# Patient Record
Sex: MALE | Race: WHITE | NOT HISPANIC OR LATINO | Employment: FULL TIME | URBAN - METROPOLITAN AREA
[De-identification: names, ages, dates, MRNs, and addresses within clinical notes are randomized per-mention and may not be internally consistent; named-entity substitution may affect disease eponyms.]

---

## 2017-05-19 ENCOUNTER — OFFICE VISIT (OUTPATIENT)
Dept: MEDICAL GROUP | Facility: MEDICAL CENTER | Age: 62
End: 2017-05-19
Payer: COMMERCIAL

## 2017-05-19 VITALS
WEIGHT: 191 LBS | DIASTOLIC BLOOD PRESSURE: 98 MMHG | OXYGEN SATURATION: 98 % | RESPIRATION RATE: 16 BRPM | SYSTOLIC BLOOD PRESSURE: 138 MMHG | HEIGHT: 71 IN | HEART RATE: 86 BPM | BODY MASS INDEX: 26.74 KG/M2 | TEMPERATURE: 97.9 F

## 2017-05-19 DIAGNOSIS — B18.2 CHRONIC HEPATITIS C WITHOUT HEPATIC COMA (HCC): ICD-10-CM

## 2017-05-19 DIAGNOSIS — Z00.00 ANNUAL PHYSICAL EXAM: ICD-10-CM

## 2017-05-19 DIAGNOSIS — R03.0 ELEVATED BLOOD PRESSURE READING: ICD-10-CM

## 2017-05-19 DIAGNOSIS — N52.9 ERECTILE DYSFUNCTION, UNSPECIFIED ERECTILE DYSFUNCTION TYPE: ICD-10-CM

## 2017-05-19 PROBLEM — B19.20 HEPATITIS C: Status: ACTIVE | Noted: 2017-05-19

## 2017-05-19 PROCEDURE — 99386 PREV VISIT NEW AGE 40-64: CPT | Performed by: FAMILY MEDICINE

## 2017-05-19 RX ORDER — SILDENAFIL 100 MG/1
100 TABLET, FILM COATED ORAL
Qty: 10 TAB | Refills: 5 | Status: SHIPPED | OUTPATIENT
Start: 2017-05-19 | End: 2018-05-07 | Stop reason: SDUPTHER

## 2017-05-19 ASSESSMENT — PATIENT HEALTH QUESTIONNAIRE - PHQ9: CLINICAL INTERPRETATION OF PHQ2 SCORE: 0

## 2017-05-19 NOTE — ASSESSMENT & PLAN NOTE
Started about 8 years ago. Difficulty getting and maintaining an erection.  Has not tried any medication.

## 2017-05-19 NOTE — ASSESSMENT & PLAN NOTE
Patient does not regularly, last any check. His blood pressure was 120/70, that was many months ago.

## 2017-05-19 NOTE — PROGRESS NOTES
"German Pagan is a 62 y.o. male here for annual  HPI: Patient has not seen a physician in 10 years.  He has history of methamphetamine abuse, but quit in .    ED (erectile dysfunction)  Started about 8 years ago. Difficulty getting and maintaining an erection.  Has not tried any medication.    Hepatitis C  Diagnosed  in [place] - [facility].  Liver biopsy in [place], mild scarring.  He is no longer drinking alcohol, quit in .    Elevated blood pressure reading  Patient does not regularly, last any check. His blood pressure was 120/70, that was many months ago.    Current medicines (including changes today)  Current Outpatient Prescriptions   Medication Sig Dispense Refill   • sildenafil citrate (VIAGRA) 100 MG tablet Take 1 Tab by mouth 1 time daily as needed for Erectile Dysfunction. 10 Tab 5     No current facility-administered medications for this visit.     He  has no past medical history on file.  He  has past surgical history that includes ankle orif and ankle fusion (Right, ).  Social History   Substance Use Topics   • Smoking status: Former Smoker -- 0.50 packs/day for 40 years     Types: Cigarettes     Quit date: 2006   • Smokeless tobacco: Current User     Types: Chew      Comment: Quit smoking in    • Alcohol Use: No      Comment: Quit in      Social History     Social History Narrative   • No narrative on file     Family History   Problem Relation Age of Onset   • Cancer Mother      Multiple Myeloma   • Cancer Father      Colon   • Cancer Sister      Breast   • Diabetes Brother      Family Status   Relation Status Death Age   • Mother Alive    • Father     • Sister     • Brother Alive          ROS  No chest pain  All other systems reviewed and are negative     Objective:     Blood pressure 138/98, pulse 86, temperature 36.6 °C (97.9 °F), resp. rate 16, height 1.803 m (5' 10.98\"), weight 86.637 kg (191 lb), SpO2 98 %. Body mass " index is 26.65 kg/(m^2).  Physical Exam:    Constitutional: Alert, no distress.  Skin: Warm, dry, good turgor, no rashes in visible areas.  Eye: Equal, round and reactive, conjunctiva clear, lids normal.  ENMT: Lips without lesions, false dentition, oropharynx clear.  Neck: Trachea midline, no masses, no thyromegaly. No cervical or supraclavicular lymphadenopathy.  Respiratory: Unlabored respiratory effort, lungs clear to auscultation, no wheezes, no ronchi.  Cardiovascular: Normal S1, S2, no murmur, no edema.  Abdomen: Soft, non-tender, no masses, no hepatosplenomegaly.  Psych: Alert and oriented x3, normal affect and mood.        Assessment and Plan:   The following treatment plan was discussed    1. Annual physical exam  Check labs and follow-up in one month with results.  - PROSTATE SPECIFIC AG SCREENING; Future  - COMP METABOLIC PANEL; Future  - CBC WITH DIFFERENTIAL; Future  - LIPID PROFILE; Future  - TSH WITH REFLEX TO FT4; Future  - VITAMIN D,25 HYDROXY; Future  - HCV GENOTYPING; Future  - HCV QUANTASURE (PLUS); Future    2. Chronic hepatitis C without hepatic coma (CMS-HCC)  Check labs in follow-up in one month with results.  - HCV GENOTYPING; Future  - HCV QUANTASURE (PLUS); Future    3. Erectile dysfunction, unspecified erectile dysfunction type  Trial of Viagra.  - sildenafil citrate (VIAGRA) 100 MG tablet; Take 1 Tab by mouth 1 time daily as needed for Erectile Dysfunction.  Dispense: 10 Tab; Refill: 5    4. Elevated blood pressure reading  Advised patient to monitor blood pressure daily and to follow-up in one month with blood pressure log.      [facility]  Followup: Return in about 4 weeks (around 6/16/2017) for Labs, HTN, Short.

## 2017-05-19 NOTE — ASSESSMENT & PLAN NOTE
Diagnosed 2002 in [place] - [facility].  Liver biopsy in [place], mild scarring.  He is no longer drinking alcohol, quit in 1994.

## 2017-05-19 NOTE — MR AVS SNAPSHOT
"        German Pagan   2017 3:40 PM   Office Visit   MRN: [id number]    Department:  [facility]   Dept Phone:  [phone]    Description:  Male : 1955   Provider:  Shiva Haney M.D.           Reason for Visit     Establish Care           Allergies as of 2017     Allergen Noted Reactions    Pcn [Penicillins] 2017       Rash as a baby      You were diagnosed with     Annual physical exam   [068395]       Chronic hepatitis C without hepatic coma (CMS-HCC)   [357430]       Erectile dysfunction, unspecified erectile dysfunction type   [4503249]       Elevated blood pressure reading   [829814]         Vital Signs     Blood Pressure Pulse Temperature Respirations Height Weight    138/98 mmHg 86 36.6 °C (97.9 °F) 16 1.803 m (5' 10.98\") 86.637 kg (191 lb)    Body Mass Index Oxygen Saturation Smoking Status             26.65 kg/m2 98% Former Smoker         Basic Information     Date Of Birth Sex Race Ethnicity Preferred Language    1955 Male White Non- English      Your appointments     2017  3:40 PM   Established Patient with Shiva Haney M.D.   [facility] ([facility])    [address]  [facility] [address] [phone]           You will be receiving a confirmation call a few days before your appointment from our automated call confirmation system.              Problem List              ICD-10-CM Priority Class Noted - Resolved    Hepatitis C B19.20   2017 - Present    ED (erectile dysfunction) N52.9   2017 - Present    Elevated blood pressure reading R03.0   2017 - Present      Health Maintenance        Date Due Completion Dates    IMM DTaP/Tdap/Td Vaccine (1 - Tdap) 3/15/1972 ---    COLONOSCOPY 3/15/2003 ---    IMM ZOSTER VACCINE 3/15/2013 ---            Current Immunizations     No immunizations on file.      Below and/or attached are the medications your provider expects you to take. Review all of your " home medications and newly ordered medications with your provider and/or pharmacist. Follow medication instructions as directed by your provider and/or pharmacist. Please keep your medication list with you and share with your provider. Update the information when medications are discontinued, doses are changed, or new medications (including over-the-counter products) are added; and carry medication information at all times in the event of emergency situations     Allergies:  PCN - (reactions not documented)               Medications  Valid as of: May 19, 2017 -  4:09 PM    Generic Name Brand Name Tablet Size Instructions for use    Sildenafil Citrate (Tab) VIAGRA 100 MG Take 1 Tab by mouth 1 time daily as needed for Erectile Dysfunction.        .                 Medicines prescribed today were sent to:     [facility] [address] [place] [address] (S), [place] [address]    [address] ([address]) [place] [address]    Phone: [phone] Fax: [phone]    Open 24 Hours?: No      Medication refill instructions:       If your prescription bottle indicates you have medication refills left, it is not necessary to call your provider’s office. Please contact your pharmacy and they will refill your medication.    If your prescription bottle indicates you do not have any refills left, you may request refills at any time through one of the following ways: The online shoutr system (except Urgent Care), by calling your provider’s office, or by asking your pharmacy to contact your provider’s office with a refill request. Medication refills are processed only during regular business hours and may not be available until the next business day. Your provider may request additional information or to have a follow-up visit with you prior to refilling your medication.   *Please Note: Medication refills are assigned a new Rx number when refilled electronically. Your pharmacy may indicate that no refills were authorized even though a new  prescription for the same medication is available at the pharmacy. Please request the medicine by name with the pharmacy before contacting your provider for a refill.        Your To Do List     Future Labs/Procedures Complete By Expires    CBC WITH DIFFERENTIAL  As directed 5/20/2018    COMP METABOLIC PANEL  As directed 5/20/2018    HCV GENOTYPING  As directed 5/20/2018    HCV QUANTASURE (PLUS)  As directed 5/20/2018    LIPID PROFILE  As directed 5/20/2018    PROSTATE SPECIFIC AG SCREENING  As directed 5/20/2018    TSH WITH REFLEX TO FT4  As directed 5/19/2018    VITAMIN D,25 HYDROXY  As directed 5/20/2018         [email or web address] Access Code: [email or web address]  Expires: 6/18/2017  3:18 PM    Netshow.me  A secure, online tool to manage your health information     [employer]’s Netshow.me® is a secure, online tool that connects you to your personalized health information from the privacy of your home -- day or night - making it very easy for you to manage your healthcare. Once the activation process is completed, you can even access your medical information using the Netshow.me maria esther, which is available for free in the Apple Maria Esther store or Google Play store.     Netshow.me provides the following levels of access (as shown below):   My Chart Features   [facility] Doctor [facility] [address]  Urgent  Care [facility]  Primary Care  Doctor   Email your healthcare team securely and privately 24/7 X X X    Manage appointments: schedule your next appointment; view details of past/upcoming appointments X      Request prescription refills. X      View recent personal medical records, including lab and immunizations X X X X   View health record, including health history, allergies, medications X X X X   Read reports about your outpatient visits, procedures, consult and ER notes X X X X   See your discharge summary, which is a recap of your hospital and/or ER visit that includes your diagnosis, lab results, and care plan. X X       How  to register for Ripl:  1. Go to  [email or web address].  2. Click on the Sign Up Now box, which takes you to the New Member Sign Up page. You will need to provide the following information:  a. Enter your Ripl Access Code exactly as it appears at the top of this page. (You will not need to use this code after you’ve completed the sign-up process. If you do not sign up before the expiration date, you must request a new code.)   b. Enter your date of birth.   c. Enter your home email address.   d. Click Submit, and follow the next screen’s instructions.  3. Create a Ripl ID. This will be your Ripl login ID and cannot be changed, so think of one that is secure and easy to remember.  4. Create a Ripl password. You can change your password at any time.  5. Enter your Password Reset Question and Answer. This can be used at a later time if you forget your password.   6. Enter your e-mail address. This allows you to receive e-mail notifications when new information is available in Ripl.  7. Click Sign Up. You can now view your health information.    For assistance activating your Ripl account, call [phone]        Quit Tobacco Information     Do you want to quit using tobacco?    Quitting tobacco decreases risks of cancer, heart and lung disease, increases life expectancy, improves sense of taste and smell, and increases spending money, among other benefits.    If you are thinking about quitting, we can help.  • [facility] Quit Tobacco Program: [phone]  o Program occurs weekly for four weeks and includes pharmacist consultation on products to support quitting smoking or chewing tobacco. A provider referral is needed for pharmacist consultation.  • Tobacco Users Help Hotline: 3-800-QUIT-NOW (113-1564) or https://[place].quitlogix.org/  o Free, confidential telephone and online coaching for [place] residents. Sessions are designed on a schedule that is convenient for you. Eligible clients  receive free nicotine replacement therapy.  • Nationally: www.smokefree.gov  o Information and professional assistance to support both immediate and long-term needs as you become, and remain, a non-smoker. Smokefree.gov allows you to choose the help that best fits your needs.

## 2017-05-23 ENCOUNTER — HOSPITAL ENCOUNTER (OUTPATIENT)
Dept: LAB | Facility: MEDICAL CENTER | Age: 62
End: 2017-05-23
Attending: FAMILY MEDICINE
Payer: COMMERCIAL

## 2017-05-23 DIAGNOSIS — B18.2 CHRONIC HEPATITIS C WITHOUT HEPATIC COMA (HCC): ICD-10-CM

## 2017-05-23 DIAGNOSIS — Z00.00 ANNUAL PHYSICAL EXAM: ICD-10-CM

## 2017-05-23 LAB
25(OH)D3 SERPL-MCNC: 24 NG/ML (ref 30–100)
ALBUMIN SERPL BCP-MCNC: 3.9 G/DL (ref 3.2–4.9)
ALBUMIN/GLOB SERPL: 1.3 G/DL
ALP SERPL-CCNC: 37 U/L (ref 30–99)
ALT SERPL-CCNC: 19 U/L (ref 2–50)
ANION GAP SERPL CALC-SCNC: 6 MMOL/L (ref 0–11.9)
AST SERPL-CCNC: 17 U/L (ref 12–45)
BASOPHILS # BLD AUTO: 0.7 % (ref 0–1.8)
BASOPHILS # BLD: 0.04 K/UL (ref 0–0.12)
BILIRUB SERPL-MCNC: 0.8 MG/DL (ref 0.1–1.5)
BUN SERPL-MCNC: 15 MG/DL (ref 8–22)
CALCIUM SERPL-MCNC: 9.7 MG/DL (ref 8.5–10.5)
CHLORIDE SERPL-SCNC: 107 MMOL/L (ref 96–112)
CHOLEST SERPL-MCNC: 167 MG/DL (ref 100–199)
CO2 SERPL-SCNC: 23 MMOL/L (ref 20–33)
CREAT SERPL-MCNC: 0.91 MG/DL (ref 0.5–1.4)
EOSINOPHIL # BLD AUTO: 0.09 K/UL (ref 0–0.51)
EOSINOPHIL NFR BLD: 1.5 % (ref 0–6.9)
ERYTHROCYTE [DISTWIDTH] IN BLOOD BY AUTOMATED COUNT: 41.4 FL (ref 35.9–50)
GFR SERPL CREATININE-BSD FRML MDRD: >60 ML/MIN/1.73 M 2
GLOBULIN SER CALC-MCNC: 3.1 G/DL (ref 1.9–3.5)
GLUCOSE SERPL-MCNC: 95 MG/DL (ref 65–99)
HCT VFR BLD AUTO: 45.6 % (ref 42–52)
HDLC SERPL-MCNC: 48 MG/DL
HGB BLD-MCNC: 15.7 G/DL (ref 14–18)
IMM GRANULOCYTES # BLD AUTO: 0.02 K/UL (ref 0–0.11)
IMM GRANULOCYTES NFR BLD AUTO: 0.3 % (ref 0–0.9)
LDLC SERPL CALC-MCNC: 104 MG/DL
LYMPHOCYTES # BLD AUTO: 2.35 K/UL (ref 1–4.8)
LYMPHOCYTES NFR BLD: 39 % (ref 22–41)
MCH RBC QN AUTO: 31.3 PG (ref 27–33)
MCHC RBC AUTO-ENTMCNC: 34.4 G/DL (ref 33.7–35.3)
MCV RBC AUTO: 91 FL (ref 81.4–97.8)
MONOCYTES # BLD AUTO: 0.55 K/UL (ref 0–0.85)
MONOCYTES NFR BLD AUTO: 9.1 % (ref 0–13.4)
NEUTROPHILS # BLD AUTO: 2.97 K/UL (ref 1.82–7.42)
NEUTROPHILS NFR BLD: 49.4 % (ref 44–72)
NRBC # BLD AUTO: 0 K/UL
NRBC BLD AUTO-RTO: 0 /100 WBC
PLATELET # BLD AUTO: 178 K/UL (ref 164–446)
PMV BLD AUTO: 10.7 FL (ref 9–12.9)
POTASSIUM SERPL-SCNC: 4 MMOL/L (ref 3.6–5.5)
PROT SERPL-MCNC: 7 G/DL (ref 6–8.2)
PSA SERPL-MCNC: 1.57 NG/ML (ref 0–4)
RBC # BLD AUTO: 5.01 M/UL (ref 4.7–6.1)
SODIUM SERPL-SCNC: 136 MMOL/L (ref 135–145)
TRIGL SERPL-MCNC: 74 MG/DL (ref 0–149)
TSH SERPL DL<=0.005 MIU/L-ACNC: 1.8 UIU/ML (ref 0.3–3.7)
WBC # BLD AUTO: 6 K/UL (ref 4.8–10.8)

## 2017-05-23 PROCEDURE — 82306 VITAMIN D 25 HYDROXY: CPT

## 2017-05-23 PROCEDURE — 87902 NFCT AGT GNTYP ALYS HEP C: CPT

## 2017-05-23 PROCEDURE — 85025 COMPLETE CBC W/AUTO DIFF WBC: CPT

## 2017-05-23 PROCEDURE — 84153 ASSAY OF PSA TOTAL: CPT

## 2017-05-23 PROCEDURE — 80061 LIPID PANEL: CPT

## 2017-05-23 PROCEDURE — 36415 COLL VENOUS BLD VENIPUNCTURE: CPT

## 2017-05-23 PROCEDURE — 80053 COMPREHEN METABOLIC PANEL: CPT

## 2017-05-23 PROCEDURE — 84443 ASSAY THYROID STIM HORMONE: CPT

## 2017-05-23 PROCEDURE — 87522 HEPATITIS C REVRS TRNSCRPJ: CPT

## 2017-05-26 LAB
HCV RNA SERPL NAA+PROBE-ACNC: NORMAL IU/ML
HCV RNA SERPL NAA+PROBE-LOG IU: 6.17 LOG10 IU/ML
TEST INFO  93644: NORMAL

## 2017-05-28 LAB — HCV GENTYP SERPL NAA+PROBE: NORMAL

## 2017-06-16 ENCOUNTER — OFFICE VISIT (OUTPATIENT)
Dept: MEDICAL GROUP | Facility: MEDICAL CENTER | Age: 62
End: 2017-06-16
Payer: COMMERCIAL

## 2017-06-16 VITALS
TEMPERATURE: 98.2 F | BODY MASS INDEX: 26.88 KG/M2 | SYSTOLIC BLOOD PRESSURE: 112 MMHG | OXYGEN SATURATION: 96 % | HEIGHT: 71 IN | HEART RATE: 86 BPM | WEIGHT: 192 LBS | DIASTOLIC BLOOD PRESSURE: 72 MMHG

## 2017-06-16 DIAGNOSIS — E55.9 VITAMIN D INSUFFICIENCY: ICD-10-CM

## 2017-06-16 DIAGNOSIS — B18.2 CHRONIC HEPATITIS C WITHOUT HEPATIC COMA (HCC): ICD-10-CM

## 2017-06-16 DIAGNOSIS — Z23 NEED FOR SHINGLES VACCINE: ICD-10-CM

## 2017-06-16 PROBLEM — R03.0 ELEVATED BLOOD PRESSURE READING: Status: RESOLVED | Noted: 2017-05-19 | Resolved: 2017-06-16

## 2017-06-16 PROCEDURE — 99213 OFFICE O/P EST LOW 20 MIN: CPT | Performed by: FAMILY MEDICINE

## 2017-06-16 NOTE — ASSESSMENT & PLAN NOTE
Diagnosed 2002 in [place] - was [facility] because of [facility].  Liver biopsy in [place], mild scarring.  He is no longer drinking alcohol, quit in 1994.  Patient states that hepatitis C has never bothered him, so he is not interested in treatment at this time.

## 2017-06-16 NOTE — Clinical Note
Critical access hospital  Shiva Haney M.D.  [address]  [place] [address]  Fax: [phone]   Authorization for Release/Disclosure of   Protected Health Information   Name: GERMAN PAGAN : 1955 SSN: [id number]   Address: [address] [place] [address] [address] [place] [address] Phone:    [phone] (home)    I authorize the entity listed below to release/disclose the PHI below to:   Critical access hospital/Shiva Haney M.D. and Shiva Haney M.D.   Provider or Entity Name:  [facility]   Address   City, State, Zip   Phone:      Fax:     Reason for request: continuity of care   Information to be released:    [  ] LAST COLONOSCOPY,  including any PATH REPORT and follow-up  [  ] LAST FIT/COLOGUARD RESULT [  ] LAST DEXA  [  ] LAST MAMMOGRAM  [  ] LAST PAP  [  ] LAST LABS [  ] RETINA EXAM REPORT  [  ] IMMUNIZATION RECORDS  [  ] Release all info      [  ] Check here and initial the line next to each item to release ALL health information INCLUDING  _____ Care and treatment for drug and / or alcohol abuse  _____ HIV testing, infection status, or AIDS  _____ Genetic Testing    DATES OF SERVICE OR TIME PERIOD TO BE DISCLOSED: _____________  I understand and acknowledge that:  * This Authorization may be revoked at any time by you in writing, except if your health information has already been used or disclosed.  * Your health information that will be used or disclosed as a result of you signing this authorization could be re-disclosed by the recipient. If this occurs, your re-disclosed health information may no longer be protected by State or Federal laws.  * You may refuse to sign this Authorization. Your refusal will not affect your ability to obtain treatment.  * This Authorization becomes effective upon signing and will  on (date) __________.      If no date is indicated, this Authorization will  one (1) year from the signature date.    Name: German Pagan    Signature:   Date:     2017            PLEASE FAX REQUESTED RECORDS BACK TO: [phone]

## 2017-06-16 NOTE — PROGRESS NOTES
"Subjective:   German Villalpando is a 62 y.o. male here today for vitamin D insufficiency, hepatitis C    Vitamin D insufficiency  Patient's vitamin D level was slightly low at 24. He is not taking vitamin D supplementation.    Hepatitis C  Diagnosed 2002 in [place] - [facility].  Liver biopsy in [place], mild scarring.  He is no longer drinking alcohol, quit in 1994.  Patient states that hepatitis C has never bothered him, so he is not interested in treatment at this time.         Current medicines (including changes today)  Current Outpatient Prescriptions   Medication Sig Dispense Refill   • zoster vaccine live, PF, (ZOSTAVAX) 88971 UNT/0.65ML injection Inject 0.65 mL as instructed Once for 1 dose. Please fax when administered so we can update our records 0.65 mL 0   • sildenafil citrate (VIAGRA) 100 MG tablet Take 1 Tab by mouth 1 time daily as needed for Erectile Dysfunction. 10 Tab 5     No current facility-administered medications for this visit.     He  has no past medical history on file.    ROS   No chest pain, no shortness of breath       Objective:     Blood pressure 112/72, pulse 86, temperature 36.8 °C (98.2 °F), height 1.803 m (5' 11\"), weight 87.091 kg (192 lb), SpO2 96 %. Body mass index is 26.79 kg/(m^2).   Physical Exam:  Constitutional: Alert, no distress.  Skin: Warm, dry, good turgor, no rashes in visible areas.  Eye: Equal, round and reactive, conjunctiva clear, lids normal.  Respiratory: Unlabored respiratory effort, lungs clear to auscultation, no wheezes, no ronchi.  Cardiovascular: Normal S1, S2, no murmur, no edema.  Psych: Alert and oriented x3, normal affect and mood.    Results for GERMAN VILLALPANDO (MRN [id number]) as of 6/16/2017 15:41   Ref. Range 5/23/2017 10:06   WBC Latest Ref Range: 4.8-10.8 K/uL 6.0   RBC Latest Ref Range: 4.70-6.10 M/uL 5.01   Hemoglobin Latest Ref Range: 14.0-18.0 g/dL 15.7   Hematocrit Latest Ref Range: 42.0-52.0 % 45.6   MCV Latest Ref " Range: 81.4-97.8 fL 91.0   MCH Latest Ref Range: 27.0-33.0 pg 31.3   MCHC Latest Ref Range: 33.7-35.3 g/dL 34.4   RDW Latest Ref Range: 35.9-50.0 fL 41.4   Platelet Count Latest Ref Range: 164-446 K/uL 178   MPV Latest Ref Range: 9.0-12.9 fL 10.7   Neutrophils-Polys Latest Ref Range: 44.00-72.00 % 49.40   Neutrophils (Absolute) Latest Ref Range: 1.82-7.42 K/uL 2.97   Lymphocytes Latest Ref Range: 22.00-41.00 % 39.00   Lymphs (Absolute) Latest Ref Range: 1.00-4.80 K/uL 2.35   Monocytes Latest Ref Range: 0.00-13.40 % 9.10   Monos (Absolute) Latest Ref Range: 0.00-0.85 K/uL 0.55   Eosinophils Latest Ref Range: 0.00-6.90 % 1.50   Eos (Absolute) Latest Ref Range: 0.00-0.51 K/uL 0.09   Basophils Latest Ref Range: 0.00-1.80 % 0.70   Baso (Absolute) Latest Ref Range: 0.00-0.12 K/uL 0.04   Immature Granulocytes Latest Ref Range: 0.00-0.90 % 0.30   Immature Granulocytes (abs) Latest Ref Range: 0.00-0.11 K/uL 0.02   Nucleated RBC Latest Units: /100 WBC 0.00   NRBC (Absolute) Latest Units: K/uL 0.00   Sodium Latest Ref Range: 135-145 mmol/L 136   Potassium Latest Ref Range: 3.6-5.5 mmol/L 4.0   Chloride Latest Ref Range:  mmol/L 107   Co2 Latest Ref Range: 20-33 mmol/L 23   Anion Gap Latest Ref Range: 0.0-11.9  6.0   Glucose Latest Ref Range: 65-99 mg/dL 95   Bun Latest Ref Range: 8-22 mg/dL 15   Creatinine Latest Ref Range: 0.50-1.40 mg/dL 0.91   GFR If  Latest Ref Range: >60 mL/min/1.73 m 2 >60   GFR If Non  Latest Ref Range: >60 mL/min/1.73 m 2 >60   Calcium Latest Ref Range: 8.5-10.5 mg/dL 9.7   AST(SGOT) Latest Ref Range: 12-45 U/L 17   ALT(SGPT) Latest Ref Range: 2-50 U/L 19   Alkaline Phosphatase Latest Ref Range: 30-99 U/L 37   Total Bilirubin Latest Ref Range: 0.1-1.5 mg/dL 0.8   Albumin Latest Ref Range: 3.2-4.9 g/dL 3.9   Total Protein Latest Ref Range: 6.0-8.2 g/dL 7.0   Globulin Latest Ref Range: 1.9-3.5 g/dL 3.1   A-G Ratio Latest Units: g/dL 1.3   Cholesterol,Tot Latest Ref  Range: 100-199 mg/dL 167   Triglycerides Latest Ref Range: 0-149 mg/dL 74   HDL Latest Ref Range: >=40 mg/dL 48   LDL Latest Ref Range: <100 mg/dL 104 (H)   Prostatic Specific Antigen Tot Latest Ref Range: 0.00-4.00 ng/mL 1.57   25-Hydroxy   Vitamin D 25 Latest Ref Range:  ng/mL 24 (L)   TSH Latest Ref Range: 0.300-3.700 uIU/mL 1.800   Hepatitis C Genotyping Unknown 1a or 1b   Hepatitis C Rna By Pcr, Quanti Latest Units: IU/mL [id number]   Test Info Unknown SEE BELOW   HCV Log 10 Latest Units: log10 IU/mL 6.170       Assessment and Plan:   The following treatment plan was discussed    1. Vitamin D insufficiency  Advised patient take vitamin D 2000 units daily.    2. Chronic hepatitis C without hepatic coma (CMS-HCC)  Offered referral to GI for hepatitis C treatment, patient will think about it and let us know if he is interested in referral.    3. Need for shingles vaccine  - zoster vaccine live, PF, (ZOSTAVAX) 90359 UNT/0.65ML injection; Inject 0.65 mL as instructed Once for 1 dose. Please fax when administered so we can update our records  Dispense: 0.65 mL; Refill: 0      Followup: Return in about 1 year (around 6/16/2018), or if symptoms worsen or fail to improve, for Annual, Short.

## 2017-06-16 NOTE — MR AVS SNAPSHOT
"        German Pagan   2017 3:40 PM   Office Visit   MRN: [id number]    Department:  [facility]   Dept Phone:  [phone]    Description:  Male : 1955   Provider:  Shiva Haney M.D.           Reason for Visit     Follow-Up labs       Allergies as of 2017     Allergen Noted Reactions    Pcn [Penicillins] 2017       Rash as a baby      You were diagnosed with     Vitamin D insufficiency   [999560]       Chronic hepatitis C without hepatic coma (CMS-HCC)   [214867]       Need for shingles vaccine   [048898]         Vital Signs     Blood Pressure Pulse Temperature Height Weight Body Mass Index    112/72 mmHg 86 36.8 °C (98.2 °F) 1.803 m (5' 11\") 87.091 kg (192 lb) 26.79 kg/m2    Oxygen Saturation Smoking Status                96% Former Smoker          Basic Information     Date Of Birth Sex Race Ethnicity Preferred Language    1955 Male White Non- English      Problem List              ICD-10-CM Priority Class Noted - Resolved    Hepatitis C B19.20   2017 - Present    ED (erectile dysfunction) N52.9   2017 - Present    Vitamin D insufficiency E55.9   2017 - Present      Health Maintenance        Date Due Completion Dates    IMM DTaP/Tdap/Td Vaccine (1 - Tdap) 3/15/1972 ---    COLONOSCOPY 3/15/2003 ---    IMM ZOSTER VACCINE 3/15/2013 ---            Current Immunizations     No immunizations on file.      Below and/or attached are the medications your provider expects you to take. Review all of your home medications and newly ordered medications with your provider and/or pharmacist. Follow medication instructions as directed by your provider and/or pharmacist. Please keep your medication list with you and share with your provider. Update the information when medications are discontinued, doses are changed, or new medications (including over-the-counter products) are added; and carry medication information at all times in the event of emergency situations "     Allergies:  PCN - (reactions not documented)               Medications  Valid as of: June 16, 2017 -  3:42 PM    Generic Name Brand Name Tablet Size Instructions for use    Sildenafil Citrate (Tab) VIAGRA 100 MG Take 1 Tab by mouth 1 time daily as needed for Erectile Dysfunction.        Zoster Vaccine Live (Recon Soln) ZOSTAVAX 39807 UNT/0.65ML Inject 0.65 mL as instructed Once for 1 dose. Please fax when administered so we can update our records        .                 Medicines prescribed today were sent to:     [address] ([place]), [place] [address]    [facility] [facility] [place] [address]    Phone: [phone] Fax: [phone]    Open 24 Hours?: No      Medication refill instructions:       If your prescription bottle indicates you have medication refills left, it is not necessary to call your provider’s office. Please contact your pharmacy and they will refill your medication.    If your prescription bottle indicates you do not have any refills left, you may request refills at any time through one of the following ways: The online [employer] system (except Urgent Care), by calling your provider’s office, or by asking your pharmacy to contact your provider’s office with a refill request. Medication refills are processed only during regular business hours and may not be available until the next business day. Your provider may request additional information or to have a follow-up visit with you prior to refilling your medication.   *Please Note: Medication refills are assigned a new Rx number when refilled electronically. Your pharmacy may indicate that no refills were authorized even though a new prescription for the same medication is available at the pharmacy. Please request the medicine by name with the pharmacy before contacting your provider for a refill.           MyChart Status: Patient Declined        Quit Tobacco Information     Do you want to quit using tobacco?    Quitting tobacco  decreases risks of cancer, heart and lung disease, increases life expectancy, improves sense of taste and smell, and increases spending money, among other benefits.    If you are thinking about quitting, we can help.  • [facility] Quit Tobacco Program: 782.863.1568  o Program occurs weekly for four weeks and includes pharmacist consultation on products to support quitting smoking or chewing tobacco. A provider referral is needed for pharmacist consultation.  • Tobacco Users Help Hotline: 5-376-QUIT-NOW (426-3655) or https://[place].quitlogix.org/  o Free, confidential telephone and online coaching for [place] residents. Sessions are designed on a schedule that is convenient for you. Eligible clients receive free nicotine replacement therapy.  • Nationally: www.smokefree.gov  o Information and professional assistance to support both immediate and long-term needs as you become, and remain, a non-smoker. Smokefree.gov allows you to choose the help that best fits your needs.

## 2018-05-07 ENCOUNTER — OFFICE VISIT (OUTPATIENT)
Dept: MEDICAL GROUP | Facility: MEDICAL CENTER | Age: 63
End: 2018-05-07
Payer: COMMERCIAL

## 2018-05-07 VITALS
OXYGEN SATURATION: 97 % | DIASTOLIC BLOOD PRESSURE: 84 MMHG | BODY MASS INDEX: 27.19 KG/M2 | TEMPERATURE: 97 F | WEIGHT: 194.2 LBS | HEART RATE: 75 BPM | HEIGHT: 71 IN | RESPIRATION RATE: 14 BRPM | SYSTOLIC BLOOD PRESSURE: 106 MMHG

## 2018-05-07 DIAGNOSIS — N52.9 ERECTILE DYSFUNCTION, UNSPECIFIED ERECTILE DYSFUNCTION TYPE: ICD-10-CM

## 2018-05-07 DIAGNOSIS — Z12.83 SKIN CANCER SCREENING: ICD-10-CM

## 2018-05-07 DIAGNOSIS — Z00.00 ANNUAL PHYSICAL EXAM: ICD-10-CM

## 2018-05-07 DIAGNOSIS — H61.22 IMPACTED CERUMEN OF LEFT EAR: ICD-10-CM

## 2018-05-07 DIAGNOSIS — B18.2 CHRONIC HEPATITIS C WITHOUT HEPATIC COMA (HCC): ICD-10-CM

## 2018-05-07 DIAGNOSIS — E55.9 VITAMIN D INSUFFICIENCY: ICD-10-CM

## 2018-05-07 PROCEDURE — 99397 PER PM REEVAL EST PAT 65+ YR: CPT | Mod: 25 | Performed by: FAMILY MEDICINE

## 2018-05-07 PROCEDURE — 90670 PCV13 VACCINE IM: CPT | Performed by: FAMILY MEDICINE

## 2018-05-07 PROCEDURE — 90471 IMMUNIZATION ADMIN: CPT | Performed by: FAMILY MEDICINE

## 2018-05-07 RX ORDER — SILDENAFIL 100 MG/1
100 TABLET, FILM COATED ORAL
Qty: 10 TAB | Refills: 5 | Status: SHIPPED | OUTPATIENT
Start: 2018-05-07 | End: 2018-08-14 | Stop reason: SDUPTHER

## 2018-05-07 ASSESSMENT — PATIENT HEALTH QUESTIONNAIRE - PHQ9: CLINICAL INTERPRETATION OF PHQ2 SCORE: 0

## 2018-05-07 NOTE — PROGRESS NOTES
"Subjective:   German Pagan is a 63 y.o. 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 Other than mild pain after work that requires tylenol or aleve, he has not complaints.  Is here for his annual exam. Patient has known hepatitis C. He states he had a liver biopsy done approximately 10 years ago and he had very mild liver disease. At that time with treatments, it was determined to not have treatment.    Current medicines (including changes today)  Current Outpatient Prescriptions   Medication Sig Dispense Refill   • sildenafil citrate (VIAGRA) 100 MG tablet Take 1 Tab by mouth 1 time daily as needed for Erectile Dysfunction. 10 Tab 5     No current facility-administered medications for this visit.      He  has no past medical history on file.    ROS   No chest pain, no shortness of breath, no abdominal pain       Objective:     Blood pressure 106/84, pulse 75, temperature 36.1 °C (97 °F), resp. rate 14, height 1.803 m (5' 11\"), weight 88.1 kg (194 lb 3.2 oz), SpO2 97 %. Body mass index is 27.09 kg/m².   Physical Exam:  Constitutional: Alert, no distress.  Skin: Warm, dry, good turgor, no rashes in visible areas.  Eye: Equal, round and reactive, conjunctiva clear, lids normal.  ENMT: Lips without lesions, good dentition, oropharynx clear. Left cerumen impaction.  Neck: Trachea midline, no masses, no thyromegaly. No cervical or supraclavicular lymphadenopathy  Respiratory: Unlabored respiratory effort, lungs clear to auscultation, no wheezes, no ronchi.  Cardiovascular: Normal S1, S2, no murmur, no edema.  Abdomen: Soft, non-tender, no masses, no hepatosplenomegaly.  Psych: Alert and oriented x3, normal affect and mood.        Assessment and Plan:   The following treatment plan was discussed    1. Annual physical exam  Check labs and call with results.  Prevnar-13 done today.  - COMP METABOLIC PANEL; Future  - CBC WITH DIFFERENTIAL; Future  - HEMOGLOBIN A1C; " Future  - LIPID PROFILE; Future  - TSH WITH REFLEX TO FT4; Future  - VITAMIN D,25 HYDROXY; Future  - PNEUMOCOCCAL CONJUGATE VACCINE 13-VALENT    2. Vitamin D insufficiency  Check labs and call with results.    3. Chronic hepatitis C without hepatic coma (HCC)  Genotype 1a or 1b. Referral to GI.  - REFERRAL TO GASTROENTEROLOGY    4. Erectile dysfunction, unspecified erectile dysfunction type  Stable. Continue viagra.  - sildenafil citrate (VIAGRA) 100 MG tablet; Take 1 Tab by mouth 1 time daily as needed for Erectile Dysfunction.  Dispense: 10 Tab; Refill: 5    5. Impacted cerumen of left ear  Lavaged in clinic.    6. Skin cancer screening  - REFERRAL TO DERMATOLOGY      Followup: Return in about 1 year (around 5/7/2019) for Annual.

## 2018-05-08 ENCOUNTER — HOSPITAL ENCOUNTER (OUTPATIENT)
Dept: LAB | Facility: MEDICAL CENTER | Age: 63
End: 2018-05-08
Attending: FAMILY MEDICINE
Payer: COMMERCIAL

## 2018-05-08 DIAGNOSIS — Z00.00 ANNUAL PHYSICAL EXAM: ICD-10-CM

## 2018-05-08 LAB
25(OH)D3 SERPL-MCNC: 39 NG/ML (ref 30–100)
BASOPHILS # BLD AUTO: 1.1 % (ref 0–1.8)
BASOPHILS # BLD: 0.07 K/UL (ref 0–0.12)
EOSINOPHIL # BLD AUTO: 0.11 K/UL (ref 0–0.51)
EOSINOPHIL NFR BLD: 1.7 % (ref 0–6.9)
ERYTHROCYTE [DISTWIDTH] IN BLOOD BY AUTOMATED COUNT: 44.7 FL (ref 35.9–50)
HCT VFR BLD AUTO: 47.8 % (ref 42–52)
HGB BLD-MCNC: 16.2 G/DL (ref 14–18)
IMM GRANULOCYTES # BLD AUTO: 0.02 K/UL (ref 0–0.11)
IMM GRANULOCYTES NFR BLD AUTO: 0.3 % (ref 0–0.9)
LYMPHOCYTES # BLD AUTO: 1.96 K/UL (ref 1–4.8)
LYMPHOCYTES NFR BLD: 30.8 % (ref 22–41)
MCH RBC QN AUTO: 32 PG (ref 27–33)
MCHC RBC AUTO-ENTMCNC: 33.9 G/DL (ref 33.7–35.3)
MCV RBC AUTO: 94.3 FL (ref 81.4–97.8)
MONOCYTES # BLD AUTO: 0.71 K/UL (ref 0–0.85)
MONOCYTES NFR BLD AUTO: 11.1 % (ref 0–13.4)
NEUTROPHILS # BLD AUTO: 3.5 K/UL (ref 1.82–7.42)
NEUTROPHILS NFR BLD: 55 % (ref 44–72)
NRBC # BLD AUTO: 0 K/UL
NRBC BLD-RTO: 0 /100 WBC
PLATELET # BLD AUTO: 164 K/UL (ref 164–446)
PMV BLD AUTO: 10.9 FL (ref 9–12.9)
RBC # BLD AUTO: 5.07 M/UL (ref 4.7–6.1)
TSH SERPL DL<=0.005 MIU/L-ACNC: 2.38 UIU/ML (ref 0.38–5.33)
WBC # BLD AUTO: 6.4 K/UL (ref 4.8–10.8)

## 2018-05-08 PROCEDURE — 80061 LIPID PANEL: CPT

## 2018-05-08 PROCEDURE — 84443 ASSAY THYROID STIM HORMONE: CPT

## 2018-05-08 PROCEDURE — 82306 VITAMIN D 25 HYDROXY: CPT

## 2018-05-08 PROCEDURE — 80053 COMPREHEN METABOLIC PANEL: CPT

## 2018-05-08 PROCEDURE — 36415 COLL VENOUS BLD VENIPUNCTURE: CPT

## 2018-05-08 PROCEDURE — 83036 HEMOGLOBIN GLYCOSYLATED A1C: CPT

## 2018-05-08 PROCEDURE — 85025 COMPLETE CBC W/AUTO DIFF WBC: CPT

## 2018-05-09 LAB
ALBUMIN SERPL BCP-MCNC: 3.9 G/DL (ref 3.2–4.9)
ALBUMIN/GLOB SERPL: 1.3 G/DL
ALP SERPL-CCNC: 33 U/L (ref 30–99)
ALT SERPL-CCNC: 93 U/L (ref 2–50)
ANION GAP SERPL CALC-SCNC: 8 MMOL/L (ref 0–11.9)
AST SERPL-CCNC: 58 U/L (ref 12–45)
BILIRUB SERPL-MCNC: 0.4 MG/DL (ref 0.1–1.5)
BUN SERPL-MCNC: 13 MG/DL (ref 8–22)
CALCIUM SERPL-MCNC: 9.7 MG/DL (ref 8.5–10.5)
CHLORIDE SERPL-SCNC: 106 MMOL/L (ref 96–112)
CHOLEST SERPL-MCNC: 160 MG/DL (ref 100–199)
CO2 SERPL-SCNC: 26 MMOL/L (ref 20–33)
CREAT SERPL-MCNC: 0.89 MG/DL (ref 0.5–1.4)
GLOBULIN SER CALC-MCNC: 3 G/DL (ref 1.9–3.5)
GLUCOSE SERPL-MCNC: 88 MG/DL (ref 65–99)
HDLC SERPL-MCNC: 42 MG/DL
LDLC SERPL CALC-MCNC: 107 MG/DL
POTASSIUM SERPL-SCNC: 4.7 MMOL/L (ref 3.6–5.5)
PROT SERPL-MCNC: 6.9 G/DL (ref 6–8.2)
SODIUM SERPL-SCNC: 140 MMOL/L (ref 135–145)
TRIGL SERPL-MCNC: 57 MG/DL (ref 0–149)

## 2018-05-10 ENCOUNTER — TELEPHONE (OUTPATIENT)
Dept: MEDICAL GROUP | Facility: MEDICAL CENTER | Age: 63
End: 2018-05-10

## 2018-05-10 LAB
EST. AVERAGE GLUCOSE BLD GHB EST-MCNC: 117 MG/DL
HBA1C MFR BLD: 5.7 % (ref 0–5.6)

## 2018-06-19 ENCOUNTER — APPOINTMENT (RX ONLY)
Dept: URBAN - METROPOLITAN AREA CLINIC 4 | Facility: CLINIC | Age: 63
Setting detail: DERMATOLOGY
End: 2018-06-19

## 2018-06-28 ENCOUNTER — APPOINTMENT (RX ONLY)
Dept: URBAN - METROPOLITAN AREA CLINIC 4 | Facility: CLINIC | Age: 63
Setting detail: DERMATOLOGY
End: 2018-06-28

## 2018-06-28 DIAGNOSIS — D22 MELANOCYTIC NEVI: ICD-10-CM

## 2018-06-28 DIAGNOSIS — L82.1 OTHER SEBORRHEIC KERATOSIS: ICD-10-CM

## 2018-06-28 DIAGNOSIS — Z71.89 OTHER SPECIFIED COUNSELING: ICD-10-CM

## 2018-06-28 DIAGNOSIS — L81.4 OTHER MELANIN HYPERPIGMENTATION: ICD-10-CM

## 2018-06-28 DIAGNOSIS — L57.0 ACTINIC KERATOSIS: ICD-10-CM

## 2018-06-28 DIAGNOSIS — L57.8 OTHER SKIN CHANGES DUE TO CHRONIC EXPOSURE TO NONIONIZING RADIATION: ICD-10-CM

## 2018-06-28 DIAGNOSIS — L85.3 XEROSIS CUTIS: ICD-10-CM

## 2018-06-28 PROBLEM — D22.9 MELANOCYTIC NEVI, UNSPECIFIED: Status: ACTIVE | Noted: 2018-06-28

## 2018-06-28 PROBLEM — D48.5 NEOPLASM OF UNCERTAIN BEHAVIOR OF SKIN: Status: ACTIVE | Noted: 2018-06-28

## 2018-06-28 PROCEDURE — ? TREATMENT REGIMEN

## 2018-06-28 PROCEDURE — 11100: CPT | Mod: 59

## 2018-06-28 PROCEDURE — 99203 OFFICE O/P NEW LOW 30 MIN: CPT | Mod: 25

## 2018-06-28 PROCEDURE — 17000 DESTRUCT PREMALG LESION: CPT

## 2018-06-28 PROCEDURE — 69100 BIOPSY OF EXTERNAL EAR: CPT | Mod: 59

## 2018-06-28 PROCEDURE — ? BIOPSY BY SHAVE METHOD

## 2018-06-28 PROCEDURE — ? COUNSELING

## 2018-06-28 PROCEDURE — 17003 DESTRUCT PREMALG LES 2-14: CPT

## 2018-06-28 PROCEDURE — ? LIQUID NITROGEN

## 2018-06-28 ASSESSMENT — LOCATION DETAILED DESCRIPTION DERM
LOCATION DETAILED: LEFT PROXIMAL DORSAL FOREARM
LOCATION DETAILED: EPIGASTRIC SKIN
LOCATION DETAILED: RIGHT DISTAL POSTERIOR UPPER ARM
LOCATION DETAILED: RIGHT CENTRAL TEMPLE
LOCATION DETAILED: RIGHT CENTRAL ZYGOMA
LOCATION DETAILED: RIGHT LATERAL MALAR CHEEK
LOCATION DETAILED: LEFT PROXIMAL POSTERIOR UPPER ARM
LOCATION DETAILED: RIGHT PROXIMAL DORSAL FOREARM
LOCATION DETAILED: RIGHT INFERIOR HELIX
LOCATION DETAILED: LEFT SUPERIOR MEDIAL FOREHEAD
LOCATION DETAILED: LEFT CENTRAL BUCCAL CHEEK
LOCATION DETAILED: LEFT SUPERIOR MEDIAL UPPER BACK
LOCATION DETAILED: LEFT SUPERIOR HELIX
LOCATION DETAILED: LEFT CENTRAL TEMPLE
LOCATION DETAILED: RIGHT MEDIAL UPPER BACK
LOCATION DETAILED: RIGHT SUPERIOR HELIX
LOCATION DETAILED: LEFT MEDIAL INFERIOR CHEST
LOCATION DETAILED: LEFT INFERIOR LATERAL MALAR CHEEK
LOCATION DETAILED: MID POSTERIOR NECK
LOCATION DETAILED: RIGHT LATERAL ZYGOMA

## 2018-06-28 ASSESSMENT — LOCATION SIMPLE DESCRIPTION DERM
LOCATION SIMPLE: LEFT FOREARM
LOCATION SIMPLE: LEFT UPPER BACK
LOCATION SIMPLE: RIGHT UPPER ARM
LOCATION SIMPLE: POSTERIOR NECK
LOCATION SIMPLE: LEFT CHEEK
LOCATION SIMPLE: ABDOMEN
LOCATION SIMPLE: RIGHT CHEEK
LOCATION SIMPLE: LEFT FOREHEAD
LOCATION SIMPLE: LEFT TEMPLE
LOCATION SIMPLE: RIGHT EAR
LOCATION SIMPLE: LEFT UPPER ARM
LOCATION SIMPLE: CHEST
LOCATION SIMPLE: RIGHT UPPER BACK
LOCATION SIMPLE: RIGHT ZYGOMA
LOCATION SIMPLE: LEFT EAR
LOCATION SIMPLE: RIGHT TEMPLE
LOCATION SIMPLE: RIGHT FOREARM

## 2018-06-28 ASSESSMENT — LOCATION ZONE DERM
LOCATION ZONE: NECK
LOCATION ZONE: TRUNK
LOCATION ZONE: ARM
LOCATION ZONE: FACE
LOCATION ZONE: EAR

## 2018-06-28 NOTE — PROCEDURE: LIQUID NITROGEN
Duration Of Freeze Thaw-Cycle (Seconds): 3
Render Post-Care Instructions In Note?: no
Number Of Freeze-Thaw Cycles: 2 freeze-thaw cycles
Detail Level: Detailed
Post-Care Instructions: I reviewed with the patient in detail post-care instructions. Patient is to wear sunprotection, and avoid picking at any of the treated lesions. Pt may apply Vaseline to crusted or scabbing areas.
Consent: The patient's consent was obtained including but not limited to risks of crusting, scabbing, blistering, scarring, darker or lighter pigmentary change, recurrence, incomplete removal and infection.
Repair Type: Complex Repair

## 2018-06-28 NOTE — PROCEDURE: BIOPSY BY SHAVE METHOD
Post-Care Instructions: I reviewed with the patient in detail post-care instructions. Patient is to keep the biopsy site dry overnight, and then apply bacitracin twice daily until healed. Patient may apply hydrogen peroxide soaks to remove any crusting.
Anesthesia Type: 1% lidocaine with 1:100,000 epinephrine and 408mcg clindamycin/ml and a 1:10 solution of 8.4% sodium bicarbonate
Lab Facility: 
Notification Instructions: Patient will be notified of biopsy results. However, patient instructed to call the office if not contacted within 2 weeks.
Electrodesiccation Text: The wound bed was treated with electrodesiccation after the biopsy was performed.
X Size Of Lesion In Cm: 0
Bill [id number] For Specimen Handling/Conveyance To Laboratory?: no
Silver Nitrate Text: The wound bed was treated with silver nitrate after the biopsy was performed.
Anesthesia Volume In Cc: 1
Consent: Written consent was obtained and risks were reviewed including but not limited to scarring, infection, bleeding, scabbing, incomplete removal, nerve damage and allergy to anesthesia.
Detail Level: Detailed
Hemostasis: Drysol
Type Of Destruction Used: Curettage
Biopsy Method: Personna blade
Was A Bandage Applied: Yes
Wound Care: Vaseline
Biopsy Type: H and E
Lab: [id number]
Dressing: bandage
Depth Of Biopsy: dermis
Cryotherapy Text: The wound bed was treated with cryotherapy after the biopsy was performed.
Electrodesiccation And Curettage Text: The wound bed was treated with electrodesiccation and curettage after the biopsy was performed.
Billing Type: Third-Party Bill
Curettage Text: The wound bed was treated with curettage after the biopsy was performed.

## 2018-07-06 ENCOUNTER — APPOINTMENT (RX ONLY)
Dept: URBAN - METROPOLITAN AREA CLINIC 4 | Facility: CLINIC | Age: 63
Setting detail: DERMATOLOGY
End: 2018-07-06

## 2018-07-06 PROBLEM — D04.9 CARCINOMA IN SITU OF SKIN, UNSPECIFIED: Status: ACTIVE | Noted: 2018-07-06

## 2018-07-06 PROCEDURE — ? MOHS SURGERY PHONE CONSULTATION

## 2018-07-06 NOTE — PROCEDURE: MOHS SURGERY PHONE CONSULTATION
Has The Patient Ever Been Seen In Our Practice For Mohs Surgery Before?: No
Additional Information?: Patient says he has Hepatitis B and is having treatment on July 18th for it.
Referring Provider: Aubrey Lott
Detail Level: Detailed
Patient Reported Location: Nasal Dorsum
Has The Pathology Report Been Received?: Yes
Pathology Accession #: [id number]
Date Of Mohs Surgery: 07/30/2018
Office Location Of Mohs Surgery: [facility]
Patient's Insurance: [facility]
Time Of Mohs Surgery: 12:00 PM
Which Antibiotic Do They Take For Surgical Prophylaxis?: Amoxicillin (2 grams)

## 2018-07-20 ENCOUNTER — HOSPITAL ENCOUNTER (OUTPATIENT)
Dept: LAB | Facility: MEDICAL CENTER | Age: 63
End: 2018-07-20
Attending: INTERNAL MEDICINE
Payer: COMMERCIAL

## 2018-07-20 LAB
HBV SURFACE AG SER QL: NEGATIVE
PLATELET # BLD AUTO: 205 K/UL (ref 164–446)

## 2018-07-20 PROCEDURE — 85049 AUTOMATED PLATELET COUNT: CPT

## 2018-07-20 PROCEDURE — 86708 HEPATITIS A ANTIBODY: CPT

## 2018-07-20 PROCEDURE — 87522 HEPATITIS C REVRS TRNSCRPJ: CPT

## 2018-07-20 PROCEDURE — 84520 ASSAY OF UREA NITROGEN: CPT

## 2018-07-20 PROCEDURE — 82105 ALPHA-FETOPROTEIN SERUM: CPT

## 2018-07-20 PROCEDURE — 87902 NFCT AGT GNTYP ALYS HEP C: CPT

## 2018-07-20 PROCEDURE — 84460 ALANINE AMINO (ALT) (SGPT): CPT

## 2018-07-20 PROCEDURE — 36415 COLL VENOUS BLD VENIPUNCTURE: CPT

## 2018-07-20 PROCEDURE — 83883 ASSAY NEPHELOMETRY NOT SPEC: CPT

## 2018-07-20 PROCEDURE — 82977 ASSAY OF GGT: CPT

## 2018-07-20 PROCEDURE — 87340 HEPATITIS B SURFACE AG IA: CPT

## 2018-07-20 PROCEDURE — 86704 HEP B CORE ANTIBODY TOTAL: CPT

## 2018-07-20 PROCEDURE — 84450 TRANSFERASE (AST) (SGOT): CPT

## 2018-07-21 LAB — HBV CORE AB SERPL QL IA: REACTIVE

## 2018-07-22 LAB
AFP-TM SERPL-MCNC: 6 NG/ML (ref 0–9)
HAV AB SER QL IA: POSITIVE

## 2018-07-24 LAB
A2 MACROGLOB SERPL-MCNC: 528 MG/DL (ref 131–293)
ALT SERPL-CCNC: 26 U/L (ref 5–50)
ANNOTATION COMMENT IMP: ABNORMAL
AST SERPL-CCNC: 20 U/L (ref 9–50)
BUN SERPL-SCNC: 11 MG/DL (ref 7–20)
CIRRHOMETER PT SCORE Q4850: 0.67
FIBROSIS STAGE SERPL QL: ABNORMAL
GGT SERPL-CCNC: 34 U/L (ref 7–51)
INFLAMETER META CLASS Q4853: ABNORMAL
INFLAMETER PT SCORE Q4852: 0.77
LIVER FIBR SCORE SERPL CALC.FIBROMETER: 0.96
PATHOLOGY STUDY: ABNORMAL
PROTHROM ACT/NOR PPP: 76 % (ref 90–120)

## 2018-07-25 LAB
HCV GENTYP SERPL NAA+PROBE: NORMAL
HCV RNA SERPL NAA+PROBE-ACNC: NORMAL IU/ML
HCV RNA SERPL NAA+PROBE-LOG IU: 6.03 LOG10 IU/ML
TEST INFO  93644: NORMAL

## 2018-07-30 ENCOUNTER — RX ONLY (OUTPATIENT)
Age: 63
Setting detail: RX ONLY
End: 2018-07-30

## 2018-07-30 ENCOUNTER — APPOINTMENT (RX ONLY)
Dept: URBAN - METROPOLITAN AREA CLINIC 36 | Facility: CLINIC | Age: 63
Setting detail: DERMATOLOGY
End: 2018-07-30

## 2018-07-30 PROBLEM — L57.0 ACTINIC KERATOSIS: Status: ACTIVE | Noted: 2018-07-30

## 2018-07-30 PROBLEM — K75.9 INFLAMMATORY LIVER DISEASE, UNSPECIFIED: Status: ACTIVE | Noted: 2018-07-30

## 2018-07-30 PROBLEM — Z85.828 PERSONAL HISTORY OF OTHER MALIGNANT NEOPLASM OF SKIN: Status: ACTIVE | Noted: 2018-07-30

## 2018-07-30 PROBLEM — D04.39 CARCINOMA IN SITU OF SKIN OF OTHER PARTS OF FACE: Status: ACTIVE | Noted: 2018-07-30

## 2018-07-30 PROCEDURE — ? PRESCRIPTION

## 2018-07-30 PROCEDURE — ? MOHS SURGERY

## 2018-07-30 PROCEDURE — 17312 MOHS ADDL STAGE: CPT

## 2018-07-30 PROCEDURE — 14061 TIS TRNFR E/N/E/L10.1-30SQCM: CPT

## 2018-07-30 PROCEDURE — 17311 MOHS 1 STAGE H/N/HF/G: CPT

## 2018-07-30 RX ORDER — HYDROCODONE BITARTRATE AND ACETAMINOPHEN 5; 325 MG/1; MG/1
TABLET ORAL
Qty: 20 | Refills: 0

## 2018-07-30 NOTE — PROCEDURE: MOHS SURGERY
Unique Flap 4 Text: The defect edges were debeveled with a #15 scalpel blade.  Given the location of the defect and the proximity to free margins a Banner transposition flap was deemed most appropriate.  Using a sterile surgical marker, an appropriate Banner transposition flap was drawn incorporating the defect.    The area thus outlined was incised deep to adipose tissue with a #15 scalpel blade.  The skin margins were undermined to an appropriate distance in all directions utilizing iris scissors.
Anesthesia Type: 1% lidocaine with 1:100,000 epinephrine and 408mcg clindamycin/ml and a 1:10 solution of 8.4% sodium bicarbonate
Stage 6: Additional Anesthesia Type: 1% lidocaine with epinephrine
Estimated Blood Loss (Cc): less than 5 cc
Inflammation Suggestive Of Cancer Camouflage Histology Text: There was a dense lymphocytic infiltrate which prevented adequate histologic evaluation of adjacent structures.
Cheek Interpolation Flap Text: A decision was made to reconstruct the defect utilizing an interpolation axial flap and a staged reconstruction.  A telfa template was made of the defect.  This telfa template was then used to outline the Cheek Interpolation flap.  The donor area for the pedicle flap was then injected with anesthesia.  The flap was excised through the skin and subcutaneous tissue down to the layer of the underlying musculature.  The interpolation flap was carefully excised within this deep plane to maintain its blood supply.  The edges of the donor site were undermined.   The donor site was closed in a primary fashion.  The pedicle was then rotated into position and sutured.  Once the tube was sutured into place, adequate blood supply was confirmed with blanching and refill.  The pedicle was then wrapped with xeroform gauze and dressed appropriately with a telfa and gauze bandage to ensure continued blood supply and protect the attached pedicle.
Dermal Autograft Text: The defect edges were debeveled with a #15 scalpel blade.  Given the location of the defect, shape of the defect and the proximity to free margins a dermal autograft was deemed most appropriate.  Using a sterile surgical marker, the primary defect shape was transferred to the donor site. The area thus outlined was incised deep to adipose tissue with a #15 scalpel blade.  The harvested graft was then trimmed of adipose and epidermal tissue until only dermis was left.  The skin graft was then placed in the primary defect and oriented appropriately.
Home Suture Removal Text: Patient was provided instructions on removing sutures and will remove their sutures at home.  If they have any questions or difficulties they will call the office.
Include Size Of Lesion In Location Indication Statement: Yes
Surgical Defect Length In Cm (Optional): 2
Advancement Flap (Single) Text: The defect edges were debeveled with a #15 scalpel blade.  Given the location of the defect and the proximity to free margins a single advancement flap was deemed most appropriate.  Using a sterile surgical marker, an appropriate advancement flap was drawn incorporating the defect and placing the expected incisions within the relaxed skin tension lines where possible.    The area thus outlined was incised deep to adipose tissue with a #15 scalpel blade.  The skin margins were undermined to an appropriate distance in all directions utilizing iris scissors.
Localized Dermabrasion With Wire Brush Text: The patient was draped in routine manner.  Localized dermabrasion using 3 x 17 mm wire brush was performed in routine manner to papillary dermis. This spot dermabrasion is being performed to complete skin cancer reconstruction. It also will eliminate the other sun damaged precancerous cells that are known to be part of the regional effect of a lifetime's worth of sun exposure. This localized dermabrasion is therapeutic and should not be considered cosmetic in any regard.
Stage 11: Additional Anesthesia Volume In Cc: 0
Cheek-To-Nose Interpolation Flap Text: A decision was made to reconstruct the defect utilizing an interpolation axial flap and a staged reconstruction.  A telfa template was made of the defect.  This telfa template was then used to outline the Cheek-To-Nose Interpolation flap.  The donor area for the pedicle flap was then injected with anesthesia.  The flap was excised through the skin and subcutaneous tissue down to the layer of the underlying musculature.  The interpolation flap was carefully excised within this deep plane to maintain its blood supply.  The edges of the donor site were undermined.   The donor site was closed in a primary fashion.  The pedicle was then rotated into position and sutured.  Once the tube was sutured into place, adequate blood supply was confirmed with blanching and refill.  The pedicle was then wrapped with xeroform gauze and dressed appropriately with a telfa and gauze bandage to ensure continued blood supply and protect the attached pedicle.
Advancement Flap (Double) Text: The defect edges were debeveled with a #15 scalpel blade.  Given the location of the defect and the proximity to free margins a double advancement flap was deemed most appropriate.  Using a sterile surgical marker, the appropriate advancement flaps were drawn incorporating the defect and placing the expected incisions within the relaxed skin tension lines where possible.    The area thus outlined was incised deep to adipose tissue with a #15 scalpel blade.  The skin margins were undermined to an appropriate distance in all directions utilizing iris scissors.
Keystone Flap Text: The defect edges were debeveled with a #15 scalpel blade.  Given the location of the defect, shape of the defect a keystone flap was deemed most appropriate.  Using a sterile surgical marker, an appropriate keystone flap was drawn incorporating the defect, outlining the appropriate donor tissue and placing the expected incisions within the relaxed skin tension lines where possible. The area thus outlined was incised deep to adipose tissue with a #15 scalpel blade.  The skin margins were undermined to an appropriate distance in all directions around the primary defect and laterally outward around the flap utilizing iris scissors.
Unique Flap 4 Name: Banner Flap
Hatchet Flap Text: The defect edges were debeveled with a #15 scalpel blade.  Given the location of the defect, shape of the defect and the proximity to free margins a hatchet flap based from the glabella was deemed most appropriate.  Using a sterile surgical marker, an appropriate glabellar hatchet flap was drawn incorporating the defect and placing the expected incisions within the relaxed skin tension lines where possible.    The area thus outlined was incised deep to adipose tissue with a #15 scalpel blade.  The skin margins were undermined to an appropriate distance in all directions utilizing iris scissors.
Quadrant Reporting?: no
Closure 2 Information: This tab is for additional flaps and grafts, including complex repair and grafts and complex repair and flaps. You can also specify a different location for the additional defect, if the location is the same you do not need to select a new one. We will insert the automated text for the repair you select below just as we do for solitary flaps and grafts. Please note that at this time if you select a location with a different insurance zone you will need to override the ICD10 and CPT if appropriate.
Banner Transposition Flap Text: The defect edges were debeveled with a #15 scalpel blade.  Given the location of the defect and the proximity to free margins a Banner transposition flap was deemed most appropriate.  Using a sterile surgical marker, an appropriate flap drawn around the defect. The area thus outlined was incised deep to adipose tissue with a #15 scalpel blade.  The skin margins were undermined to an appropriate distance in all directions utilizing iris scissors.
Deep Sutures: 5-0 Vicryl
Crescentic Complex Repair Preamble Text (Leave Blank If You Do Not Want): Extensive wide undermining was performed at least 2 cm in all directions.
Bilobed Transposition Flap Text: The defect edges were debeveled with a #15 scalpel blade.  Given the location of the defect and the proximity to free margins a bilobed transposition flap was deemed most appropriate.  Using a sterile surgical marker, an appropriate bilobe flap drawn around the defect.    The area thus outlined was incised deep to adipose tissue with a #15 scalpel blade.  The skin margins were undermined to an appropriate distance in all directions utilizing iris scissors.
No Repair - Repaired With Adjacent Surgical Defect Text (Leave Blank If You Do Not Want): After obtaining clear surgical margins the defect was repaired concurrently with another surgical defect which was in close approximation.
Mercedes Flap Text: The defect edges were debeveled with a #15 scalpel blade.  Given the location of the defect, shape of the defect and the proximity to free margins a Mercedes flap was deemed most appropriate.  Using a sterile surgical marker, an appropriate advancement flap was drawn incorporating the defect and placing the expected incisions within the relaxed skin tension lines where possible. The area thus outlined was incised deep to adipose tissue with a #15 scalpel blade.  The skin margins were undermined to an appropriate distance in all directions utilizing iris scissors.
Flap Type: A-T Advancement Flap
Eye Protection Verbiage: Before proceeding with the stage, a plastic scleral shield was inserted. The globe was anesthetized with a few drops of 1% lidocaine with 1:100,000 epinephrine. Then, an appropriate sized scleral shield was chosen and coated with lacrilube ointment. The shield was gently inserted and left in place for the duration of each stage. After the stage was completed, the shield was gently removed.
Purse String (Intermediate) Text: Given the location of the defect and the characteristics of the surrounding skin a purse string intermediate closure was deemed most appropriate.  Undermining was performed circumfirentially around the surgical defect.  A purse string suture was then placed and tightened.
Consent (Marginal Mandibular)/Introductory Paragraph: The rationale for Mohs was explained to the patient and consent was obtained. The risks, benefits and alternatives to therapy were discussed in detail. Specifically, the risks of damage to the marginal mandibular branch of the facial nerve, infection, scarring, bleeding, prolonged wound healing, incomplete removal, allergy to anesthesia, and recurrence were addressed. Prior to the procedure, the treatment site was clearly identified and confirmed by the patient. All components of Universal Protocol/PAUSE Rule completed.
Secondary Defect Length In Cm (Required For Flaps): 3.2
Double Island Pedicle Flap Text: The defect edges were debeveled with a #15 scalpel blade.  Given the location of the defect, shape of the defect and the proximity to free margins a double island pedicle advancement flap was deemed most appropriate.  Using a sterile surgical marker, an appropriate advancement flap was drawn incorporating the defect, outlining the appropriate donor tissue and placing the expected incisions within the relaxed skin tension lines where possible.    The area thus outlined was incised deep to adipose tissue with a #15 scalpel blade.  The skin margins were undermined to an appropriate distance in all directions around the primary defect and laterally outward around the island pedicle utilizing iris scissors.  There was minimal undermining beneath the pedicle flap.
Paramedian Forehead Flap Text: A decision was made to reconstruct the defect utilizing an interpolation axial flap and a staged reconstruction.  A telfa template was made of the defect.  This telfa template was then used to outline the paramedian forehead pedicle flap.  The donor area for the pedicle flap was then injected with anesthesia.  The flap was excised through the skin and subcutaneous tissue down to the layer of the underlying musculature.  The pedicle flap was carefully excised within this deep plane to maintain its blood supply.  The edges of the donor site were undermined.   The donor site was closed in a primary fashion.  The pedicle was then rotated into position and sutured.  Once the tube was sutured into place, adequate blood supply was confirmed with blanching and refill.  The pedicle was then wrapped with xeroform gauze and dressed appropriately with a telfa and gauze bandage to ensure continued blood supply and protect the attached pedicle.
H Plasty Text: Given the location of the defect, shape of the defect and the proximity to free margins a H-plasty was deemed most appropriate for repair.  Using a sterile surgical marker, the appropriate advancement arms of the H-plasty were drawn incorporating the defect and placing the expected incisions within the relaxed skin tension lines where possible. The area thus outlined was incised deep to adipose tissue with a #15 scalpel blade. The skin margins were undermined to an appropriate distance in all directions utilizing iris scissors.  The opposing advancement arms were then advanced into place in opposite direction and anchored with interrupted buried subcutaneous sutures.
Full Thickness Lip Wedge Repair (Flap) Text: Given the location of the defect and the proximity to free margins a full thickness wedge repair was deemed most appropriate.  Using a sterile surgical marker, the appropriate repair was drawn incorporating the defect and placing the expected incisions perpendicular to the vermilion border.  The vermilion border was also meticulously outlined to ensure appropriate reapproximation during the repair.  The area thus outlined was incised through and through with a #15 scalpel blade.  The muscularis and dermis were reaproximated with deep sutures following hemostasis. Care was taken to realign the vermilion border before proceeding with the superficial closure.  Once the vermilion was realigned the superfical and mucosal closure was finished.
Referred To Mid-Level For Closure Text (Leave Blank If You Do Not Want): After obtaining clear surgical margins the patient was sent to a mid-level provider for surgical repair.  The patient understands they will receive post-surgical care and follow-up from the mid-level provider.
Area H Indication Text: Tumors in this location are included in Area H (eyelids, eyebrows, nose, lips, chin, ear, pre-auricular, post-auricular, temple, genitalia, hands, feet, ankles and areola).  Tissue conservation is critical in these anatomic locations.
A-T Advancement Flap Text: The defect edges were debeveled with a #15 scalpel blade.  Given the location of the defect, shape of the defect and the proximity to free margins an A-T advancement flap was deemed most appropriate.  Using a sterile surgical marker, an appropriate advancement flap was drawn incorporating the defect and placing the expected incisions within the relaxed skin tension lines where possible.    The area thus outlined was incised deep to adipose tissue with a #15 scalpel blade.  The skin margins were undermined to an appropriate distance in all directions utilizing iris scissors.
Same Histology In Subsequent Stages Text: The pattern and morphology of the tumor is as described in the first stage.
Consent 2/Introductory Paragraph: Mohs surgery was explained to the patient and consent was obtained. The risks, benefits and alternatives to therapy were discussed in detail. Specifically, the risks of infection, scarring, bleeding, prolonged wound healing, incomplete removal, allergy to anesthesia, nerve injury and recurrence were addressed. Prior to the procedure, the treatment site was clearly identified and confirmed by the patient. All components of Universal Protocol/PAUSE Rule completed.
Complex Repair And Flap Additional Text (Will Appearing After The Standard Complex Repair Text): The complex repair was not sufficient to completely close the primary defect. The remaining additional defect was repaired with the flap mentioned below.
Referred To Otolaryngology For Closure Text (Leave Blank If You Do Not Want): After obtaining clear surgical margins the patient was sent to otolaryngology for surgical repair.  The patient understands they will receive post-surgical care and follow-up from the referring physician's office.
Muscle Hinge Flap Text: The defect edges were debeveled with a #15 scalpel blade.  Given the size, depth and location of the defect and the proximity to free margins a muscle hinge flap was deemed most appropriate.  Using a sterile surgical marker, an appropriate hinge flap was drawn incorporating the defect. The area thus outlined was incised with a #15 scalpel blade.  The skin margins were undermined to an appropriate distance in all directions utilizing iris scissors.
Unique Flap 1 Name: Myocutaneous Island pedicle Flap
Graft Cartilage Fenestration Text: The cartilage was fenestrated with a 2mm punch biopsy to help facilitate graft survival and healing.
Bcc Histology Text: There were numerous aggregates of basaloid cells.
Referred To Plastics For Closure Text (Leave Blank If You Do Not Want): After obtaining clear surgical margins the patient was sent to plastics for surgical repair.  The patient understands they will receive post-surgical care and follow-up from the referring physician's office.
Alternatives Discussed Intro (Do Not Add Period): I discussed alternative treatments to Mohs surgery and specifically discussed the risks and benefits of
Repair Anesthesia Method: local infiltration
Epidermal Closure: running cuticular
Helical Rim Advancement Flap Text: The defect edges were debeveled with a #15 blade scalpel.  Given the location of the defect and the proximity to free margins (helical rim) a double helical rim advancement flap was deemed most appropriate.  Using a sterile surgical marker, the appropriate advancement flaps were drawn incorporating the defect and placing the expected incisions between the helical rim and antihelix where possible.  The area thus outlined was incised through and through with a #15 scalpel blade.  With a skin hook and iris scissors, the flaps were gently and sharply undermined and freed up.
Donor Site Anesthesia Type: same as repair anesthesia
Referring Physician (Optional): HUY Lott
Unique Flap 2 Text: A decision was made to reconstruct the defect utilizing a Peng Flap (Bilateral Advancement Rotation Flap). Given the location of the defect and the proximity to free margins, this flap was deemed most appropriate.  Using a sterile surgical marker, the appropriate rotation flaps were drawn incorporating the defect and placing the expected incisions within the relaxed skin tension lines where possible.    The area thus outlined was incised deep to adipose tissue with a #15 scalpel blade.  The skin margins were undermined to an appropriate distance in all directions utilizing iris scissors.
Purse String (Simple) Text: Given the location of the defect and the characteristics of the surrounding skin a purse string closure was deemed most appropriate.  Undermining was performed circumfirentially around the surgical defect.  A purse string suture was then placed and tightened.
Surgeon/Pathologist Verbiage (Will Incorporate Name Of Surgeon From Intro If Not Blank): operated in two distinct and integrated capacities as the surgeon and pathologist.
Wound Care (No Sutures): Petrolatum
Consent (Nose)/Introductory Paragraph: The rationale for Mohs was explained to the patient and consent was obtained. The risks, benefits and alternatives to therapy were discussed in detail. Specifically, the risks of nasal deformity, changes in the flow of air through the nose, infection, scarring, bleeding, prolonged wound healing, incomplete removal, allergy to anesthesia, nerve injury and recurrence were addressed. Prior to the procedure, the treatment site was clearly identified and confirmed by the patient. All components of Universal Protocol/PAUSE Rule completed.
Previous Accession (Optional): [id number]
Island Pedicle Flap Text: The defect edges were debeveled with a #15 scalpel blade.  Given the location of the defect, shape of the defect and the proximity to free margins an island pedicle advancement flap was deemed most appropriate.  Using a sterile surgical marker, an appropriate advancement flap was drawn incorporating the defect, outlining the appropriate donor tissue and placing the expected incisions within the relaxed skin tension lines where possible.    The area thus outlined was incised deep to adipose tissue with a #15 scalpel blade.  The skin margins were undermined to an appropriate distance in all directions around the primary defect and laterally outward around the island pedicle utilizing iris scissors.  There was minimal undermining beneath the pedicle flap.
Consent (Near Eyelid Margin)/Introductory Paragraph: The rationale for Mohs was explained to the patient and consent was obtained. The risks, benefits and alternatives to therapy were discussed in detail. Specifically, the risks of ectropion or eyelid deformity, infection, scarring, bleeding, prolonged wound healing, incomplete removal, allergy to anesthesia, nerve injury and recurrence were addressed. Prior to the procedure, the treatment site was clearly identified and confirmed by the patient. All components of Universal Protocol/PAUSE Rule completed.
Epidermal Autograft Text: The defect edges were debeveled with a #15 scalpel blade.  Given the location of the defect, shape of the defect and the proximity to free margins an epidermal autograft was deemed most appropriate.  Using a sterile surgical marker, the primary defect shape was transferred to the donor site. The epidermal graft was then harvested.  The skin graft was then placed in the primary defect and oriented appropriately.
Hemostasis: Electrocautery
Crescentic Intermediate Repair Preamble Text (Leave Blank If You Do Not Want): Undermining was performed with blunt dissection.
Spiral Flap Text: The defect edges were debeveled with a #15 scalpel blade.  Given the location of the defect, shape of the defect and the proximity to free margins a spiral flap was deemed most appropriate.  Using a sterile surgical marker, an appropriate rotation flap was drawn incorporating the defect and placing the expected incisions within the relaxed skin tension lines where possible. The area thus outlined was incised deep to adipose tissue with a #15 scalpel blade.  The skin margins were undermined to an appropriate distance in all directions utilizing iris scissors.
Surgical Defect Width In Cm (Optional): 1.4
V-Y Flap Text: The defect edges were debeveled with a #15 scalpel blade.  Given the location of the defect, shape of the defect and the proximity to free margins a V-Y flap was deemed most appropriate.  Using a sterile surgical marker, an appropriate advancement flap was drawn incorporating the defect and placing the expected incisions within the relaxed skin tension lines where possible.    The area thus outlined was incised deep to adipose tissue with a #15 scalpel blade.  The skin margins were undermined to an appropriate distance in all directions utilizing iris scissors.
Posterior Auricular Interpolation Flap Text: A decision was made to reconstruct the defect utilizing an interpolation axial flap and a staged reconstruction.  A telfa template was made of the defect.  This telfa template was then used to outline the posterior auricular interpolation flap.  The donor area for the pedicle flap was then injected with anesthesia.  The flap was excised through the skin and subcutaneous tissue down to the layer of the underlying musculature.  The pedicle flap was carefully excised within this deep plane to maintain its blood supply.  The edges of the donor site were undermined.   The donor site was closed in a primary fashion.  The pedicle was then rotated into position and sutured.  Once the tube was sutured into place, adequate blood supply was confirmed with blanching and refill.  The pedicle was then wrapped with xeroform gauze and dressed appropriately with a telfa and gauze bandage to ensure continued blood supply and protect the attached pedicle.
Area L Indication Text: Tumors in this location are included in Area L (trunk and extremities).  Mohs surgery is indicated for larger tumors, or tumors with aggressive histologic features, in these anatomic locations.
Graft Basting Suture (Optional): 5-0 Fast Absorbing Gut
Bilateral Helical Rim Advancement Flap Text: The defect edges were debeveled with a #15 blade scalpel.  Given the location of the defect and the proximity to free margins (helical rim) a bilateral helical rim advancement flap was deemed most appropriate.  Using a sterile surgical marker, the appropriate advancement flaps were drawn incorporating the defect and placing the expected incisions between the helical rim and antihelix where possible.  The area thus outlined was incised through and through with a #15 scalpel blade.  With a skin hook and iris scissors, the flaps were gently and sharply undermined and freed up.
Cheiloplasty (Complex) Text: A decision was made to reconstruct the defect with a  cheiloplasty.  The defect was undermined extensively.  Additional obicularis oris muscle was excised with a 15 blade scalpel.  The defect was converted into a full thickness wedge to facilite a better cosmetic result.  Small vessels were then tied off with 5-0 monocyrl. The obicularis oris, superficial fascia, adipose and dermis were then reapproximated.  After the deeper layers were approximated the epidermis was reapproximated with particular care given to realign the vermilion border.
O-Z Plasty Text: The defect edges were debeveled with a #15 scalpel blade.  Given the location of the defect, shape of the defect and the proximity to free margins an O-Z plasty (double transposition flap) was deemed most appropriate.  Using a sterile surgical marker, the appropriate transposition flaps were drawn incorporating the defect and placing the expected incisions within the relaxed skin tension lines where possible.    The area thus outlined was incised deep to adipose tissue with a #15 scalpel blade.  The skin margins were undermined to an appropriate distance in all directions utilizing iris scissors.  Hemostasis was achieved with electrocautery.  The flaps were then transposed into place, one clockwise and the other counterclockwise, and anchored with interrupted buried subcutaneous sutures.
Melolabial Interpolation Flap Text: A decision was made to reconstruct the defect utilizing an interpolation axial flap and a staged reconstruction.  A telfa template was made of the defect.  This telfa template was then used to outline the melolabial interpolation flap.  The donor area for the pedicle flap was then injected with anesthesia.  The flap was excised through the skin and subcutaneous tissue down to the layer of the underlying musculature.  The pedicle flap was carefully excised within this deep plane to maintain its blood supply.  The edges of the donor site were undermined.   The donor site was closed in a primary fashion.  The pedicle was then rotated into position and sutured.  Once the tube was sutured into place, adequate blood supply was confirmed with blanching and refill.  The pedicle was then wrapped with xeroform gauze and dressed appropriately with a telfa and gauze bandage to ensure continued blood supply and protect the attached pedicle.
Postop Diagnosis: same
Surgeon Performing Repair (Optional): Vlad
Tissue Cultured Epidermal Autograft Text: The defect edges were debeveled with a #15 scalpel blade.  Given the location of the defect, shape of the defect and the proximity to free margins a tissue cultured epidermal autograft was deemed most appropriate.  The graft was then trimmed to fit the size of the defect.  The graft was then placed in the primary defect and oriented appropriately.
Closure 3 Information: This tab is for additional flaps and grafts above and beyond our usual structured repairs.  Please note if you enter information here it will not currently bill and you will need to add the billing information manually.
Unique Flap 3 Name: Mercedes Flap
Wound Care: Aquaphor
Consent (Scalp)/Introductory Paragraph: The rationale for Mohs was explained to the patient and consent was obtained. The risks, benefits and alternatives to therapy were discussed in detail. Specifically, the risks of changes in hair growth pattern secondary to repair, infection, scarring, bleeding, prolonged wound healing, incomplete removal, allergy to anesthesia, nerve injury and recurrence were addressed. Prior to the procedure, the treatment site was clearly identified and confirmed by the patient. All components of Universal Protocol/PAUSE Rule completed.
Post-Care Instructions: I reviewed with the patient in detail post-care instructions. Patient is not to engage in any heavy lifting, exercise, or swimming for the next 14 days. Should the patient develop any fevers, chills, bleeding, severe pain patient will contact the office immediately.
Trilobed Flap Text: The defect edges were debeveled with a #15 scalpel blade.  Given the location of the defect and the proximity to free margins a trilobed flap was deemed most appropriate.  Using a sterile surgical marker, an appropriate trilobed flap drawn around the defect.    The area thus outlined was incised deep to adipose tissue with a #15 scalpel blade.  The skin margins were undermined to an appropriate distance in all directions utilizing iris scissors.
No Residual Tumor Seen Histology Text: There were no malignant cells seen in the sections examined.
Ftsg Text: The defect edges were debeveled with a #15 scalpel blade.  Given the location of the defect, shape of the defect and the proximity to free margins a full thickness skin graft was deemed most appropriate.  Using a sterile surgical marker, the primary defect shape was transferred to the donor site. The area thus outlined was incised deep to adipose tissue with a #15 scalpel blade.  The harvested graft was then trimmed of adipose tissue until only dermis and epidermis was left.  The skin margins of the secondary defect were undermined to an appropriate distance in all directions utilizing iris scissors.  The secondary defect was closed with interrupted buried subcutaneous sutures.  The skin edges were then re-apposed with running  sutures.  The skin graft was then placed in the primary defect and oriented appropriately.
Cartilage Graft Text: The defect edges were debeveled with a #15 scalpel blade.  Given the location of the defect, shape of the defect, the fact the defect involved a full thickness cartilage defect a cartilage graft was deemed most appropriate.  An appropriate donor site was identified, cleansed, and anesthetized. The cartilage graft was then harvested and transferred to the recipient site, oriented appropriately and then sutured into place.  The secondary defect was then repaired using a primary closure.
Alar Island Pedicle Flap Text: The defect edges were debeveled with a #15 scalpel blade.  Given the location of the defect, shape of the defect and the proximity to the alar rim an island pedicle advancement flap was deemed most appropriate.  Using a sterile surgical marker, an appropriate advancement flap was drawn incorporating the defect, outlining the appropriate donor tissue and placing the expected incisions within the nasal ala running parallel to the alar rim. The area thus outlined was incised with a #15 scalpel blade.  The skin margins were undermined minimally to an appropriate distance in all directions around the primary defect and laterally outward around the island pedicle utilizing iris scissors.  There was minimal undermining beneath the pedicle flap.
Transposition Flap Text: The defect edges were debeveled with a #15 scalpel blade.  Given the location of the defect and the proximity to free margins a transposition flap was deemed most appropriate.  Using a sterile surgical marker, an appropriate transposition flap was drawn incorporating the defect.    The area thus outlined was incised deep to adipose tissue with a #15 scalpel blade.  The skin margins were undermined to an appropriate distance in all directions utilizing iris scissors.
Rhombic Flap Text: The defect edges were debeveled with a #15 scalpel blade.  Given the location of the defect and the proximity to free margins a rhombic flap was deemed most appropriate.  Using a sterile surgical marker, an appropriate rhombic flap was drawn incorporating the defect.    The area thus outlined was incised deep to adipose tissue with a #15 scalpel blade.  The skin margins were undermined to an appropriate distance in all directions utilizing iris scissors.
Subsequent Stages Histo Method Verbiage: Using a similar technique to that described above, a thin layer of tissue was removed from all areas where tumor was visible on the previous stage.  The tissue was again oriented, mapped, dyed, and processed as above.
Dressing: dry sterile dressing
Melolabial Transposition Flap Text: The defect edges were debeveled with a #15 scalpel blade.  Given the location of the defect and the proximity to free margins a melolabial flap was deemed most appropriate.  Using a sterile surgical marker, an appropriate melolabial transposition flap was drawn incorporating the defect.    The area thus outlined was incised deep to adipose tissue with a #15 scalpel blade.  The skin margins were undermined to an appropriate distance in all directions utilizing iris scissors.
Bilobed Flap Text: The defect edges were debeveled with a #15 scalpel blade.  Given the location of the defect and the proximity to free margins a bilobe flap was deemed most appropriate.  Using a sterile surgical marker, an appropriate bilobe flap drawn around the defect.    The area thus outlined was incised deep to adipose tissue with a #15 scalpel blade.  The skin margins were undermined to an appropriate distance in all directions utilizing iris scissors.
Cheiloplasty (Less Than 50%) Text: A decision was made to reconstruct the defect with a  cheiloplasty.  The defect was undermined extensively.  Additional obicularis oris muscle was excised with a 15 blade scalpel.  The defect was converted into a full thickness wedge, of less than 50% of the vertical height of the lip, to facilite a better cosmetic result.  Small vessels were then tied off with 5-0 monocyrl. The obicularis oris, superficial fascia, adipose and dermis were then reapproximated.  After the deeper layers were approximated the epidermis was reapproximated with particular care given to realign the vermilion border.
Detail Level: Detailed
Partial Purse String (Simple) Text: Given the location of the defect and the characteristics of the surrounding skin a simple purse string closure was deemed most appropriate.  Undermining was performed circumfirentially around the surgical defect.  A purse string suture was then placed and tightened. Wound tension only allowed a partial closure of the circular defect.
Graft Donor Site Epidermal Sutures (Optional): 5-0 Ethibond
Ear Wedge Repair Text: A wedge excision was completed by carrying down an excision through the full thickness of the ear and cartilage with an inward facing Burow's triangle. The wound was then closed in a layered fashion.
Mohs Rapid Report Verbiage: The area of clinically evident tumor was marked with skin marking ink and appropriately hatched.  The initial incision was made following the Mohs approach through the skin.  The specimen was taken to the lab, divided into the necessary number of pieces, chromacoded and processed according to the Mohs protocol.  This was repeated in successive stages until a tumor free defect was achieved.
Z Plasty Text: The lesion was extirpated to the level of the fat with a #15 scalpel blade.  Given the location of the defect, shape of the defect and the proximity to free margins a Z-plasty was deemed most appropriate for repair.  Using a sterile surgical marker, the appropriate transposition arms of the Z-plasty were drawn incorporating the defect and placing the expected incisions within the relaxed skin tension lines where possible.    The area thus outlined was incised deep to adipose tissue with a #15 scalpel blade.  The skin margins were undermined to an appropriate distance in all directions utilizing iris scissors.  The opposing transposition arms were then transposed into place in opposite direction and anchored with interrupted buried subcutaneous sutures.
Consent (Temporal Branch)/Introductory Paragraph: The rationale for Mohs was explained to the patient and consent was obtained. The risks, benefits and alternatives to therapy were discussed in detail. Specifically, the risks of damage to the temporal branch of the facial nerve, infection, scarring, bleeding, prolonged wound healing, incomplete removal, allergy to anesthesia, and recurrence were addressed. Prior to the procedure, the treatment site was clearly identified and confirmed by the patient. All components of Universal Protocol/PAUSE Rule completed.
Unna Boot Text: An Unna boot was placed to help immobilize the limb and facilitate more rapid healing.
Anesthesia Volume In Cc: 6
Partial Purse String (Intermediate) Text: Given the location of the defect and the characteristics of the surrounding skin an intermediate purse string closure was deemed most appropriate.  Undermining was performed circumfirentially around the surgical defect.  A purse string suture was then placed and tightened. Wound tension only allowed a partial closure of the circular defect.
Secondary Intention Text (Leave Blank If You Do Not Want): The defect will heal with secondary intention.
Manual Repair Warning Statement: We plan on removing the manually selected variable below in favor of our much easier automatic structured text blocks found in the previous tab. We decided to do this to help make the flow better and give you the full power of structured data. Manual selection is never going to be ideal in our platform and I would encourage you to avoid using manual selection from this point on, especially since I will be sunsetting this feature. It is important that you do one of two things with the customized text below. First, you can save all of the text in a word file so you can have it for future reference. Second, transfer the text to the appropriate area in the Library tab. Lastly, if there is a flap or graft type which we do not have you need to let us know right away so I can add it in before the variable is hidden. No need to panic, we plan to give you roughly 6 months to make the change.
O-T Advancement Flap Text: The defect edges were debeveled with a #15 scalpel blade.  Given the location of the defect, shape of the defect and the proximity to free margins an O-T advancement flap was deemed most appropriate.  Using a sterile surgical marker, an appropriate advancement flap was drawn incorporating the defect and placing the expected incisions within the relaxed skin tension lines where possible.    The area thus outlined was incised deep to adipose tissue with a #15 scalpel blade.  The skin margins were undermined to an appropriate distance in all directions utilizing iris scissors.
V-Y Plasty Text: The defect edges were debeveled with a #15 scalpel blade.  Given the location of the defect, shape of the defect and the proximity to free margins an V-Y advancement flap was deemed most appropriate.  Using a sterile surgical marker, an appropriate advancement flap was drawn incorporating the defect and placing the expected incisions within the relaxed skin tension lines where possible.    The area thus outlined was incised deep to adipose tissue with a #15 scalpel blade.  The skin margins were undermined to an appropriate distance in all directions utilizing iris scissors.
Unique Flap 3 Text: The defect edges were debeveled with a #15 scalpel blade.  Given the location of the defect, shape of the defect and the proximity to free margins a Mercedes (double advancement flap) was deemed most appropriate.  Using a sterile surgical marker, the appropriate transposition flaps were drawn incorporating the defect and placing the expected incisions within the relaxed skin tension lines where possible.    The area thus outlined was incised deep to adipose tissue with a #15 scalpel blade.  The skin margins were undermined to an appropriate distance in all directions utilizing iris scissors.  Hemostasis was achieved with electrocautery.  The flaps were then advanced into the defect and anchored with interrupted buried subcutaneous sutures.
Complex Repair And Graft Additional Text (Will Appearing After The Standard Complex Repair Text): The complex repair was not sufficient to completely close the primary defect. The remaining additional defect was repaired with the graft mentioned below.
Rotation Flap Text: The defect edges were debeveled with a #15 scalpel blade.  Given the location of the defect, shape of the defect and the proximity to free margins a rotation flap was deemed most appropriate.  Using a sterile surgical marker, an appropriate rotation flap was drawn incorporating the defect and placing the expected incisions within the relaxed skin tension lines where possible.    The area thus outlined was incised deep to adipose tissue with a #15 scalpel blade.  The skin margins were undermined to an appropriate distance in all directions utilizing iris scissors.
Burow's Advancement Flap Text: The defect edges were debeveled with a #15 scalpel blade.  Given the location of the defect and the proximity to free margins a Burow's advancement flap was deemed most appropriate.  Using a sterile surgical marker, the appropriate advancement flap was drawn incorporating the defect and placing the expected incisions within the relaxed skin tension lines where possible.    The area thus outlined was incised deep to adipose tissue with a #15 scalpel blade.  The skin margins were undermined to an appropriate distance in all directions utilizing iris scissors.
Skin Substitute Text: The defect edges were debeveled with a #15 scalpel blade.  Given the location of the defect, shape of the defect and the proximity to free margins a skin substitute graft was deemed most appropriate.  The graft material was trimmed to fit the size of the defect. The graft was then placed in the primary defect and oriented appropriately.
Referred To Oculoplastics For Closure Text (Leave Blank If You Do Not Want): After obtaining clear surgical margins the patient was sent to oculoplastics for surgical repair.  The patient understands they will receive post-surgical care and follow-up from the referring physician's office.
Modified Advancement Flap Text: The defect edges were debeveled with a #15 scalpel blade.  Given the location of the defect, shape of the defect and the proximity to free margins a modified advancement flap was deemed most appropriate.  Using a sterile surgical marker, an appropriate advancement flap was drawn incorporating the defect and placing the expected incisions within the relaxed skin tension lines where possible.    The area thus outlined was incised deep to adipose tissue with a #15 scalpel blade.  The skin margins were undermined to an appropriate distance in all directions utilizing iris scissors.
Island Pedicle Flap With Canthal Suspension Text: The defect edges were debeveled with a #15 scalpel blade.  Given the location of the defect, shape of the defect and the proximity to free margins an island pedicle advancement flap was deemed most appropriate.  Using a sterile surgical marker, an appropriate advancement flap was drawn incorporating the defect, outlining the appropriate donor tissue and placing the expected incisions within the relaxed skin tension lines where possible. The area thus outlined was incised deep to adipose tissue with a #15 scalpel blade.  The skin margins were undermined to an appropriate distance in all directions around the primary defect and laterally outward around the island pedicle utilizing iris scissors.  There was minimal undermining beneath the pedicle flap. A suspension suture was placed in the canthal tendon to prevent tension and prevent ectropion.
Area M Indication Text: Tumors in this location are included in Area M (cheek, forehead, scalp, neck, jawline and pretibial skin).  Mohs surgery is indicated for tumors in these anatomic locations.
Island Pedicle Flap-Requiring Vessel Identification Text: The defect edges were debeveled with a #15 scalpel blade.  Given the location of the defect, shape of the defect and the proximity to free margins an island pedicle advancement flap was deemed most appropriate.  Using a sterile surgical marker, an appropriate advancement flap was drawn, based on the axial vessel mentioned above, incorporating the defect, outlining the appropriate donor tissue and placing the expected incisions within the relaxed skin tension lines where possible.    The area thus outlined was incised deep to adipose tissue with a #15 scalpel blade.  The skin margins were undermined to an appropriate distance in all directions around the primary defect and laterally outward around the island pedicle utilizing iris scissors.  There was minimal undermining beneath the pedicle flap.
Tarsorrhaphy Text: A tarsorrhaphy was performed using Frost sutures.
Non-Graft Cartilage Fenestration Text: The cartilage was fenestrated with a 2mm punch biopsy to help facilitate healing.
Crescentic Advancement Flap Text: The defect edges were debeveled with a #15 scalpel blade.  Given the location of the defect and the proximity to free margins a crescentic advancement flap was deemed most appropriate.  Using a sterile surgical marker, the appropriate advancement flap was drawn incorporating the defect and placing the expected incisions within the relaxed skin tension lines where possible.    The area thus outlined was incised deep to adipose tissue with a #15 scalpel blade.  The skin margins were undermined to an appropriate distance in all directions utilizing iris scissors.
Consent (Spinal Accessory)/Introductory Paragraph: The rationale for Mohs was explained to the patient and consent was obtained. The risks, benefits and alternatives to therapy were discussed in detail. Specifically, the risks of damage to the spinal accessory nerve, infection, scarring, bleeding, prolonged wound healing, incomplete removal, allergy to anesthesia, and recurrence were addressed. Prior to the procedure, the treatment site was clearly identified and confirmed by the patient. All components of Universal Protocol/PAUSE Rule completed.
Consent 1/Introductory Paragraph: The rationale for Mohs was explained to the patient and consent was obtained. The risks, benefits and alternatives to therapy were discussed in detail. Specifically, the risks of infection, scarring, bleeding, prolonged wound healing, incomplete removal, allergy to anesthesia, nerve injury and recurrence were addressed. Prior to the procedure, the treatment site was clearly identified and confirmed by the patient. All components of Universal Protocol/PAUSE Rule completed.
Repair Performed By Another Provider Text (Leave Blank If You Do Not Want): After obtaining clear surgical margins the defect was repaired by another provider.
Interpolation Flap Text: A decision was made to reconstruct the defect utilizing an interpolation axial flap and a staged reconstruction.  A telfa template was made of the defect.  This telfa template was then used to outline the interpolation flap.  The donor area for the pedicle flap was then injected with anesthesia.  The flap was excised through the skin and subcutaneous tissue down to the layer of the underlying musculature.  The interpolation flap was carefully excised within this deep plane to maintain its blood supply.  The edges of the donor site were undermined.   The donor site was closed in a primary fashion.  The pedicle was then rotated into position and sutured.  Once the tube was sutured into place, adequate blood supply was confirmed with blanching and refill.  The pedicle was then wrapped with xeroform gauze and dressed appropriately with a telfa and gauze bandage to ensure continued blood supply and protect the attached pedicle.
Stage 1: Number Of Blocks?: 1
Anesthesia Volume In Cc: 2.4
Graft Donor Site Bandage (Optional-Leave Blank If You Don't Want In Note): Aquaphor and telefa placed on wound. Pressure dressing applied to donor site
W Plasty Text: The lesion was extirpated to the level of the fat with a #15 scalpel blade.  Given the location of the defect, shape of the defect and the proximity to free margins a W-plasty was deemed most appropriate for repair.  Using a sterile surgical marker, the appropriate transposition arms of the W-plasty were drawn incorporating the defect and placing the expected incisions within the relaxed skin tension lines where possible.    The area thus outlined was incised deep to adipose tissue with a #15 scalpel blade.  The skin margins were undermined to an appropriate distance in all directions utilizing iris scissors.  The opposing transposition arms were then transposed into place in opposite direction and anchored with interrupted buried subcutaneous sutures.
Dorsal Nasal Flap Text: The defect edges were debeveled with a #15 scalpel blade.  Given the location of the defect and the proximity to free margins a dorsal nasal flap,based upon the glabellar folds, was deemed most appropriate.  Using a sterile surgical marker, an appropriate dorsal nasal flap was drawn around the defect.    The area thus outlined was incised deep to adipose tissue with a #15 scalpel blade.  The skin margins were undermined to an appropriate distance in all directions utilizing iris scissors.
Consent (Lip)/Introductory Paragraph: The rationale for Mohs was explained to the patient and consent was obtained. The risks, benefits and alternatives to therapy were discussed in detail. Specifically, the risks of lip deformity, changes in the oral aperture, infection, scarring, bleeding, prolonged wound healing, incomplete removal, allergy to anesthesia, nerve injury and recurrence were addressed. Prior to the procedure, the treatment site was clearly identified and confirmed by the patient. All components of Universal Protocol/PAUSE Rule completed.
Composite Graft Text: The defect edges were debeveled with a #15 scalpel blade.  Given the location of the defect, shape of the defect, the proximity to free margins and the fact the defect was full thickness a composite graft was deemed most appropriate.  The defect was outline and then transferred to the donor site.  A full thickness graft was then excised from the donor site. The graft was then placed in the primary defect, oriented appropriately and then sutured into place.  The secondary defect was then repaired using a primary closure.
Consent (Ear)/Introductory Paragraph: The rationale for Mohs was explained to the patient and consent was obtained. The risks, benefits and alternatives to therapy were discussed in detail. Specifically, the risks of ear deformity, infection, scarring, bleeding, prolonged wound healing, incomplete removal, allergy to anesthesia, nerve injury and recurrence were addressed. Prior to the procedure, the treatment site was clearly identified and confirmed by the patient. All components of Universal Protocol/PAUSE Rule completed.
Unique Flap 2 Name: Peng Flap
Mohs Histo Method Verbiage: Each section was then chromacoded and processed in the Mohs lab using the Mohs protocol and submitted for frozen section.
Secondary Defect Width In Cm (Required For Flaps): 2.5
Mohs Method Verbiage: An incision at a 45 degree angle following the standard Mohs approach was done and the specimen was harvested as a microscopic controlled layer.
Mohs Case Number: [id number]
Epidermal Sutures: 5-0 Ethilon
Location Indication Override (Is Already Calculated Based On Selected Body Location): Area H
Epidermal Closure Graft Donor Site (Optional): simple interrupted
Mauc Instructions: By selecting yes to the question below the MAUC number will be added into the note.  This will be calculated automatically based on the diagnosis chosen, the size entered, the body zone selected (H,M,L) and the specific indications you chose. You will also have the option to override the Mohs AUC if you disagree with the automatically calculated number and this option is found in the Case Summary tab.
Bcc Infiltrative Histology Text: There were numerous aggregates of basaloid cells demonstrating an infiltrative pattern.
Split-Thickness Skin Graft Text: The defect edges were debeveled with a #15 scalpel blade.  Given the location of the defect, shape of the defect and the proximity to free margins a split thickness skin graft was deemed most appropriate.  Using a sterile surgical marker, the primary defect shape was transferred to the donor site. The split thickness graft was then harvested.  The skin graft was then placed in the primary defect and oriented appropriately.
Consent Type: Consent 1 (Standard)
Ear Star Wedge Flap Text: The defect edges were debeveled with a #15 blade scalpel.  Given the location of the defect and the proximity to free margins (helical rim) an ear star wedge flap was deemed most appropriate.  Using a sterile surgical marker, the appropriate flap was drawn incorporating the defect and placing the expected incisions between the helical rim and antihelix where possible.  The area thus outlined was incised through and through with a #15 scalpel blade.
Unique Flap 1 Text: A decision was made to reconstruct the defect utilizing a myocutaneous Island pedicle Flap based on the levator labii superioris muscle.  A telfa template was made of the defect.  This telfa template was then used to outline the myocutaneous flap, based along the meilolabial fold.  The donor area for the pedicle flap was then injected with anesthesia.  The flap was excised through the skin and subcutaneous tissue down to the layer of the underlying musculature.  The myocutaneous flap was carefully excised within this deep plane to maintain its blood supply. Based on the muscle. The edges of the donor site were undermined.   The donor site was closed in a primary fashion to the point of transposition.  The pedicle was then transposed into position and sutured.  Once the flap was sutured into place, adequate blood supply was confirmed with blanching and refill.
Mucosal Advancement Flap Text: Given the location of the defect, shape of the defect and the proximity to free margins a mucosal advancement flap was deemed most appropriate. Incisions were made with a 15 blade scalpel in the appropriate fashion along the cutaneous vermilion border and the mucosal lip. The remaining actinically damaged mucosal tissue was excised.  The mucosal advancement flap was then elevated to the gingival sulcus with care taken to preserve the neurovascular structures and advanced into the primary defect. Care was taken to ensure that precise realignment of the vermilion border was achieved.
Bi-Rhombic Flap Text: The defect edges were debeveled with a #15 scalpel blade.  Given the location of the defect and the proximity to free margins a bi-rhombic flap was deemed most appropriate.  Using a sterile surgical marker, an appropriate rhombic flap was drawn incorporating the defect. The area thus outlined was incised deep to adipose tissue with a #15 scalpel blade.  The skin margins were undermined to an appropriate distance in all directions utilizing iris scissors.
Star Wedge Flap Text: The defect edges were debeveled with a #15 scalpel blade.  Given the location of the defect, shape of the defect and the proximity to free margins a star wedge flap was deemed most appropriate.  Using a sterile surgical marker, an appropriate rotation flap was drawn incorporating the defect and placing the expected incisions within the relaxed skin tension lines where possible. The area thus outlined was incised deep to adipose tissue with a #15 scalpel blade.  The skin margins were undermined to an appropriate distance in all directions utilizing iris scissors.
Mastoid Interpolation Flap Text: A decision was made to reconstruct the defect utilizing an interpolation axial flap and a staged reconstruction.  A telfa template was made of the defect.  This telfa template was then used to outline the mastoid interpolation flap.  The donor area for the pedicle flap was then injected with anesthesia.  The flap was excised through the skin and subcutaneous tissue down to the layer of the underlying musculature.  The pedicle flap was carefully excised within this deep plane to maintain its blood supply.  The edges of the donor site were undermined.   The donor site was closed in a primary fashion.  The pedicle was then rotated into position and sutured.  Once the tube was sutured into place, adequate blood supply was confirmed with blanching and refill.  The pedicle was then wrapped with xeroform gauze and dressed appropriately with a telfa and gauze bandage to ensure continued blood supply and protect the attached pedicle.
Suture Removal: 7 days
O-L Flap Text: The defect edges were debeveled with a #15 scalpel blade.  Given the location of the defect, shape of the defect and the proximity to free margins an O-L flap was deemed most appropriate.  Using a sterile surgical marker, an appropriate advancement flap was drawn incorporating the defect and placing the expected incisions within the relaxed skin tension lines where possible.    The area thus outlined was incised deep to adipose tissue with a #15 scalpel blade.  The skin margins were undermined to an appropriate distance in all directions utilizing iris scissors.
Repair Type: Flap
O-T Plasty Text: The defect edges were debeveled with a #15 scalpel blade.  Given the location of the defect, shape of the defect and the proximity to free margins an O-T plasty was deemed most appropriate.  Using a sterile surgical marker, an appropriate O-T plasty was drawn incorporating the defect and placing the expected incisions within the relaxed skin tension lines where possible.    The area thus outlined was incised deep to adipose tissue with a #15 scalpel blade.  The skin margins were undermined to an appropriate distance in all directions utilizing iris scissors.
S Plasty Text: Given the location and shape of the defect, and the orientation of relaxed skin tension lines, an S-plasty was deemed most appropriate for repair.  Using a sterile surgical marker, the appropriate outline of the S-plasty was drawn, incorporating the defect and placing the expected incisions within the relaxed skin tension lines where possible.  The area thus outlined was incised deep to adipose tissue with a #15 scalpel blade.  The skin margins were undermined to an appropriate distance in all directions utilizing iris scissors. The skin flaps were advanced over the defect.  The opposing margins were then approximated with interrupted buried subcutaneous sutures.
Referred To Asc For Closure Text (Leave Blank If You Do Not Want): After obtaining clear surgical margins the patient was sent to an ASC for surgical repair.  The patient understands they will receive post-surgical care and follow-up from the ASC physician.
Advancement-Rotation Flap Text: The defect edges were debeveled with a #15 scalpel blade.  Given the location of the defect, shape of the defect and the proximity to free margins an advancement-rotation flap was deemed most appropriate.  Using a sterile surgical marker, an appropriate flap was drawn incorporating the defect and placing the expected incisions within the relaxed skin tension lines where possible. The area thus outlined was incised deep to adipose tissue with a #15 scalpel blade.  The skin margins were undermined to an appropriate distance in all directions utilizing iris scissors.
Xenograft Text: The defect edges were debeveled with a #15 scalpel blade.  Given the location of the defect, shape of the defect and the proximity to free margins a xenograft was deemed most appropriate.  The graft was then trimmed to fit the size of the defect.  The graft was then placed in the primary defect and oriented appropriately.
Consent 3/Introductory Paragraph: I gave the patient a chance to ask questions they had about the procedure.  Following this I explained the Mohs procedure and consent was obtained. The risks, benefits and alternatives to therapy were discussed in detail. Specifically, the risks of infection, scarring, bleeding, prolonged wound healing, incomplete removal, allergy to anesthesia, nerve injury and recurrence were addressed. Prior to the procedure, the treatment site was clearly identified and confirmed by the patient. All components of Universal Protocol/PAUSE Rule completed.
Medical Necessity Statement: Based on my medical judgement, Mohs surgery is the most appropriate treatment for this cancer compared to other treatments.
Patient Specific Indications Override: hepatitis C

## 2018-08-02 ENCOUNTER — APPOINTMENT (OUTPATIENT)
Dept: RADIOLOGY | Facility: MEDICAL CENTER | Age: 63
End: 2018-08-02
Attending: INTERNAL MEDICINE
Payer: COMMERCIAL

## 2018-08-06 ENCOUNTER — APPOINTMENT (RX ONLY)
Dept: URBAN - METROPOLITAN AREA CLINIC 36 | Facility: CLINIC | Age: 63
Setting detail: DERMATOLOGY
End: 2018-08-06

## 2018-08-06 DIAGNOSIS — Z48.02 ENCOUNTER FOR REMOVAL OF SUTURES: ICD-10-CM

## 2018-08-06 PROCEDURE — ? SUTURE REMOVAL (GLOBAL PERIOD)

## 2018-08-06 PROCEDURE — 99024 POSTOP FOLLOW-UP VISIT: CPT

## 2018-08-06 ASSESSMENT — LOCATION SIMPLE DESCRIPTION DERM: LOCATION SIMPLE: NOSE

## 2018-08-06 ASSESSMENT — LOCATION DETAILED DESCRIPTION DERM: LOCATION DETAILED: NASAL ROOT

## 2018-08-06 ASSESSMENT — LOCATION ZONE DERM: LOCATION ZONE: NOSE

## 2018-08-06 NOTE — PROCEDURE: SUTURE REMOVAL (GLOBAL PERIOD)
Detail Level: Detailed
Add 63765 Cpt? (Important Note: In 2017 The Use Of 93816 Is Being Tracked By Cms To Determine Future Global Period Reimbursement For Global Periods): yes

## 2018-08-08 ENCOUNTER — HOSPITAL ENCOUNTER (OUTPATIENT)
Dept: RADIOLOGY | Facility: MEDICAL CENTER | Age: 63
End: 2018-08-08
Attending: INTERNAL MEDICINE
Payer: COMMERCIAL

## 2018-08-08 DIAGNOSIS — B18.2 CHRONIC HEPATITIS C WITHOUT HEPATIC COMA (HCC): ICD-10-CM

## 2018-08-08 PROCEDURE — 0346T US-LIVER ELASTOGRAPHY: CPT

## 2018-08-08 PROCEDURE — 76700 US EXAM ABDOM COMPLETE: CPT

## 2018-08-14 DIAGNOSIS — N52.9 ERECTILE DYSFUNCTION, UNSPECIFIED ERECTILE DYSFUNCTION TYPE: ICD-10-CM

## 2018-08-14 RX ORDER — SILDENAFIL 100 MG/1
TABLET, FILM COATED ORAL
Qty: 6 TAB | Refills: 9 | Status: SHIPPED | OUTPATIENT
Start: 2018-08-14 | End: 2019-09-21 | Stop reason: SDUPTHER

## 2018-10-08 ENCOUNTER — APPOINTMENT (RX ONLY)
Dept: URBAN - METROPOLITAN AREA CLINIC 22 | Facility: CLINIC | Age: 63
Setting detail: DERMATOLOGY
End: 2018-10-08

## 2018-10-08 DIAGNOSIS — Z85.828 PERSONAL HISTORY OF OTHER MALIGNANT NEOPLASM OF SKIN: ICD-10-CM

## 2018-10-08 DIAGNOSIS — L82.1 OTHER SEBORRHEIC KERATOSIS: ICD-10-CM

## 2018-10-08 DIAGNOSIS — Z71.89 OTHER SPECIFIED COUNSELING: ICD-10-CM

## 2018-10-08 DIAGNOSIS — L57.0 ACTINIC KERATOSIS: ICD-10-CM

## 2018-10-08 DIAGNOSIS — L57.8 OTHER SKIN CHANGES DUE TO CHRONIC EXPOSURE TO NONIONIZING RADIATION: ICD-10-CM

## 2018-10-08 DIAGNOSIS — D22 MELANOCYTIC NEVI: ICD-10-CM

## 2018-10-08 DIAGNOSIS — L81.4 OTHER MELANIN HYPERPIGMENTATION: ICD-10-CM

## 2018-10-08 PROBLEM — D22.9 MELANOCYTIC NEVI, UNSPECIFIED: Status: ACTIVE | Noted: 2018-10-08

## 2018-10-08 PROBLEM — D48.5 NEOPLASM OF UNCERTAIN BEHAVIOR OF SKIN: Status: ACTIVE | Noted: 2018-10-08

## 2018-10-08 PROCEDURE — ? COUNSELING

## 2018-10-08 PROCEDURE — 17003 DESTRUCT PREMALG LES 2-14: CPT | Mod: 79

## 2018-10-08 PROCEDURE — 99213 OFFICE O/P EST LOW 20 MIN: CPT | Mod: 24,25

## 2018-10-08 PROCEDURE — ? LIQUID NITROGEN

## 2018-10-08 PROCEDURE — 17000 DESTRUCT PREMALG LESION: CPT | Mod: 79

## 2018-10-08 PROCEDURE — ? BIOPSY BY SHAVE METHOD

## 2018-10-08 PROCEDURE — 69100 BIOPSY OF EXTERNAL EAR: CPT | Mod: 79,59

## 2018-10-08 ASSESSMENT — LOCATION SIMPLE DESCRIPTION DERM
LOCATION SIMPLE: LEFT UPPER ARM
LOCATION SIMPLE: LEFT FOREHEAD
LOCATION SIMPLE: RIGHT CHEEK
LOCATION SIMPLE: RIGHT FOREARM
LOCATION SIMPLE: RIGHT FOREHEAD
LOCATION SIMPLE: RIGHT UPPER ARM
LOCATION SIMPLE: NOSE
LOCATION SIMPLE: RIGHT UPPER BACK
LOCATION SIMPLE: CHEST
LOCATION SIMPLE: LEFT UPPER BACK
LOCATION SIMPLE: POSTERIOR NECK
LOCATION SIMPLE: LEFT FOREARM
LOCATION SIMPLE: SCALP
LOCATION SIMPLE: ABDOMEN
LOCATION SIMPLE: LEFT EAR
LOCATION SIMPLE: RIGHT EAR

## 2018-10-08 ASSESSMENT — LOCATION DETAILED DESCRIPTION DERM
LOCATION DETAILED: MID POSTERIOR NECK
LOCATION DETAILED: LEFT SUPERIOR MEDIAL FOREHEAD
LOCATION DETAILED: LEFT TRIANGULAR FOSSA
LOCATION DETAILED: RIGHT FOREHEAD
LOCATION DETAILED: NASAL ROOT
LOCATION DETAILED: LEFT PROXIMAL POSTERIOR UPPER ARM
LOCATION DETAILED: RIGHT INFERIOR CENTRAL MALAR CHEEK
LOCATION DETAILED: RIGHT DISTAL POSTERIOR UPPER ARM
LOCATION DETAILED: EPIGASTRIC SKIN
LOCATION DETAILED: LEFT SUPERIOR POSTAURICULAR SKIN
LOCATION DETAILED: RIGHT ANTIHELIX
LOCATION DETAILED: RIGHT ANTERIOR EARLOBE
LOCATION DETAILED: LEFT MEDIAL INFERIOR CHEST
LOCATION DETAILED: LEFT SUPERIOR MEDIAL UPPER BACK
LOCATION DETAILED: RIGHT INFERIOR CRUS OF ANTIHELIX
LOCATION DETAILED: LEFT PROXIMAL DORSAL FOREARM
LOCATION DETAILED: RIGHT MEDIAL UPPER BACK
LOCATION DETAILED: RIGHT PROXIMAL DORSAL FOREARM
LOCATION DETAILED: RIGHT SUPERIOR HELIX
LOCATION DETAILED: RIGHT SCAPHA

## 2018-10-08 ASSESSMENT — LOCATION ZONE DERM
LOCATION ZONE: ARM
LOCATION ZONE: SCALP
LOCATION ZONE: NECK
LOCATION ZONE: EAR
LOCATION ZONE: NOSE
LOCATION ZONE: TRUNK
LOCATION ZONE: FACE

## 2018-10-08 NOTE — PROCEDURE: LIQUID NITROGEN
Render Post-Care Instructions In Note?: no
Duration Of Freeze Thaw-Cycle (Seconds): 3
Number Of Freeze-Thaw Cycles: 2 freeze-thaw cycles
Post-Care Instructions: I reviewed with the patient in detail post-care instructions. Patient is to wear sunprotection, and avoid picking at any of the treated lesions. Pt may apply Vaseline to crusted or scabbing areas.
Consent: The patient's consent was obtained including but not limited to risks of crusting, scabbing, blistering, scarring, darker or lighter pigmentary change, recurrence, incomplete removal and infection.
Detail Level: Detailed

## 2018-10-08 NOTE — PROCEDURE: BIOPSY BY SHAVE METHOD
Size Of Lesion In Cm: 0
Anesthesia Type: 1% lidocaine with 1:100,000 epinephrine and a 1:10 solution of 8.4% sodium bicarbonate
Curettage Text: The wound bed was treated with curettage after the biopsy was performed.
Notification Instructions: Patient will be notified of biopsy results. However, patient instructed to call the office if not contacted within 2 weeks.
Lab Facility: 
Type Of Destruction Used: Curettage
Depth Of Biopsy: dermis
Anesthesia Volume In Cc: 1
Hemostasis: Drysol
Biopsy Method: Personna blade
Cryotherapy Text: The wound bed was treated with cryotherapy after the biopsy was performed.
Billing Type: Third-Party Bill
Silver Nitrate Text: The wound bed was treated with silver nitrate after the biopsy was performed.
Wound Care: Vaseline
Dressing: bandage
Bill For Surgical Tray: no
Electrodesiccation Text: The wound bed was treated with electrodesiccation after the biopsy was performed.
Consent: Written consent was obtained and risks were reviewed including but not limited to scarring, infection, bleeding, scabbing, incomplete removal, nerve damage and allergy to anesthesia.
Render Post-Care Instructions In Note?: yes
Post-Care Instructions: I reviewed with the patient in detail post-care instructions. Patient is to keep the biopsy site dry overnight, and then apply bacitracin twice daily until healed. Patient may apply hydrogen peroxide soaks to remove any crusting.
Detail Level: Detailed
Biopsy Type: H and E
Electrodesiccation And Curettage Text: The wound bed was treated with electrodesiccation and curettage after the biopsy was performed.
Lab: [id number]

## 2018-10-12 ENCOUNTER — HOSPITAL ENCOUNTER (OUTPATIENT)
Dept: LAB | Facility: MEDICAL CENTER | Age: 63
End: 2018-10-12
Attending: INTERNAL MEDICINE
Payer: COMMERCIAL

## 2018-10-12 LAB
ALBUMIN SERPL BCP-MCNC: 4.7 G/DL (ref 3.2–4.9)
ALBUMIN/GLOB SERPL: 1.3 G/DL
ALP SERPL-CCNC: 48 U/L (ref 30–99)
ALT SERPL-CCNC: 22 U/L (ref 2–50)
ANION GAP SERPL CALC-SCNC: 8 MMOL/L (ref 0–11.9)
AST SERPL-CCNC: 20 U/L (ref 12–45)
BASOPHILS # BLD AUTO: 1.2 % (ref 0–1.8)
BASOPHILS # BLD: 0.07 K/UL (ref 0–0.12)
BILIRUB SERPL-MCNC: 0.7 MG/DL (ref 0.1–1.5)
BUN SERPL-MCNC: 12 MG/DL (ref 8–22)
CALCIUM SERPL-MCNC: 10.2 MG/DL (ref 8.5–10.5)
CHLORIDE SERPL-SCNC: 104 MMOL/L (ref 96–112)
CO2 SERPL-SCNC: 27 MMOL/L (ref 20–33)
CREAT SERPL-MCNC: 1 MG/DL (ref 0.5–1.4)
EOSINOPHIL # BLD AUTO: 0.15 K/UL (ref 0–0.51)
EOSINOPHIL NFR BLD: 2.6 % (ref 0–6.9)
ERYTHROCYTE [DISTWIDTH] IN BLOOD BY AUTOMATED COUNT: 44.1 FL (ref 35.9–50)
GLOBULIN SER CALC-MCNC: 3.5 G/DL (ref 1.9–3.5)
GLUCOSE SERPL-MCNC: 95 MG/DL (ref 65–99)
HCT VFR BLD AUTO: 52 % (ref 42–52)
HGB BLD-MCNC: 17.7 G/DL (ref 14–18)
IMM GRANULOCYTES # BLD AUTO: 0.02 K/UL (ref 0–0.11)
IMM GRANULOCYTES NFR BLD AUTO: 0.4 % (ref 0–0.9)
LYMPHOCYTES # BLD AUTO: 2.2 K/UL (ref 1–4.8)
LYMPHOCYTES NFR BLD: 38.8 % (ref 22–41)
MCH RBC QN AUTO: 32 PG (ref 27–33)
MCHC RBC AUTO-ENTMCNC: 34 G/DL (ref 33.7–35.3)
MCV RBC AUTO: 94 FL (ref 81.4–97.8)
MONOCYTES # BLD AUTO: 0.55 K/UL (ref 0–0.85)
MONOCYTES NFR BLD AUTO: 9.7 % (ref 0–13.4)
NEUTROPHILS # BLD AUTO: 2.68 K/UL (ref 1.82–7.42)
NEUTROPHILS NFR BLD: 47.3 % (ref 44–72)
NRBC # BLD AUTO: 0 K/UL
NRBC BLD-RTO: 0 /100 WBC
PLATELET # BLD AUTO: 194 K/UL (ref 164–446)
PMV BLD AUTO: 10.9 FL (ref 9–12.9)
POTASSIUM SERPL-SCNC: 3.8 MMOL/L (ref 3.6–5.5)
PROT SERPL-MCNC: 8.2 G/DL (ref 6–8.2)
RBC # BLD AUTO: 5.53 M/UL (ref 4.7–6.1)
SODIUM SERPL-SCNC: 139 MMOL/L (ref 135–145)
WBC # BLD AUTO: 5.7 K/UL (ref 4.8–10.8)

## 2018-10-12 PROCEDURE — 80053 COMPREHEN METABOLIC PANEL: CPT

## 2018-10-12 PROCEDURE — 36415 COLL VENOUS BLD VENIPUNCTURE: CPT

## 2018-10-12 PROCEDURE — 87521 HEPATITIS C PROBE&RVRS TRNSC: CPT

## 2018-10-12 PROCEDURE — 85025 COMPLETE CBC W/AUTO DIFF WBC: CPT

## 2018-10-16 LAB — HCV RNA SERPL QL NAA+PROBE: NEGATIVE

## 2018-10-22 ENCOUNTER — APPOINTMENT (RX ONLY)
Dept: URBAN - METROPOLITAN AREA CLINIC 22 | Facility: CLINIC | Age: 63
Setting detail: DERMATOLOGY
End: 2018-10-22

## 2018-10-22 PROBLEM — D04.9 CARCINOMA IN SITU OF SKIN, UNSPECIFIED: Status: ACTIVE | Noted: 2018-10-22

## 2018-10-22 PROCEDURE — ? MOHS SURGERY PHONE CONSULTATION

## 2018-10-22 NOTE — PROCEDURE: MOHS SURGERY PHONE CONSULTATION
Patient's Insurance: [facility]
Has The Patient Ever Been Seen In Our Practice For Mohs Surgery Before?: Yes
Does The Patient Have A Living Will (Optional)?: No
Pathology Accession #: [id number]
Referring Provider: Aubrey Lott PA-C
Office Location Of Mohs Surgery: [place]
Additional Information?: He is on his 34th or 35th treatment for Hep C
Date Of Mohs Surgery: 11/14/2018
Which Antibiotic Do They Take For Surgical Prophylaxis?: Amoxicillin (2 grams)
Patient Reported Location: L superior posterior helix
Time Of Mohs Surgery: 8:00 AM
Detail Level: Detailed

## 2018-11-14 ENCOUNTER — APPOINTMENT (RX ONLY)
Dept: URBAN - METROPOLITAN AREA CLINIC 36 | Facility: CLINIC | Age: 63
Setting detail: DERMATOLOGY
End: 2018-11-14

## 2018-11-14 PROBLEM — C44.229 SQUAMOUS CELL CARCINOMA OF SKIN OF LEFT EAR AND EXTERNAL AURICULAR CANAL: Status: ACTIVE | Noted: 2018-11-14

## 2018-11-14 PROCEDURE — 17312 MOHS ADDL STAGE: CPT

## 2018-11-14 PROCEDURE — ? MOHS SURGERY

## 2018-11-14 PROCEDURE — 14061 TIS TRNFR E/N/E/L10.1-30SQCM: CPT

## 2018-11-14 PROCEDURE — 17311 MOHS 1 STAGE H/N/HF/G: CPT

## 2018-11-14 NOTE — PROCEDURE: MOHS SURGERY
Xenograft Text: The defect edges were debeveled with a #15 scalpel blade.  Given the location of the defect, shape of the defect and the proximity to free margins a xenograft was deemed most appropriate.  The graft was then trimmed to fit the size of the defect.  The graft was then placed in the primary defect and oriented appropriately.
Double M-Plasty Complex Repair Preamble Text (Leave Blank If You Do Not Want): Extensive wide undermining was performed.
Ear Star Wedge Flap Text: The defect edges were debeveled with a #15 blade scalpel.  Given the location of the defect and the proximity to free margins (helical rim) an ear star wedge flap was deemed most appropriate.  Using a sterile surgical marker, the appropriate flap was drawn incorporating the defect and placing the expected incisions between the helical rim and antihelix where possible.  The area thus outlined was incised through and through with a #15 scalpel blade.
Medical Necessity Statement: Based on my medical judgement, Mohs surgery is the most appropriate treatment for this cancer compared to other treatments.
Consent Type: Consent 1 (Standard)
Otolaryngologist Procedure Text (C): After obtaining clear surgical margins the patient was sent to otolaryngology for surgical repair.  The patient understands they will receive post-surgical care and follow-up from the referring physician's office.
Area M Indication Text: Tumors in this location are included in Area M (cheek, forehead, scalp, neck, jawline and pretibial skin).  Mohs surgery is indicated for tumors in these anatomic locations.
Show Home Suture Removal Variable: Yes
Closure 4 Information: This tab is for additional flaps and grafts above and beyond our usual structured repairs.  Please note if you enter information here it will not currently bill and you will need to add the billing information manually.
Asc Procedure Text (F): After obtaining clear surgical margins the patient was sent to an ASC for surgical repair.  The patient understands they will receive post-surgical care and follow-up from the ASC physician.
Mohs Method Verbiage: An incision at a 45 degree angle following the standard Mohs approach was done and the specimen was harvested as a microscopic controlled layer.
Stage 10: Additional Anesthesia Type: 1% lidocaine with epinephrine
O-L Flap Text: The defect edges were debeveled with a #15 scalpel blade.  Given the location of the defect, shape of the defect and the proximity to free margins an O-L flap was deemed most appropriate.  Using a sterile surgical marker, an appropriate advancement flap was drawn incorporating the defect and placing the expected incisions within the relaxed skin tension lines where possible.    The area thus outlined was incised deep to adipose tissue with a #15 scalpel blade.  The skin margins were undermined to an appropriate distance in all directions utilizing iris scissors.
Interpolation Flap Text: A decision was made to reconstruct the defect utilizing an interpolation axial flap and a staged reconstruction.  A telfa template was made of the defect.  This telfa template was then used to outline the interpolation flap.  The donor area for the pedicle flap was then injected with anesthesia.  The flap was excised through the skin and subcutaneous tissue down to the layer of the underlying musculature.  The interpolation flap was carefully excised within this deep plane to maintain its blood supply.  The edges of the donor site were undermined.   The donor site was closed in a primary fashion.  The pedicle was then rotated into position and sutured.  Once the tube was sutured into place, adequate blood supply was confirmed with blanching and refill.  The pedicle was then wrapped with xeroform gauze and dressed appropriately with a telfa and gauze bandage to ensure continued blood supply and protect the attached pedicle.
Same Histology In Subsequent Stages Text: The pattern and morphology of the tumor is as described in the first stage.
Stage 5: Additional Anesthesia Volume In Cc: 0
Repair Anesthesia Type: 1% lidocaine with 1:100,000 epinephrine and 408mcg clindamycin/ml and a 1:10 solution of 8.4% sodium bicarbonate
Partial Purse String (Intermediate) Text: Given the location of the defect and the characteristics of the surrounding skin an intermediate purse string closure was deemed most appropriate.  Undermining was performed circumfirentially around the surgical defect.  A purse string suture was then placed and tightened. Wound tension only allowed a partial closure of the circular defect.
Trilobed Flap Text: The defect edges were debeveled with a #15 scalpel blade.  Given the location of the defect and the proximity to free margins a trilobed flap was deemed most appropriate.  Using a sterile surgical marker, an appropriate trilobed flap drawn around the defect.    The area thus outlined was incised deep to adipose tissue with a #15 scalpel blade.  The skin margins were undermined to an appropriate distance in all directions utilizing iris scissors.
Provider Procedure Text (C): After obtaining clear surgical margins the defect was repaired by another provider.
Double M-Plasty Intermediate Repair Preamble Text (Leave Blank If You Do Not Want): Undermining was performed with blunt dissection.
Quadrant Reporting?: no
Consent 3/Introductory Paragraph: I gave the patient a chance to ask questions they had about the procedure.  Following this I explained the Mohs procedure and consent was obtained. The risks, benefits and alternatives to therapy were discussed in detail. Specifically, the risks of infection, scarring, bleeding, prolonged wound healing, incomplete removal, allergy to anesthesia, nerve injury and recurrence were addressed. Prior to the procedure, the treatment site was clearly identified and confirmed by the patient. All components of Universal Protocol/PAUSE Rule completed.
Oculoplastic Surgeon Procedure Text (D): After obtaining clear surgical margins the patient was sent to oculoplastics for surgical repair.  The patient understands they will receive post-surgical care and follow-up from the referring physician's office.
Location Indication Override (Is Already Calculated Based On Selected Body Location): Area H
Paramedian Forehead Flap Text: A decision was made to reconstruct the defect utilizing an interpolation axial flap and a staged reconstruction.  A telfa template was made of the defect.  This telfa template was then used to outline the paramedian forehead pedicle flap.  The donor area for the pedicle flap was then injected with anesthesia.  The flap was excised through the skin and subcutaneous tissue down to the layer of the underlying musculature.  The pedicle flap was carefully excised within this deep plane to maintain its blood supply.  The edges of the donor site were undermined.   The donor site was closed in a primary fashion.  The pedicle was then rotated into position and sutured.  Once the tube was sutured into place, adequate blood supply was confirmed with blanching and refill.  The pedicle was then wrapped with xeroform gauze and dressed appropriately with a telfa and gauze bandage to ensure continued blood supply and protect the attached pedicle.
Unna Boot Text: An Unna boot was placed to help immobilize the limb and facilitate more rapid healing.
Modified Advancement Flap Text: The defect edges were debeveled with a #15 scalpel blade.  Given the location of the defect, shape of the defect and the proximity to free margins a modified advancement flap was deemed most appropriate.  Using a sterile surgical marker, an appropriate advancement flap was drawn incorporating the defect and placing the expected incisions within the relaxed skin tension lines where possible.    The area thus outlined was incised deep to adipose tissue with a #15 scalpel blade.  The skin margins were undermined to an appropriate distance in all directions utilizing iris scissors.
Consent (Near Eyelid Margin)/Introductory Paragraph: The rationale for Mohs was explained to the patient and consent was obtained. The risks, benefits and alternatives to therapy were discussed in detail. Specifically, the risks of ectropion or eyelid deformity, infection, scarring, bleeding, prolonged wound healing, incomplete removal, allergy to anesthesia, nerve injury and recurrence were addressed. Prior to the procedure, the treatment site was clearly identified and confirmed by the patient. All components of Universal Protocol/PAUSE Rule completed.
No Repair - Repaired With Adjacent Surgical Defect Text (Leave Blank If You Do Not Want): After obtaining clear surgical margins the defect was repaired concurrently with another surgical defect which was in close approximation.
Complex Repair And Graft Additional Text (Will Appearing After The Standard Complex Repair Text): The complex repair was not sufficient to completely close the primary defect. The remaining additional defect was repaired with the graft mentioned below.
Alar Island Pedicle Flap Text: The defect edges were debeveled with a #15 scalpel blade.  Given the location of the defect, shape of the defect and the proximity to the alar rim an island pedicle advancement flap was deemed most appropriate.  Using a sterile surgical marker, an appropriate advancement flap was drawn incorporating the defect, outlining the appropriate donor tissue and placing the expected incisions within the nasal ala running parallel to the alar rim. The area thus outlined was incised with a #15 scalpel blade.  The skin margins were undermined minimally to an appropriate distance in all directions around the primary defect and laterally outward around the island pedicle utilizing iris scissors.  There was minimal undermining beneath the pedicle flap.
Number Of Stages: 2
Double Island Pedicle Flap Text: The defect edges were debeveled with a #15 scalpel blade.  Given the location of the defect, shape of the defect and the proximity to free margins a double island pedicle advancement flap was deemed most appropriate.  Using a sterile surgical marker, an appropriate advancement flap was drawn incorporating the defect, outlining the appropriate donor tissue and placing the expected incisions within the relaxed skin tension lines where possible.    The area thus outlined was incised deep to adipose tissue with a #15 scalpel blade.  The skin margins were undermined to an appropriate distance in all directions around the primary defect and laterally outward around the island pedicle utilizing iris scissors.  There was minimal undermining beneath the pedicle flap.
Graft Donor Site Bandage (Optional-Leave Blank If You Don't Want In Note): Aquaphor and telefa placed on wound. Pressure dressing applied to donor site
Anesthesia Volume In Cc: 1.9
Consent (Ear)/Introductory Paragraph: The rationale for Mohs was explained to the patient and consent was obtained. The risks, benefits and alternatives to therapy were discussed in detail. Specifically, the risks of ear deformity, infection, scarring, bleeding, prolonged wound healing, incomplete removal, allergy to anesthesia, nerve injury and recurrence were addressed. Prior to the procedure, the treatment site was clearly identified and confirmed by the patient. All components of Universal Protocol/PAUSE Rule completed.
Bcc Infiltrative Histology Text: There were numerous aggregates of basaloid cells demonstrating an infiltrative pattern.
Ftsg Text: The defect edges were debeveled with a #15 scalpel blade.  Given the location of the defect, shape of the defect and the proximity to free margins a full thickness skin graft was deemed most appropriate.  Using a sterile surgical marker, the primary defect shape was transferred to the donor site. The area thus outlined was incised deep to adipose tissue with a #15 scalpel blade.  The harvested graft was then trimmed of adipose tissue until only dermis and epidermis was left.  The skin margins of the secondary defect were undermined to an appropriate distance in all directions utilizing iris scissors.  The secondary defect was closed with interrupted buried subcutaneous sutures.  The skin edges were then re-apposed with running  sutures.  The skin graft was then placed in the primary defect and oriented appropriately.
Secondary Defect Width In Cm (Required For Flaps): 3.2
Star Wedge Flap Text: The defect edges were debeveled with a #15 scalpel blade.  Given the location of the defect, shape of the defect and the proximity to free margins a star wedge flap was deemed most appropriate.  Using a sterile surgical marker, an appropriate rotation flap was drawn incorporating the defect and placing the expected incisions within the relaxed skin tension lines where possible. The area thus outlined was incised deep to adipose tissue with a #15 scalpel blade.  The skin margins were undermined to an appropriate distance in all directions utilizing iris scissors.
Information: Selecting Yes will display possible errors in your note based on the variables you have selected. This validation is only offered as a suggestion for you. PLEASE NOTE THAT THE VALIDATION TEXT WILL BE REMOVED WHEN YOU FINALIZE YOUR NOTE. IF YOU WANT TO FAX A PRELIMINARY NOTE YOU WILL NEED TO TOGGLE THIS TO 'NO' IF YOU DO NOT WANT IT IN YOUR FAXED NOTE.
Mid-Level Procedure Text (E): After obtaining clear surgical margins the patient was sent to a mid-level provider for surgical repair.  The patient understands they will receive post-surgical care and follow-up from the mid-level provider.
Detail Level: Detailed
Unique Flap 1 Text: A decision was made to reconstruct the defect utilizing a myocutaneous Island pedicle Flap based on the levator labii superioris muscle.  A telfa template was made of the defect.  This telfa template was then used to outline the myocutaneous flap, based along the meilolabial fold.  The donor area for the pedicle flap was then injected with anesthesia.  The flap was excised through the skin and subcutaneous tissue down to the layer of the underlying musculature.  The myocutaneous flap was carefully excised within this deep plane to maintain its blood supply. Based on the muscle. The edges of the donor site were undermined.   The donor site was closed in a primary fashion to the point of transposition.  The pedicle was then transposed into position and sutured.  Once the flap was sutured into place, adequate blood supply was confirmed with blanching and refill.
Hemostasis: Electrocautery
O-Z Plasty Text: The defect edges were debeveled with a #15 scalpel blade.  Given the location of the defect, shape of the defect and the proximity to free margins an O-Z plasty (double transposition flap) was deemed most appropriate.  Using a sterile surgical marker, the appropriate transposition flaps were drawn incorporating the defect and placing the expected incisions within the relaxed skin tension lines where possible.    The area thus outlined was incised deep to adipose tissue with a #15 scalpel blade.  The skin margins were undermined to an appropriate distance in all directions utilizing iris scissors.  Hemostasis was achieved with electrocautery.  The flaps were then transposed into place, one clockwise and the other counterclockwise, and anchored with interrupted buried subcutaneous sutures.
Plastic Surgeon Procedure Text (B): After obtaining clear surgical margins the patient was sent to plastics for surgical repair.  The patient understands they will receive post-surgical care and follow-up from the referring physician's office.
Rhombic Flap Text: The defect edges were debeveled with a #15 scalpel blade.  Given the location of the defect and the proximity to free margins a rhombic flap was deemed most appropriate.  Using a sterile surgical marker, an appropriate rhombic flap was drawn incorporating the defect.    The area thus outlined was incised deep to adipose tissue with a #15 scalpel blade.  The skin margins were undermined to an appropriate distance in all directions utilizing iris scissors.
Mauc Instructions: By selecting yes to the question below the MAUC number will be added into the note.  This will be calculated automatically based on the diagnosis chosen, the size entered, the body zone selected (H,M,L) and the specific indications you chose. You will also have the option to override the Mohs AUC if you disagree with the automatically calculated number and this option is found in the Case Summary tab.
Previous Accession (Optional): [id number]
Surgical Defect Width In Cm (Optional): 1.3
Donor Site Anesthesia Type: same as repair anesthesia
Epidermal Autograft Text: The defect edges were debeveled with a #15 scalpel blade.  Given the location of the defect, shape of the defect and the proximity to free margins an epidermal autograft was deemed most appropriate.  Using a sterile surgical marker, the primary defect shape was transferred to the donor site. The epidermal graft was then harvested.  The skin graft was then placed in the primary defect and oriented appropriately.
W Plasty Text: The lesion was extirpated to the level of the fat with a #15 scalpel blade.  Given the location of the defect, shape of the defect and the proximity to free margins a W-plasty was deemed most appropriate for repair.  Using a sterile surgical marker, the appropriate transposition arms of the W-plasty were drawn incorporating the defect and placing the expected incisions within the relaxed skin tension lines where possible.    The area thus outlined was incised deep to adipose tissue with a #15 scalpel blade.  The skin margins were undermined to an appropriate distance in all directions utilizing iris scissors.  The opposing transposition arms were then transposed into place in opposite direction and anchored with interrupted buried subcutaneous sutures.
Manual Repair Warning Statement: We plan on removing the manually selected variable below in favor of our much easier automatic structured text blocks found in the previous tab. We decided to do this to help make the flow better and give you the full power of structured data. Manual selection is never going to be ideal in our platform and I would encourage you to avoid using manual selection from this point on, especially since I will be sunsetting this feature. It is important that you do one of two things with the customized text below. First, you can save all of the text in a word file so you can have it for future reference. Second, transfer the text to the appropriate area in the Library tab. Lastly, if there is a flap or graft type which we do not have you need to let us know right away so I can add it in before the variable is hidden. No need to panic, we plan to give you roughly 6 months to make the change.
Dressing (No Sutures): dry sterile dressing
Burow's Advancement Flap Text: The defect edges were debeveled with a #15 scalpel blade.  Given the location of the defect and the proximity to free margins a Burow's advancement flap was deemed most appropriate.  Using a sterile surgical marker, the appropriate advancement flap was drawn incorporating the defect and placing the expected incisions within the relaxed skin tension lines where possible.    The area thus outlined was incised deep to adipose tissue with a #15 scalpel blade.  The skin margins were undermined to an appropriate distance in all directions utilizing iris scissors.
Alternatives Discussed Intro (Do Not Add Period): I discussed alternative treatments to Mohs surgery and specifically discussed the risks and benefits of
Banner Transposition Flap Text: The defect edges were debeveled with a #15 scalpel blade.  Given the location of the defect and the proximity to free margins a Banner transposition flap was deemed most appropriate.  Using a sterile surgical marker, an appropriate flap drawn around the defect. The area thus outlined was incised deep to adipose tissue with a #15 scalpel blade.  The skin margins were undermined to an appropriate distance in all directions utilizing iris scissors.
Purse String (Simple) Text: Given the location of the defect and the characteristics of the surrounding skin a purse string closure was deemed most appropriate.  Undermining was performed circumfirentially around the surgical defect.  A purse string suture was then placed and tightened.
Area L Indication Text: Tumors in this location are included in Area L (trunk and extremities).  Mohs surgery is indicated for larger tumors, or tumors with aggressive histologic features, in these anatomic locations.
Melolabial Interpolation Flap Text: A decision was made to reconstruct the defect utilizing an interpolation axial flap and a staged reconstruction.  A telfa template was made of the defect.  This telfa template was then used to outline the melolabial interpolation flap.  The donor area for the pedicle flap was then injected with anesthesia.  The flap was excised through the skin and subcutaneous tissue down to the layer of the underlying musculature.  The pedicle flap was carefully excised within this deep plane to maintain its blood supply.  The edges of the donor site were undermined.   The donor site was closed in a primary fashion.  The pedicle was then rotated into position and sutured.  Once the tube was sutured into place, adequate blood supply was confirmed with blanching and refill.  The pedicle was then wrapped with xeroform gauze and dressed appropriately with a telfa and gauze bandage to ensure continued blood supply and protect the attached pedicle.
Surgeon/Pathologist Verbiage (Will Incorporate Name Of Surgeon From Intro If Not Blank): operated in two distinct and integrated capacities as the surgeon and pathologist.
V-Y Flap Text: The defect edges were debeveled with a #15 scalpel blade.  Given the location of the defect, shape of the defect and the proximity to free margins a V-Y flap was deemed most appropriate.  Using a sterile surgical marker, an appropriate advancement flap was drawn incorporating the defect and placing the expected incisions within the relaxed skin tension lines where possible.    The area thus outlined was incised deep to adipose tissue with a #15 scalpel blade.  The skin margins were undermined to an appropriate distance in all directions utilizing iris scissors.
Epidermal Sutures: 5-0 Fast Absorbing Gut
No Residual Tumor Seen Histology Text: There were no malignant cells seen in the sections examined.
Localized Dermabrasion With Wire Brush Text: The patient was draped in routine manner.  Localized dermabrasion using 3 x 17 mm wire brush was performed in routine manner to papillary dermis. This spot dermabrasion is being performed to complete skin cancer reconstruction. It also will eliminate the other sun damaged precancerous cells that are known to be part of the regional effect of a lifetime's worth of sun exposure. This localized dermabrasion is therapeutic and should not be considered cosmetic in any regard.
Graft Donor Site Epidermal Sutures (Optional): 5-0 Ethibond
Dorsal Nasal Flap Text: The defect edges were debeveled with a #15 scalpel blade.  Given the location of the defect and the proximity to free margins a dorsal nasal flap,based upon the glabellar folds, was deemed most appropriate.  Using a sterile surgical marker, an appropriate dorsal nasal flap was drawn around the defect.    The area thus outlined was incised deep to adipose tissue with a #15 scalpel blade.  The skin margins were undermined to an appropriate distance in all directions utilizing iris scissors.
Non-Graft Cartilage Fenestration Text: The cartilage was fenestrated with a 2mm punch biopsy to help facilitate healing.
Consent (Temporal Branch)/Introductory Paragraph: The rationale for Mohs was explained to the patient and consent was obtained. The risks, benefits and alternatives to therapy were discussed in detail. Specifically, the risks of damage to the temporal branch of the facial nerve, infection, scarring, bleeding, prolonged wound healing, incomplete removal, allergy to anesthesia, and recurrence were addressed. Prior to the procedure, the treatment site was clearly identified and confirmed by the patient. All components of Universal Protocol/PAUSE Rule completed.
Mucosal Advancement Flap Text: Given the location of the defect, shape of the defect and the proximity to free margins a mucosal advancement flap was deemed most appropriate. Incisions were made with a 15 blade scalpel in the appropriate fashion along the cutaneous vermilion border and the mucosal lip. The remaining actinically damaged mucosal tissue was excised.  The mucosal advancement flap was then elevated to the gingival sulcus with care taken to preserve the neurovascular structures and advanced into the primary defect. Care was taken to ensure that precise realignment of the vermilion border was achieved.
Home Suture Removal Text: Patient was provided instructions on removing sutures and will remove their sutures at home.  If they have any questions or difficulties they will call the office.
Cheiloplasty (Less Than 50%) Text: A decision was made to reconstruct the defect with a  cheiloplasty.  The defect was undermined extensively.  Additional obicularis oris muscle was excised with a 15 blade scalpel.  The defect was converted into a full thickness wedge, of less than 50% of the vertical height of the lip, to facilite a better cosmetic result.  Small vessels were then tied off with 5-0 monocyrl. The obicularis oris, superficial fascia, adipose and dermis were then reapproximated.  After the deeper layers were approximated the epidermis was reapproximated with particular care given to realign the vermilion border.
Unique Flap 1 Name: Myocutaneous Island pedicle Flap
Unique Flap 2 Name: Peng Flap
Island Pedicle Flap-Requiring Vessel Identification Text: The defect edges were debeveled with a #15 scalpel blade.  Given the location of the defect, shape of the defect and the proximity to free margins an island pedicle advancement flap was deemed most appropriate.  Using a sterile surgical marker, an appropriate advancement flap was drawn, based on the axial vessel mentioned above, incorporating the defect, outlining the appropriate donor tissue and placing the expected incisions within the relaxed skin tension lines where possible.    The area thus outlined was incised deep to adipose tissue with a #15 scalpel blade.  The skin margins were undermined to an appropriate distance in all directions around the primary defect and laterally outward around the island pedicle utilizing iris scissors.  There was minimal undermining beneath the pedicle flap.
Primary Defect Length In Cm (Final Defect Size - Required For Flaps/Grafts): 1.5
Wound Care: Aquaphor
Consent (Nose)/Introductory Paragraph: The rationale for Mohs was explained to the patient and consent was obtained. The risks, benefits and alternatives to therapy were discussed in detail. Specifically, the risks of nasal deformity, changes in the flow of air through the nose, infection, scarring, bleeding, prolonged wound healing, incomplete removal, allergy to anesthesia, nerve injury and recurrence were addressed. Prior to the procedure, the treatment site was clearly identified and confirmed by the patient. All components of Universal Protocol/PAUSE Rule completed.
Transposition Flap Text: The defect edges were debeveled with a #15 scalpel blade.  Given the location of the defect and the proximity to free margins a transposition flap was deemed most appropriate.  Using a sterile surgical marker, an appropriate transposition flap was drawn incorporating the defect.    The area thus outlined was incised deep to adipose tissue with a #15 scalpel blade.  The skin margins were undermined to an appropriate distance in all directions utilizing iris scissors.
Split-Thickness Skin Graft Text: The defect edges were debeveled with a #15 scalpel blade.  Given the location of the defect, shape of the defect and the proximity to free margins a split thickness skin graft was deemed most appropriate.  Using a sterile surgical marker, the primary defect shape was transferred to the donor site. The split thickness graft was then harvested.  The skin graft was then placed in the primary defect and oriented appropriately.
Subsequent Stages Histo Method Verbiage: Using a similar technique to that described above, a thin layer of tissue was removed from all areas where tumor was visible on the previous stage.  The tissue was again oriented, mapped, dyed, and processed as above.
Unique Flap 2 Text: A decision was made to reconstruct the defect utilizing a Peng Flap (Bilateral Advancement Rotation Flap). Given the location of the defect and the proximity to free margins, this flap was deemed most appropriate.  Using a sterile surgical marker, the appropriate rotation flaps were drawn incorporating the defect and placing the expected incisions within the relaxed skin tension lines where possible.    The area thus outlined was incised deep to adipose tissue with a #15 scalpel blade.  The skin margins were undermined to an appropriate distance in all directions utilizing iris scissors.
Stage 1: Number Of Blocks?: 1
S Plasty Text: Given the location and shape of the defect, and the orientation of relaxed skin tension lines, an S-plasty was deemed most appropriate for repair.  Using a sterile surgical marker, the appropriate outline of the S-plasty was drawn, incorporating the defect and placing the expected incisions within the relaxed skin tension lines where possible.  The area thus outlined was incised deep to adipose tissue with a #15 scalpel blade.  The skin margins were undermined to an appropriate distance in all directions utilizing iris scissors. The skin flaps were advanced over the defect.  The opposing margins were then approximated with interrupted buried subcutaneous sutures.
Crescentic Complex Repair Preamble Text (Leave Blank If You Do Not Want): Extensive wide undermining was performed at least 2 cm in all directions.
Bi-Rhombic Flap Text: The defect edges were debeveled with a #15 scalpel blade.  Given the location of the defect and the proximity to free margins a bi-rhombic flap was deemed most appropriate.  Using a sterile surgical marker, an appropriate rhombic flap was drawn incorporating the defect. The area thus outlined was incised deep to adipose tissue with a #15 scalpel blade.  The skin margins were undermined to an appropriate distance in all directions utilizing iris scissors.
Dermal Autograft Text: The defect edges were debeveled with a #15 scalpel blade.  Given the location of the defect, shape of the defect and the proximity to free margins a dermal autograft was deemed most appropriate.  Using a sterile surgical marker, the primary defect shape was transferred to the donor site. The area thus outlined was incised deep to adipose tissue with a #15 scalpel blade.  The harvested graft was then trimmed of adipose and epidermal tissue until only dermis was left.  The skin graft was then placed in the primary defect and oriented appropriately.
Z Plasty Text: The lesion was extirpated to the level of the fat with a #15 scalpel blade.  Given the location of the defect, shape of the defect and the proximity to free margins a Z-plasty was deemed most appropriate for repair.  Using a sterile surgical marker, the appropriate transposition arms of the Z-plasty were drawn incorporating the defect and placing the expected incisions within the relaxed skin tension lines where possible.    The area thus outlined was incised deep to adipose tissue with a #15 scalpel blade.  The skin margins were undermined to an appropriate distance in all directions utilizing iris scissors.  The opposing transposition arms were then transposed into place in opposite direction and anchored with interrupted buried subcutaneous sutures.
Crescentic Advancement Flap Text: The defect edges were debeveled with a #15 scalpel blade.  Given the location of the defect and the proximity to free margins a crescentic advancement flap was deemed most appropriate.  Using a sterile surgical marker, the appropriate advancement flap was drawn incorporating the defect and placing the expected incisions within the relaxed skin tension lines where possible.    The area thus outlined was incised deep to adipose tissue with a #15 scalpel blade.  The skin margins were undermined to an appropriate distance in all directions utilizing iris scissors.
Cheek Interpolation Flap Text: A decision was made to reconstruct the defect utilizing an interpolation axial flap and a staged reconstruction.  A telfa template was made of the defect.  This telfa template was then used to outline the Cheek Interpolation flap.  The donor area for the pedicle flap was then injected with anesthesia.  The flap was excised through the skin and subcutaneous tissue down to the layer of the underlying musculature.  The interpolation flap was carefully excised within this deep plane to maintain its blood supply.  The edges of the donor site were undermined.   The donor site was closed in a primary fashion.  The pedicle was then rotated into position and sutured.  Once the tube was sutured into place, adequate blood supply was confirmed with blanching and refill.  The pedicle was then wrapped with xeroform gauze and dressed appropriately with a telfa and gauze bandage to ensure continued blood supply and protect the attached pedicle.
A-T Advancement Flap Text: The defect edges were debeveled with a #15 scalpel blade.  Given the location of the defect, shape of the defect and the proximity to free margins an A-T advancement flap was deemed most appropriate.  Using a sterile surgical marker, an appropriate advancement flap was drawn incorporating the defect and placing the expected incisions within the relaxed skin tension lines where possible.    The area thus outlined was incised deep to adipose tissue with a #15 scalpel blade.  The skin margins were undermined to an appropriate distance in all directions utilizing iris scissors.
Suture Removal: 7 days
Consent 1/Introductory Paragraph: The rationale for Mohs was explained to the patient and consent was obtained. The risks, benefits and alternatives to therapy were discussed in detail. Specifically, the risks of infection, scarring, bleeding, prolonged wound healing, incomplete removal, allergy to anesthesia, nerve injury and recurrence were addressed. Prior to the procedure, the treatment site was clearly identified and confirmed by the patient. All components of Universal Protocol/PAUSE Rule completed.
Bilobed Flap Text: The defect edges were debeveled with a #15 scalpel blade.  Given the location of the defect and the proximity to free margins a bilobe flap was deemed most appropriate.  Using a sterile surgical marker, an appropriate bilobe flap drawn around the defect.    The area thus outlined was incised deep to adipose tissue with a #15 scalpel blade.  The skin margins were undermined to an appropriate distance in all directions utilizing iris scissors.
Repair Anesthesia Method: local infiltration
Purse String (Intermediate) Text: Given the location of the defect and the characteristics of the surrounding skin a purse string intermediate closure was deemed most appropriate.  Undermining was performed circumfirentially around the surgical defect.  A purse string suture was then placed and tightened.
Mastoid Interpolation Flap Text: A decision was made to reconstruct the defect utilizing an interpolation axial flap and a staged reconstruction.  A telfa template was made of the defect.  This telfa template was then used to outline the mastoid interpolation flap.  The donor area for the pedicle flap was then injected with anesthesia.  The flap was excised through the skin and subcutaneous tissue down to the layer of the underlying musculature.  The pedicle flap was carefully excised within this deep plane to maintain its blood supply.  The edges of the donor site were undermined.   The donor site was closed in a primary fashion.  The pedicle was then rotated into position and sutured.  Once the tube was sutured into place, adequate blood supply was confirmed with blanching and refill.  The pedicle was then wrapped with xeroform gauze and dressed appropriately with a telfa and gauze bandage to ensure continued blood supply and protect the attached pedicle.
Inflammation Suggestive Of Cancer Camouflage Histology Text: There was a dense lymphocytic infiltrate which prevented adequate histologic evaluation of adjacent structures.
Mohs Histo Method Verbiage: Each section was then chromacoded and processed in the Mohs lab using the Mohs protocol and submitted for frozen section.
Advancement-Rotation Flap Text: The defect edges were debeveled with a #15 scalpel blade.  Given the location of the defect, shape of the defect and the proximity to free margins an advancement-rotation flap was deemed most appropriate.  Using a sterile surgical marker, an appropriate flap was drawn incorporating the defect and placing the expected incisions within the relaxed skin tension lines where possible. The area thus outlined was incised deep to adipose tissue with a #15 scalpel blade.  The skin margins were undermined to an appropriate distance in all directions utilizing iris scissors.
Graft Cartilage Fenestration Text: The cartilage was fenestrated with a 2mm punch biopsy to help facilitate graft survival and healing.
Anesthesia Volume In Cc: 6
Consent (Marginal Mandibular)/Introductory Paragraph: The rationale for Mohs was explained to the patient and consent was obtained. The risks, benefits and alternatives to therapy were discussed in detail. Specifically, the risks of damage to the marginal mandibular branch of the facial nerve, infection, scarring, bleeding, prolonged wound healing, incomplete removal, allergy to anesthesia, and recurrence were addressed. Prior to the procedure, the treatment site was clearly identified and confirmed by the patient. All components of Universal Protocol/PAUSE Rule completed.
Surgeon Performing Repair (Optional): Vlad
Island Pedicle Flap Text: The defect edges were debeveled with a #15 scalpel blade.  Given the location of the defect, shape of the defect and the proximity to free margins an island pedicle advancement flap was deemed most appropriate.  Using a sterile surgical marker, an appropriate advancement flap was drawn incorporating the defect, outlining the appropriate donor tissue and placing the expected incisions within the relaxed skin tension lines where possible.    The area thus outlined was incised deep to adipose tissue with a #15 scalpel blade.  The skin margins were undermined to an appropriate distance in all directions around the primary defect and laterally outward around the island pedicle utilizing iris scissors.  There was minimal undermining beneath the pedicle flap.
Tarsorrhaphy Text: A tarsorrhaphy was performed using Frost sutures.
Flap Type: Rotation Flap
Hatchet Flap Text: The defect edges were debeveled with a #15 scalpel blade.  Given the location of the defect, shape of the defect and the proximity to free margins a hatchet flap based from the glabella was deemed most appropriate.  Using a sterile surgical marker, an appropriate glabellar hatchet flap was drawn incorporating the defect and placing the expected incisions within the relaxed skin tension lines where possible.    The area thus outlined was incised deep to adipose tissue with a #15 scalpel blade.  The skin margins were undermined to an appropriate distance in all directions utilizing iris scissors.
Cheiloplasty (Complex) Text: A decision was made to reconstruct the defect with a  cheiloplasty.  The defect was undermined extensively.  Additional obicularis oris muscle was excised with a 15 blade scalpel.  The defect was converted into a full thickness wedge to facilite a better cosmetic result.  Small vessels were then tied off with 5-0 monocyrl. The obicularis oris, superficial fascia, adipose and dermis were then reapproximated.  After the deeper layers were approximated the epidermis was reapproximated with particular care given to realign the vermilion border.
Epidermal Closure: running and interrupted
Ear Wedge Repair Text: A wedge excision was completed by carrying down an excision through the full thickness of the ear and cartilage with an inward facing Burow's triangle. The wound was then closed in a layered fashion.
Unique Flap 3 Name: Mercedes Flap
Keystone Flap Text: The defect edges were debeveled with a #15 scalpel blade.  Given the location of the defect, shape of the defect a keystone flap was deemed most appropriate.  Using a sterile surgical marker, an appropriate keystone flap was drawn incorporating the defect, outlining the appropriate donor tissue and placing the expected incisions within the relaxed skin tension lines where possible. The area thus outlined was incised deep to adipose tissue with a #15 scalpel blade.  The skin margins were undermined to an appropriate distance in all directions around the primary defect and laterally outward around the flap utilizing iris scissors.
Closure 2 Information: This tab is for additional flaps and grafts, including complex repair and grafts and complex repair and flaps. You can also specify a different location for the additional defect, if the location is the same you do not need to select a new one. We will insert the automated text for the repair you select below just as we do for solitary flaps and grafts. Please note that at this time if you select a location with a different insurance zone you will need to override the ICD10 and CPT if appropriate.
Consent (Lip)/Introductory Paragraph: The rationale for Mohs was explained to the patient and consent was obtained. The risks, benefits and alternatives to therapy were discussed in detail. Specifically, the risks of lip deformity, changes in the oral aperture, infection, scarring, bleeding, prolonged wound healing, incomplete removal, allergy to anesthesia, nerve injury and recurrence were addressed. Prior to the procedure, the treatment site was clearly identified and confirmed by the patient. All components of Universal Protocol/PAUSE Rule completed.
Mohs Rapid Report Verbiage: The area of clinically evident tumor was marked with skin marking ink and appropriately hatched.  The initial incision was made following the Mohs approach through the skin.  The specimen was taken to the lab, divided into the necessary number of pieces, chromacoded and processed according to the Mohs protocol.  This was repeated in successive stages until a tumor free defect was achieved.
Cartilage Graft Text: The defect edges were debeveled with a #15 scalpel blade.  Given the location of the defect, shape of the defect, the fact the defect involved a full thickness cartilage defect a cartilage graft was deemed most appropriate.  An appropriate donor site was identified, cleansed, and anesthetized. The cartilage graft was then harvested and transferred to the recipient site, oriented appropriately and then sutured into place.  The secondary defect was then repaired using a primary closure.
Muscle Hinge Flap Text: The defect edges were debeveled with a #15 scalpel blade.  Given the size, depth and location of the defect and the proximity to free margins a muscle hinge flap was deemed most appropriate.  Using a sterile surgical marker, an appropriate hinge flap was drawn incorporating the defect. The area thus outlined was incised with a #15 scalpel blade.  The skin margins were undermined to an appropriate distance in all directions utilizing iris scissors.
Mohs Case Number: 
Wound Care (No Sutures): Petrolatum
Advancement Flap (Single) Text: The defect edges were debeveled with a #15 scalpel blade.  Given the location of the defect and the proximity to free margins a single advancement flap was deemed most appropriate.  Using a sterile surgical marker, an appropriate advancement flap was drawn incorporating the defect and placing the expected incisions within the relaxed skin tension lines where possible.    The area thus outlined was incised deep to adipose tissue with a #15 scalpel blade.  The skin margins were undermined to an appropriate distance in all directions utilizing iris scissors.
V-Y Plasty Text: The defect edges were debeveled with a #15 scalpel blade.  Given the location of the defect, shape of the defect and the proximity to free margins an V-Y advancement flap was deemed most appropriate.  Using a sterile surgical marker, an appropriate advancement flap was drawn incorporating the defect and placing the expected incisions within the relaxed skin tension lines where possible.    The area thus outlined was incised deep to adipose tissue with a #15 scalpel blade.  The skin margins were undermined to an appropriate distance in all directions utilizing iris scissors.
Estimated Blood Loss (Cc): less than 5 cc
Unique Flap 3 Text: The defect edges were debeveled with a #15 scalpel blade.  Given the location of the defect, shape of the defect and the proximity to free margins a Mercedes (double advancement flap) was deemed most appropriate.  Using a sterile surgical marker, the appropriate transposition flaps were drawn incorporating the defect and placing the expected incisions within the relaxed skin tension lines where possible.    The area thus outlined was incised deep to adipose tissue with a #15 scalpel blade.  The skin margins were undermined to an appropriate distance in all directions utilizing iris scissors.  Hemostasis was achieved with electrocautery.  The flaps were then advanced into the defect and anchored with interrupted buried subcutaneous sutures.
Skin Substitute Text: The defect edges were debeveled with a #15 scalpel blade.  Given the location of the defect, shape of the defect and the proximity to free margins a skin substitute graft was deemed most appropriate.  The graft material was trimmed to fit the size of the defect. The graft was then placed in the primary defect and oriented appropriately.
Helical Rim Advancement Flap Text: The defect edges were debeveled with a #15 blade scalpel.  Given the location of the defect and the proximity to free margins (helical rim) a double helical rim advancement flap was deemed most appropriate.  Using a sterile surgical marker, the appropriate advancement flaps were drawn incorporating the defect and placing the expected incisions between the helical rim and antihelix where possible.  The area thus outlined was incised through and through with a #15 scalpel blade.  With a skin hook and iris scissors, the flaps were gently and sharply undermined and freed up.
Patient Specific Indications Override: hepatitis C pt
Referring Physician (Optional): ROSEMARIE Lott
O-T Advancement Flap Text: The defect edges were debeveled with a #15 scalpel blade.  Given the location of the defect, shape of the defect and the proximity to free margins an O-T advancement flap was deemed most appropriate.  Using a sterile surgical marker, an appropriate advancement flap was drawn incorporating the defect and placing the expected incisions within the relaxed skin tension lines where possible.    The area thus outlined was incised deep to adipose tissue with a #15 scalpel blade.  The skin margins were undermined to an appropriate distance in all directions utilizing iris scissors.
Cheek-To-Nose Interpolation Flap Text: A decision was made to reconstruct the defect utilizing an interpolation axial flap and a staged reconstruction.  A telfa template was made of the defect.  This telfa template was then used to outline the Cheek-To-Nose Interpolation flap.  The donor area for the pedicle flap was then injected with anesthesia.  The flap was excised through the skin and subcutaneous tissue down to the layer of the underlying musculature.  The interpolation flap was carefully excised within this deep plane to maintain its blood supply.  The edges of the donor site were undermined.   The donor site was closed in a primary fashion.  The pedicle was then rotated into position and sutured.  Once the tube was sutured into place, adequate blood supply was confirmed with blanching and refill.  The pedicle was then wrapped with xeroform gauze and dressed appropriately with a telfa and gauze bandage to ensure continued blood supply and protect the attached pedicle.
Eye Protection Verbiage: Before proceeding with the stage, a plastic scleral shield was inserted. The globe was anesthetized with a few drops of 1% lidocaine with 1:100,000 epinephrine. Then, an appropriate sized scleral shield was chosen and coated with lacrilube ointment. The shield was gently inserted and left in place for the duration of each stage. After the stage was completed, the shield was gently removed.
Postop Diagnosis: same
Bilobed Transposition Flap Text: The defect edges were debeveled with a #15 scalpel blade.  Given the location of the defect and the proximity to free margins a bilobed transposition flap was deemed most appropriate.  Using a sterile surgical marker, an appropriate bilobe flap drawn around the defect.    The area thus outlined was incised deep to adipose tissue with a #15 scalpel blade.  The skin margins were undermined to an appropriate distance in all directions utilizing iris scissors.
Partial Purse String (Simple) Text: Given the location of the defect and the characteristics of the surrounding skin a simple purse string closure was deemed most appropriate.  Undermining was performed circumfirentially around the surgical defect.  A purse string suture was then placed and tightened. Wound tension only allowed a partial closure of the circular defect.
Consent 2/Introductory Paragraph: Mohs surgery was explained to the patient and consent was obtained. The risks, benefits and alternatives to therapy were discussed in detail. Specifically, the risks of infection, scarring, bleeding, prolonged wound healing, incomplete removal, allergy to anesthesia, nerve injury and recurrence were addressed. Prior to the procedure, the treatment site was clearly identified and confirmed by the patient. All components of Universal Protocol/PAUSE Rule completed.
Posterior Auricular Interpolation Flap Text: A decision was made to reconstruct the defect utilizing an interpolation axial flap and a staged reconstruction.  A telfa template was made of the defect.  This telfa template was then used to outline the posterior auricular interpolation flap.  The donor area for the pedicle flap was then injected with anesthesia.  The flap was excised through the skin and subcutaneous tissue down to the layer of the underlying musculature.  The pedicle flap was carefully excised within this deep plane to maintain its blood supply.  The edges of the donor site were undermined.   The donor site was closed in a primary fashion.  The pedicle was then rotated into position and sutured.  Once the tube was sutured into place, adequate blood supply was confirmed with blanching and refill.  The pedicle was then wrapped with xeroform gauze and dressed appropriately with a telfa and gauze bandage to ensure continued blood supply and protect the attached pedicle.
Mercedes Flap Text: The defect edges were debeveled with a #15 scalpel blade.  Given the location of the defect, shape of the defect and the proximity to free margins a Mercedes flap was deemed most appropriate.  Using a sterile surgical marker, an appropriate advancement flap was drawn incorporating the defect and placing the expected incisions within the relaxed skin tension lines where possible. The area thus outlined was incised deep to adipose tissue with a #15 scalpel blade.  The skin margins were undermined to an appropriate distance in all directions utilizing iris scissors.
Epidermal Closure Graft Donor Site (Optional): simple interrupted
Consent (Spinal Accessory)/Introductory Paragraph: The rationale for Mohs was explained to the patient and consent was obtained. The risks, benefits and alternatives to therapy were discussed in detail. Specifically, the risks of damage to the spinal accessory nerve, infection, scarring, bleeding, prolonged wound healing, incomplete removal, allergy to anesthesia, and recurrence were addressed. Prior to the procedure, the treatment site was clearly identified and confirmed by the patient. All components of Universal Protocol/PAUSE Rule completed.
Secondary Intention Text (Leave Blank If You Do Not Want): The defect will heal with secondary intention.
Initial Size Of Lesion: 1.2
Anesthesia Type: 0.5% lidocaine with 1:200,000 epinephrine and a 1:10 solution of 8.4% sodium bicarbonate and 408mcg clindamycin/ml
Complex Repair And Flap Additional Text (Will Appearing After The Standard Complex Repair Text): The complex repair was not sufficient to completely close the primary defect. The remaining additional defect was repaired with the flap mentioned below.
Island Pedicle Flap With Canthal Suspension Text: The defect edges were debeveled with a #15 scalpel blade.  Given the location of the defect, shape of the defect and the proximity to free margins an island pedicle advancement flap was deemed most appropriate.  Using a sterile surgical marker, an appropriate advancement flap was drawn incorporating the defect, outlining the appropriate donor tissue and placing the expected incisions within the relaxed skin tension lines where possible. The area thus outlined was incised deep to adipose tissue with a #15 scalpel blade.  The skin margins were undermined to an appropriate distance in all directions around the primary defect and laterally outward around the island pedicle utilizing iris scissors.  There was minimal undermining beneath the pedicle flap. A suspension suture was placed in the canthal tendon to prevent tension and prevent ectropion.
Rotation Flap Text: The defect edges were debeveled with a #15 scalpel blade.  Given the location of the defect, shape of the defect and the proximity to free margins a rotation flap was deemed most appropriate.  Using a sterile surgical marker, an appropriate rotation flap was drawn incorporating the defect and placing the expected incisions within the relaxed skin tension lines where possible.    The area thus outlined was incised deep to adipose tissue with a #15 scalpel blade.  The skin margins were undermined to an appropriate distance in all directions utilizing iris scissors.
Bcc Histology Text: There were numerous aggregates of basaloid cells.
Spiral Flap Text: The defect edges were debeveled with a #15 scalpel blade.  Given the location of the defect, shape of the defect and the proximity to free margins a spiral flap was deemed most appropriate.  Using a sterile surgical marker, an appropriate rotation flap was drawn incorporating the defect and placing the expected incisions within the relaxed skin tension lines where possible. The area thus outlined was incised deep to adipose tissue with a #15 scalpel blade.  The skin margins were undermined to an appropriate distance in all directions utilizing iris scissors.
Post-Care Instructions: I reviewed with the patient in detail post-care instructions. Patient is not to engage in any heavy lifting, exercise, or swimming for the next 14 days. Should the patient develop any fevers, chills, bleeding, severe pain patient will contact the office immediately.
Secondary Defect Length In Cm (Required For Flaps): 3.5
Full Thickness Lip Wedge Repair (Flap) Text: Given the location of the defect and the proximity to free margins a full thickness wedge repair was deemed most appropriate.  Using a sterile surgical marker, the appropriate repair was drawn incorporating the defect and placing the expected incisions perpendicular to the vermilion border.  The vermilion border was also meticulously outlined to ensure appropriate reapproximation during the repair.  The area thus outlined was incised through and through with a #15 scalpel blade.  The muscularis and dermis were reaproximated with deep sutures following hemostasis. Care was taken to realign the vermilion border before proceeding with the superficial closure.  Once the vermilion was realigned the superfical and mucosal closure was finished.
O-T Plasty Text: The defect edges were debeveled with a #15 scalpel blade.  Given the location of the defect, shape of the defect and the proximity to free margins an O-T plasty was deemed most appropriate.  Using a sterile surgical marker, an appropriate O-T plasty was drawn incorporating the defect and placing the expected incisions within the relaxed skin tension lines where possible.    The area thus outlined was incised deep to adipose tissue with a #15 scalpel blade.  The skin margins were undermined to an appropriate distance in all directions utilizing iris scissors.
Unique Flap 4 Name: Banner Flap
Consent (Scalp)/Introductory Paragraph: The rationale for Mohs was explained to the patient and consent was obtained. The risks, benefits and alternatives to therapy were discussed in detail. Specifically, the risks of changes in hair growth pattern secondary to repair, infection, scarring, bleeding, prolonged wound healing, incomplete removal, allergy to anesthesia, nerve injury and recurrence were addressed. Prior to the procedure, the treatment site was clearly identified and confirmed by the patient. All components of Universal Protocol/PAUSE Rule completed.
Repair Type: Flap
Melolabial Transposition Flap Text: The defect edges were debeveled with a #15 scalpel blade.  Given the location of the defect and the proximity to free margins a melolabial flap was deemed most appropriate.  Using a sterile surgical marker, an appropriate melolabial transposition flap was drawn incorporating the defect.    The area thus outlined was incised deep to adipose tissue with a #15 scalpel blade.  The skin margins were undermined to an appropriate distance in all directions utilizing iris scissors.
Composite Graft Text: The defect edges were debeveled with a #15 scalpel blade.  Given the location of the defect, shape of the defect, the proximity to free margins and the fact the defect was full thickness a composite graft was deemed most appropriate.  The defect was outline and then transferred to the donor site.  A full thickness graft was then excised from the donor site. The graft was then placed in the primary defect, oriented appropriately and then sutured into place.  The secondary defect was then repaired using a primary closure.
H Plasty Text: Given the location of the defect, shape of the defect and the proximity to free margins a H-plasty was deemed most appropriate for repair.  Using a sterile surgical marker, the appropriate advancement arms of the H-plasty were drawn incorporating the defect and placing the expected incisions within the relaxed skin tension lines where possible. The area thus outlined was incised deep to adipose tissue with a #15 scalpel blade. The skin margins were undermined to an appropriate distance in all directions utilizing iris scissors.  The opposing advancement arms were then advanced into place in opposite direction and anchored with interrupted buried subcutaneous sutures.
Advancement Flap (Double) Text: The defect edges were debeveled with a #15 scalpel blade.  Given the location of the defect and the proximity to free margins a double advancement flap was deemed most appropriate.  Using a sterile surgical marker, the appropriate advancement flaps were drawn incorporating the defect and placing the expected incisions within the relaxed skin tension lines where possible.    The area thus outlined was incised deep to adipose tissue with a #15 scalpel blade.  The skin margins were undermined to an appropriate distance in all directions utilizing iris scissors.
Deep Sutures: 5-0 Vicryl
Unique Flap 4 Text: The defect edges were debeveled with a #15 scalpel blade.  Given the location of the defect and the proximity to free margins a Banner transposition flap was deemed most appropriate.  Using a sterile surgical marker, an appropriate Banner transposition flap was drawn incorporating the defect.    The area thus outlined was incised deep to adipose tissue with a #15 scalpel blade.  The skin margins were undermined to an appropriate distance in all directions utilizing iris scissors.
Bilateral Helical Rim Advancement Flap Text: The defect edges were debeveled with a #15 blade scalpel.  Given the location of the defect and the proximity to free margins (helical rim) a bilateral helical rim advancement flap was deemed most appropriate.  Using a sterile surgical marker, the appropriate advancement flaps were drawn incorporating the defect and placing the expected incisions between the helical rim and antihelix where possible.  The area thus outlined was incised through and through with a #15 scalpel blade.  With a skin hook and iris scissors, the flaps were gently and sharply undermined and freed up.
Tissue Cultured Epidermal Autograft Text: The defect edges were debeveled with a #15 scalpel blade.  Given the location of the defect, shape of the defect and the proximity to free margins a tissue cultured epidermal autograft was deemed most appropriate.  The graft was then trimmed to fit the size of the defect.  The graft was then placed in the primary defect and oriented appropriately.
Area H Indication Text: Tumors in this location are included in Area H (eyelids, eyebrows, nose, lips, chin, ear, pre-auricular, post-auricular, temple, genitalia, hands, feet, ankles and areola).  Tissue conservation is critical in these anatomic locations.

## 2018-11-28 ENCOUNTER — APPOINTMENT (RX ONLY)
Dept: URBAN - METROPOLITAN AREA CLINIC 36 | Facility: CLINIC | Age: 63
Setting detail: DERMATOLOGY
End: 2018-11-28

## 2018-11-28 DIAGNOSIS — Z48.02 ENCOUNTER FOR REMOVAL OF SUTURES: ICD-10-CM

## 2018-11-28 PROCEDURE — 99024 POSTOP FOLLOW-UP VISIT: CPT

## 2018-11-28 PROCEDURE — ? SUTURE REMOVAL (GLOBAL PERIOD)

## 2018-11-28 ASSESSMENT — LOCATION DETAILED DESCRIPTION DERM: LOCATION DETAILED: LEFT SUPERIOR HELIX

## 2018-11-28 ASSESSMENT — LOCATION ZONE DERM: LOCATION ZONE: EAR

## 2018-11-28 ASSESSMENT — LOCATION SIMPLE DESCRIPTION DERM: LOCATION SIMPLE: LEFT EAR

## 2018-11-28 NOTE — PROCEDURE: SUTURE REMOVAL (GLOBAL PERIOD)
Detail Level: Detailed
Add 38540 Cpt? (Important Note: In 2017 The Use Of 37821 Is Being Tracked By Cms To Determine Future Global Period Reimbursement For Global Periods): yes

## 2018-12-07 ENCOUNTER — HOSPITAL ENCOUNTER (OUTPATIENT)
Dept: LAB | Facility: MEDICAL CENTER | Age: 63
End: 2018-12-07
Attending: INTERNAL MEDICINE
Payer: COMMERCIAL

## 2018-12-07 LAB
ALBUMIN SERPL BCP-MCNC: 4.2 G/DL (ref 3.2–4.9)
ALBUMIN/GLOB SERPL: 1.4 G/DL
ALP SERPL-CCNC: 53 U/L (ref 30–99)
ALT SERPL-CCNC: 20 U/L (ref 2–50)
ANION GAP SERPL CALC-SCNC: 5 MMOL/L (ref 0–11.9)
AST SERPL-CCNC: 20 U/L (ref 12–45)
BASOPHILS # BLD AUTO: 1.2 % (ref 0–1.8)
BASOPHILS # BLD: 0.07 K/UL (ref 0–0.12)
BILIRUB SERPL-MCNC: 0.6 MG/DL (ref 0.1–1.5)
BUN SERPL-MCNC: 16 MG/DL (ref 8–22)
CALCIUM SERPL-MCNC: 9.9 MG/DL (ref 8.5–10.5)
CHLORIDE SERPL-SCNC: 104 MMOL/L (ref 96–112)
CO2 SERPL-SCNC: 25 MMOL/L (ref 20–33)
COMMENT 1642: NORMAL
CREAT SERPL-MCNC: 1.05 MG/DL (ref 0.5–1.4)
EOSINOPHIL # BLD AUTO: 0.18 K/UL (ref 0–0.51)
EOSINOPHIL NFR BLD: 3 % (ref 0–6.9)
ERYTHROCYTE [DISTWIDTH] IN BLOOD BY AUTOMATED COUNT: 42.5 FL (ref 35.9–50)
GLOBULIN SER CALC-MCNC: 2.9 G/DL (ref 1.9–3.5)
GLUCOSE SERPL-MCNC: 95 MG/DL (ref 65–99)
HCT VFR BLD AUTO: 48.6 % (ref 42–52)
HGB BLD-MCNC: 16.7 G/DL (ref 14–18)
IMM GRANULOCYTES # BLD AUTO: 0.03 K/UL (ref 0–0.11)
IMM GRANULOCYTES NFR BLD AUTO: 0.5 % (ref 0–0.9)
LYMPHOCYTES # BLD AUTO: 2.09 K/UL (ref 1–4.8)
LYMPHOCYTES NFR BLD: 35.4 % (ref 22–41)
MCH RBC QN AUTO: 31.8 PG (ref 27–33)
MCHC RBC AUTO-ENTMCNC: 34.4 G/DL (ref 33.7–35.3)
MCV RBC AUTO: 92.6 FL (ref 81.4–97.8)
MONOCYTES # BLD AUTO: 0.6 K/UL (ref 0–0.85)
MONOCYTES NFR BLD AUTO: 10.2 % (ref 0–13.4)
MORPHOLOGY BLD-IMP: NORMAL
NEUTROPHILS # BLD AUTO: 2.94 K/UL (ref 1.82–7.42)
NEUTROPHILS NFR BLD: 49.7 % (ref 44–72)
NRBC # BLD AUTO: 0 K/UL
NRBC BLD-RTO: 0 /100 WBC
PLATELET # BLD AUTO: 158 K/UL (ref 164–446)
PMV BLD AUTO: 11.7 FL (ref 9–12.9)
POTASSIUM SERPL-SCNC: 4 MMOL/L (ref 3.6–5.5)
PROT SERPL-MCNC: 7.1 G/DL (ref 6–8.2)
RBC # BLD AUTO: 5.25 M/UL (ref 4.7–6.1)
SODIUM SERPL-SCNC: 134 MMOL/L (ref 135–145)
WBC # BLD AUTO: 5.9 K/UL (ref 4.8–10.8)

## 2018-12-07 PROCEDURE — 87521 HEPATITIS C PROBE&RVRS TRNSC: CPT

## 2018-12-07 PROCEDURE — 36415 COLL VENOUS BLD VENIPUNCTURE: CPT

## 2018-12-07 PROCEDURE — 82105 ALPHA-FETOPROTEIN SERUM: CPT

## 2018-12-07 PROCEDURE — 82107 ALPHA-FETOPROTEIN L3: CPT

## 2018-12-07 PROCEDURE — 80053 COMPREHEN METABOLIC PANEL: CPT

## 2018-12-07 PROCEDURE — 85025 COMPLETE CBC W/AUTO DIFF WBC: CPT

## 2018-12-07 PROCEDURE — 83951 ONCOPROTEIN DCP: CPT

## 2018-12-09 LAB
ACARBOXYPROTHROMBIN SERPL-MCNC: 0.4 NG/ML (ref 0–7.4)
AFP-TM SERPL-MCNC: 4 NG/ML (ref 0–9)

## 2018-12-11 LAB
AFP L3 MFR SERPL: <0.5 % (ref 0–9.9)
AFP SERPL-MCNC: 4 NG/ML (ref 0–15)

## 2018-12-13 LAB — HCV RNA SERPL QL NAA+PROBE: NEGATIVE

## 2019-03-01 ENCOUNTER — HOSPITAL ENCOUNTER (OUTPATIENT)
Dept: LAB | Facility: MEDICAL CENTER | Age: 64
End: 2019-03-01
Attending: INTERNAL MEDICINE
Payer: COMMERCIAL

## 2019-03-01 LAB
ALBUMIN SERPL BCP-MCNC: 4.3 G/DL (ref 3.2–4.9)
ALBUMIN/GLOB SERPL: 1.6 G/DL
ALP SERPL-CCNC: 40 U/L (ref 30–99)
ALT SERPL-CCNC: 16 U/L (ref 2–50)
ANION GAP SERPL CALC-SCNC: 5 MMOL/L (ref 0–11.9)
AST SERPL-CCNC: 17 U/L (ref 12–45)
BASOPHILS # BLD AUTO: 1.5 % (ref 0–1.8)
BASOPHILS # BLD: 0.11 K/UL (ref 0–0.12)
BILIRUB SERPL-MCNC: 0.6 MG/DL (ref 0.1–1.5)
BUN SERPL-MCNC: 10 MG/DL (ref 8–22)
CALCIUM SERPL-MCNC: 10.1 MG/DL (ref 8.5–10.5)
CHLORIDE SERPL-SCNC: 104 MMOL/L (ref 96–112)
CO2 SERPL-SCNC: 27 MMOL/L (ref 20–33)
CREAT SERPL-MCNC: 1.09 MG/DL (ref 0.5–1.4)
EOSINOPHIL # BLD AUTO: 0.19 K/UL (ref 0–0.51)
EOSINOPHIL NFR BLD: 2.5 % (ref 0–6.9)
ERYTHROCYTE [DISTWIDTH] IN BLOOD BY AUTOMATED COUNT: 45.7 FL (ref 35.9–50)
GLOBULIN SER CALC-MCNC: 2.7 G/DL (ref 1.9–3.5)
GLUCOSE SERPL-MCNC: 87 MG/DL (ref 65–99)
HCT VFR BLD AUTO: 52.6 % (ref 42–52)
HGB BLD-MCNC: 17.4 G/DL (ref 14–18)
IMM GRANULOCYTES # BLD AUTO: 0.04 K/UL (ref 0–0.11)
IMM GRANULOCYTES NFR BLD AUTO: 0.5 % (ref 0–0.9)
LYMPHOCYTES # BLD AUTO: 3.06 K/UL (ref 1–4.8)
LYMPHOCYTES NFR BLD: 40.7 % (ref 22–41)
MCH RBC QN AUTO: 31.9 PG (ref 27–33)
MCHC RBC AUTO-ENTMCNC: 33.1 G/DL (ref 33.7–35.3)
MCV RBC AUTO: 96.5 FL (ref 81.4–97.8)
MONOCYTES # BLD AUTO: 0.66 K/UL (ref 0–0.85)
MONOCYTES NFR BLD AUTO: 8.8 % (ref 0–13.4)
NEUTROPHILS # BLD AUTO: 3.46 K/UL (ref 1.82–7.42)
NEUTROPHILS NFR BLD: 46 % (ref 44–72)
NRBC # BLD AUTO: 0 K/UL
NRBC BLD-RTO: 0 /100 WBC
PLATELET # BLD AUTO: 191 K/UL (ref 164–446)
PMV BLD AUTO: 10.7 FL (ref 9–12.9)
POTASSIUM SERPL-SCNC: 4 MMOL/L (ref 3.6–5.5)
PROT SERPL-MCNC: 7 G/DL (ref 6–8.2)
RBC # BLD AUTO: 5.45 M/UL (ref 4.7–6.1)
SODIUM SERPL-SCNC: 136 MMOL/L (ref 135–145)
WBC # BLD AUTO: 7.5 K/UL (ref 4.8–10.8)

## 2019-03-01 PROCEDURE — 80053 COMPREHEN METABOLIC PANEL: CPT

## 2019-03-01 PROCEDURE — 87522 HEPATITIS C REVRS TRNSCRPJ: CPT

## 2019-03-01 PROCEDURE — 36415 COLL VENOUS BLD VENIPUNCTURE: CPT

## 2019-03-01 PROCEDURE — 85025 COMPLETE CBC W/AUTO DIFF WBC: CPT

## 2019-03-05 LAB
HCV RNA SERPL NAA+PROBE-ACNC: NOT DETECTED IU/ML
HCV RNA SERPL NAA+PROBE-LOG IU: NOT DETECTED LOG IU/ML
HCV RNA SERPL QL NAA+PROBE: NOT DETECTED

## 2019-04-08 ENCOUNTER — APPOINTMENT (RX ONLY)
Dept: URBAN - METROPOLITAN AREA CLINIC 22 | Facility: CLINIC | Age: 64
Setting detail: DERMATOLOGY
End: 2019-04-08

## 2019-04-08 DIAGNOSIS — D22 MELANOCYTIC NEVI: ICD-10-CM

## 2019-04-08 DIAGNOSIS — Z71.89 OTHER SPECIFIED COUNSELING: ICD-10-CM

## 2019-04-08 DIAGNOSIS — L82.1 OTHER SEBORRHEIC KERATOSIS: ICD-10-CM

## 2019-04-08 DIAGNOSIS — L57.0 ACTINIC KERATOSIS: ICD-10-CM

## 2019-04-08 DIAGNOSIS — L81.4 OTHER MELANIN HYPERPIGMENTATION: ICD-10-CM

## 2019-04-08 DIAGNOSIS — D18.0 HEMANGIOMA: ICD-10-CM

## 2019-04-08 PROBLEM — D18.01 HEMANGIOMA OF SKIN AND SUBCUTANEOUS TISSUE: Status: ACTIVE | Noted: 2019-04-08

## 2019-04-08 PROBLEM — D22.5 MELANOCYTIC NEVI OF TRUNK: Status: ACTIVE | Noted: 2019-04-08

## 2019-04-08 PROBLEM — D22.62 MELANOCYTIC NEVI OF LEFT UPPER LIMB, INCLUDING SHOULDER: Status: ACTIVE | Noted: 2019-04-08

## 2019-04-08 PROBLEM — D22.61 MELANOCYTIC NEVI OF RIGHT UPPER LIMB, INCLUDING SHOULDER: Status: ACTIVE | Noted: 2019-04-08

## 2019-04-08 PROCEDURE — 17000 DESTRUCT PREMALG LESION: CPT

## 2019-04-08 PROCEDURE — 17003 DESTRUCT PREMALG LES 2-14: CPT

## 2019-04-08 PROCEDURE — 99214 OFFICE O/P EST MOD 30 MIN: CPT | Mod: 25

## 2019-04-08 PROCEDURE — ? COUNSELING

## 2019-04-08 PROCEDURE — ? LIQUID NITROGEN

## 2019-04-08 ASSESSMENT — LOCATION DETAILED DESCRIPTION DERM
LOCATION DETAILED: RIGHT PROXIMAL DORSAL FOREARM
LOCATION DETAILED: RIGHT DISTAL LATERAL POSTERIOR UPPER ARM
LOCATION DETAILED: RIGHT ULNAR DORSAL HAND
LOCATION DETAILED: LEFT PROXIMAL DORSAL FOREARM
LOCATION DETAILED: LEFT DISTAL DORSAL FOREARM
LOCATION DETAILED: RIGHT SUPERIOR UPPER BACK
LOCATION DETAILED: RIGHT RADIAL DORSAL HAND
LOCATION DETAILED: LEFT RADIAL DORSAL HAND
LOCATION DETAILED: LEFT MEDIAL FOREHEAD
LOCATION DETAILED: LEFT INFERIOR CRUS OF ANTIHELIX
LOCATION DETAILED: RIGHT MEDIAL UPPER BACK
LOCATION DETAILED: LEFT SUPERIOR MEDIAL MALAR CHEEK
LOCATION DETAILED: RIGHT SUPERIOR MEDIAL MIDBACK
LOCATION DETAILED: RIGHT MEDIAL MALAR CHEEK
LOCATION DETAILED: LEFT ELBOW
LOCATION DETAILED: RIGHT TRIANGULAR FOSSA
LOCATION DETAILED: RIGHT CENTRAL MALAR CHEEK
LOCATION DETAILED: EPIGASTRIC SKIN
LOCATION DETAILED: LEFT SUPERIOR POSTERIOR HELIX
LOCATION DETAILED: LEFT CENTRAL MALAR CHEEK
LOCATION DETAILED: LEFT ULNAR DORSAL HAND

## 2019-04-08 ASSESSMENT — LOCATION SIMPLE DESCRIPTION DERM
LOCATION SIMPLE: ABDOMEN
LOCATION SIMPLE: RIGHT CHEEK
LOCATION SIMPLE: LEFT CHEEK
LOCATION SIMPLE: RIGHT UPPER BACK
LOCATION SIMPLE: LEFT EAR
LOCATION SIMPLE: RIGHT LOWER BACK
LOCATION SIMPLE: LEFT ELBOW
LOCATION SIMPLE: LEFT HAND
LOCATION SIMPLE: RIGHT UPPER ARM
LOCATION SIMPLE: LEFT FOREHEAD
LOCATION SIMPLE: RIGHT FOREARM
LOCATION SIMPLE: RIGHT EAR
LOCATION SIMPLE: LEFT FOREARM
LOCATION SIMPLE: RIGHT HAND

## 2019-04-08 ASSESSMENT — LOCATION ZONE DERM
LOCATION ZONE: HAND
LOCATION ZONE: ARM
LOCATION ZONE: EAR
LOCATION ZONE: TRUNK
LOCATION ZONE: FACE

## 2019-04-08 NOTE — PROCEDURE: LIQUID NITROGEN
Duration Of Freeze Thaw-Cycle (Seconds): 3
Consent: The patient's consent was obtained including but not limited to risks of crusting, scabbing, blistering, scarring, darker or lighter pigmentary change, recurrence, incomplete removal and infection.
Detail Level: Detailed
Render Post-Care Instructions In Note?: no
Post-Care Instructions: I reviewed with the patient in detail post-care instructions. Patient is to wear sunprotection, and avoid picking at any of the treated lesions. Pt may apply Vaseline to crusted or scabbing areas.
Number Of Freeze-Thaw Cycles: 2 freeze-thaw cycles

## 2019-05-07 ENCOUNTER — OFFICE VISIT (OUTPATIENT)
Dept: MEDICAL GROUP | Facility: MEDICAL CENTER | Age: 64
End: 2019-05-07
Payer: COMMERCIAL

## 2019-05-07 VITALS
TEMPERATURE: 98.2 F | HEIGHT: 71 IN | WEIGHT: 190 LBS | BODY MASS INDEX: 26.6 KG/M2 | DIASTOLIC BLOOD PRESSURE: 82 MMHG | OXYGEN SATURATION: 96 % | HEART RATE: 86 BPM | SYSTOLIC BLOOD PRESSURE: 130 MMHG

## 2019-05-07 DIAGNOSIS — Z86.19 HISTORY OF HEPATITIS C: ICD-10-CM

## 2019-05-07 DIAGNOSIS — Z00.00 ANNUAL PHYSICAL EXAM: ICD-10-CM

## 2019-05-07 DIAGNOSIS — R73.03 PREDIABETES: ICD-10-CM

## 2019-05-07 PROCEDURE — 90732 PPSV23 VACC 2 YRS+ SUBQ/IM: CPT | Performed by: FAMILY MEDICINE

## 2019-05-07 PROCEDURE — 90471 IMMUNIZATION ADMIN: CPT | Performed by: FAMILY MEDICINE

## 2019-05-07 PROCEDURE — 99397 PER PM REEVAL EST PAT 65+ YR: CPT | Mod: 25 | Performed by: FAMILY MEDICINE

## 2019-05-07 ASSESSMENT — ACTIVITIES OF DAILY LIVING (ADL): BATHING_REQUIRES_ASSISTANCE: 0

## 2019-05-07 ASSESSMENT — ENCOUNTER SYMPTOMS: GENERAL WELL-BEING: GOOD

## 2019-05-07 ASSESSMENT — PATIENT HEALTH QUESTIONNAIRE - PHQ9: CLINICAL INTERPRETATION OF PHQ2 SCORE: 0

## 2019-05-07 NOTE — PROGRESS NOTES
Chief Complaint   Patient presents with   • Annual Exam         HPI:  German Pagan is a 64 y.o. here for Medicare Annual Wellness Visit     Patient Active Problem List    Diagnosis Date Noted   • Vitamin D insufficiency 06/16/2017   • Hepatitis C 05/19/2017   • ED (erectile dysfunction) 05/19/2017       Current Outpatient Prescriptions   Medication Sig Dispense Refill   • sildenafil citrate (VIAGRA) 100 MG tablet TAKE ONE TABLET BY MOUTH ONCE DAILY AS NEEDED FOR ERECTILE DYSFUNCTION 6 Tab 9     No current facility-administered medications for this visit.             Current supplements as per medication list.       Allergies: Pcn [penicillins]    Current social contact/activities: living with mother (has multiple myeloma), still working. 3 children, live in [place].    He  reports that he quit smoking about 13 years ago. His smoking use included Cigarettes. He has a 20.00 pack-year smoking history. His smokeless tobacco use includes Chew. He reports that he does not drink alcohol or use drugs.  Ready to quit: Not Answered  Counseling given: Not Answered      DPA/Advanced Directive:  Patient has Advanced Directive, but it is not on file. Instructed to bring in a copy to scan into their chart.    ROS:    Gait: Uses no assistive device  Ostomy: No  Other tubes: No  Amputations: No  Chronic oxygen use: No  Last eye exam: 1 year ago.  Wears hearing aids: No   : Denies any urinary leakage during the last 6 months      Depression Screening    Little interest or pleasure in doing things?  0 - not at all  Feeling down, depressed , or hopeless? 0 - not at all  Patient Health Questionnaire Score: 0     If depressive symptoms identified deferred to follow up visit unless specifically addressed in assessment and plan.    Interpretation of PHQ-9 Total Score   Score Severity   1-4 No Depression   5-9 Mild Depression   10-14 Moderate Depression   15-19 Moderately Severe Depression   20-27 Severe Depression    Screening  for Cognitive Impairment    Three Minute Recall (village, kitchen, baby) 2/3    Shun clock face with all 12 numbers and set the hands to show 10 past 10.  Yes    Cognitive concerns identified deferred for follow up unless specifically addressed in assessment and plan.    Fall Risk Assessment    Has the patient had two or more falls in the last year or any fall with injury in the last year?  No    Safety Assessment    Throw rugs on floor.  Yes  Handrails on all stairs.  Yes  Good lighting in all hallways.  Yes  Difficulty hearing.  No  Patient counseled about all safety risks that were identified.    Functional Assessment ADLs    Are there any barriers preventing you from cooking for yourself or meeting nutritional needs?  No.    Are there any barriers preventing you from driving safely or obtaining transportation?  No.    Are there any barriers preventing you from using a telephone or calling for help?  No.    Are there any barriers preventing you from shopping?  No.    Are there any barriers preventing you from taking care of your own finances?  No.    Are there any barriers preventing you from managing your medications?  No.    Are there any barriers preventing you from showering, bathing or dressing yourself?  No.    Are you currently engaging in any exercise or physical activity?  Yes.     What is your perception of your health?  Good.      Health Maintenance Summary                IMM ZOSTER VACCINES Overdue 1/13/2018      Done 11/18/2017 Imm Admin: Zoster Vaccine Live (ZVL) (Zostavax)    IMM PNEUMOCOCCAL 65+ (ADULT) LOW/MEDIUM RISK SERIES Overdue 5/7/2019      Done 5/7/2018 Imm Admin: Pneumococcal Conjugate Vaccine (Prevnar/PCV-13)    IMM DTaP/Tdap/Td Vaccine Next Due 10/18/2026      Done 10/18/2016 Imm Admin: Tdap Vaccine    COLONOSCOPY Next Due 4/3/2027      Done 4/3/2017 REFERRAL TO GI FOR COLONOSCOPY          Patient Care Team:  Shiva Haney M.D. as PCP - General (Family Medicine)        Social History  "  Substance Use Topics   • Smoking status: Former Smoker     Packs/day: 0.50     Years: 40.00     Types: Cigarettes     Quit date: 1/1/2006   • Smokeless tobacco: Current User     Types: Chew      Comment: Quit smoking in 2006   • Alcohol use No      Comment: Quit in 1994     Family History   Problem Relation Age of Onset   • Cancer Mother         Multiple Myeloma   • Cancer Father         Colon   • Cancer Sister         Breast   • Diabetes Brother      He  has no past medical history on file.   Past Surgical History:   Procedure Laterality Date   • ANKLE FUSION Right 1990   • ANKLE ORIF         Exam:   /82 (BP Location: Right arm, Patient Position: Sitting)   Pulse 86   Temp 36.8 °C (98.2 °F)   Ht 1.803 m (5' 11\")   Wt 86.2 kg (190 lb)   SpO2 96%  Body mass index is 26.5 kg/m².    Constitutional: Alert, no distress.  Skin: Warm, dry, good turgor, no rashes in visible areas.  Eye: Equal, round and reactive, conjunctiva clear, lids normal.  ENMT: Lips without lesions, good dentition, oropharynx clear.  Neck: Trachea midline, no masses, no thyromegaly. No cervical or supraclavicular lymphadenopathy  Respiratory: Unlabored respiratory effort, lungs clear to auscultation, no wheezes, no ronchi.  Cardiovascular: Normal S1, S2, no murmur, no edema.  Psych: Alert and oriented x3, normal affect and mood.    Assessment and Plan. The following treatment and monitoring plan is recommended:    1. Annual physical exam  Advised healthy lifestyle. Check labs and call with results.  - Comp Metabolic Panel; Future  - HEMOGLOBIN A1C; Future  - Lipid Profile; Future  - PROSTATE SPECIFIC AG SCREENING; Future  - Pneumococal Polysaccharide Vaccine 23-Valent =>1YO SQ/IM    2. History of hepatitis C  Treated with no sign of recurrence on follow up labs.    3. Prediabetes  Advised healthy lifestyle, admits to eating too many sweets.      Services suggested: No services needed at this time  Health Care Screening: Age-appropriate " preventive services recommended by USPTF and ACIP covered by Medicare were discussed today. Services ordered if indicated and agreed upon by the patient.  Referrals offered: Community-based lifestyle interventions to reduce health risks and promote self-management and wellness, fall prevention, nutrition, physical activity, tobacco-use cessation, weight loss, and mental health services as per orders if indicated.    Discussion today about general wellness and lifestyle habits:    · Prevent falls and reduce trip hazards; Cautioned about securing or removing rugs.  · Have a working fire alarm and carbon monoxide detector;   · Engage in regular physical activity and social activities     Follow-up: Return in about 1 year (around 5/7/2020) for Annual.

## 2019-05-14 ENCOUNTER — HOSPITAL ENCOUNTER (OUTPATIENT)
Dept: LAB | Facility: MEDICAL CENTER | Age: 64
End: 2019-05-14
Attending: FAMILY MEDICINE
Payer: COMMERCIAL

## 2019-05-14 DIAGNOSIS — Z00.00 ANNUAL PHYSICAL EXAM: ICD-10-CM

## 2019-05-14 LAB
ALBUMIN SERPL BCP-MCNC: 4.1 G/DL (ref 3.2–4.9)
ALBUMIN/GLOB SERPL: 1.4 G/DL
ALP SERPL-CCNC: 43 U/L (ref 30–99)
ALT SERPL-CCNC: 17 U/L (ref 2–50)
ANION GAP SERPL CALC-SCNC: 4 MMOL/L (ref 0–11.9)
AST SERPL-CCNC: 18 U/L (ref 12–45)
BILIRUB SERPL-MCNC: 0.4 MG/DL (ref 0.1–1.5)
BUN SERPL-MCNC: 15 MG/DL (ref 8–22)
CALCIUM SERPL-MCNC: 9.6 MG/DL (ref 8.5–10.5)
CHLORIDE SERPL-SCNC: 106 MMOL/L (ref 96–112)
CHOLEST SERPL-MCNC: 181 MG/DL (ref 100–199)
CO2 SERPL-SCNC: 27 MMOL/L (ref 20–33)
CREAT SERPL-MCNC: 1.04 MG/DL (ref 0.5–1.4)
FASTING STATUS PATIENT QL REPORTED: NORMAL
GLOBULIN SER CALC-MCNC: 2.9 G/DL (ref 1.9–3.5)
GLUCOSE SERPL-MCNC: 88 MG/DL (ref 65–99)
HDLC SERPL-MCNC: 43 MG/DL
LDLC SERPL CALC-MCNC: 120 MG/DL
POTASSIUM SERPL-SCNC: 4.3 MMOL/L (ref 3.6–5.5)
PROT SERPL-MCNC: 7 G/DL (ref 6–8.2)
PSA SERPL-MCNC: 2.03 NG/ML (ref 0–4)
SODIUM SERPL-SCNC: 137 MMOL/L (ref 135–145)
TRIGL SERPL-MCNC: 88 MG/DL (ref 0–149)

## 2019-05-14 PROCEDURE — 80053 COMPREHEN METABOLIC PANEL: CPT

## 2019-05-14 PROCEDURE — 83036 HEMOGLOBIN GLYCOSYLATED A1C: CPT

## 2019-05-14 PROCEDURE — 84153 ASSAY OF PSA TOTAL: CPT

## 2019-05-14 PROCEDURE — 80061 LIPID PANEL: CPT

## 2019-05-14 PROCEDURE — 36415 COLL VENOUS BLD VENIPUNCTURE: CPT

## 2019-05-15 LAB
EST. AVERAGE GLUCOSE BLD GHB EST-MCNC: 120 MG/DL
HBA1C MFR BLD: 5.8 % (ref 0–5.6)

## 2019-05-16 ENCOUNTER — TELEPHONE (OUTPATIENT)
Dept: MEDICAL GROUP | Facility: MEDICAL CENTER | Age: 64
End: 2019-05-16

## 2019-05-16 NOTE — TELEPHONE ENCOUNTER
----- Message from Shiva Haney M.D. sent at 5/15/2019  7:23 AM PDT -----  Please notify patient that his kidney function, liver function, prostate cancer screening are all normal.  Cholesterol and average blood sugar did go up slightly compared to last year. We advise to reduce sugar/carbohydrate/alcohol, eat more vegetables and lean meats such as fish/chicken/turkey. We also recommend 30 minutes of cardiovascular exercise most days of the week.    Shiva Haney M.D.

## 2019-09-21 DIAGNOSIS — N52.9 ERECTILE DYSFUNCTION, UNSPECIFIED ERECTILE DYSFUNCTION TYPE: ICD-10-CM

## 2019-09-23 RX ORDER — SILDENAFIL 100 MG/1
TABLET, FILM COATED ORAL
Qty: 6 TAB | Refills: 9 | Status: SHIPPED | OUTPATIENT
Start: 2019-09-23 | End: 2020-11-11

## 2019-09-30 ENCOUNTER — APPOINTMENT (RX ONLY)
Dept: URBAN - METROPOLITAN AREA CLINIC 22 | Facility: CLINIC | Age: 64
Setting detail: DERMATOLOGY
End: 2019-09-30

## 2019-09-30 DIAGNOSIS — Z85.828 PERSONAL HISTORY OF OTHER MALIGNANT NEOPLASM OF SKIN: ICD-10-CM

## 2019-09-30 DIAGNOSIS — Z71.89 OTHER SPECIFIED COUNSELING: ICD-10-CM

## 2019-09-30 DIAGNOSIS — D22 MELANOCYTIC NEVI: ICD-10-CM

## 2019-09-30 DIAGNOSIS — L57.0 ACTINIC KERATOSIS: ICD-10-CM

## 2019-09-30 DIAGNOSIS — L81.4 OTHER MELANIN HYPERPIGMENTATION: ICD-10-CM

## 2019-09-30 DIAGNOSIS — L82.1 OTHER SEBORRHEIC KERATOSIS: ICD-10-CM

## 2019-09-30 DIAGNOSIS — D18.0 HEMANGIOMA: ICD-10-CM

## 2019-09-30 PROBLEM — D18.01 HEMANGIOMA OF SKIN AND SUBCUTANEOUS TISSUE: Status: ACTIVE | Noted: 2019-09-30

## 2019-09-30 PROBLEM — D22.5 MELANOCYTIC NEVI OF TRUNK: Status: ACTIVE | Noted: 2019-09-30

## 2019-09-30 PROCEDURE — 99214 OFFICE O/P EST MOD 30 MIN: CPT | Mod: 25

## 2019-09-30 PROCEDURE — ? LIQUID NITROGEN

## 2019-09-30 PROCEDURE — ? COUNSELING

## 2019-09-30 PROCEDURE — 17004 DESTROY PREMAL LESIONS 15/>: CPT

## 2019-09-30 ASSESSMENT — LOCATION DETAILED DESCRIPTION DERM
LOCATION DETAILED: LEFT CENTRAL EYEBROW
LOCATION DETAILED: LEFT SUPERIOR MEDIAL UPPER BACK
LOCATION DETAILED: RIGHT SUPERIOR POSTERIOR HELIX
LOCATION DETAILED: LEFT SUPERIOR CRUS OF ANTIHELIX
LOCATION DETAILED: RIGHT CENTRAL MALAR CHEEK
LOCATION DETAILED: LEFT SUPERIOR HELIX
LOCATION DETAILED: LEFT PROXIMAL DORSAL FOREARM
LOCATION DETAILED: RIGHT PROXIMAL DORSAL FOREARM
LOCATION DETAILED: RIGHT INFERIOR POSTERIOR HELIX
LOCATION DETAILED: LEFT DISTAL DORSAL FOREARM
LOCATION DETAILED: RIGHT RADIAL DORSAL HAND
LOCATION DETAILED: RIGHT SUPERIOR LATERAL MALAR CHEEK
LOCATION DETAILED: RIGHT DISTAL DORSAL FOREARM
LOCATION DETAILED: RIGHT SUPERIOR MEDIAL MIDBACK
LOCATION DETAILED: LEFT SUPERIOR LATERAL MALAR CHEEK
LOCATION DETAILED: RIGHT LATERAL EYEBROW
LOCATION DETAILED: LEFT SUPERIOR POSTERIOR HELIX
LOCATION DETAILED: RIGHT SUPERIOR CRUS OF ANTIHELIX
LOCATION DETAILED: NASAL DORSUM
LOCATION DETAILED: LEFT SUPERIOR MEDIAL FOREHEAD
LOCATION DETAILED: LEFT SCAPHA
LOCATION DETAILED: RIGHT MID TEMPLE
LOCATION DETAILED: NASAL ROOT
LOCATION DETAILED: LEFT CENTRAL MALAR CHEEK
LOCATION DETAILED: LEFT POSTERIOR EAR
LOCATION DETAILED: LEFT LATERAL EYEBROW
LOCATION DETAILED: RIGHT CYMBA CONCHA
LOCATION DETAILED: EPIGASTRIC SKIN

## 2019-09-30 ASSESSMENT — LOCATION SIMPLE DESCRIPTION DERM
LOCATION SIMPLE: ABDOMEN
LOCATION SIMPLE: LEFT FOREARM
LOCATION SIMPLE: RIGHT EYEBROW
LOCATION SIMPLE: RIGHT HAND
LOCATION SIMPLE: LEFT CHEEK
LOCATION SIMPLE: LEFT EYEBROW
LOCATION SIMPLE: RIGHT TEMPLE
LOCATION SIMPLE: LEFT UPPER BACK
LOCATION SIMPLE: LEFT FOREHEAD
LOCATION SIMPLE: LEFT EAR
LOCATION SIMPLE: RIGHT EAR
LOCATION SIMPLE: RIGHT FOREARM
LOCATION SIMPLE: RIGHT LOWER BACK
LOCATION SIMPLE: NOSE
LOCATION SIMPLE: RIGHT CHEEK

## 2019-09-30 ASSESSMENT — LOCATION ZONE DERM
LOCATION ZONE: HAND
LOCATION ZONE: EAR
LOCATION ZONE: NOSE
LOCATION ZONE: TRUNK
LOCATION ZONE: FACE
LOCATION ZONE: ARM

## 2019-09-30 NOTE — PROCEDURE: LIQUID NITROGEN
Consent: The patient's consent was obtained including but not limited to risks of crusting, scabbing, blistering, scarring, darker or lighter pigmentary change, recurrence, incomplete removal and infection.
Render Post-Care Instructions In Note?: no
Detail Level: Detailed
Duration Of Freeze Thaw-Cycle (Seconds): 3
Post-Care Instructions: I reviewed with the patient in detail post-care instructions. Patient is to wear sunprotection, and avoid picking at any of the treated lesions. Pt may apply Vaseline to crusted or scabbing areas.
Number Of Freeze-Thaw Cycles: 2 freeze-thaw cycles

## 2020-07-14 ENCOUNTER — APPOINTMENT (RX ONLY)
Dept: URBAN - METROPOLITAN AREA CLINIC 22 | Facility: CLINIC | Age: 65
Setting detail: DERMATOLOGY
End: 2020-07-14

## 2020-07-14 DIAGNOSIS — L82.1 OTHER SEBORRHEIC KERATOSIS: ICD-10-CM

## 2020-07-14 DIAGNOSIS — L57.0 ACTINIC KERATOSIS: ICD-10-CM

## 2020-07-14 DIAGNOSIS — L81.4 OTHER MELANIN HYPERPIGMENTATION: ICD-10-CM

## 2020-07-14 DIAGNOSIS — D18.0 HEMANGIOMA: ICD-10-CM

## 2020-07-14 DIAGNOSIS — Z71.89 OTHER SPECIFIED COUNSELING: ICD-10-CM

## 2020-07-14 DIAGNOSIS — D22 MELANOCYTIC NEVI: ICD-10-CM

## 2020-07-14 DIAGNOSIS — Z85.828 PERSONAL HISTORY OF OTHER MALIGNANT NEOPLASM OF SKIN: ICD-10-CM

## 2020-07-14 PROBLEM — D48.5 NEOPLASM OF UNCERTAIN BEHAVIOR OF SKIN: Status: ACTIVE | Noted: 2020-07-14

## 2020-07-14 PROBLEM — D22.5 MELANOCYTIC NEVI OF TRUNK: Status: ACTIVE | Noted: 2020-07-14

## 2020-07-14 PROBLEM — D18.01 HEMANGIOMA OF SKIN AND SUBCUTANEOUS TISSUE: Status: ACTIVE | Noted: 2020-07-14

## 2020-07-14 PROCEDURE — ? LIQUID NITROGEN

## 2020-07-14 PROCEDURE — 99214 OFFICE O/P EST MOD 30 MIN: CPT | Mod: 25

## 2020-07-14 PROCEDURE — 11102 TANGNTL BX SKIN SINGLE LES: CPT | Mod: 59

## 2020-07-14 PROCEDURE — 17004 DESTROY PREMAL LESIONS 15/>: CPT

## 2020-07-14 PROCEDURE — 69100 BIOPSY OF EXTERNAL EAR: CPT

## 2020-07-14 PROCEDURE — ? COUNSELING

## 2020-07-14 PROCEDURE — ? BIOPSY BY SHAVE METHOD

## 2020-07-14 ASSESSMENT — LOCATION DETAILED DESCRIPTION DERM
LOCATION DETAILED: RIGHT SUPERIOR MEDIAL MIDBACK
LOCATION DETAILED: RIGHT FOREHEAD
LOCATION DETAILED: LEFT MID TEMPLE
LOCATION DETAILED: RIGHT CENTRAL TEMPLE
LOCATION DETAILED: LEFT CENTRAL EYEBROW
LOCATION DETAILED: LEFT SUPERIOR HELIX
LOCATION DETAILED: LEFT LATERAL FOREHEAD
LOCATION DETAILED: RIGHT CENTRAL MALAR CHEEK
LOCATION DETAILED: LEFT SUPERIOR POSTERIOR HELIX
LOCATION DETAILED: EPIGASTRIC SKIN
LOCATION DETAILED: LEFT ANTIHELIX
LOCATION DETAILED: LEFT SUPERIOR MEDIAL UPPER BACK
LOCATION DETAILED: RIGHT INFERIOR CENTRAL MALAR CHEEK
LOCATION DETAILED: NASAL DORSUM
LOCATION DETAILED: NASAL ROOT
LOCATION DETAILED: RIGHT INFERIOR FOREHEAD
LOCATION DETAILED: RIGHT SCAPHA
LOCATION DETAILED: LEFT CENTRAL MALAR CHEEK
LOCATION DETAILED: LEFT DISTAL DORSAL FOREARM
LOCATION DETAILED: LEFT FOREHEAD
LOCATION DETAILED: RIGHT MEDIAL TEMPLE
LOCATION DETAILED: LEFT PROXIMAL DORSAL FOREARM

## 2020-07-14 ASSESSMENT — LOCATION SIMPLE DESCRIPTION DERM
LOCATION SIMPLE: LEFT FOREHEAD
LOCATION SIMPLE: RIGHT TEMPLE
LOCATION SIMPLE: RIGHT CHEEK
LOCATION SIMPLE: LEFT EAR
LOCATION SIMPLE: RIGHT LOWER BACK
LOCATION SIMPLE: RIGHT EAR
LOCATION SIMPLE: LEFT CHEEK
LOCATION SIMPLE: ABDOMEN
LOCATION SIMPLE: LEFT UPPER BACK
LOCATION SIMPLE: LEFT EYEBROW
LOCATION SIMPLE: LEFT TEMPLE
LOCATION SIMPLE: LEFT FOREARM
LOCATION SIMPLE: RIGHT FOREHEAD
LOCATION SIMPLE: NOSE

## 2020-07-14 ASSESSMENT — LOCATION ZONE DERM
LOCATION ZONE: ARM
LOCATION ZONE: FACE
LOCATION ZONE: EAR
LOCATION ZONE: TRUNK
LOCATION ZONE: NOSE

## 2020-07-14 NOTE — PROCEDURE: LIQUID NITROGEN
Detail Level: Detailed
Number Of Freeze-Thaw Cycles: 2 freeze-thaw cycles
Duration Of Freeze Thaw-Cycle (Seconds): 3
Render Post-Care Instructions In Note?: no
Post-Care Instructions: I reviewed with the patient in detail post-care instructions. Patient is to wear sunprotection, and avoid picking at any of the treated lesions. Pt may apply Vaseline to crusted or scabbing areas.
Consent: The patient's consent was obtained including but not limited to risks of crusting, scabbing, blistering, scarring, darker or lighter pigmentary change, recurrence, incomplete removal and infection.

## 2020-07-24 ENCOUNTER — APPOINTMENT (RX ONLY)
Dept: URBAN - METROPOLITAN AREA CLINIC 22 | Facility: CLINIC | Age: 65
Setting detail: DERMATOLOGY
End: 2020-07-24

## 2020-07-24 PROBLEM — C44.222 SQUAMOUS CELL CARCINOMA OF SKIN OF RIGHT EAR AND EXTERNAL AURICULAR CANAL: Status: ACTIVE | Noted: 2020-07-24

## 2020-07-24 PROBLEM — C44.329 SQUAMOUS CELL CARCINOMA OF SKIN OF OTHER PARTS OF FACE: Status: ACTIVE | Noted: 2020-07-24

## 2020-07-24 PROCEDURE — ? MOHS SURGERY PHONE CONSULTATION

## 2020-07-24 NOTE — PROCEDURE: MOHS SURGERY PHONE CONSULTATION
Which Antibiotic Do They Take For Surgical Prophylaxis?: Amoxicillin (2 grams)
Pathology Accession #: [id number]
Does The Patient Take Antibiotics Prior To Dental Procedures?: No
Time Of Mohs Surgery: 810am
Has The Pathology Report Been Received?: Yes
Detail Level: Simple
Office Location Of Mohs Surgery: [facility]
Patient's Insurance: [facility]
Date Of Mohs Surgery: 08/05/2020
Patient Reported Location: left lateral malar cheek
Referring Provider: Aubrey Lott
Time Of Mohs Surgery: 850am
Pathology Accession #: [id number]
Date Of Mohs Surgery: 08/19/2020
Patient Reported Location: right Inferior posterior [place]

## 2020-08-05 ENCOUNTER — APPOINTMENT (RX ONLY)
Dept: URBAN - METROPOLITAN AREA CLINIC 36 | Facility: CLINIC | Age: 65
Setting detail: DERMATOLOGY
End: 2020-08-05

## 2020-08-05 PROBLEM — C44.329 SQUAMOUS CELL CARCINOMA OF SKIN OF OTHER PARTS OF FACE: Status: ACTIVE | Noted: 2020-08-05

## 2020-08-05 PROCEDURE — 17311 MOHS 1 STAGE H/N/HF/G: CPT

## 2020-08-05 PROCEDURE — ? MOHS SURGERY

## 2020-08-05 PROCEDURE — 17312 MOHS ADDL STAGE: CPT

## 2020-08-05 PROCEDURE — 13132 CMPLX RPR F/C/C/M/N/AX/G/H/F: CPT

## 2020-08-05 NOTE — PROCEDURE: MOHS SURGERY
Mohs Case Number: 
Previous Accession (Optional): [id number]
Biopsy Photograph Reviewed: Yes
Referring Physician (Optional): Kaylie
Consent Type: Consent 1 (Standard)
Eye Shield Used: No
Surgeon Performing Repair (Optional): Vlad
Initial Size Of Lesion: 0.8
X Size Of Lesion In Cm (Optional): 0.7
Number Of Stages: 2
Primary Defect Length In Cm (Final Defect Size - Required For Flaps/Grafts): 3.2
Primary Defect Width In Cm (Final Defect Size - Required For Flaps/Grafts): 1.4
Repair Type: Complex Repair
Oculoplastic Surgeon (A): Vicente
Oculoplastic Surgeon Procedure Text (A): After obtaining clear surgical margins the patient was sent to oculoplastics for surgical repair.  The patient understands they will receive post-surgical care and follow-up from the referring physician's office.
Otolaryngologist Procedure Text (A): After obtaining clear surgical margins the patient was sent to otolaryngology for surgical repair.  The patient understands they will receive post-surgical care and follow-up from the referring physician's office.
Plastic Surgeon Procedure Text (A): After obtaining clear surgical margins the patient was sent to plastics for surgical repair.  The patient understands they will receive post-surgical care and follow-up from the referring physician's office.
Mid-Level Procedure Text (A): After obtaining clear surgical margins the patient was sent to a mid-level provider for surgical repair.  The patient understands they will receive post-surgical care and follow-up from the mid-level provider.
Provider Procedure Text (A): After obtaining clear surgical margins the defect was repaired by another provider.
Asc Procedure Text (A): After obtaining clear surgical margins the patient was sent to an ASC for surgical repair.  The patient understands they will receive post-surgical care and follow-up from the ASC physician.
Suturegard Retention Suture: 2-0 Nylon
Retention Suture Bite Size: 3 mm
Length To Time In Minutes Device Was In Place: 10
Number Of Hemigard Strips Per Side: 1
Simple / Intermediate / Complex Repair - Final Wound Length In Cm: 4.3
Complex/Intermediate Repair Variations: Crescentic
Undermining Type: Entire Wound
Debridement Text: The wound edges were debrided prior to proceeding with the closure to facilitate wound healing.
Helical Rim Text: The closure involved the helical rim.
Vermilion Border Text: The closure involved the vermilion border.
Nostril Rim Text: The closure involved the nostril rim.
Retention Suture Text: Retention sutures were placed to support the closure and prevent dehiscence.
Secondary Defect Length In Cm (Required For Flaps): 0
Area H Indication Text: Tumors in this location are included in Area H (eyelids, eyebrows, nose, lips, chin, ear, pre-auricular, post-auricular, temple, genitalia, hands, feet, ankles and areola).  Tissue conservation is critical in these anatomic locations.
Area M Indication Text: Tumors in this location are included in Area M (cheek, forehead, scalp, neck, jawline and pretibial skin).  Mohs surgery is indicated for tumors in these anatomic locations.
Area L Indication Text: Tumors in this location are included in Area L (trunk and extremities).  Mohs surgery is indicated for larger tumors, or tumors with aggressive histologic features, in these anatomic locations.
Surgical Defect Length In Cm (Optional): 1.1
Special Stains Stage 1 - Results: Base On Clearance Noted Above
Stage 2: Additional Anesthesia Type: 1% lidocaine with 1:100,000 epinephrine and 408mcg clindamycin/ml and a 1:10 solution of 8.4% sodium bicarbonate
Stage 4: Additional Anesthesia Type: 1% lidocaine with epinephrine
Include Tumor Staging In Mohs Note?: Please Select the Appropriate Response
Staging Info: By selecting yes to the question above you will include information on AJCC 8 tumor staging in your Mohs note. Information on tumor staging will be automatically added for SCCs on the head and neck. AJCC 8 includes tumor size, tumor depth, perineural involvement and bone invasion.
Tumor Depth: Less than 6mm from granular layer and no invasion beyond the subcutaneous fat
Medical Necessity Statement: Based on my medical judgement, Mohs surgery is the most appropriate treatment for this cancer compared to other treatments.
Alternatives Discussed Intro (Do Not Add Period): I discussed alternative treatments to Mohs surgery and specifically discussed the risks and benefits of
Consent 1/Introductory Paragraph: The rationale for Mohs was explained to the patient and consent was obtained. The risks, benefits and alternatives to therapy were discussed in detail. Specifically, the risks of infection, scarring, bleeding, prolonged wound healing, incomplete removal, allergy to anesthesia, nerve injury and recurrence were addressed. Prior to the procedure, the treatment site was clearly identified and confirmed by the patient. All components of Universal Protocol/PAUSE Rule completed.
Consent 2/Introductory Paragraph: Mohs surgery was explained to the patient and consent was obtained. The risks, benefits and alternatives to therapy were discussed in detail. Specifically, the risks of infection, scarring, bleeding, prolonged wound healing, incomplete removal, allergy to anesthesia, nerve injury and recurrence were addressed. Prior to the procedure, the treatment site was clearly identified and confirmed by the patient. All components of Universal Protocol/PAUSE Rule completed.
Consent 3/Introductory Paragraph: I gave the patient a chance to ask questions they had about the procedure.  Following this I explained the Mohs procedure and consent was obtained. The risks, benefits and alternatives to therapy were discussed in detail. Specifically, the risks of infection, scarring, bleeding, prolonged wound healing, incomplete removal, allergy to anesthesia, nerve injury and recurrence were addressed. Prior to the procedure, the treatment site was clearly identified and confirmed by the patient. All components of Universal Protocol/PAUSE Rule completed.
Consent (Temporal Branch)/Introductory Paragraph: The rationale for Mohs was explained to the patient and consent was obtained. The risks, benefits and alternatives to therapy were discussed in detail. Specifically, the risks of damage to the temporal branch of the facial nerve, infection, scarring, bleeding, prolonged wound healing, incomplete removal, allergy to anesthesia, and recurrence were addressed. Prior to the procedure, the treatment site was clearly identified and confirmed by the patient. All components of Universal Protocol/PAUSE Rule completed.
Consent (Marginal Mandibular)/Introductory Paragraph: The rationale for Mohs was explained to the patient and consent was obtained. The risks, benefits and alternatives to therapy were discussed in detail. Specifically, the risks of damage to the marginal mandibular branch of the facial nerve, infection, scarring, bleeding, prolonged wound healing, incomplete removal, allergy to anesthesia, and recurrence were addressed. Prior to the procedure, the treatment site was clearly identified and confirmed by the patient. All components of Universal Protocol/PAUSE Rule completed.
Consent (Spinal Accessory)/Introductory Paragraph: The rationale for Mohs was explained to the patient and consent was obtained. The risks, benefits and alternatives to therapy were discussed in detail. Specifically, the risks of damage to the spinal accessory nerve, infection, scarring, bleeding, prolonged wound healing, incomplete removal, allergy to anesthesia, and recurrence were addressed. Prior to the procedure, the treatment site was clearly identified and confirmed by the patient. All components of Universal Protocol/PAUSE Rule completed.
Consent (Near Eyelid Margin)/Introductory Paragraph: The rationale for Mohs was explained to the patient and consent was obtained. The risks, benefits and alternatives to therapy were discussed in detail. Specifically, the risks of ectropion or eyelid deformity, infection, scarring, bleeding, prolonged wound healing, incomplete removal, allergy to anesthesia, nerve injury and recurrence were addressed. Prior to the procedure, the treatment site was clearly identified and confirmed by the patient. All components of Universal Protocol/PAUSE Rule completed.
Consent (Ear)/Introductory Paragraph: The rationale for Mohs was explained to the patient and consent was obtained. The risks, benefits and alternatives to therapy were discussed in detail. Specifically, the risks of ear deformity, infection, scarring, bleeding, prolonged wound healing, incomplete removal, allergy to anesthesia, nerve injury and recurrence were addressed. Prior to the procedure, the treatment site was clearly identified and confirmed by the patient. All components of Universal Protocol/PAUSE Rule completed.
Consent (Nose)/Introductory Paragraph: The rationale for Mohs was explained to the patient and consent was obtained. The risks, benefits and alternatives to therapy were discussed in detail. Specifically, the risks of nasal deformity, changes in the flow of air through the nose, infection, scarring, bleeding, prolonged wound healing, incomplete removal, allergy to anesthesia, nerve injury and recurrence were addressed. Prior to the procedure, the treatment site was clearly identified and confirmed by the patient. All components of Universal Protocol/PAUSE Rule completed.
Consent (Lip)/Introductory Paragraph: The rationale for Mohs was explained to the patient and consent was obtained. The risks, benefits and alternatives to therapy were discussed in detail. Specifically, the risks of lip deformity, changes in the oral aperture, infection, scarring, bleeding, prolonged wound healing, incomplete removal, allergy to anesthesia, nerve injury and recurrence were addressed. Prior to the procedure, the treatment site was clearly identified and confirmed by the patient. All components of Universal Protocol/PAUSE Rule completed.
Consent (Scalp)/Introductory Paragraph: The rationale for Mohs was explained to the patient and consent was obtained. The risks, benefits and alternatives to therapy were discussed in detail. Specifically, the risks of changes in hair growth pattern secondary to repair, infection, scarring, bleeding, prolonged wound healing, incomplete removal, allergy to anesthesia, nerve injury and recurrence were addressed. Prior to the procedure, the treatment site was clearly identified and confirmed by the patient. All components of Universal Protocol/PAUSE Rule completed.
Detail Level: Detailed
Postop Diagnosis: same
Anesthesia Type: 1% lidocaine with 1:100,000 epinephrine and a 1:10 solution of 8.4% sodium bicarbonate
Anesthesia Volume In Cc: 3
Additional Anesthesia Volume In Cc: 6
Hemostasis: Electrocautery
Estimated Blood Loss (Cc): less than 5 cc
Repair Anesthesia Method: local infiltration
Deep Sutures: 5-0 Vicryl
Epidermal Sutures: 5-0 Ethilon
Epidermal Closure: running cuticular
Suturegard Intro: Intraoperative tissue expansion was performed, utilizing the SUTUREGARD device, in order to reduce wound tension.
Suturegard Body: The suture ends were repeatedly re-tightened and re-clamped to achieve the desired tissue expansion.
Hemigard Intro: Due to skin fragility and wound tension, it was decided to use HEMIGARD adhesive retention suture devices to permit a linear closure. The skin was cleaned and dried for a 6cm distance away from the wound. Excessive hair, if present, was removed to allow for adhesion.
Hemigard Postcare Instructions: The HEMIGARD strips are to remain completely dry for at least 5-7 days.
Donor Site Anesthesia Type: same as repair anesthesia
Graft Basting Suture (Optional): 5-0 Fast Absorbing Gut
Graft Donor Site Epidermal Sutures (Optional): 5-0 Ethibond
Epidermal Closure Graft Donor Site (Optional): simple interrupted
Graft Donor Site Bandage (Optional-Leave Blank If You Don't Want In Note): Aquaphor and telefa placed on wound. Pressure dressing applied to donor site
Closure 2 Information: This tab is for additional flaps and grafts, including complex repair and grafts and complex repair and flaps. You can also specify a different location for the additional defect, if the location is the same you do not need to select a new one. We will insert the automated text for the repair you select below just as we do for solitary flaps and grafts. Please note that at this time if you select a location with a different insurance zone you will need to override the ICD10 and CPT if appropriate.
Closure 3 Information: This tab is for additional flaps and grafts above and beyond our usual structured repairs.  Please note if you enter information here it will not currently bill and you will need to add the billing information manually.
Wound Care: Aquaphor
Dressing: dry sterile dressing
Wound Care (No Sutures): Petrolatum
Suture Removal: 7 days
Unna Boot Text: An Unna boot was placed to help immobilize the limb and facilitate more rapid healing.
Home Suture Removal Text: Patient was provided instructions on removing sutures and will remove their sutures at home.  If they have any questions or difficulties they will call the office.
Post-Care Instructions: I reviewed with the patient in detail post-care instructions. Patient is not to engage in any heavy lifting, exercise, or swimming for the next 14 days. Should the patient develop any fevers, chills, bleeding, severe pain patient will contact the office immediately.
Pain Refusal Text: I offered to prescribe pain medication but the patient refused to take this medication.
Mauc Instructions: By selecting yes to the question below the MAUC number will be added into the note.  This will be calculated automatically based on the diagnosis chosen, the size entered, the body zone selected (H,M,L) and the specific indications you chose. You will also have the option to override the Mohs AUC if you disagree with the automatically calculated number and this option is found in the Case Summary tab.
Where Do You Want The Question To Include Opioid Counseling Located?: Case Summary Tab
Eye Protection Verbiage: Before proceeding with the stage, a plastic scleral shield was inserted. The globe was anesthetized with a few drops of 1% lidocaine with 1:100,000 epinephrine. Then, an appropriate sized scleral shield was chosen and coated with lacrilube ointment. The shield was gently inserted and left in place for the duration of each stage. After the stage was completed, the shield was gently removed.
Mohs Method Verbiage: An incision at a 45 degree angle following the standard Mohs approach was done and the specimen was harvested as a microscopic controlled layer.
Surgeon/Pathologist Verbiage (Will Incorporate Name Of Surgeon From Intro If Not Blank): operated in two distinct and integrated capacities as the surgeon and pathologist.
Mohs Histo Method Verbiage: Each section was then chromacoded and processed in the Mohs lab using the Mohs protocol and submitted for frozen section.
Subsequent Stages Histo Method Verbiage: Using a similar technique to that described above, a thin layer of tissue was removed from all areas where tumor was visible on the previous stage.  The tissue was again oriented, mapped, dyed, and processed as above.
Mohs Rapid Report Verbiage: The area of clinically evident tumor was marked with skin marking ink and appropriately hatched.  The initial incision was made following the Mohs approach through the skin.  The specimen was taken to the lab, divided into the necessary number of pieces, chromacoded and processed according to the Mohs protocol.  This was repeated in successive stages until a tumor free defect was achieved.
Complex Repair Preamble Text (Leave Blank If You Do Not Want): Extensive wide undermining was performed at least 2 cm in all directions.
Intermediate Repair Preamble Text (Leave Blank If You Do Not Want): Undermining was performed with blunt dissection.
M-Plasty Complex Repair Preamble Text (Leave Blank If You Do Not Want): Extensive wide undermining was performed.
Non-Graft Cartilage Fenestration Text: The cartilage was fenestrated with a 2mm punch biopsy to help facilitate healing.
Graft Cartilage Fenestration Text: The cartilage was fenestrated with a 2mm punch biopsy to help facilitate graft survival and healing.
Secondary Intention Text (Leave Blank If You Do Not Want): The defect will heal with secondary intention.
No Repair - Repaired With Adjacent Surgical Defect Text (Leave Blank If You Do Not Want): After obtaining clear surgical margins the defect was repaired concurrently with another surgical defect which was in close approximation.
Advancement Flap (Single) Text: The defect edges were debeveled with a #15 scalpel blade.  Given the location of the defect and the proximity to free margins a single advancement flap was deemed most appropriate.  Using a sterile surgical marker, an appropriate advancement flap was drawn incorporating the defect and placing the expected incisions within the relaxed skin tension lines where possible.    The area thus outlined was incised deep to adipose tissue with a #15 scalpel blade.  The skin margins were undermined to an appropriate distance in all directions utilizing iris scissors.
Advancement Flap (Double) Text: The defect edges were debeveled with a #15 scalpel blade.  Given the location of the defect and the proximity to free margins a double advancement flap was deemed most appropriate.  Using a sterile surgical marker, the appropriate advancement flaps were drawn incorporating the defect and placing the expected incisions within the relaxed skin tension lines where possible.    The area thus outlined was incised deep to adipose tissue with a #15 scalpel blade.  The skin margins were undermined to an appropriate distance in all directions utilizing iris scissors.
Burow's Advancement Flap Text: The defect edges were debeveled with a #15 scalpel blade.  Given the location of the defect and the proximity to free margins a Burow's advancement flap was deemed most appropriate.  Using a sterile surgical marker, the appropriate advancement flap was drawn incorporating the defect and placing the expected incisions within the relaxed skin tension lines where possible.    The area thus outlined was incised deep to adipose tissue with a #15 scalpel blade.  The skin margins were undermined to an appropriate distance in all directions utilizing iris scissors.
Chonodrocutaneous Helical Advancement Flap Text: The defect edges were debeveled with a #15 scalpel blade.  Given the location of the defect and the proximity to free margins a chondrocutaneous helical advancement flap was deemed most appropriate.  Using a sterile surgical marker, the appropriate advancement flap was drawn incorporating the defect and placing the expected incisions within the relaxed skin tension lines where possible.    The area thus outlined was incised deep to adipose tissue with a #15 scalpel blade.  The skin margins were undermined to an appropriate distance in all directions utilizing iris scissors.
Crescentic Advancement Flap Text: The defect edges were debeveled with a #15 scalpel blade.  Given the location of the defect and the proximity to free margins a crescentic advancement flap was deemed most appropriate.  Using a sterile surgical marker, the appropriate advancement flap was drawn incorporating the defect and placing the expected incisions within the relaxed skin tension lines where possible.    The area thus outlined was incised deep to adipose tissue with a #15 scalpel blade.  The skin margins were undermined to an appropriate distance in all directions utilizing iris scissors.
A-T Advancement Flap Text: The defect edges were debeveled with a #15 scalpel blade.  Given the location of the defect, shape of the defect and the proximity to free margins an A-T advancement flap was deemed most appropriate.  Using a sterile surgical marker, an appropriate advancement flap was drawn incorporating the defect and placing the expected incisions within the relaxed skin tension lines where possible.    The area thus outlined was incised deep to adipose tissue with a #15 scalpel blade.  The skin margins were undermined to an appropriate distance in all directions utilizing iris scissors.
O-T Advancement Flap Text: The defect edges were debeveled with a #15 scalpel blade.  Given the location of the defect, shape of the defect and the proximity to free margins an O-T advancement flap was deemed most appropriate.  Using a sterile surgical marker, an appropriate advancement flap was drawn incorporating the defect and placing the expected incisions within the relaxed skin tension lines where possible.    The area thus outlined was incised deep to adipose tissue with a #15 scalpel blade.  The skin margins were undermined to an appropriate distance in all directions utilizing iris scissors.
O-L Flap Text: The defect edges were debeveled with a #15 scalpel blade.  Given the location of the defect, shape of the defect and the proximity to free margins an O-L flap was deemed most appropriate.  Using a sterile surgical marker, an appropriate advancement flap was drawn incorporating the defect and placing the expected incisions within the relaxed skin tension lines where possible.    The area thus outlined was incised deep to adipose tissue with a #15 scalpel blade.  The skin margins were undermined to an appropriate distance in all directions utilizing iris scissors.
O-Z Flap Text: The defect edges were debeveled with a #15 scalpel blade.  Given the location of the defect, shape of the defect and the proximity to free margins an O-Z flap was deemed most appropriate.  Using a sterile surgical marker, an appropriate transposition flap was drawn incorporating the defect and placing the expected incisions within the relaxed skin tension lines where possible. The area thus outlined was incised deep to adipose tissue with a #15 scalpel blade.  The skin margins were undermined to an appropriate distance in all directions utilizing iris scissors.
Double O-Z Flap Text: The defect edges were debeveled with a #15 scalpel blade.  Given the location of the defect, shape of the defect and the proximity to free margins a Double O-Z flap was deemed most appropriate.  Using a sterile surgical marker, an appropriate transposition flap was drawn incorporating the defect and placing the expected incisions within the relaxed skin tension lines where possible. The area thus outlined was incised deep to adipose tissue with a #15 scalpel blade.  The skin margins were undermined to an appropriate distance in all directions utilizing iris scissors.
V-Y Flap Text: The defect edges were debeveled with a #15 scalpel blade.  Given the location of the defect, shape of the defect and the proximity to free margins a V-Y flap was deemed most appropriate.  Using a sterile surgical marker, an appropriate advancement flap was drawn incorporating the defect and placing the expected incisions within the relaxed skin tension lines where possible.    The area thus outlined was incised deep to adipose tissue with a #15 scalpel blade.  The skin margins were undermined to an appropriate distance in all directions utilizing iris scissors.
Advancement-Rotation Flap Text: The defect edges were debeveled with a #15 scalpel blade.  Given the location of the defect, shape of the defect and the proximity to free margins an advancement-rotation flap was deemed most appropriate.  Using a sterile surgical marker, an appropriate flap was drawn incorporating the defect and placing the expected incisions within the relaxed skin tension lines where possible. The area thus outlined was incised deep to adipose tissue with a #15 scalpel blade.  The skin margins were undermined to an appropriate distance in all directions utilizing iris scissors.
Mercedes Flap Text: The defect edges were debeveled with a #15 scalpel blade.  Given the location of the defect, shape of the defect and the proximity to free margins a Mercedes flap was deemed most appropriate.  Using a sterile surgical marker, an appropriate advancement flap was drawn incorporating the defect and placing the expected incisions within the relaxed skin tension lines where possible. The area thus outlined was incised deep to adipose tissue with a #15 scalpel blade.  The skin margins were undermined to an appropriate distance in all directions utilizing iris scissors.
Modified Advancement Flap Text: The defect edges were debeveled with a #15 scalpel blade.  Given the location of the defect, shape of the defect and the proximity to free margins a modified advancement flap was deemed most appropriate.  Using a sterile surgical marker, an appropriate advancement flap was drawn incorporating the defect and placing the expected incisions within the relaxed skin tension lines where possible.    The area thus outlined was incised deep to adipose tissue with a #15 scalpel blade.  The skin margins were undermined to an appropriate distance in all directions utilizing iris scissors.
Mucosal Advancement Flap Text: Given the location of the defect, shape of the defect and the proximity to free margins a mucosal advancement flap was deemed most appropriate. Incisions were made with a 15 blade scalpel in the appropriate fashion along the cutaneous vermilion border and the mucosal lip. The remaining actinically damaged mucosal tissue was excised.  The mucosal advancement flap was then elevated to the gingival sulcus with care taken to preserve the neurovascular structures and advanced into the primary defect. Care was taken to ensure that precise realignment of the vermilion border was achieved.
Peng Advancement Flap Text: The defect edges were debeveled with a #15 scalpel blade.  Given the location of the defect, shape of the defect and the proximity to free margins a Peng advancement flap was deemed most appropriate.  Using a sterile surgical marker, an appropriate advancement flap was drawn incorporating the defect and placing the expected incisions within the relaxed skin tension lines where possible. The area thus outlined was incised deep to adipose tissue with a #15 scalpel blade.  The skin margins were undermined to an appropriate distance in all directions utilizing iris scissors.
Hatchet Flap Text: The defect edges were debeveled with a #15 scalpel blade.  Given the location of the defect, shape of the defect and the proximity to free margins a hatchet flap based from the glabella was deemed most appropriate.  Using a sterile surgical marker, an appropriate glabellar hatchet flap was drawn incorporating the defect and placing the expected incisions within the relaxed skin tension lines where possible.    The area thus outlined was incised deep to adipose tissue with a #15 scalpel blade.  The skin margins were undermined to an appropriate distance in all directions utilizing iris scissors.
Rotation Flap Text: The defect edges were debeveled with a #15 scalpel blade.  Given the location of the defect, shape of the defect and the proximity to free margins a rotation flap was deemed most appropriate.  Using a sterile surgical marker, an appropriate rotation flap was drawn incorporating the defect and placing the expected incisions within the relaxed skin tension lines where possible.    The area thus outlined was incised deep to adipose tissue with a #15 scalpel blade.  The skin margins were undermined to an appropriate distance in all directions utilizing iris scissors.
Spiral Flap Text: The defect edges were debeveled with a #15 scalpel blade.  Given the location of the defect, shape of the defect and the proximity to free margins a spiral flap was deemed most appropriate.  Using a sterile surgical marker, an appropriate rotation flap was drawn incorporating the defect and placing the expected incisions within the relaxed skin tension lines where possible. The area thus outlined was incised deep to adipose tissue with a #15 scalpel blade.  The skin margins were undermined to an appropriate distance in all directions utilizing iris scissors.
Star Wedge Flap Text: The defect edges were debeveled with a #15 scalpel blade.  Given the location of the defect, shape of the defect and the proximity to free margins a star wedge flap was deemed most appropriate.  Using a sterile surgical marker, an appropriate rotation flap was drawn incorporating the defect and placing the expected incisions within the relaxed skin tension lines where possible. The area thus outlined was incised deep to adipose tissue with a #15 scalpel blade.  The skin margins were undermined to an appropriate distance in all directions utilizing iris scissors.
Transposition Flap Text: The defect edges were debeveled with a #15 scalpel blade.  Given the location of the defect and the proximity to free margins a transposition flap was deemed most appropriate.  Using a sterile surgical marker, an appropriate transposition flap was drawn incorporating the defect.    The area thus outlined was incised deep to adipose tissue with a #15 scalpel blade.  The skin margins were undermined to an appropriate distance in all directions utilizing iris scissors.
Muscle Hinge Flap Text: The defect edges were debeveled with a #15 scalpel blade.  Given the size, depth and location of the defect and the proximity to free margins a muscle hinge flap was deemed most appropriate.  Using a sterile surgical marker, an appropriate hinge flap was drawn incorporating the defect. The area thus outlined was incised with a #15 scalpel blade.  The skin margins were undermined to an appropriate distance in all directions utilizing iris scissors.
Melolabial Transposition Flap Text: The defect edges were debeveled with a #15 scalpel blade.  Given the location of the defect and the proximity to free margins a melolabial flap was deemed most appropriate.  Using a sterile surgical marker, an appropriate melolabial transposition flap was drawn incorporating the defect.    The area thus outlined was incised deep to adipose tissue with a #15 scalpel blade.  The skin margins were undermined to an appropriate distance in all directions utilizing iris scissors.
Rhombic Flap Text: The defect edges were debeveled with a #15 scalpel blade.  Given the location of the defect and the proximity to free margins a rhombic flap was deemed most appropriate.  Using a sterile surgical marker, an appropriate rhombic flap was drawn incorporating the defect.    The area thus outlined was incised deep to adipose tissue with a #15 scalpel blade.  The skin margins were undermined to an appropriate distance in all directions utilizing iris scissors.
Rhomboid Transposition Flap Text: The defect edges were debeveled with a #15 scalpel blade.  Given the location of the defect and the proximity to free margins a rhomboid transposition flap was deemed most appropriate.  Using a sterile surgical marker, an appropriate rhomboid flap was drawn incorporating the defect.    The area thus outlined was incised deep to adipose tissue with a #15 scalpel blade.  The skin margins were undermined to an appropriate distance in all directions utilizing iris scissors.
Bi-Rhombic Flap Text: The defect edges were debeveled with a #15 scalpel blade.  Given the location of the defect and the proximity to free margins a bi-rhombic flap was deemed most appropriate.  Using a sterile surgical marker, an appropriate rhombic flap was drawn incorporating the defect. The area thus outlined was incised deep to adipose tissue with a #15 scalpel blade.  The skin margins were undermined to an appropriate distance in all directions utilizing iris scissors.
Helical Rim Advancement Flap Text: The defect edges were debeveled with a #15 blade scalpel.  Given the location of the defect and the proximity to free margins (helical rim) a double helical rim advancement flap was deemed most appropriate.  Using a sterile surgical marker, the appropriate advancement flaps were drawn incorporating the defect and placing the expected incisions between the helical rim and antihelix where possible.  The area thus outlined was incised through and through with a #15 scalpel blade.  With a skin hook and iris scissors, the flaps were gently and sharply undermined and freed up.
Bilateral Helical Rim Advancement Flap Text: The defect edges were debeveled with a #15 blade scalpel.  Given the location of the defect and the proximity to free margins (helical rim) a bilateral helical rim advancement flap was deemed most appropriate.  Using a sterile surgical marker, the appropriate advancement flaps were drawn incorporating the defect and placing the expected incisions between the helical rim and antihelix where possible.  The area thus outlined was incised through and through with a #15 scalpel blade.  With a skin hook and iris scissors, the flaps were gently and sharply undermined and freed up.
Ear Star Wedge Flap Text: The defect edges were debeveled with a #15 blade scalpel.  Given the location of the defect and the proximity to free margins (helical rim) an ear star wedge flap was deemed most appropriate.  Using a sterile surgical marker, the appropriate flap was drawn incorporating the defect and placing the expected incisions between the helical rim and antihelix where possible.  The area thus outlined was incised through and through with a #15 scalpel blade.
Banner Transposition Flap Text: The defect edges were debeveled with a #15 scalpel blade.  Given the location of the defect and the proximity to free margins a Banner transposition flap was deemed most appropriate.  Using a sterile surgical marker, an appropriate flap drawn around the defect. The area thus outlined was incised deep to adipose tissue with a #15 scalpel blade.  The skin margins were undermined to an appropriate distance in all directions utilizing iris scissors.
Bilobed Flap Text: The defect edges were debeveled with a #15 scalpel blade.  Given the location of the defect and the proximity to free margins a bilobe flap was deemed most appropriate.  Using a sterile surgical marker, an appropriate bilobe flap drawn around the defect.    The area thus outlined was incised deep to adipose tissue with a #15 scalpel blade.  The skin margins were undermined to an appropriate distance in all directions utilizing iris scissors.
Bilobed Transposition Flap Text: The defect edges were debeveled with a #15 scalpel blade.  Given the location of the defect and the proximity to free margins a bilobed transposition flap was deemed most appropriate.  Using a sterile surgical marker, an appropriate bilobe flap drawn around the defect.    The area thus outlined was incised deep to adipose tissue with a #15 scalpel blade.  The skin margins were undermined to an appropriate distance in all directions utilizing iris scissors.
Trilobed Flap Text: The defect edges were debeveled with a #15 scalpel blade.  Given the location of the defect and the proximity to free margins a trilobed flap was deemed most appropriate.  Using a sterile surgical marker, an appropriate trilobed flap drawn around the defect.    The area thus outlined was incised deep to adipose tissue with a #15 scalpel blade.  The skin margins were undermined to an appropriate distance in all directions utilizing iris scissors.
Dorsal Nasal Flap Text: The defect edges were debeveled with a #15 scalpel blade.  Given the location of the defect and the proximity to free margins a dorsal nasal flap,based upon the glabellar folds, was deemed most appropriate.  Using a sterile surgical marker, an appropriate dorsal nasal flap was drawn around the defect.    The area thus outlined was incised deep to adipose tissue with a #15 scalpel blade.  The skin margins were undermined to an appropriate distance in all directions utilizing iris scissors.
Island Pedicle Flap Text: The defect edges were debeveled with a #15 scalpel blade.  Given the location of the defect, shape of the defect and the proximity to free margins an island pedicle advancement flap was deemed most appropriate.  Using a sterile surgical marker, an appropriate advancement flap was drawn incorporating the defect, outlining the appropriate donor tissue and placing the expected incisions within the relaxed skin tension lines where possible.    The area thus outlined was incised deep to adipose tissue with a #15 scalpel blade.  The skin margins were undermined to an appropriate distance in all directions around the primary defect and laterally outward around the island pedicle utilizing iris scissors.  There was minimal undermining beneath the pedicle flap.
Island Pedicle Flap With Canthal Suspension Text: The defect edges were debeveled with a #15 scalpel blade.  Given the location of the defect, shape of the defect and the proximity to free margins an island pedicle advancement flap was deemed most appropriate.  Using a sterile surgical marker, an appropriate advancement flap was drawn incorporating the defect, outlining the appropriate donor tissue and placing the expected incisions within the relaxed skin tension lines where possible. The area thus outlined was incised deep to adipose tissue with a #15 scalpel blade.  The skin margins were undermined to an appropriate distance in all directions around the primary defect and laterally outward around the island pedicle utilizing iris scissors.  There was minimal undermining beneath the pedicle flap. A suspension suture was placed in the canthal tendon to prevent tension and prevent ectropion.
Alar Island Pedicle Flap Text: The defect edges were debeveled with a #15 scalpel blade.  Given the location of the defect, shape of the defect and the proximity to the alar rim an island pedicle advancement flap was deemed most appropriate.  Using a sterile surgical marker, an appropriate advancement flap was drawn incorporating the defect, outlining the appropriate donor tissue and placing the expected incisions within the nasal ala running parallel to the alar rim. The area thus outlined was incised with a #15 scalpel blade.  The skin margins were undermined minimally to an appropriate distance in all directions around the primary defect and laterally outward around the island pedicle utilizing iris scissors.  There was minimal undermining beneath the pedicle flap.
Double Island Pedicle Flap Text: The defect edges were debeveled with a #15 scalpel blade.  Given the location of the defect, shape of the defect and the proximity to free margins a double island pedicle advancement flap was deemed most appropriate.  Using a sterile surgical marker, an appropriate advancement flap was drawn incorporating the defect, outlining the appropriate donor tissue and placing the expected incisions within the relaxed skin tension lines where possible.    The area thus outlined was incised deep to adipose tissue with a #15 scalpel blade.  The skin margins were undermined to an appropriate distance in all directions around the primary defect and laterally outward around the island pedicle utilizing iris scissors.  There was minimal undermining beneath the pedicle flap.
Island Pedicle Flap-Requiring Vessel Identification Text: The defect edges were debeveled with a #15 scalpel blade.  Given the location of the defect, shape of the defect and the proximity to free margins an island pedicle advancement flap was deemed most appropriate.  Using a sterile surgical marker, an appropriate advancement flap was drawn, based on the axial vessel mentioned above, incorporating the defect, outlining the appropriate donor tissue and placing the expected incisions within the relaxed skin tension lines where possible.    The area thus outlined was incised deep to adipose tissue with a #15 scalpel blade.  The skin margins were undermined to an appropriate distance in all directions around the primary defect and laterally outward around the island pedicle utilizing iris scissors.  There was minimal undermining beneath the pedicle flap.
Keystone Flap Text: The defect edges were debeveled with a #15 scalpel blade.  Given the location of the defect, shape of the defect a keystone flap was deemed most appropriate.  Using a sterile surgical marker, an appropriate keystone flap was drawn incorporating the defect, outlining the appropriate donor tissue and placing the expected incisions within the relaxed skin tension lines where possible. The area thus outlined was incised deep to adipose tissue with a #15 scalpel blade.  The skin margins were undermined to an appropriate distance in all directions around the primary defect and laterally outward around the flap utilizing iris scissors.
O-T Plasty Text: The defect edges were debeveled with a #15 scalpel blade.  Given the location of the defect, shape of the defect and the proximity to free margins an O-T plasty was deemed most appropriate.  Using a sterile surgical marker, an appropriate O-T plasty was drawn incorporating the defect and placing the expected incisions within the relaxed skin tension lines where possible.    The area thus outlined was incised deep to adipose tissue with a #15 scalpel blade.  The skin margins were undermined to an appropriate distance in all directions utilizing iris scissors.
O-Z Plasty Text: The defect edges were debeveled with a #15 scalpel blade.  Given the location of the defect, shape of the defect and the proximity to free margins an O-Z plasty (double transposition flap) was deemed most appropriate.  Using a sterile surgical marker, the appropriate transposition flaps were drawn incorporating the defect and placing the expected incisions within the relaxed skin tension lines where possible.    The area thus outlined was incised deep to adipose tissue with a #15 scalpel blade.  The skin margins were undermined to an appropriate distance in all directions utilizing iris scissors.  Hemostasis was achieved with electrocautery.  The flaps were then transposed into place, one clockwise and the other counterclockwise, and anchored with interrupted buried subcutaneous sutures.
Double O-Z Plasty Text: The defect edges were debeveled with a #15 scalpel blade.  Given the location of the defect, shape of the defect and the proximity to free margins a Double O-Z plasty (double transposition flap) was deemed most appropriate.  Using a sterile surgical marker, the appropriate transposition flaps were drawn incorporating the defect and placing the expected incisions within the relaxed skin tension lines where possible. The area thus outlined was incised deep to adipose tissue with a #15 scalpel blade.  The skin margins were undermined to an appropriate distance in all directions utilizing iris scissors.  Hemostasis was achieved with electrocautery.  The flaps were then transposed into place, one clockwise and the other counterclockwise, and anchored with interrupted buried subcutaneous sutures.
V-Y Plasty Text: The defect edges were debeveled with a #15 scalpel blade.  Given the location of the defect, shape of the defect and the proximity to free margins an V-Y advancement flap was deemed most appropriate.  Using a sterile surgical marker, an appropriate advancement flap was drawn incorporating the defect and placing the expected incisions within the relaxed skin tension lines where possible.    The area thus outlined was incised deep to adipose tissue with a #15 scalpel blade.  The skin margins were undermined to an appropriate distance in all directions utilizing iris scissors.
H Plasty Text: Given the location of the defect, shape of the defect and the proximity to free margins a H-plasty was deemed most appropriate for repair.  Using a sterile surgical marker, the appropriate advancement arms of the H-plasty were drawn incorporating the defect and placing the expected incisions within the relaxed skin tension lines where possible. The area thus outlined was incised deep to adipose tissue with a #15 scalpel blade. The skin margins were undermined to an appropriate distance in all directions utilizing iris scissors.  The opposing advancement arms were then advanced into place in opposite direction and anchored with interrupted buried subcutaneous sutures.
W Plasty Text: The lesion was extirpated to the level of the fat with a #15 scalpel blade.  Given the location of the defect, shape of the defect and the proximity to free margins a W-plasty was deemed most appropriate for repair.  Using a sterile surgical marker, the appropriate transposition arms of the W-plasty were drawn incorporating the defect and placing the expected incisions within the relaxed skin tension lines where possible.    The area thus outlined was incised deep to adipose tissue with a #15 scalpel blade.  The skin margins were undermined to an appropriate distance in all directions utilizing iris scissors.  The opposing transposition arms were then transposed into place in opposite direction and anchored with interrupted buried subcutaneous sutures.
Z Plasty Text: The lesion was extirpated to the level of the fat with a #15 scalpel blade.  Given the location of the defect, shape of the defect and the proximity to free margins a Z-plasty was deemed most appropriate for repair.  Using a sterile surgical marker, the appropriate transposition arms of the Z-plasty were drawn incorporating the defect and placing the expected incisions within the relaxed skin tension lines where possible.    The area thus outlined was incised deep to adipose tissue with a #15 scalpel blade.  The skin margins were undermined to an appropriate distance in all directions utilizing iris scissors.  The opposing transposition arms were then transposed into place in opposite direction and anchored with interrupted buried subcutaneous sutures.
Zygomaticofacial Flap Text: Given the location of the defect, shape of the defect and the proximity to free margins a zygomaticofacial flap was deemed most appropriate for repair.  Using a sterile surgical marker, the appropriate flap was drawn incorporating the defect and placing the expected incisions within the relaxed skin tension lines where possible. The area thus outlined was incised deep to adipose tissue with a #15 scalpel blade with preservation of a vascular pedicle.  The skin margins were undermined to an appropriate distance in all directions utilizing iris scissors.  The flap was then placed into the defect and anchored with interrupted buried subcutaneous sutures.
Cheek Interpolation Flap Text: A decision was made to reconstruct the defect utilizing an interpolation axial flap and a staged reconstruction.  A telfa template was made of the defect.  This telfa template was then used to outline the Cheek Interpolation flap.  The donor area for the pedicle flap was then injected with anesthesia.  The flap was excised through the skin and subcutaneous tissue down to the layer of the underlying musculature.  The interpolation flap was carefully excised within this deep plane to maintain its blood supply.  The edges of the donor site were undermined.   The donor site was closed in a primary fashion.  The pedicle was then rotated into position and sutured.  Once the tube was sutured into place, adequate blood supply was confirmed with blanching and refill.  The pedicle was then wrapped with xeroform gauze and dressed appropriately with a telfa and gauze bandage to ensure continued blood supply and protect the attached pedicle.
Cheek-To-Nose Interpolation Flap Text: A decision was made to reconstruct the defect utilizing an interpolation axial flap and a staged reconstruction.  A telfa template was made of the defect.  This telfa template was then used to outline the Cheek-To-Nose Interpolation flap.  The donor area for the pedicle flap was then injected with anesthesia.  The flap was excised through the skin and subcutaneous tissue down to the layer of the underlying musculature.  The interpolation flap was carefully excised within this deep plane to maintain its blood supply.  The edges of the donor site were undermined.   The donor site was closed in a primary fashion.  The pedicle was then rotated into position and sutured.  Once the tube was sutured into place, adequate blood supply was confirmed with blanching and refill.  The pedicle was then wrapped with xeroform gauze and dressed appropriately with a telfa and gauze bandage to ensure continued blood supply and protect the attached pedicle.
Interpolation Flap Text: A decision was made to reconstruct the defect utilizing an interpolation axial flap and a staged reconstruction.  A telfa template was made of the defect.  This telfa template was then used to outline the interpolation flap.  The donor area for the pedicle flap was then injected with anesthesia.  The flap was excised through the skin and subcutaneous tissue down to the layer of the underlying musculature.  The interpolation flap was carefully excised within this deep plane to maintain its blood supply.  The edges of the donor site were undermined.   The donor site was closed in a primary fashion.  The pedicle was then rotated into position and sutured.  Once the tube was sutured into place, adequate blood supply was confirmed with blanching and refill.  The pedicle was then wrapped with xeroform gauze and dressed appropriately with a telfa and gauze bandage to ensure continued blood supply and protect the attached pedicle.
Melolabial Interpolation Flap Text: A decision was made to reconstruct the defect utilizing an interpolation axial flap and a staged reconstruction.  A telfa template was made of the defect.  This telfa template was then used to outline the melolabial interpolation flap.  The donor area for the pedicle flap was then injected with anesthesia.  The flap was excised through the skin and subcutaneous tissue down to the layer of the underlying musculature.  The pedicle flap was carefully excised within this deep plane to maintain its blood supply.  The edges of the donor site were undermined.   The donor site was closed in a primary fashion.  The pedicle was then rotated into position and sutured.  Once the tube was sutured into place, adequate blood supply was confirmed with blanching and refill.  The pedicle was then wrapped with xeroform gauze and dressed appropriately with a telfa and gauze bandage to ensure continued blood supply and protect the attached pedicle.
Mastoid Interpolation Flap Text: A decision was made to reconstruct the defect utilizing an interpolation axial flap and a staged reconstruction.  A telfa template was made of the defect.  This telfa template was then used to outline the mastoid interpolation flap.  The donor area for the pedicle flap was then injected with anesthesia.  The flap was excised through the skin and subcutaneous tissue down to the layer of the underlying musculature.  The pedicle flap was carefully excised within this deep plane to maintain its blood supply.  The edges of the donor site were undermined.   The donor site was closed in a primary fashion.  The pedicle was then rotated into position and sutured.  Once the tube was sutured into place, adequate blood supply was confirmed with blanching and refill.  The pedicle was then wrapped with xeroform gauze and dressed appropriately with a telfa and gauze bandage to ensure continued blood supply and protect the attached pedicle.
Posterior Auricular Interpolation Flap Text: A decision was made to reconstruct the defect utilizing an interpolation axial flap and a staged reconstruction.  A telfa template was made of the defect.  This telfa template was then used to outline the posterior auricular interpolation flap.  The donor area for the pedicle flap was then injected with anesthesia.  The flap was excised through the skin and subcutaneous tissue down to the layer of the underlying musculature.  The pedicle flap was carefully excised within this deep plane to maintain its blood supply.  The edges of the donor site were undermined.   The donor site was closed in a primary fashion.  The pedicle was then rotated into position and sutured.  Once the tube was sutured into place, adequate blood supply was confirmed with blanching and refill.  The pedicle was then wrapped with xeroform gauze and dressed appropriately with a telfa and gauze bandage to ensure continued blood supply and protect the attached pedicle.
Paramedian Forehead Flap Text: A decision was made to reconstruct the defect utilizing an interpolation axial flap and a staged reconstruction.  A telfa template was made of the defect.  This telfa template was then used to outline the paramedian forehead pedicle flap.  The donor area for the pedicle flap was then injected with anesthesia.  The flap was excised through the skin and subcutaneous tissue down to the layer of the underlying musculature.  The pedicle flap was carefully excised within this deep plane to maintain its blood supply.  The edges of the donor site were undermined.   The donor site was closed in a primary fashion.  The pedicle was then rotated into position and sutured.  Once the tube was sutured into place, adequate blood supply was confirmed with blanching and refill.  The pedicle was then wrapped with xeroform gauze and dressed appropriately with a telfa and gauze bandage to ensure continued blood supply and protect the attached pedicle.
Cheiloplasty (Less Than 50%) Text: A decision was made to reconstruct the defect with a  cheiloplasty.  The defect was undermined extensively.  Additional obicularis oris muscle was excised with a 15 blade scalpel.  The defect was converted into a full thickness wedge, of less than 50% of the vertical height of the lip, to facilite a better cosmetic result.  Small vessels were then tied off with 5-0 monocyrl. The obicularis oris, superficial fascia, adipose and dermis were then reapproximated.  After the deeper layers were approximated the epidermis was reapproximated with particular care given to realign the vermilion border.
Cheiloplasty (Complex) Text: A decision was made to reconstruct the defect with a  cheiloplasty.  The defect was undermined extensively.  Additional obicularis oris muscle was excised with a 15 blade scalpel.  The defect was converted into a full thickness wedge to facilite a better cosmetic result.  Small vessels were then tied off with 5-0 monocyrl. The obicularis oris, superficial fascia, adipose and dermis were then reapproximated.  After the deeper layers were approximated the epidermis was reapproximated with particular care given to realign the vermilion border.
Ear Wedge Repair Text: A wedge excision was completed by carrying down an excision through the full thickness of the ear and cartilage with an inward facing Burow's triangle. The wound was then closed in a layered fashion.
Full Thickness Lip Wedge Repair (Flap) Text: Given the location of the defect and the proximity to free margins a full thickness wedge repair was deemed most appropriate.  Using a sterile surgical marker, the appropriate repair was drawn incorporating the defect and placing the expected incisions perpendicular to the vermilion border.  The vermilion border was also meticulously outlined to ensure appropriate reapproximation during the repair.  The area thus outlined was incised through and through with a #15 scalpel blade.  The muscularis and dermis were reaproximated with deep sutures following hemostasis. Care was taken to realign the vermilion border before proceeding with the superficial closure.  Once the vermilion was realigned the superfical and mucosal closure was finished.
Ftsg Text: The defect edges were debeveled with a #15 scalpel blade.  Given the location of the defect, shape of the defect and the proximity to free margins a full thickness skin graft was deemed most appropriate.  Using a sterile surgical marker, the primary defect shape was transferred to the donor site. The area thus outlined was incised deep to adipose tissue with a #15 scalpel blade.  The harvested graft was then trimmed of adipose tissue until only dermis and epidermis was left.  The skin margins of the secondary defect were undermined to an appropriate distance in all directions utilizing iris scissors.  The secondary defect was closed with interrupted buried subcutaneous sutures.  The skin edges were then re-apposed with running  sutures.  The skin graft was then placed in the primary defect and oriented appropriately.
Split-Thickness Skin Graft Text: The defect edges were debeveled with a #15 scalpel blade.  Given the location of the defect, shape of the defect and the proximity to free margins a split thickness skin graft was deemed most appropriate.  Using a sterile surgical marker, the primary defect shape was transferred to the donor site. The split thickness graft was then harvested.  The skin graft was then placed in the primary defect and oriented appropriately.
Burow's Graft Text: The defect edges were debeveled with a #15 scalpel blade.  Given the location of the defect, shape of the defect, the proximity to free margins and the presence of a standing cone deformity a Burow's skin graft was deemed most appropriate. The standing cone was removed and this tissue was then trimmed to the shape of the primary defect. The adipose tissue was also removed until only dermis and epidermis were left.  The skin margins of the secondary defect were undermined to an appropriate distance in all directions utilizing iris scissors.  The secondary defect was closed with interrupted buried subcutaneous sutures.  The skin edges were then re-apposed with running  sutures.  The skin graft was then placed in the primary defect and oriented appropriately.
Cartilage Graft Text: The defect edges were debeveled with a #15 scalpel blade.  Given the location of the defect, shape of the defect, the fact the defect involved a full thickness cartilage defect a cartilage graft was deemed most appropriate.  An appropriate donor site was identified, cleansed, and anesthetized. The cartilage graft was then harvested and transferred to the recipient site, oriented appropriately and then sutured into place.  The secondary defect was then repaired using a primary closure.
Composite Graft Text: The defect edges were debeveled with a #15 scalpel blade.  Given the location of the defect, shape of the defect, the proximity to free margins and the fact the defect was full thickness a composite graft was deemed most appropriate.  The defect was outline and then transferred to the donor site.  A full thickness graft was then excised from the donor site. The graft was then placed in the primary defect, oriented appropriately and then sutured into place.  The secondary defect was then repaired using a primary closure.
Epidermal Autograft Text: The defect edges were debeveled with a #15 scalpel blade.  Given the location of the defect, shape of the defect and the proximity to free margins an epidermal autograft was deemed most appropriate.  Using a sterile surgical marker, the primary defect shape was transferred to the donor site. The epidermal graft was then harvested.  The skin graft was then placed in the primary defect and oriented appropriately.
Dermal Autograft Text: The defect edges were debeveled with a #15 scalpel blade.  Given the location of the defect, shape of the defect and the proximity to free margins a dermal autograft was deemed most appropriate.  Using a sterile surgical marker, the primary defect shape was transferred to the donor site. The area thus outlined was incised deep to adipose tissue with a #15 scalpel blade.  The harvested graft was then trimmed of adipose and epidermal tissue until only dermis was left.  The skin graft was then placed in the primary defect and oriented appropriately.
Skin Substitute Text: The defect edges were debeveled with a #15 scalpel blade.  Given the location of the defect, shape of the defect and the proximity to free margins a skin substitute graft was deemed most appropriate.  The graft material was trimmed to fit the size of the defect. The graft was then placed in the primary defect and oriented appropriately.
Tissue Cultured Epidermal Autograft Text: The defect edges were debeveled with a #15 scalpel blade.  Given the location of the defect, shape of the defect and the proximity to free margins a tissue cultured epidermal autograft was deemed most appropriate.  The graft was then trimmed to fit the size of the defect.  The graft was then placed in the primary defect and oriented appropriately.
Xenograft Text: The defect edges were debeveled with a #15 scalpel blade.  Given the location of the defect, shape of the defect and the proximity to free margins a xenograft was deemed most appropriate.  The graft was then trimmed to fit the size of the defect.  The graft was then placed in the primary defect and oriented appropriately.
Purse String (Simple) Text: Given the location of the defect and the characteristics of the surrounding skin a purse string closure was deemed most appropriate.  Undermining was performed circumfirentially around the surgical defect.  A purse string suture was then placed and tightened.
Purse String (Intermediate) Text: Given the location of the defect and the characteristics of the surrounding skin a purse string intermediate closure was deemed most appropriate.  Undermining was performed circumfirentially around the surgical defect.  A purse string suture was then placed and tightened.
Partial Purse String (Simple) Text: Given the location of the defect and the characteristics of the surrounding skin a simple purse string closure was deemed most appropriate.  Undermining was performed circumfirentially around the surgical defect.  A purse string suture was then placed and tightened. Wound tension only allowed a partial closure of the circular defect.
Partial Purse String (Intermediate) Text: Given the location of the defect and the characteristics of the surrounding skin an intermediate purse string closure was deemed most appropriate.  Undermining was performed circumfirentially around the surgical defect.  A purse string suture was then placed and tightened. Wound tension only allowed a partial closure of the circular defect.
Localized Dermabrasion With Wire Brush Text: The patient was draped in routine manner.  Localized dermabrasion using 3 x 17 mm wire brush was performed in routine manner to papillary dermis. This spot dermabrasion is being performed to complete skin cancer reconstruction. It also will eliminate the other sun damaged precancerous cells that are known to be part of the regional effect of a lifetime's worth of sun exposure. This localized dermabrasion is therapeutic and should not be considered cosmetic in any regard.
Tarsorrhaphy Text: A tarsorrhaphy was performed using Frost sutures.
Complex Repair And Flap Additional Text (Will Appearing After The Standard Complex Repair Text): The complex repair was not sufficient to completely close the primary defect. The remaining additional defect was repaired with the flap mentioned below.
Complex Repair And Graft Additional Text (Will Appearing After The Standard Complex Repair Text): The complex repair was not sufficient to completely close the primary defect. The remaining additional defect was repaired with the graft mentioned below.
Unique Flap 1 Name: Myocutaneous Island pedicle Flap
Unique Flap 2 Name: Peng Flap
Unique Flap 3 Name: Mercedes Flap
Unique Flap 4 Name: Banner Flap
Unique Flap 5 Name: tunneled myocutaneous flap
Unique Flap 1 Text: A decision was made to reconstruct the defect utilizing a myocutaneous Island pedicle Flap based on the levator labii superioris muscle.  A telfa template was made of the defect.  This telfa template was then used to outline the myocutaneous flap, based along the meilolabial fold.  The donor area for the pedicle flap was then injected with anesthesia.  The flap was excised through the skin and subcutaneous tissue down to the layer of the underlying musculature.  The myocutaneous flap was carefully excised within this deep plane to maintain its blood supply. Based on the muscle. The edges of the donor site were undermined.   The donor site was closed in a primary fashion to the point of transposition.  The pedicle was then transposed into position and sutured.  Once the flap was sutured into place, adequate blood supply was confirmed with blanching and refill.
Unique Flap 2 Text: A decision was made to reconstruct the defect utilizing a Peng Flap (Bilateral Advancement Rotation Flap). Given the location of the defect and the proximity to free margins, this flap was deemed most appropriate.  Using a sterile surgical marker, the appropriate rotation flaps were drawn incorporating the defect and placing the expected incisions within the relaxed skin tension lines where possible.    The area thus outlined was incised deep to adipose tissue with a #15 scalpel blade.  The skin margins were undermined to an appropriate distance in all directions utilizing iris scissors.
Unique Flap 3 Text: The defect edges were debeveled with a #15 scalpel blade.  Given the location of the defect, shape of the defect and the proximity to free margins a Mercedes (double advancement flap) was deemed most appropriate.  Using a sterile surgical marker, the appropriate transposition flaps were drawn incorporating the defect and placing the expected incisions within the relaxed skin tension lines where possible.    The area thus outlined was incised deep to adipose tissue with a #15 scalpel blade.  The skin margins were undermined to an appropriate distance in all directions utilizing iris scissors.  Hemostasis was achieved with electrocautery.  The flaps were then advanced into the defect and anchored with interrupted buried subcutaneous sutures.
Unique Flap 4 Text: The defect edges were debeveled with a #15 scalpel blade.  Given the location of the defect and the proximity to free margins a Banner transposition flap was deemed most appropriate.  Using a sterile surgical marker, an appropriate Banner transposition flap was drawn incorporating the defect.    The area thus outlined was incised deep to adipose tissue with a #15 scalpel blade.  The skin margins were undermined to an appropriate distance in all directions utilizing iris scissors.
Unique Flap 5 Text: A decision was made to reconstruct the defect utilizing a tunneled myocutaneous Island pedicle Flap based on the anterior auricularis muscle.  A telfa template was made of the defect.  This telfa template was then used to outline the myocutaneous flap, based along the preauricular fold.  The donor area for the pedicle flap was then injected with anesthesia.  The flap was excised through the skin and subcutaneous tissue down to the layer of the underlying musculature.  The myocutaneous flap was carefully excised within this deep plane to maintain its blood supply based on the muscle. The edges of the donor site were undermined.   The donor site was closed in a primary fashion to the point of transposition.  The pedicle was then transposed through a tunnel into position and sutured.  Once the flap was sutured into place, adequate blood supply was confirmed with blanching and refill.
Manual Repair Warning Statement: We plan on removing the manually selected variable below in favor of our much easier automatic structured text blocks found in the previous tab. We decided to do this to help make the flow better and give you the full power of structured data. Manual selection is never going to be ideal in our platform and I would encourage you to avoid using manual selection from this point on, especially since I will be sunsetting this feature. It is important that you do one of two things with the customized text below. First, you can save all of the text in a word file so you can have it for future reference. Second, transfer the text to the appropriate area in the Library tab. Lastly, if there is a flap or graft type which we do not have you need to let us know right away so I can add it in before the variable is hidden. No need to panic, we plan to give you roughly 6 months to make the change.
Same Histology In Subsequent Stages Text: The pattern and morphology of the tumor is as described in the first stage.
No Residual Tumor Seen Histology Text: There were no malignant cells seen in the sections examined.
Inflammation Suggestive Of Cancer Camouflage Histology Text: There was a dense lymphocytic infiltrate which prevented adequate histologic evaluation of adjacent structures.
Bcc Histology Text: There were numerous aggregates of basaloid cells.
Bcc Infiltrative Histology Text: There were numerous aggregates of basaloid cells demonstrating an infiltrative pattern.
Mart-1 - Positive Histology Text: MART-1 staining demonstrates areas of higher density and clustering of melanocytes with Pagetoid spread upwards within the epidermis. The surgical margins are positive for tumor cells.
Mart-1 - Negative Histology Text: MART-1 staining demonstrates a normal density and pattern of melanocytes along the dermal-epidermal junction. The surgical margins are negative for tumor cells.
Information: Selecting Yes will display possible errors in your note based on the variables you have selected. This validation is only offered as a suggestion for you. PLEASE NOTE THAT THE VALIDATION TEXT WILL BE REMOVED WHEN YOU FINALIZE YOUR NOTE. IF YOU WANT TO FAX A PRELIMINARY NOTE YOU WILL NEED TO TOGGLE THIS TO 'NO' IF YOU DO NOT WANT IT IN YOUR FAXED NOTE.

## 2020-08-12 ENCOUNTER — APPOINTMENT (RX ONLY)
Dept: URBAN - METROPOLITAN AREA CLINIC 36 | Facility: CLINIC | Age: 65
Setting detail: DERMATOLOGY
End: 2020-08-12

## 2020-08-12 DIAGNOSIS — Z48.02 ENCOUNTER FOR REMOVAL OF SUTURES: ICD-10-CM

## 2020-08-12 PROCEDURE — 99024 POSTOP FOLLOW-UP VISIT: CPT

## 2020-08-12 PROCEDURE — ? SUTURE REMOVAL (GLOBAL PERIOD)

## 2020-08-12 ASSESSMENT — LOCATION DETAILED DESCRIPTION DERM: LOCATION DETAILED: LEFT LATERAL MALAR CHEEK

## 2020-08-12 ASSESSMENT — LOCATION SIMPLE DESCRIPTION DERM: LOCATION SIMPLE: LEFT CHEEK

## 2020-08-12 ASSESSMENT — LOCATION ZONE DERM: LOCATION ZONE: FACE

## 2020-08-12 NOTE — PROCEDURE: SUTURE REMOVAL (GLOBAL PERIOD)
Detail Level: Detailed
Add 95880 Cpt? (Important Note: In 2017 The Use Of 16318 Is Being Tracked By Cms To Determine Future Global Period Reimbursement For Global Periods): yes

## 2020-08-19 ENCOUNTER — APPOINTMENT (RX ONLY)
Dept: URBAN - METROPOLITAN AREA CLINIC 36 | Facility: CLINIC | Age: 65
Setting detail: DERMATOLOGY
End: 2020-08-19

## 2020-08-19 DIAGNOSIS — L90.5 SCAR CONDITIONS AND FIBROSIS OF SKIN: ICD-10-CM

## 2020-08-19 PROBLEM — C44.222 SQUAMOUS CELL CARCINOMA OF SKIN OF RIGHT EAR AND EXTERNAL AURICULAR CANAL: Status: ACTIVE | Noted: 2020-08-19

## 2020-08-19 PROCEDURE — ? OBSERVATION

## 2020-08-19 PROCEDURE — ? INTRALESIONAL KENALOG

## 2020-08-19 PROCEDURE — ? LASER RESURFACING

## 2020-08-19 ASSESSMENT — LOCATION DETAILED DESCRIPTION DERM
LOCATION DETAILED: LEFT SUPERIOR CENTRAL MALAR CHEEK
LOCATION DETAILED: LEFT CENTRAL MALAR CHEEK

## 2020-08-19 ASSESSMENT — LOCATION ZONE DERM: LOCATION ZONE: FACE

## 2020-08-19 ASSESSMENT — LOCATION SIMPLE DESCRIPTION DERM: LOCATION SIMPLE: LEFT CHEEK

## 2020-08-19 NOTE — PROCEDURE: INTRALESIONAL KENALOG
X Size Of Lesion In Cm (Optional): 0
Consent: The risks of atrophy were reviewed with the patient.
Expiration Date For Kenalog (Optional): dec 2021
Total Volume (Ccs): 0.2
Kenalog Preparation: Kenalog
Concentration Of Kenalog Solution Injected (Mg/Ml): 40.0
Detail Level: Detailed
Include Z78.9 (Other Specified Conditions Influencing Health Status) As An Associated Diagnosis?: No
Medical Necessity Clause: This procedure was medically necessary because the lesions that were treated were: draining
Lot # For Kenalog (Optional): [id number]

## 2020-08-19 NOTE — PROCEDURE: LASER RESURFACING
Anesthesia Volume In Cc: 0
Post-Care Instructions: I reviewed with the patient in detail post-care instructions. Patient should avoid sun until area fully healed. Pt should apply vaseline to treated areas, and remove crusts gently with water-vinegar soaks.
Laser Type: CO2 laser
Number Of Passes: 5
Detail Level: Detailed
Power (Muñoz): 18
Consent: Written consent obtained, risks reviewed including but not limited to crusting, scabbing, blistering, scarring, darker or lighter pigmentary change, incomplete improvement of dyschromia, wrinkles, prolonged erythema and facial swelling, infection and bleeding.
Wavelength: 10,600nm
Treatment Number: 1

## 2020-11-11 DIAGNOSIS — N52.9 ERECTILE DYSFUNCTION, UNSPECIFIED ERECTILE DYSFUNCTION TYPE: ICD-10-CM

## 2020-11-11 RX ORDER — SILDENAFIL 100 MG/1
TABLET, FILM COATED ORAL
Qty: 6 TAB | Refills: 0 | Status: SHIPPED | OUTPATIENT
Start: 2020-11-11 | End: 2020-11-30

## 2021-01-14 ENCOUNTER — APPOINTMENT (RX ONLY)
Dept: URBAN - METROPOLITAN AREA CLINIC 22 | Facility: CLINIC | Age: 66
Setting detail: DERMATOLOGY
End: 2021-01-14

## 2021-01-14 DIAGNOSIS — L82.1 OTHER SEBORRHEIC KERATOSIS: ICD-10-CM

## 2021-01-14 DIAGNOSIS — D22 MELANOCYTIC NEVI: ICD-10-CM

## 2021-01-14 DIAGNOSIS — L57.0 ACTINIC KERATOSIS: ICD-10-CM

## 2021-01-14 DIAGNOSIS — L81.4 OTHER MELANIN HYPERPIGMENTATION: ICD-10-CM

## 2021-01-14 DIAGNOSIS — D18.0 HEMANGIOMA: ICD-10-CM

## 2021-01-14 DIAGNOSIS — Z71.89 OTHER SPECIFIED COUNSELING: ICD-10-CM

## 2021-01-14 DIAGNOSIS — Z85.828 PERSONAL HISTORY OF OTHER MALIGNANT NEOPLASM OF SKIN: ICD-10-CM

## 2021-01-14 PROBLEM — D18.01 HEMANGIOMA OF SKIN AND SUBCUTANEOUS TISSUE: Status: ACTIVE | Noted: 2021-01-14

## 2021-01-14 PROBLEM — D48.5 NEOPLASM OF UNCERTAIN BEHAVIOR OF SKIN: Status: ACTIVE | Noted: 2021-01-14

## 2021-01-14 PROBLEM — D22.5 MELANOCYTIC NEVI OF TRUNK: Status: ACTIVE | Noted: 2021-01-14

## 2021-01-14 PROCEDURE — 17004 DESTROY PREMAL LESIONS 15/>: CPT

## 2021-01-14 PROCEDURE — 69100 BIOPSY OF EXTERNAL EAR: CPT

## 2021-01-14 PROCEDURE — ? ADDITIONAL NOTES

## 2021-01-14 PROCEDURE — ? COUNSELING

## 2021-01-14 PROCEDURE — ? BIOPSY BY SHAVE METHOD

## 2021-01-14 PROCEDURE — 69100 BIOPSY OF EXTERNAL EAR: CPT | Mod: 76

## 2021-01-14 PROCEDURE — 99213 OFFICE O/P EST LOW 20 MIN: CPT | Mod: 25

## 2021-01-14 PROCEDURE — ? LIQUID NITROGEN

## 2021-01-14 PROCEDURE — ? SUNSCREEN RECOMMENDATIONS

## 2021-01-14 ASSESSMENT — LOCATION SIMPLE DESCRIPTION DERM
LOCATION SIMPLE: LEFT UPPER BACK
LOCATION SIMPLE: LEFT CHEEK
LOCATION SIMPLE: ABDOMEN
LOCATION SIMPLE: LEFT EAR
LOCATION SIMPLE: NOSE
LOCATION SIMPLE: RIGHT EAR
LOCATION SIMPLE: RIGHT TEMPLE
LOCATION SIMPLE: RIGHT FOREARM
LOCATION SIMPLE: LEFT FOREARM
LOCATION SIMPLE: RIGHT LOWER BACK
LOCATION SIMPLE: RIGHT CHEEK
LOCATION SIMPLE: RIGHT HAND

## 2021-01-14 ASSESSMENT — LOCATION DETAILED DESCRIPTION DERM
LOCATION DETAILED: RIGHT SUPERIOR CRUS OF ANTIHELIX
LOCATION DETAILED: RIGHT DISTAL DORSAL FOREARM
LOCATION DETAILED: LEFT SUPERIOR CRUS OF ANTIHELIX
LOCATION DETAILED: LEFT ANTIHELIX
LOCATION DETAILED: RIGHT SUPERIOR MEDIAL MIDBACK
LOCATION DETAILED: RIGHT SUPERIOR HELIX
LOCATION DETAILED: LEFT SUPERIOR LATERAL MALAR CHEEK
LOCATION DETAILED: RIGHT SUPERIOR LATERAL MALAR CHEEK
LOCATION DETAILED: LEFT CENTRAL MALAR CHEEK
LOCATION DETAILED: LEFT DISTAL DORSAL FOREARM
LOCATION DETAILED: LEFT PROXIMAL DORSAL FOREARM
LOCATION DETAILED: RIGHT ULNAR DORSAL HAND
LOCATION DETAILED: EPIGASTRIC SKIN
LOCATION DETAILED: NASAL ROOT
LOCATION DETAILED: RIGHT PROXIMAL DORSAL FOREARM
LOCATION DETAILED: RIGHT INFERIOR TEMPLE
LOCATION DETAILED: LEFT SUPERIOR POSTERIOR HELIX
LOCATION DETAILED: NASAL DORSUM
LOCATION DETAILED: LEFT SUPERIOR MEDIAL UPPER BACK

## 2021-01-14 ASSESSMENT — LOCATION ZONE DERM
LOCATION ZONE: FACE
LOCATION ZONE: TRUNK
LOCATION ZONE: EAR
LOCATION ZONE: HAND
LOCATION ZONE: ARM
LOCATION ZONE: NOSE

## 2021-01-14 NOTE — PROCEDURE: BIOPSY BY SHAVE METHOD
Detail Level: Detailed
Depth Of Biopsy: dermis
Was A Bandage Applied: Yes
Size Of Lesion In Cm: 0.2
X Size Of Lesion In Cm: 0
Biopsy Type: H and E
Biopsy Method: Personna blade
Anesthesia Type: 1% lidocaine with 1:100,000 epinephrine and a 1:3 solution of 8.4% sodium bicarbonate
Anesthesia Volume In Cc: 1
Hemostasis: Drysol
Wound Care: Vaseline
Dressing: bandage
Destruction After The Procedure: No
Type Of Destruction Used: Curettage
Curettage Text: The wound bed was treated with curettage after the biopsy was performed.
Cryotherapy Text: The wound bed was treated with cryotherapy after the biopsy was performed.
Electrodesiccation Text: The wound bed was treated with electrodesiccation after the biopsy was performed.
Electrodesiccation And Curettage Text: The wound bed was treated with electrodesiccation and curettage after the biopsy was performed.
Silver Nitrate Text: The wound bed was treated with silver nitrate after the biopsy was performed.
Lab: [id number]
Lab Facility: 
Consent: Written consent was obtained and risks were reviewed including but not limited to scarring, infection, bleeding, scabbing, incomplete removal, nerve damage and allergy to anesthesia.
Post-Care Instructions: I reviewed with the patient in detail post-care instructions. Patient is to keep the biopsy site dry overnight, and then apply bacitracin twice daily until healed. Patient may apply hydrogen peroxide soaks to remove any crusting.
Notification Instructions: Patient will be notified of biopsy results. However, patient instructed to call the office if not contacted within 2 weeks.
Billing Type: Third-Party Bill
Information: Selecting Yes will display possible errors in your note based on the variables you have selected. This validation is only offered as a suggestion for you. PLEASE NOTE THAT THE VALIDATION TEXT WILL BE REMOVED WHEN YOU FINALIZE YOUR NOTE. IF YOU WANT TO FAX A PRELIMINARY NOTE YOU WILL NEED TO TOGGLE THIS TO 'NO' IF YOU DO NOT WANT IT IN YOUR FAXED NOTE.
Size Of Lesion In Cm: 0.4

## 2021-01-14 NOTE — HPI: SKIN LESION
Is This A New Presentation, Or A Follow-Up?: Skin Lesions
What Type Of Note Output Would You Prefer (Optional)?: Standard Output
How Severe Is Your Skin Lesion?: moderate
Has Your Skin Lesion Been Treated?: not been treated
Additional History: Lesion on right ear was biopsied July 2020.  Results show superficial biopsy with questionable SCC.  Site was re-evaluated at his [facility] appointment.  There was no sign of a lesion at this time so he has been monitoring the area.  Reports a tender crusty lesion returned about 4 weeks ago.

## 2021-01-14 NOTE — PROCEDURE: LIQUID NITROGEN
Render Note In Bullet Format When Appropriate: No
Duration Of Freeze Thaw-Cycle (Seconds): 3
Consent: The patient's consent was obtained including but not limited to risks of crusting, scabbing, blistering, scarring, darker or lighter pigmentary change, recurrence, incomplete removal and infection.
Detail Level: Detailed
Post-Care Instructions: I reviewed with the patient in detail post-care instructions. Patient is to wear sunprotection, and avoid picking at any of the treated lesions. Pt may apply Vaseline to crusted or scabbing areas.
Number Of Freeze-Thaw Cycles: 2 freeze-thaw cycles

## 2021-01-14 NOTE — PROCEDURE: ADDITIONAL NOTES
Additional Notes: Site rebiopsied from 7/2020
Detail Level: Detailed
Render Risk Assessment In Note?: no

## 2021-03-03 DIAGNOSIS — Z23 NEED FOR VACCINATION: ICD-10-CM

## 2021-03-24 ENCOUNTER — OFFICE VISIT (OUTPATIENT)
Dept: MEDICAL GROUP | Facility: PHYSICIAN GROUP | Age: 66
End: 2021-03-24
Payer: COMMERCIAL

## 2021-03-24 VITALS
HEIGHT: 71 IN | BODY MASS INDEX: 27.47 KG/M2 | HEART RATE: 78 BPM | TEMPERATURE: 98.3 F | OXYGEN SATURATION: 97 % | DIASTOLIC BLOOD PRESSURE: 76 MMHG | RESPIRATION RATE: 16 BRPM | SYSTOLIC BLOOD PRESSURE: 142 MMHG | WEIGHT: 196.2 LBS

## 2021-03-24 DIAGNOSIS — E78.5 DYSLIPIDEMIA: ICD-10-CM

## 2021-03-24 DIAGNOSIS — Z86.19 HISTORY OF HEPATITIS C: ICD-10-CM

## 2021-03-24 DIAGNOSIS — E55.9 VITAMIN D INSUFFICIENCY: ICD-10-CM

## 2021-03-24 DIAGNOSIS — R73.03 PREDIABETES: ICD-10-CM

## 2021-03-24 DIAGNOSIS — N52.9 ERECTILE DYSFUNCTION, UNSPECIFIED ERECTILE DYSFUNCTION TYPE: ICD-10-CM

## 2021-03-24 PROCEDURE — 99214 OFFICE O/P EST MOD 30 MIN: CPT | Performed by: PHYSICIAN ASSISTANT

## 2021-03-24 ASSESSMENT — PATIENT HEALTH QUESTIONNAIRE - PHQ9: CLINICAL INTERPRETATION OF PHQ2 SCORE: 0

## 2021-03-25 NOTE — PROGRESS NOTES
Chief Complaint   Patient presents with   • Establish Care     Pain in middle left side of back    • Requesting Labs     Family history of CA        HISTORY OF PRESENT ILLNESS: German Villalpando is an established 66 y.o. male here to discuss the evaluation and management of:      Prediabetes  Chronic problem.  Unknown is stable.  Lab work was last completed in 2019.  Hemoglobin A1c on 5/14/2019 was 5.8.  Patient admits that he eats more carbohydrates and sugar than he should.  He states he lives an active lifestyle.  Patient works for [email or web address] and walks throughout his work shift.  He denies polyuria, polydipsia, polyphagia, poor wound healing, vision changes, peripheral neuropathy.    Vitamin D insufficiency  Chronic problem.  Unknown if stable.  Patient is on taking over-the-counter vitamin D supplementation.    Erectile dysfunction, unspecified erectile dysfunction type  Chronic but stable problem.  Patient uses Viagra 100 mg tab as needed.  Denies side effects or complications medication.  Does not need a refill at this time.    Dyslipidemia  Chronic problem.  Could be better controlled.  Reviewed patient's lipid profile lab work results with him.  Results are as follows:    Results for GERMAN VILLALPANDO (MRN [id number]) as of 3/25/2021 12:22   Ref. Range 5/23/2017 10:06 5/8/2018 08:15 5/14/2019 06:04   Cholesterol,Tot Latest Ref Range: 100 - 199 mg/dL 167 160 181   Triglycerides Latest Ref Range: 0 - 149 mg/dL 74 57 88   HDL Latest Ref Range: >=40 mg/dL 48 42 43   LDL Latest Ref Range: <100 mg/dL 104 (H) 107 (H) 120 (H)     Patient's 10-year ASCVD and CVD risk scores are high.  He does not want to be placed on a statin medication at this time.    History of hepatitis C  Historical.  Patient states in 2002 he was diagnosed with hepatitis C.  He tells me he completed a 12-week program in 2018 was monitored closely by gastroenterologist.  States he is hepatitis C free.  Patient is asymptomatic.      Patient  Active Problem List    Diagnosis Date Noted   • Prediabetes 2019   • Vitamin D insufficiency 2017   • History of hepatitis C 2017   • ED (erectile dysfunction) 2017       Allergies:Pcn [penicillins]    Current Outpatient Medications   Medication Sig Dispense Refill   • Cholecalciferol (VITAMIN D3) 125 MCG (5000 UT) Cap Take 1 capsule by mouth every 48 hours.     • sildenafil citrate (VIAGRA) 100 MG tablet TAKE 1 TABLET BY MOUTH ONCE DAILY FOR ERECTILE DYSFUNCTION 6 Tab 5     No current facility-administered medications for this visit.       Social History     Tobacco Use   • Smoking status: Former Smoker     Packs/day: 0.50     Years: 40.00     Pack years: 20.00     Types: Cigarettes     Quit date: 2006     Years since quitting: 15.2   • Smokeless tobacco: Current User     Types: Chew   • Tobacco comment: Quit smoking in    Substance Use Topics   • Alcohol use: Not Currently     Comment: Quit in    • Drug use: Not Currently     Types: Methamphetamines, Marijuana     Comment: Quit in , used methamphetamine       Family Status   Relation Name Status   • Mo  Alive   • Fa     • Sis     • Bro  Alive   • MGMo     • Son  Alive   • Mercy  Alive   • Mercy  Alive     Family History   Problem Relation Age of Onset   • Cancer Mother         Multiple Myeloma   • Cancer Father         Colon   • Cancer Sister         Breast   • Diabetes Brother    • Colon Cancer Maternal Grandmother    • No Known Problems Son    • No Known Problems Daughter    • No Known Problems Daughter        ROS:  Review of Systems   Constitutional: Negative for fever, chills, weight loss and malaise/fatigue.   HENT: Negative for ear pain, nosebleeds, congestion, sore throat and neck pain.    Eyes: Negative for blurred vision.   Respiratory: Negative for cough, sputum production, shortness of breath and wheezing.    Cardiovascular: Negative for chest pain, palpitations, orthopnea and leg swelling.  "  Gastrointestinal: Negative for heartburn, nausea, vomiting and abdominal pain.   Genitourinary: Negative for dysuria, urgency and frequency.   Musculoskeletal: Negative for myalgias, back pain and joint pain.   Skin: Negative for rash and itching.   Neurological: Negative for dizziness, tingling, tremors, sensory change, focal weakness and headaches.   Endo/Heme/Allergies: Does not bruise/bleed easily.   Psychiatric/Behavioral: Negative for depression, suicidal ideas and memory loss.  The patient is not nervous/anxious and does not have insomnia.    All other systems reviewed and are negative except as in HPI.    Exam: /76 (BP Location: Left arm, Patient Position: Sitting, BP Cuff Size: Adult)   Pulse 78   Temp 36.8 °C (98.3 °F) (Temporal)   Resp 16   Ht 1.803 m (5' 11\")   Wt 89 kg (196 lb 3.2 oz)   SpO2 97%  Body mass index is 27.36 kg/m².  General: Normal appearing. No distress.  HEENT: Normocephalic. Eyes conjunctiva clear lids without ptosis, ears normal shape and contour.  Neck: Supple without JVD. Thyroid is not enlarged.  Pulmonary: Clear to ausculation.  Normal effort. No rales, ronchi, or wheezing.  Cardiovascular: Regular rate and rhythm without murmur.   Abdomen: Nondistended.   Neurologic: Grossly nonfocal.  Cranial nerves are normal.   Skin: Warm and dry.  No rashes or suspicious skin lesions.  Musculoskeletal: Normal gait. No extremity cyanosis, clubbing, or edema.  Psych: Normal mood and affect. Alert and oriented x3. Judgment and insight is normal.    Medical decision-making and discussion:  1. Prediabetes  Chronic problem.  Unknown is stable.  Lab work has been ordered to further evaluate patient.  Patient will follow up in 6 weeks for evaluation.  Advised patient to decrease carbohydrate and sugar consumption.  Continue working on eating a healthy diet and exercising regularly.    Follow-up for worsening symptoms,lack of expected recovery, or should new symptoms or problems " arise.      - CBC WITH DIFFERENTIAL; Future  - Comp Metabolic Panel; Future  - Lipid Profile; Future  - HEMOGLOBIN A1C; Future  - MICROALB/CREAT RATIO RAND. UR    2. Vitamin D insufficiency  Chronic problem.  Unknown if stable.  Lab work was ordered to further evaluate patient.  Patient follow-up in 6 weeks discuss lab work results.    - VITAMIN D,25 HYDROXY; Future    3. Erectile dysfunction, unspecified erectile dysfunction type  Chronic but stable problem.  Continue current medication regimen.  Continue to monitor.    4. Dyslipidemia  Chronic problem.  Could improve.  Lab work has been ordered to further evaluate patient.  Reviewed patient's 10-year ASCVD and CVD scores.  Patient does not want to be placed on a statin medication.  Advised patient continue working on diet and exercise.  Continue to monitor.    - CBC WITH DIFFERENTIAL; Future  - Comp Metabolic Panel; Future  - Lipid Profile; Future    5. History of hepatitis C  Historical.  Patient underwent a 12-week treatment program.  Repeat lab work indicated patient was cured of hepatitis C.  Patient is feeling well.  Continue to monitor.  - CBC WITH DIFFERENTIAL; Future  - Comp Metabolic Panel; Future      Please note that this dictation was created using voice recognition software. I have made every reasonable attempt to correct obvious errors, but I expect that there are errors of grammar and possibly content that I did not discover before finalizing the note.    Assessment/Plan:  1. Prediabetes  CBC WITH DIFFERENTIAL    Comp Metabolic Panel    Lipid Profile    HEMOGLOBIN A1C    MICROALB/CREAT RATIO RAND. UR   2. Vitamin D insufficiency  VITAMIN D,25 HYDROXY   3. Erectile dysfunction, unspecified erectile dysfunction type     4. Dyslipidemia  CBC WITH DIFFERENTIAL    Comp Metabolic Panel    Lipid Profile   5. History of hepatitis C  CBC WITH DIFFERENTIAL    Comp Metabolic Panel       Return in about 6 weeks (around 5/5/2021).

## 2021-03-26 ENCOUNTER — HOSPITAL ENCOUNTER (OUTPATIENT)
Dept: LAB | Facility: MEDICAL CENTER | Age: 66
End: 2021-03-26
Attending: PHYSICIAN ASSISTANT
Payer: COMMERCIAL

## 2021-03-26 DIAGNOSIS — R73.03 PREDIABETES: ICD-10-CM

## 2021-03-26 DIAGNOSIS — E78.5 DYSLIPIDEMIA: ICD-10-CM

## 2021-03-26 DIAGNOSIS — E55.9 VITAMIN D INSUFFICIENCY: ICD-10-CM

## 2021-03-26 DIAGNOSIS — Z86.19 HISTORY OF HEPATITIS C: ICD-10-CM

## 2021-03-26 LAB
ALBUMIN SERPL BCP-MCNC: 4.6 G/DL (ref 3.2–4.9)
ALBUMIN/GLOB SERPL: 1.6 G/DL
ALP SERPL-CCNC: 43 U/L (ref 30–99)
ALT SERPL-CCNC: 25 U/L (ref 2–50)
ANION GAP SERPL CALC-SCNC: 10 MMOL/L (ref 7–16)
AST SERPL-CCNC: 17 U/L (ref 12–45)
BASOPHILS # BLD AUTO: 1.5 % (ref 0–1.8)
BASOPHILS # BLD: 0.1 K/UL (ref 0–0.12)
BILIRUB SERPL-MCNC: 0.4 MG/DL (ref 0.1–1.5)
BUN SERPL-MCNC: 9 MG/DL (ref 8–22)
CALCIUM SERPL-MCNC: 9.7 MG/DL (ref 8.5–10.5)
CHLORIDE SERPL-SCNC: 102 MMOL/L (ref 96–112)
CHOLEST SERPL-MCNC: 207 MG/DL (ref 100–199)
CO2 SERPL-SCNC: 26 MMOL/L (ref 20–33)
CREAT SERPL-MCNC: 0.94 MG/DL (ref 0.5–1.4)
CREAT UR-MCNC: 31.66 MG/DL
EOSINOPHIL # BLD AUTO: 0.2 K/UL (ref 0–0.51)
EOSINOPHIL NFR BLD: 2.9 % (ref 0–6.9)
ERYTHROCYTE [DISTWIDTH] IN BLOOD BY AUTOMATED COUNT: 46.8 FL (ref 35.9–50)
EST. AVERAGE GLUCOSE BLD GHB EST-MCNC: 114 MG/DL
GLOBULIN SER CALC-MCNC: 2.8 G/DL (ref 1.9–3.5)
GLUCOSE SERPL-MCNC: 96 MG/DL (ref 65–99)
HBA1C MFR BLD: 5.6 % (ref 4–5.6)
HCT VFR BLD AUTO: 48.1 % (ref 42–52)
HDLC SERPL-MCNC: 48 MG/DL
HGB BLD-MCNC: 16 G/DL (ref 14–18)
IMM GRANULOCYTES # BLD AUTO: 0.03 K/UL (ref 0–0.11)
IMM GRANULOCYTES NFR BLD AUTO: 0.4 % (ref 0–0.9)
LDLC SERPL CALC-MCNC: 140 MG/DL
LYMPHOCYTES # BLD AUTO: 2.39 K/UL (ref 1–4.8)
LYMPHOCYTES NFR BLD: 34.7 % (ref 22–41)
MCH RBC QN AUTO: 31.7 PG (ref 27–33)
MCHC RBC AUTO-ENTMCNC: 33.3 G/DL (ref 33.7–35.3)
MCV RBC AUTO: 95.4 FL (ref 81.4–97.8)
MICROALBUMIN UR-MCNC: <1.2 MG/DL
MICROALBUMIN/CREAT UR: NORMAL MG/G (ref 0–30)
MONOCYTES # BLD AUTO: 0.66 K/UL (ref 0–0.85)
MONOCYTES NFR BLD AUTO: 9.6 % (ref 0–13.4)
NEUTROPHILS # BLD AUTO: 3.5 K/UL (ref 1.82–7.42)
NEUTROPHILS NFR BLD: 50.9 % (ref 44–72)
NRBC # BLD AUTO: 0 K/UL
NRBC BLD-RTO: 0 /100 WBC
PLATELET # BLD AUTO: 204 K/UL (ref 164–446)
PMV BLD AUTO: 10.8 FL (ref 9–12.9)
POTASSIUM SERPL-SCNC: 4.2 MMOL/L (ref 3.6–5.5)
PROT SERPL-MCNC: 7.4 G/DL (ref 6–8.2)
RBC # BLD AUTO: 5.04 M/UL (ref 4.7–6.1)
SODIUM SERPL-SCNC: 138 MMOL/L (ref 135–145)
TRIGL SERPL-MCNC: 96 MG/DL (ref 0–149)
WBC # BLD AUTO: 6.9 K/UL (ref 4.8–10.8)

## 2021-03-26 PROCEDURE — 80061 LIPID PANEL: CPT

## 2021-03-26 PROCEDURE — 82306 VITAMIN D 25 HYDROXY: CPT

## 2021-03-26 PROCEDURE — 82570 ASSAY OF URINE CREATININE: CPT

## 2021-03-26 PROCEDURE — 82043 UR ALBUMIN QUANTITATIVE: CPT

## 2021-03-26 PROCEDURE — 83036 HEMOGLOBIN GLYCOSYLATED A1C: CPT

## 2021-03-26 PROCEDURE — 85025 COMPLETE CBC W/AUTO DIFF WBC: CPT

## 2021-03-26 PROCEDURE — 36415 COLL VENOUS BLD VENIPUNCTURE: CPT

## 2021-03-26 PROCEDURE — 80053 COMPREHEN METABOLIC PANEL: CPT

## 2021-03-27 LAB — 25(OH)D3 SERPL-MCNC: 52 NG/ML (ref 30–100)

## 2021-04-06 ENCOUNTER — TELEPHONE (OUTPATIENT)
Dept: MEDICAL GROUP | Facility: PHYSICIAN GROUP | Age: 66
End: 2021-04-06

## 2021-04-06 NOTE — TELEPHONE ENCOUNTER
Phone Number Called: [phone] (home)       Call outcome: Spoke to patient regarding message below.    Message: Pt notified of results. Pt verbalized understanding.

## 2021-04-06 NOTE — TELEPHONE ENCOUNTER
----- Message from Colette Car P.A.-C. sent at 4/5/2021  8:00 PM PDT -----  Please call patient. I have reviewed patient's lab work and results indicate that he no longer has prediabetes.  This is great news!  Total cholesterol and bad cholesterol were elevated.  Please continue working on diet and exercise.  Vitamin D level is trending upward and is within normal limits.  Please continue vitamin D supplementation.  We will discuss lab work results in more detail during follow-up appointment.    Thank you,    Rebecca HAMPTON

## 2021-05-05 ENCOUNTER — OFFICE VISIT (OUTPATIENT)
Dept: MEDICAL GROUP | Facility: PHYSICIAN GROUP | Age: 66
End: 2021-05-05
Payer: COMMERCIAL

## 2021-05-05 VITALS
RESPIRATION RATE: 16 BRPM | OXYGEN SATURATION: 98 % | TEMPERATURE: 97.8 F | SYSTOLIC BLOOD PRESSURE: 130 MMHG | HEART RATE: 74 BPM | DIASTOLIC BLOOD PRESSURE: 70 MMHG | WEIGHT: 192.6 LBS | BODY MASS INDEX: 26.96 KG/M2 | HEIGHT: 71 IN

## 2021-05-05 DIAGNOSIS — E55.9 VITAMIN D INSUFFICIENCY: ICD-10-CM

## 2021-05-05 DIAGNOSIS — Z12.5 SCREENING FOR MALIGNANT NEOPLASM OF PROSTATE: ICD-10-CM

## 2021-05-05 DIAGNOSIS — E78.00 ELEVATED LDL CHOLESTEROL LEVEL: ICD-10-CM

## 2021-05-05 DIAGNOSIS — R73.03 PREDIABETES: ICD-10-CM

## 2021-05-05 PROCEDURE — 99214 OFFICE O/P EST MOD 30 MIN: CPT | Performed by: PHYSICIAN ASSISTANT

## 2021-05-05 RX ORDER — ROSUVASTATIN CALCIUM 10 MG/1
10 TABLET, COATED ORAL EVERY EVENING
Qty: 90 TABLET | Refills: 3 | Status: SHIPPED | OUTPATIENT
Start: 2021-05-05 | End: 2021-11-09

## 2021-05-05 ASSESSMENT — FIBROSIS 4 INDEX: FIB4 SCORE: 1.133333333333333333

## 2021-05-05 NOTE — PROGRESS NOTES
Chief Complaint   Patient presents with   • Lab Results       HISTORY OF PRESENT ILLNESS: German Pagan is an established 66 y.o. male here to discuss the evaluation and management of:    Prediabetes  Resolved.  Discussed recent hemoglobin A1c results from 3/26/2021 and hemoglobin A1c was 5.6.  Patient states he has been decreasing bread and candy bar consumption.  States he still needs to work on his diet.  Admits he may eat more fatty foods and carbohydrates than he should.      Vitamin D insufficiency  Chronic but stable problem.  Vitamin D level 52 on 3/26/2021.  Patient takes 5000 units of vitamin D every other day.  Denies side effects or complications from supplementation.    Elevated LDL cholesterol level  Reviewed lipid profile lab work results from 3/26/2021 with patient.  Total cholesterol and bad cholesterol slightly elevated.  Patient 10-year ASCVD and CVD risk scores are high risk.  Patient has agreed to start a statin medication.        Patient Active Problem List    Diagnosis Date Noted   • Elevated LDL cholesterol level 05/05/2021   • Prediabetes 05/07/2019   • Vitamin D insufficiency 06/16/2017   • History of hepatitis C 05/19/2017   • ED (erectile dysfunction) 05/19/2017       Allergies:Pcn [penicillins]    Current Outpatient Medications   Medication Sig Dispense Refill   • rosuvastatin (CRESTOR) 10 MG Tab Take 1 tablet by mouth every evening. 90 tablet 3   • Cholecalciferol (VITAMIN D3) 125 MCG (5000 UT) Cap Take 1 capsule by mouth every 48 hours.     • sildenafil citrate (VIAGRA) 100 MG tablet TAKE 1 TABLET BY MOUTH ONCE DAILY FOR ERECTILE DYSFUNCTION 6 Tab 5     No current facility-administered medications for this visit.       Social History     Tobacco Use   • Smoking status: Former Smoker     Packs/day: 0.50     Years: 40.00     Pack years: 20.00     Types: Cigarettes     Quit date: 1/1/2006     Years since quitting: 15.3   • Smokeless tobacco: Current User     Types: Chew   • Tobacco  "comment: Quit smoking in    Substance Use Topics   • Alcohol use: Not Currently     Comment: Quit in    • Drug use: Not Currently     Types: Methamphetamines, Marijuana     Comment: Quit in , used methamphetamine       Family Status   Relation Name Status   • Mo  Alive   • Fa     • Sis     • Bro  Alive   • MGMo     • Son  Alive   • Mercy  Alive   • Mercy  Alive     Family History   Problem Relation Age of Onset   • Cancer Mother         Multiple Myeloma   • Cancer Father         Colon   • Cancer Sister         Breast   • Diabetes Brother    • Colon Cancer Maternal Grandmother    • No Known Problems Son    • No Known Problems Daughter    • No Known Problems Daughter        ROS:  Review of Systems   Constitutional: Negative for fever, chills, weight loss and malaise/fatigue.   HENT: Negative for ear pain, nosebleeds, congestion, sore throat and neck pain.    Eyes: Negative for blurred vision.   Respiratory: Negative for cough, sputum production, shortness of breath and wheezing.    Cardiovascular: Negative for chest pain, palpitations, orthopnea and leg swelling.   Gastrointestinal: Negative for heartburn, nausea, vomiting and abdominal pain.   Genitourinary: Negative for dysuria, urgency and frequency.   Musculoskeletal: Negative for myalgias, back pain and joint pain.   Skin: Negative for rash and itching.   Neurological: Negative for dizziness, tingling, tremors, sensory change, focal weakness and headaches.   Endo/Heme/Allergies: Does not bruise/bleed easily.   Psychiatric/Behavioral: Negative for depression, suicidal ideas and memory loss.  The patient is not nervous/anxious and does not have insomnia.    All other systems reviewed and are negative except as in HPI.    Exam: /70 (BP Location: Left arm, Patient Position: Sitting, BP Cuff Size: Adult)   Pulse 74   Temp 36.6 °C (97.8 °F) (Temporal)   Resp 16   Ht 1.803 m (5' 11\")   Wt 87.4 kg (192 lb 9.6 oz)   SpO2 98%  " Body mass index is 26.86 kg/m².  General: Normal appearing. No distress.  HEENT: Normocephalic. Eyes conjunctiva clear lids without ptosis, ears normal shape and contour.  Neck: Supple without JVD. Thyroid is not enlarged.  Pulmonary: Clear to ausculation.  Normal effort. No rales, ronchi, or wheezing.  Cardiovascular: Regular rate and rhythm without murmur.   Abdomen: Soft, nontender, nondistended.   Neurologic: Grossly nonfocal.  Cranial nerves are normal.   Skin: Warm and dry.  No rashes or suspicious skin lesions.  Musculoskeletal: Normal gait. No extremity cyanosis, clubbing, or edema.  Psych: Normal mood and affect. Alert and oriented x3. Judgment and insight is normal.    Medical decision-making and discussion:  1. Prediabetes  Resolved.  Discussed recent hemoglobin A1c results.  Hemoglobin A1c 5.6 on 3/26/2021.    - Encouraged diet high in fruits, vegetables, and fiber. And a diet low in salt, refined carbohydrates, cholesterol, saturated fat, and trans fatty acids.    - Encouraged  a minimum of 30 minutes of moderate intensity aerobic exercise (eg, brisk walking) is recommended on five days each week. Or 20 minutes of vigorous-intensity aerobic exercise (eg, jogging) on three days each week.     Highly incised importance of decreasing sugar and carbohydrate consumption.    Patient will follow up in 1 year prior to 1 year of follow-up he will complete annual lab work.  We will discuss lab work during 1 year follow-up appointment.    - CBC WITH DIFFERENTIAL; Future  - Comp Metabolic Panel; Future  - Lipid Profile; Future  - HEMOGLOBIN A1C; Future        2. Vitamin D insufficiency  Chronic but stable problem.  Reviewed recent vitamin D lab work results with patient.  Vitamin D is within normal limits.  Advised patient to continue taking 5 Pazienza vitamin D every other day.  Patient will repeat vitamin D lab work prior to follow-up appointment in 1 year.  We will discuss lab work results at that time.    -  VITAMIN D,25 HYDROXY; Future      3. Elevated LDL cholesterol level  Chronic problem.  Not stable.  Discussed recent lipid profile lab work results with patient.  Total cholesterol and bad cholesterol were slightly elevated.  Patient's 10-year ASCVD and CVD scores were high.  Patient has agreed to start Crestor 10 mg tab once daily.  Discussed with patient he can take over-the-counter coq.10 once daily 2-3 weeks prior to starting Crestor and continue taking co-Q10 with Crestor or he can take Crestor 10 mg tab once daily and if he develops muscle aches to stop medication and start coq.10 for 2-3 weeks and then restart Crestor but continue taking co-Q10.  Patient will repeat lipid profile lab work in 3 months for evaluation.  He will be contacted with results.  Discussed side effects of Crestor with patient.  Advised patient continue work on diet and exercise.  Patient repeat annual lab work prior to follow-up appointment in 1 year.    - rosuvastatin (CRESTOR) 10 MG Tab; Take 1 tablet by mouth every evening.  Dispense: 90 tablet; Refill: 3  - Lipid Profile; Future  - CBC WITH DIFFERENTIAL; Future  - Comp Metabolic Panel; Future  - Lipid Profile; Future     4. Screening for malignant neoplasm of prostate  Patient will complete PSA lab work prior to follow-up appointment in 1 year.    - PROSTATE SPECIFIC AG SCREENING; Future      Please note that this dictation was created using voice recognition software. I have made every reasonable attempt to correct obvious errors, but I expect that there are errors of grammar and possibly content that I did not discover before finalizing the note.    Assessment/Plan:  1. Prediabetes  CBC WITH DIFFERENTIAL    Comp Metabolic Panel    Lipid Profile    HEMOGLOBIN A1C   2. Vitamin D insufficiency  VITAMIN D,25 HYDROXY   3. Elevated LDL cholesterol level  rosuvastatin (CRESTOR) 10 MG Tab    Lipid Profile    CBC WITH DIFFERENTIAL    Comp Metabolic Panel    Lipid Profile   4. Screening for  malignant neoplasm of prostate  PROSTATE SPECIFIC AG SCREENING       Return in about 1 year (around 5/5/2022).

## 2021-07-14 ENCOUNTER — APPOINTMENT (RX ONLY)
Dept: URBAN - METROPOLITAN AREA CLINIC 22 | Facility: CLINIC | Age: 66
Setting detail: DERMATOLOGY
End: 2021-07-14

## 2021-07-14 DIAGNOSIS — Z71.89 OTHER SPECIFIED COUNSELING: ICD-10-CM

## 2021-07-14 DIAGNOSIS — L82.1 OTHER SEBORRHEIC KERATOSIS: ICD-10-CM

## 2021-07-14 DIAGNOSIS — D18.0 HEMANGIOMA: ICD-10-CM

## 2021-07-14 DIAGNOSIS — L81.4 OTHER MELANIN HYPERPIGMENTATION: ICD-10-CM

## 2021-07-14 DIAGNOSIS — D22 MELANOCYTIC NEVI: ICD-10-CM

## 2021-07-14 DIAGNOSIS — L57.0 ACTINIC KERATOSIS: ICD-10-CM

## 2021-07-14 DIAGNOSIS — Z85.828 PERSONAL HISTORY OF OTHER MALIGNANT NEOPLASM OF SKIN: ICD-10-CM

## 2021-07-14 PROBLEM — D22.5 MELANOCYTIC NEVI OF TRUNK: Status: ACTIVE | Noted: 2021-07-14

## 2021-07-14 PROBLEM — D18.01 HEMANGIOMA OF SKIN AND SUBCUTANEOUS TISSUE: Status: ACTIVE | Noted: 2021-07-14

## 2021-07-14 PROCEDURE — ? COUNSELING

## 2021-07-14 PROCEDURE — ? LIQUID NITROGEN

## 2021-07-14 PROCEDURE — ? SUNSCREEN RECOMMENDATIONS

## 2021-07-14 PROCEDURE — 99213 OFFICE O/P EST LOW 20 MIN: CPT | Mod: 25

## 2021-07-14 PROCEDURE — 17004 DESTROY PREMAL LESIONS 15/>: CPT

## 2021-07-14 ASSESSMENT — LOCATION SIMPLE DESCRIPTION DERM
LOCATION SIMPLE: RIGHT ZYGOMA
LOCATION SIMPLE: NOSE
LOCATION SIMPLE: LEFT UPPER ARM
LOCATION SIMPLE: RIGHT FOREARM
LOCATION SIMPLE: LEFT UPPER BACK
LOCATION SIMPLE: RIGHT LOWER BACK
LOCATION SIMPLE: LEFT HAND
LOCATION SIMPLE: RIGHT EAR
LOCATION SIMPLE: LEFT EAR
LOCATION SIMPLE: LEFT ZYGOMA
LOCATION SIMPLE: RIGHT CHEEK
LOCATION SIMPLE: ABDOMEN

## 2021-07-14 ASSESSMENT — LOCATION DETAILED DESCRIPTION DERM
LOCATION DETAILED: LEFT SUPERIOR CRUS OF ANTIHELIX
LOCATION DETAILED: RIGHT SUPERIOR MEDIAL MIDBACK
LOCATION DETAILED: EPIGASTRIC SKIN
LOCATION DETAILED: NASAL ROOT
LOCATION DETAILED: LEFT DISTAL POSTERIOR UPPER ARM
LOCATION DETAILED: LEFT TRIANGULAR FOSSA
LOCATION DETAILED: RIGHT SUPERIOR CENTRAL MALAR CHEEK
LOCATION DETAILED: NASAL DORSUM
LOCATION DETAILED: RIGHT ANTIHELIX
LOCATION DETAILED: RIGHT CENTRAL ZYGOMA
LOCATION DETAILED: RIGHT SUPERIOR CRUS OF ANTIHELIX
LOCATION DETAILED: LEFT SUPERIOR HELIX
LOCATION DETAILED: LEFT SUPERIOR MEDIAL UPPER BACK
LOCATION DETAILED: LEFT SUPERIOR POSTERIOR HELIX
LOCATION DETAILED: RIGHT SUPERIOR HELIX
LOCATION DETAILED: LEFT CENTRAL ZYGOMA
LOCATION DETAILED: LEFT RADIAL DORSAL HAND
LOCATION DETAILED: RIGHT INFERIOR HELIX
LOCATION DETAILED: RIGHT INCISURA INTERTRAGICA
LOCATION DETAILED: LEFT ULNAR DORSAL HAND
LOCATION DETAILED: RIGHT PROXIMAL DORSAL FOREARM

## 2021-07-14 ASSESSMENT — LOCATION ZONE DERM
LOCATION ZONE: TRUNK
LOCATION ZONE: FACE
LOCATION ZONE: ARM
LOCATION ZONE: HAND
LOCATION ZONE: NOSE
LOCATION ZONE: EAR

## 2021-07-14 NOTE — PROCEDURE: LIQUID NITROGEN
Number Of Freeze-Thaw Cycles: 2 freeze-thaw cycles
Duration Of Freeze Thaw-Cycle (Seconds): 3
Show Applicator Variable?: Yes
Render Note In Bullet Format When Appropriate: No
Post-Care Instructions: I reviewed with the patient in detail post-care instructions. Patient is to wear sunprotection, and avoid picking at any of the treated lesions. Pt may apply Vaseline to crusted or scabbing areas.
Detail Level: Detailed
Consent: The patient's consent was obtained including but not limited to risks of crusting, scabbing, blistering, scarring, darker or lighter pigmentary change, recurrence, incomplete removal and infection.

## 2021-08-05 ENCOUNTER — HOSPITAL ENCOUNTER (OUTPATIENT)
Dept: LAB | Facility: MEDICAL CENTER | Age: 66
End: 2021-08-05
Attending: PHYSICIAN ASSISTANT
Payer: COMMERCIAL

## 2021-08-05 ENCOUNTER — TELEPHONE (OUTPATIENT)
Dept: MEDICAL GROUP | Facility: PHYSICIAN GROUP | Age: 66
End: 2021-08-05

## 2021-08-05 DIAGNOSIS — E55.9 VITAMIN D INSUFFICIENCY: ICD-10-CM

## 2021-08-05 DIAGNOSIS — R73.03 PREDIABETES: ICD-10-CM

## 2021-08-05 DIAGNOSIS — Z12.5 SCREENING FOR MALIGNANT NEOPLASM OF PROSTATE: ICD-10-CM

## 2021-08-05 DIAGNOSIS — E78.00 ELEVATED LDL CHOLESTEROL LEVEL: ICD-10-CM

## 2021-08-05 LAB
25(OH)D3 SERPL-MCNC: 50 NG/ML (ref 30–100)
ALBUMIN SERPL BCP-MCNC: 4.6 G/DL (ref 3.2–4.9)
ALBUMIN/GLOB SERPL: 1.5 G/DL
ALP SERPL-CCNC: 55 U/L (ref 30–99)
ALT SERPL-CCNC: 22 U/L (ref 2–50)
ANION GAP SERPL CALC-SCNC: 12 MMOL/L (ref 7–16)
AST SERPL-CCNC: 19 U/L (ref 12–45)
BASOPHILS # BLD AUTO: 1 % (ref 0–1.8)
BASOPHILS # BLD: 0.07 K/UL (ref 0–0.12)
BILIRUB SERPL-MCNC: 0.5 MG/DL (ref 0.1–1.5)
BUN SERPL-MCNC: 10 MG/DL (ref 8–22)
CALCIUM SERPL-MCNC: 9.6 MG/DL (ref 8.5–10.5)
CHLORIDE SERPL-SCNC: 102 MMOL/L (ref 96–112)
CHOLEST SERPL-MCNC: 203 MG/DL (ref 100–199)
CO2 SERPL-SCNC: 24 MMOL/L (ref 20–33)
CREAT SERPL-MCNC: 0.95 MG/DL (ref 0.5–1.4)
EOSINOPHIL # BLD AUTO: 0.15 K/UL (ref 0–0.51)
EOSINOPHIL NFR BLD: 2.2 % (ref 0–6.9)
ERYTHROCYTE [DISTWIDTH] IN BLOOD BY AUTOMATED COUNT: 45.2 FL (ref 35.9–50)
EST. AVERAGE GLUCOSE BLD GHB EST-MCNC: 111 MG/DL
FASTING STATUS PATIENT QL REPORTED: NORMAL
GLOBULIN SER CALC-MCNC: 3 G/DL (ref 1.9–3.5)
GLUCOSE SERPL-MCNC: 98 MG/DL (ref 65–99)
HBA1C MFR BLD: 5.5 % (ref 4–5.6)
HCT VFR BLD AUTO: 51.7 % (ref 42–52)
HDLC SERPL-MCNC: 54 MG/DL
HGB BLD-MCNC: 17.1 G/DL (ref 14–18)
IMM GRANULOCYTES # BLD AUTO: 0.05 K/UL (ref 0–0.11)
IMM GRANULOCYTES NFR BLD AUTO: 0.7 % (ref 0–0.9)
LDLC SERPL CALC-MCNC: 130 MG/DL
LYMPHOCYTES # BLD AUTO: 2.17 K/UL (ref 1–4.8)
LYMPHOCYTES NFR BLD: 31.6 % (ref 22–41)
MCH RBC QN AUTO: 30.9 PG (ref 27–33)
MCHC RBC AUTO-ENTMCNC: 33.1 G/DL (ref 33.7–35.3)
MCV RBC AUTO: 93.5 FL (ref 81.4–97.8)
MONOCYTES # BLD AUTO: 0.65 K/UL (ref 0–0.85)
MONOCYTES NFR BLD AUTO: 9.5 % (ref 0–13.4)
NEUTROPHILS # BLD AUTO: 3.77 K/UL (ref 1.82–7.42)
NEUTROPHILS NFR BLD: 55 % (ref 44–72)
NRBC # BLD AUTO: 0 K/UL
NRBC BLD-RTO: 0 /100 WBC
PLATELET # BLD AUTO: 203 K/UL (ref 164–446)
PMV BLD AUTO: 10.8 FL (ref 9–12.9)
POTASSIUM SERPL-SCNC: 4.3 MMOL/L (ref 3.6–5.5)
PROT SERPL-MCNC: 7.6 G/DL (ref 6–8.2)
PSA SERPL-MCNC: 0.83 NG/ML (ref 0–4)
RBC # BLD AUTO: 5.53 M/UL (ref 4.7–6.1)
SODIUM SERPL-SCNC: 138 MMOL/L (ref 135–145)
TRIGL SERPL-MCNC: 94 MG/DL (ref 0–149)
WBC # BLD AUTO: 6.9 K/UL (ref 4.8–10.8)

## 2021-08-05 PROCEDURE — 84153 ASSAY OF PSA TOTAL: CPT

## 2021-08-05 PROCEDURE — 82306 VITAMIN D 25 HYDROXY: CPT

## 2021-08-05 PROCEDURE — 80061 LIPID PANEL: CPT

## 2021-08-05 PROCEDURE — 85025 COMPLETE CBC W/AUTO DIFF WBC: CPT

## 2021-08-05 PROCEDURE — 83036 HEMOGLOBIN GLYCOSYLATED A1C: CPT

## 2021-08-05 PROCEDURE — 80053 COMPREHEN METABOLIC PANEL: CPT

## 2021-08-05 PROCEDURE — 36415 COLL VENOUS BLD VENIPUNCTURE: CPT

## 2021-08-06 NOTE — TELEPHONE ENCOUNTER
Phone Number Called: [phone] (home)     Call outcome: Spoke to patient regarding message below.    Message: Pt informed, Pt verbalized understanding, no questions at this time.  Informed the Pt if decided he would like further information about the message below, or would like to see PCP to discuss, he could make a follow up appt earlier than the one currently scheduled.

## 2021-08-06 NOTE — TELEPHONE ENCOUNTER
----- Message from Krissy Villanueva M.D. sent at 8/5/2021  5:03 PM PDT -----  Your recent lab results are normal except for:  -Elevated total cholesterol and LDL

## 2021-11-09 ENCOUNTER — OFFICE VISIT (OUTPATIENT)
Dept: MEDICAL GROUP | Facility: PHYSICIAN GROUP | Age: 66
End: 2021-11-09
Payer: COMMERCIAL

## 2021-11-09 VITALS
DIASTOLIC BLOOD PRESSURE: 80 MMHG | TEMPERATURE: 98 F | WEIGHT: 187.2 LBS | BODY MASS INDEX: 26.21 KG/M2 | HEART RATE: 78 BPM | SYSTOLIC BLOOD PRESSURE: 144 MMHG | HEIGHT: 71 IN | RESPIRATION RATE: 16 BRPM | OXYGEN SATURATION: 96 %

## 2021-11-09 DIAGNOSIS — M79.605 MUSCULOSKELETAL PAIN OF LEFT LOWER EXTREMITY: ICD-10-CM

## 2021-11-09 DIAGNOSIS — N52.9 ERECTILE DYSFUNCTION, UNSPECIFIED ERECTILE DYSFUNCTION TYPE: ICD-10-CM

## 2021-11-09 PROCEDURE — 99214 OFFICE O/P EST MOD 30 MIN: CPT | Performed by: PHYSICIAN ASSISTANT

## 2021-11-09 RX ORDER — TADALAFIL 20 MG/1
20 TABLET ORAL PRN
Qty: 10 TABLET | Refills: 3 | Status: SHIPPED | OUTPATIENT
Start: 2021-11-09 | End: 2022-11-23

## 2021-11-09 ASSESSMENT — FIBROSIS 4 INDEX: FIB4 SCORE: 1.36

## 2021-11-17 NOTE — PROGRESS NOTES
Chief Complaint   Patient presents with   • Medication Management     leg cramps from Crestor - stopped taking several months ago - possibly from vaccine    • Requesting Labs     antibody        HISTORY OF PRESENT ILLNESS: German Pagan is an established 66 y.o. male here to discuss the evaluation and management of:    Patient is a pleasant 66-year-old male here today wanting to discuss a cramping sensation of left upper inner thigh region as well as erectile dysfunction.  Patient states 3-4 months ago he developed a cramping sensation in the left upper inner thigh that only occurs when standing.  States he has not noticed pain symptoms of this region when sitting or driving.  States he gets relief if he takes 2 Tylenol and aspirin.  He tells me pain is a localized cramping sensation but he does not feel his muscle tightening up and he does not experience a radiating pain.  On physical examination area of concern is tender to deep palpation.  He denies trauma.  He denies gait abnormalities, muscle atrophy, muscle weakness, back pain, bilateral hip pain.  Patient is inquiring but treatment options.    Patient tells me he suffers from erectile dysfunction and Viagra 100 mg as needed is not working for him.  Patient would like to try Cialis.  States he has difficulty obtaining and maintaining erection.  He denies history of heart disease.      Patient Active Problem List    Diagnosis Date Noted   • Elevated LDL cholesterol level 05/05/2021   • Prediabetes 05/07/2019   • Vitamin D insufficiency 06/16/2017   • History of hepatitis C 05/19/2017   • ED (erectile dysfunction) 05/19/2017       Allergies:Pcn [penicillins]    Current Outpatient Medications   Medication Sig Dispense Refill   • coenzyme Q-10 30 MG capsule Take 60 mg by mouth every day.     • tadalafil (CIALIS) 20 MG tablet Take 1 Tablet by mouth as needed for Erectile Dysfunction. 10 Tablet 3   • Cholecalciferol (VITAMIN D3) 125 MCG (5000 UT) Cap Take 1  capsule by mouth every 48 hours.       No current facility-administered medications for this visit.       Social History     Tobacco Use   • Smoking status: Former Smoker     Packs/day: 0.50     Years: 40.00     Pack years: 20.00     Types: Cigarettes     Quit date: 2006     Years since quitting: 15.8   • Smokeless tobacco: Current User     Types: Chew   • Tobacco comment: Quit smoking in    Vaping Use   • Vaping Use: Never used   Substance Use Topics   • Alcohol use: Not Currently     Comment: Quit in    • Drug use: Not Currently     Types: Methamphetamines, Marijuana     Comment: Quit in , used methamphetamine       Family Status   Relation Name Status   • Mo  Alive   • Fa     • Sis     • Bro  Alive   • MGMo     • Son  Alive   • Mercy  Alive   • Mercy  Alive     Family History   Problem Relation Age of Onset   • Cancer Mother         Multiple Myeloma   • Cancer Father         Colon   • Cancer Sister         Breast   • Diabetes Brother    • Colon Cancer Maternal Grandmother    • No Known Problems Son    • No Known Problems Daughter    • No Known Problems Daughter        ROS:  Review of Systems   Constitutional: Negative for fever, chills, weight loss and malaise/fatigue.   HENT: Negative for ear pain, nosebleeds, congestion, sore throat and neck pain.    Eyes: Negative for blurred vision.   Respiratory: Negative for cough, sputum production, shortness of breath and wheezing.    Cardiovascular: Negative for chest pain, palpitations, orthopnea and leg swelling.   Gastrointestinal: Negative for heartburn, nausea, vomiting and abdominal pain.   Genitourinary: Negative for dysuria, urgency and frequency.   Musculoskeletal: Negative for back pain and joint pain.   Skin: Negative for rash and itching.   Neurological: Negative for dizziness, tingling, tremors, sensory change, focal weakness and headaches.   Endo/Heme/Allergies: Does not bruise/bleed easily.   Psychiatric/Behavioral:  "Negative for depression, suicidal ideas and memory loss.  The patient is not nervous/anxious and does not have insomnia.    All other systems reviewed and are negative except as in HPI.    Exam: /80 (BP Location: Left arm, Patient Position: Sitting, BP Cuff Size: Adult)   Pulse 78   Temp 36.7 °C (98 °F) (Temporal)   Resp 16   Ht 1.803 m (5' 11\")   Wt 84.9 kg (187 lb 3.2 oz)   SpO2 96%  Body mass index is 26.11 kg/m².  General: Normal appearing. No distress.  HEENT: Normocephalic. Eyes conjunctiva clear lids without ptosis, ears normal shape and contour.  Neck: Supple without JVD. Thyroid is not enlarged.  Pulmonary: Clear to ausculation.  Normal effort. No rales, ronchi, or wheezing.  Cardiovascular: Regular rate and rhythm without murmur.  Abdomen: Nondistended.   Neurologic: Grossly nonfocal.  Cranial nerves are normal.   Skin: Warm and dry.  No rashes or suspicious skin lesions.  Musculoskeletal: Normal gait. No extremity cyanosis, clubbing, or edema.  Left upper inner thigh is tender to deep palpation.  Psych: Normal mood and affect. Alert and oriented x3. Judgment and insight is normal.    Medical decision-making and discussion:  1. Musculoskeletal pain of left lower extremity  Suspect patient is experiencing musculoskeletal pain.  Advised patient to stretch, use heat and ice as needed, use proper body mechanics, use over-the-counter analgesics as needed.  Patient has been referred to sports medicine.    Follow-up for worsening symptoms,lack of expected recovery, or should new symptoms or problems arise.      - Referral to Sports Medicine    2. Erectile dysfunction, unspecified erectile dysfunction type  Viagra has been discontinued.  Patient has been prescribed Cialis.  Patient was given oral instructions on how to take medication.  Discussed emergency room precautions with patient.  Discussed with patient if he still has erectile dysfunction symptoms with Cialis he may want to consider following " up with urology for further evaluation discussed alternative treatment options.  Patient verbally consented he understood.    - tadalafil (CIALIS) 20 MG tablet; Take 1 Tablet by mouth as needed for Erectile Dysfunction.  Dispense: 10 Tablet; Refill: 3      Please note that this dictation was created using voice recognition software. I have made every reasonable attempt to correct obvious errors, but I expect that there are errors of grammar and possibly content that I did not discover before finalizing the note.    Assessment/Plan:  1. Musculoskeletal pain of left lower extremity  Referral to Sports Medicine   2. Erectile dysfunction, unspecified erectile dysfunction type  tadalafil (CIALIS) 20 MG tablet       Return if symptoms worsen or fail to improve.

## 2021-11-24 ENCOUNTER — APPOINTMENT (OUTPATIENT)
Dept: RADIOLOGY | Facility: IMAGING CENTER | Age: 66
End: 2021-11-24
Attending: FAMILY MEDICINE
Payer: COMMERCIAL

## 2021-11-24 ENCOUNTER — OFFICE VISIT (OUTPATIENT)
Dept: SPORTS MEDICINE | Facility: CLINIC | Age: 66
End: 2021-11-24
Payer: COMMERCIAL

## 2021-11-24 VITALS
SYSTOLIC BLOOD PRESSURE: 128 MMHG | HEART RATE: 72 BPM | WEIGHT: 187 LBS | DIASTOLIC BLOOD PRESSURE: 82 MMHG | HEIGHT: 71 IN | OXYGEN SATURATION: 98 % | TEMPERATURE: 98 F | RESPIRATION RATE: 14 BRPM | BODY MASS INDEX: 26.18 KG/M2

## 2021-11-24 DIAGNOSIS — M79.652 LEFT THIGH PAIN: ICD-10-CM

## 2021-11-24 DIAGNOSIS — M25.552 LEFT HIP PAIN: ICD-10-CM

## 2021-11-24 PROCEDURE — 99213 OFFICE O/P EST LOW 20 MIN: CPT | Performed by: FAMILY MEDICINE

## 2021-11-24 PROCEDURE — 73501 X-RAY EXAM HIP UNI 1 VIEW: CPT | Mod: TC,LT | Performed by: FAMILY MEDICINE

## 2021-11-24 RX ORDER — MELOXICAM 7.5 MG/1
7.5 TABLET ORAL DAILY
Qty: 30 TABLET | Refills: 0 | Status: SHIPPED | OUTPATIENT
Start: 2021-11-24 | End: 2022-01-04

## 2021-11-24 ASSESSMENT — ENCOUNTER SYMPTOMS
COUGH: 0
VOMITING: 0
FEVER: 0

## 2021-11-24 ASSESSMENT — FIBROSIS 4 INDEX: FIB4 SCORE: 1.36

## 2021-11-24 NOTE — PROGRESS NOTES
"Subjective:     German Pagan is a 66 y.o. male who presents for Leg Pain (Leg pain, in the left upper leg. Leg pain when the patient sneezing or stands up. No trauma or injury. Tx: ASA and Tylenol. Onset 5 months. )    HPI  Pt presents for evaluation of pain in the left upper thigh for the past 4-5 months  Pain is worst when first standing from a seated position  Does not recall any falls or injuries in the past  Taking aspirin and Tylenol and seems to help some  Pain does not wake him up from sleep   Wakes up feeling well   Pain is worse with activity  Pain is in the anterior thigh up near the hip   No radiation   No numbness or tingling  Has tried some stretches at home without too much improvement     Review of Systems   Constitutional: Negative for fever.   Respiratory: Negative for cough.    Gastrointestinal: Negative for vomiting.   Skin: Negative for rash.     PMH:  has no past medical history on file.  MEDS:   Current Outpatient Medications:   •  coenzyme Q-10 30 MG capsule, Take 60 mg by mouth every day., Disp: , Rfl:   •  tadalafil (CIALIS) 20 MG tablet, Take 1 Tablet by mouth as needed for Erectile Dysfunction., Disp: 10 Tablet, Rfl: 3  •  Cholecalciferol (VITAMIN D3) 125 MCG (5000 UT) Cap, Take 1 capsule by mouth every 48 hours., Disp: , Rfl:   ALLERGIES:   Allergies   Allergen Reactions   • Pcn [Penicillins]      Rash as a baby     SURGHX:   Past Surgical History:   Procedure Laterality Date   • ANKLE FUSION Right 1990   • ANKLE ORIF       SOCHX:  reports that he quit smoking about 15 years ago. His smoking use included cigarettes. He has a 20.00 pack-year smoking history. His smokeless tobacco use includes chew. He reports previous alcohol use. He reports previous drug use. Drugs: Methamphetamines and Marijuana.     Objective:   /82 (BP Location: Left arm, Patient Position: Sitting, BP Cuff Size: Adult)   Pulse 72   Temp 36.7 °C (98 °F) (Temporal)   Resp 14   Ht 1.803 m (5' 11\")   Wt " 84.8 kg (187 lb)   SpO2 98%   BMI 26.08 kg/m²     Physical Exam  Constitutional:       General: He is not in acute distress.     Appearance: He is well-developed. He is not diaphoretic.   Pulmonary:      Effort: Pulmonary effort is normal.   Neurological:      Mental Status: He is alert.     Left hip:  General: No gross deformity  ROM: flexion 120 degrees, extension 10, ER 60, IR 40 (reproduces pain), abduction 50, adduction 30  Palpation: No TTP over greater trochanter, +mild TTP along the anterior thigh  Strength: 5/5 abduction, 5/5 adduction, 5/5 flexion, 5/5 extension  Special tests: Pao -, Fadir -, Labral shear -, Resisted sit-up -, Straight leg raise -  Neuro: Sensation intact and equal bilaterally in LE's    Hip x-ray 11/24/21  Degenerative changes without radiographic evidence of acute traumatic injury.    Assessment/Plan:   Assessment    1. Left hip pain  - DX-HIP-UNILATERAL-WITH PELVIS-1 VIEW LEFT; Future  - Referral to Physical Therapy  - meloxicam (MOBIC) 7.5 MG Tab; Take 1 Tablet by mouth every day.  Dispense: 30 Tablet; Refill: 0    2. Left thigh pain  - DX-HIP-UNILATERAL-WITH PELVIS-1 VIEW LEFT; Future  - Referral to Physical Therapy  - meloxicam (MOBIC) 7.5 MG Tab; Take 1 Tablet by mouth every day.  Dispense: 30 Tablet; Refill: 0    Patient with a left hip pain and thigh pain. On exam, pain feels like it is coming more from the intra-articular hip. X-ray does show mild to moderate degenerative changes. Suspect he is flared some chronic osteoarthritis. He retains full strength and range of motion, and there is no sign of large muscular tear. Reviewed multiple treatment options and patient prefers to start with physical therapy and daily meloxicam for the next few weeks. If pain greatly improving, no further treatment needed. If pain is not greatly improving, may need to consider MRI to better evaluate the area.

## 2022-01-04 ENCOUNTER — OFFICE VISIT (OUTPATIENT)
Dept: MEDICAL GROUP | Facility: PHYSICIAN GROUP | Age: 67
End: 2022-01-04
Payer: COMMERCIAL

## 2022-01-04 VITALS
SYSTOLIC BLOOD PRESSURE: 102 MMHG | DIASTOLIC BLOOD PRESSURE: 78 MMHG | TEMPERATURE: 98 F | WEIGHT: 184.6 LBS | OXYGEN SATURATION: 95 % | BODY MASS INDEX: 25.84 KG/M2 | RESPIRATION RATE: 20 BRPM | HEIGHT: 71 IN | HEART RATE: 67 BPM

## 2022-01-04 DIAGNOSIS — Z00.00 HEALTHCARE MAINTENANCE: ICD-10-CM

## 2022-01-04 PROCEDURE — 99212 OFFICE O/P EST SF 10 MIN: CPT | Performed by: FAMILY MEDICINE

## 2022-01-04 ASSESSMENT — ENCOUNTER SYMPTOMS
BLURRED VISION: 0
VOMITING: 0
NAUSEA: 0
DIZZINESS: 0
DOUBLE VISION: 0
CHILLS: 0
PALPITATIONS: 0
SHORTNESS OF BREATH: 0
SORE THROAT: 0
MYALGIAS: 0
FEVER: 0
HEADACHES: 0
COUGH: 0

## 2022-01-04 ASSESSMENT — FIBROSIS 4 INDEX: FIB4 SCORE: 1.36

## 2022-01-04 ASSESSMENT — PATIENT HEALTH QUESTIONNAIRE - PHQ9: CLINICAL INTERPRETATION OF PHQ2 SCORE: 0

## 2022-01-04 NOTE — PROGRESS NOTES
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 10 Tablet 3   • Cholecalciferol (VITAMIN D3) 125 MCG (5000 UT) Cap Take 1 capsule by mouth every 48 hours.       No current [facility]-ordered facility-administered medications on file.     ROS:  Review of Systems   Constitutional: Negative for chills and fever.   HENT: Negative for ear pain and sore throat.    Eyes: Negative for blurred vision and double vision.   Respiratory: Negative for cough and shortness of breath.    Cardiovascular: Negative for chest pain and palpitations.   Gastrointestinal: Negative for nausea and vomiting.   Genitourinary: Negative for dysuria and hematuria.   Musculoskeletal: Negative for myalgias.   Neurological: Negative for dizziness and headaches.       Objective:     Exam:  /78 (BP Location: Left arm, Patient Position: Sitting, BP Cuff Size: Adult)   Pulse 67   Temp 36.7 °C (98 °F) (Temporal)   Resp 20   Ht 1.803 m (5' 11\")   Wt 83.7 kg (184 lb 9.6 oz)   SpO2 95%   BMI 25.75 kg/m²  Body mass index is 25.75 kg/m².    Physical Exam  Constitutional:       General: He is not in acute distress.     Appearance: Normal appearance. He is normal weight. He is not ill-appearing, toxic-appearing or diaphoretic.   Eyes:      General: No scleral icterus.        Right eye: No discharge.         Left eye: No discharge.      Extraocular Movements: Extraocular movements intact.      Conjunctiva/sclera: Conjunctivae normal.      Pupils: Pupils are equal, round, and reactive to light.   Cardiovascular:      Rate and " Rhythm: Normal rate and regular rhythm.      Pulses: Normal pulses.      Heart sounds: Normal heart sounds. No murmur heard.      Pulmonary:      Effort: Pulmonary effort is normal. No respiratory distress.      Breath sounds: Normal breath sounds.   Neurological:      Mental Status: He is alert.      Coordination: Coordination normal.      Gait: Gait normal.           A/P     66 y.o. male with the following -     Problem List Items Addressed This Visit     Healthcare maintenance     Pt to establish care  Discussed 10.4% risk ASCVD, pt declined statin, would like to cont diet exercise and wt control  rtc 1 yr             Return in about 1 year (around 1/4/2023) for f/u labs  .    Please note that this dictation was created using voice recognition software. I have made every reasonable attempt to correct obvious errors, but I expect that there are errors of grammar and possibly content that I did not discover before finalizing the note.

## 2022-01-04 NOTE — ASSESSMENT & PLAN NOTE
Pt to establish care  Discussed 10.4% risk ASCVD, pt declined statin, would like to cont diet exercise and wt control  rtc 1 yr

## 2022-01-19 ENCOUNTER — APPOINTMENT (OUTPATIENT)
Dept: SPORTS MEDICINE | Facility: CLINIC | Age: 67
End: 2022-01-19
Payer: COMMERCIAL

## 2022-01-20 ENCOUNTER — APPOINTMENT (OUTPATIENT)
Dept: PHYSICAL THERAPY | Facility: REHABILITATION | Age: 67
End: 2022-01-20
Attending: FAMILY MEDICINE
Payer: COMMERCIAL

## 2022-01-25 ENCOUNTER — APPOINTMENT (OUTPATIENT)
Dept: PHYSICAL THERAPY | Facility: REHABILITATION | Age: 67
End: 2022-01-25
Attending: FAMILY MEDICINE
Payer: COMMERCIAL

## 2022-01-27 ENCOUNTER — APPOINTMENT (OUTPATIENT)
Dept: PHYSICAL THERAPY | Facility: REHABILITATION | Age: 67
End: 2022-01-27
Attending: FAMILY MEDICINE
Payer: COMMERCIAL

## 2022-02-01 ENCOUNTER — APPOINTMENT (OUTPATIENT)
Dept: PHYSICAL THERAPY | Facility: REHABILITATION | Age: 67
End: 2022-02-01
Attending: FAMILY MEDICINE
Payer: COMMERCIAL

## 2022-02-03 ENCOUNTER — APPOINTMENT (OUTPATIENT)
Dept: PHYSICAL THERAPY | Facility: REHABILITATION | Age: 67
End: 2022-02-03
Attending: FAMILY MEDICINE
Payer: COMMERCIAL

## 2022-02-08 ENCOUNTER — APPOINTMENT (OUTPATIENT)
Dept: PHYSICAL THERAPY | Facility: REHABILITATION | Age: 67
End: 2022-02-08
Attending: FAMILY MEDICINE
Payer: COMMERCIAL

## 2022-02-10 ENCOUNTER — APPOINTMENT (OUTPATIENT)
Dept: PHYSICAL THERAPY | Facility: REHABILITATION | Age: 67
End: 2022-02-10
Payer: COMMERCIAL

## 2022-02-15 ENCOUNTER — APPOINTMENT (OUTPATIENT)
Dept: PHYSICAL THERAPY | Facility: REHABILITATION | Age: 67
End: 2022-02-15
Attending: FAMILY MEDICINE
Payer: COMMERCIAL

## 2022-02-17 ENCOUNTER — APPOINTMENT (OUTPATIENT)
Dept: PHYSICAL THERAPY | Facility: REHABILITATION | Age: 67
End: 2022-02-17
Attending: FAMILY MEDICINE
Payer: COMMERCIAL

## 2022-08-26 PROBLEM — M16.12 ARTHRITIS OF LEFT HIP: Status: ACTIVE | Noted: 2022-08-26

## 2023-02-14 ENCOUNTER — HOSPITAL ENCOUNTER (OUTPATIENT)
Dept: LAB | Facility: MEDICAL CENTER | Age: 68
End: 2023-02-14
Payer: COMMERCIAL

## 2023-02-14 DIAGNOSIS — R73.03 PREDIABETES: ICD-10-CM

## 2023-02-14 DIAGNOSIS — E55.9 VITAMIN D INSUFFICIENCY: ICD-10-CM

## 2023-02-14 DIAGNOSIS — E78.00 ELEVATED LDL CHOLESTEROL LEVEL: ICD-10-CM

## 2023-02-14 LAB
25(OH)D3 SERPL-MCNC: 42 NG/ML (ref 30–100)
ALBUMIN SERPL BCP-MCNC: 4.6 G/DL (ref 3.2–4.9)
ALBUMIN/GLOB SERPL: 2 G/DL
ALP SERPL-CCNC: 47 U/L (ref 30–99)
ALT SERPL-CCNC: 18 U/L (ref 2–50)
ANION GAP SERPL CALC-SCNC: 13 MMOL/L (ref 7–16)
AST SERPL-CCNC: 13 U/L (ref 12–45)
BASOPHILS # BLD AUTO: 1.1 % (ref 0–1.8)
BASOPHILS # BLD: 0.08 K/UL (ref 0–0.12)
BILIRUB SERPL-MCNC: 0.3 MG/DL (ref 0.1–1.5)
BUN SERPL-MCNC: 18 MG/DL (ref 8–22)
CALCIUM ALBUM COR SERPL-MCNC: 9.2 MG/DL (ref 8.5–10.5)
CALCIUM SERPL-MCNC: 9.7 MG/DL (ref 8.5–10.5)
CHLORIDE SERPL-SCNC: 106 MMOL/L (ref 96–112)
CHOLEST SERPL-MCNC: 160 MG/DL (ref 100–199)
CO2 SERPL-SCNC: 24 MMOL/L (ref 20–33)
CREAT SERPL-MCNC: 0.98 MG/DL (ref 0.5–1.4)
EOSINOPHIL # BLD AUTO: 0.14 K/UL (ref 0–0.51)
EOSINOPHIL NFR BLD: 1.9 % (ref 0–6.9)
ERYTHROCYTE [DISTWIDTH] IN BLOOD BY AUTOMATED COUNT: 45.1 FL (ref 35.9–50)
FASTING STATUS PATIENT QL REPORTED: NORMAL
GFR SERPLBLD CREATININE-BSD FMLA CKD-EPI: 83 ML/MIN/1.73 M 2
GLOBULIN SER CALC-MCNC: 2.3 G/DL (ref 1.9–3.5)
GLUCOSE SERPL-MCNC: 99 MG/DL (ref 65–99)
HCT VFR BLD AUTO: 49 % (ref 42–52)
HDLC SERPL-MCNC: 52 MG/DL
HGB BLD-MCNC: 16.1 G/DL (ref 14–18)
IMM GRANULOCYTES # BLD AUTO: 0.06 K/UL (ref 0–0.11)
IMM GRANULOCYTES NFR BLD AUTO: 0.8 % (ref 0–0.9)
LDLC SERPL CALC-MCNC: 88 MG/DL
LYMPHOCYTES # BLD AUTO: 2.06 K/UL (ref 1–4.8)
LYMPHOCYTES NFR BLD: 27.4 % (ref 22–41)
MCH RBC QN AUTO: 32 PG (ref 27–33)
MCHC RBC AUTO-ENTMCNC: 32.9 G/DL (ref 33.7–35.3)
MCV RBC AUTO: 97.4 FL (ref 81.4–97.8)
MONOCYTES # BLD AUTO: 0.61 K/UL (ref 0–0.85)
MONOCYTES NFR BLD AUTO: 8.1 % (ref 0–13.4)
NEUTROPHILS # BLD AUTO: 4.58 K/UL (ref 1.82–7.42)
NEUTROPHILS NFR BLD: 60.7 % (ref 44–72)
NRBC # BLD AUTO: 0 K/UL
NRBC BLD-RTO: 0 /100 WBC
PLATELET # BLD AUTO: 208 K/UL (ref 164–446)
PMV BLD AUTO: 10.5 FL (ref 9–12.9)
POTASSIUM SERPL-SCNC: 5 MMOL/L (ref 3.6–5.5)
PROT SERPL-MCNC: 6.9 G/DL (ref 6–8.2)
RBC # BLD AUTO: 5.03 M/UL (ref 4.7–6.1)
SODIUM SERPL-SCNC: 143 MMOL/L (ref 135–145)
TRIGL SERPL-MCNC: 100 MG/DL (ref 0–149)
WBC # BLD AUTO: 7.5 K/UL (ref 4.8–10.8)

## 2023-02-14 PROCEDURE — 80061 LIPID PANEL: CPT

## 2023-02-14 PROCEDURE — 36415 COLL VENOUS BLD VENIPUNCTURE: CPT

## 2023-02-14 PROCEDURE — 82306 VITAMIN D 25 HYDROXY: CPT

## 2023-02-14 PROCEDURE — 80053 COMPREHEN METABOLIC PANEL: CPT

## 2023-02-14 PROCEDURE — 83036 HEMOGLOBIN GLYCOSYLATED A1C: CPT

## 2023-02-14 PROCEDURE — 85025 COMPLETE CBC W/AUTO DIFF WBC: CPT

## 2023-02-16 LAB
EST. AVERAGE GLUCOSE BLD GHB EST-MCNC: 108 MG/DL
HBA1C MFR BLD: 5.4 % (ref 4–5.6)

## 2023-06-08 NOTE — TELEPHONE ENCOUNTER
----- Message from Shiva Haney M.D. sent at 5/10/2018  9:04 AM PDT -----  Please notify patient that average blood sugar is slightly prediabetic and cholesterol is slightly elevated. We advise to reduce sugar/carbohydrate/alcohol, eat more vegetables and lean meats such as fish/chicken/turkey. We also recommend 30 minutes of cardiovascular exercise most days of the week.  Otherwise, blood counts, vitamin D, thyroid, kidney function and liver function are normal.  Labs should be rechecked annually.  Shiva Haney M.D.     Physical Exam  Mallampati: I  TM Distance: >3 FB  Neck ROM: Full  Cardio Rhythm: Irregular  Cardio Rate: Normal  Cardiovascular Note: Prominent S4 gallop  pulmonary exam normal  abdominal exam normal  dental exam normal        Anesthesia Plan  ASA Status: 2  Anesthesia Type: MAC  Induction: Intravenous  Reviewed: Past Med History, Medications, NPO Status, Problem List, Allergies and Patient Summary  The proposed anesthetic plan, including its risks and benefits, have been discussed with the Patient and Spouse - along with the risks and benefits of alternatives.  Questions were encouraged and answered and the patient and/or representative agrees to proceed.        Anesthesia ROS/Med Hx    Overall Review:    Pt. has history of anesthetic complications (headaches after anesthesia)    Pulmonary Review:  Pulmonary Comments: H/O PE  Pt. positive for sleep apnea - Brought CPAP    Neuro/Psych Review:    Pt. positive for neuromuscular disease (spinal stenosis limiting activity)  Pt. positive for psychiatric history (Depression)    Cardiovascular Review:  Cardiovascular ROS notes: Echo 11/2013 -- EF 60-70%    EKG 10/9/14 NSR, nl axis  Exercise tolerance: poor  Valvular problems/murmurs: states she has had a murmur for 7-8 years -- asymptomatic.  Pt. positive for hypertension - well controlled  Pt. positive for hyperlipidemia    GI/HEPATIC/RENAL Review:  GI/Hepatic/Renal Review Comments: Overactive bladder  Pt. positive for GERD - poorly controlled    End/Other Review:    Pt. positive for anemia  Pt. positive for arthritis  Overall Review of Systems Comments:  nml echo     08-Jun-2023

## 2023-07-26 ENCOUNTER — APPOINTMENT (OUTPATIENT)
Dept: RADIOLOGY | Facility: MEDICAL CENTER | Age: 68
End: 2023-07-26
Payer: COMMERCIAL

## 2023-07-26 ENCOUNTER — HOSPITAL ENCOUNTER (EMERGENCY)
Facility: MEDICAL CENTER | Age: 68
End: 2023-07-26
Attending: EMERGENCY MEDICINE
Payer: COMMERCIAL

## 2023-07-26 ENCOUNTER — OFFICE VISIT (OUTPATIENT)
Dept: URGENT CARE | Facility: CLINIC | Age: 68
End: 2023-07-26
Payer: COMMERCIAL

## 2023-07-26 VITALS
HEIGHT: 71 IN | HEART RATE: 77 BPM | RESPIRATION RATE: 16 BRPM | BODY MASS INDEX: 24.22 KG/M2 | TEMPERATURE: 98.2 F | WEIGHT: 173 LBS | SYSTOLIC BLOOD PRESSURE: 144 MMHG | OXYGEN SATURATION: 97 % | DIASTOLIC BLOOD PRESSURE: 92 MMHG

## 2023-07-26 VITALS
HEIGHT: 71 IN | WEIGHT: 173 LBS | DIASTOLIC BLOOD PRESSURE: 64 MMHG | HEART RATE: 86 BPM | OXYGEN SATURATION: 99 % | TEMPERATURE: 98.1 F | BODY MASS INDEX: 24.22 KG/M2 | RESPIRATION RATE: 20 BRPM | SYSTOLIC BLOOD PRESSURE: 130 MMHG

## 2023-07-26 DIAGNOSIS — R07.9 CHEST PAIN, UNSPECIFIED TYPE: ICD-10-CM

## 2023-07-26 DIAGNOSIS — M54.9 PAIN, UPPER BACK: ICD-10-CM

## 2023-07-26 LAB
ALBUMIN SERPL BCP-MCNC: 4.6 G/DL (ref 3.2–4.9)
ALBUMIN/GLOB SERPL: 1.4 G/DL
ALP SERPL-CCNC: 80 U/L (ref 30–99)
ALT SERPL-CCNC: 38 U/L (ref 2–50)
ANION GAP SERPL CALC-SCNC: 16 MMOL/L (ref 7–16)
AST SERPL-CCNC: 22 U/L (ref 12–45)
BASOPHILS # BLD AUTO: 0.7 % (ref 0–1.8)
BASOPHILS # BLD: 0.07 K/UL (ref 0–0.12)
BILIRUB SERPL-MCNC: 0.4 MG/DL (ref 0.1–1.5)
BUN SERPL-MCNC: 14 MG/DL (ref 8–22)
CALCIUM ALBUM COR SERPL-MCNC: 9.7 MG/DL (ref 8.5–10.5)
CALCIUM SERPL-MCNC: 10.2 MG/DL (ref 8.5–10.5)
CHLORIDE SERPL-SCNC: 103 MMOL/L (ref 96–112)
CO2 SERPL-SCNC: 21 MMOL/L (ref 20–33)
CREAT SERPL-MCNC: 0.82 MG/DL (ref 0.5–1.4)
D DIMER PPP IA.FEU-MCNC: 0.32 UG/ML (FEU) (ref 0–0.5)
EOSINOPHIL # BLD AUTO: 0.02 K/UL (ref 0–0.51)
EOSINOPHIL NFR BLD: 0.2 % (ref 0–6.9)
ERYTHROCYTE [DISTWIDTH] IN BLOOD BY AUTOMATED COUNT: 44.3 FL (ref 35.9–50)
GFR SERPLBLD CREATININE-BSD FMLA CKD-EPI: 94 ML/MIN/1.73 M 2
GLOBULIN SER CALC-MCNC: 3.3 G/DL (ref 1.9–3.5)
GLUCOSE SERPL-MCNC: 91 MG/DL (ref 65–99)
HCT VFR BLD AUTO: 47 % (ref 42–52)
HGB BLD-MCNC: 15.5 G/DL (ref 14–18)
IMM GRANULOCYTES # BLD AUTO: 0.07 K/UL (ref 0–0.11)
IMM GRANULOCYTES NFR BLD AUTO: 0.7 % (ref 0–0.9)
LYMPHOCYTES # BLD AUTO: 1.76 K/UL (ref 1–4.8)
LYMPHOCYTES NFR BLD: 17.5 % (ref 22–41)
MCH RBC QN AUTO: 29.5 PG (ref 27–33)
MCHC RBC AUTO-ENTMCNC: 33 G/DL (ref 32.3–36.5)
MCV RBC AUTO: 89.5 FL (ref 81.4–97.8)
MONOCYTES # BLD AUTO: 0.75 K/UL (ref 0–0.85)
MONOCYTES NFR BLD AUTO: 7.4 % (ref 0–13.4)
NEUTROPHILS # BLD AUTO: 7.41 K/UL (ref 1.82–7.42)
NEUTROPHILS NFR BLD: 73.5 % (ref 44–72)
NRBC # BLD AUTO: 0 K/UL
NRBC BLD-RTO: 0 /100 WBC (ref 0–0.2)
PLATELET # BLD AUTO: 267 K/UL (ref 164–446)
PMV BLD AUTO: 10.2 FL (ref 9–12.9)
POTASSIUM SERPL-SCNC: 3.7 MMOL/L (ref 3.6–5.5)
PROT SERPL-MCNC: 7.9 G/DL (ref 6–8.2)
RBC # BLD AUTO: 5.25 M/UL (ref 4.7–6.1)
SODIUM SERPL-SCNC: 140 MMOL/L (ref 135–145)
TROPONIN T SERPL-MCNC: 8 NG/L (ref 6–19)
WBC # BLD AUTO: 10.1 K/UL (ref 4.8–10.8)

## 2023-07-26 PROCEDURE — 71045 X-RAY EXAM CHEST 1 VIEW: CPT

## 2023-07-26 PROCEDURE — 3075F SYST BP GE 130 - 139MM HG: CPT | Performed by: NURSE PRACTITIONER

## 2023-07-26 PROCEDURE — 84484 ASSAY OF TROPONIN QUANT: CPT

## 2023-07-26 PROCEDURE — 85379 FIBRIN DEGRADATION QUANT: CPT

## 2023-07-26 PROCEDURE — 80053 COMPREHEN METABOLIC PANEL: CPT

## 2023-07-26 PROCEDURE — 36415 COLL VENOUS BLD VENIPUNCTURE: CPT

## 2023-07-26 PROCEDURE — 20552 NJX 1/MLT TRIGGER POINT 1/2: CPT

## 2023-07-26 PROCEDURE — 3078F DIAST BP <80 MM HG: CPT | Performed by: NURSE PRACTITIONER

## 2023-07-26 PROCEDURE — 99285 EMERGENCY DEPT VISIT HI MDM: CPT

## 2023-07-26 PROCEDURE — 85025 COMPLETE CBC W/AUTO DIFF WBC: CPT

## 2023-07-26 PROCEDURE — 700101 HCHG RX REV CODE 250: Performed by: EMERGENCY MEDICINE

## 2023-07-26 PROCEDURE — 99214 OFFICE O/P EST MOD 30 MIN: CPT | Performed by: NURSE PRACTITIONER

## 2023-07-26 RX ORDER — LIDOCAINE 50 MG/G
1 PATCH TOPICAL EVERY 24 HOURS
Qty: 30 PATCH | Refills: 0 | Status: SHIPPED | OUTPATIENT
Start: 2023-07-26 | End: 2023-10-02

## 2023-07-26 RX ORDER — BUPIVACAINE HYDROCHLORIDE AND EPINEPHRINE 5; 5 MG/ML; UG/ML
10 INJECTION, SOLUTION PERINEURAL ONCE
Status: COMPLETED | OUTPATIENT
Start: 2023-07-26 | End: 2023-07-26

## 2023-07-26 RX ADMIN — BUPIVACAINE HYDROCHLORIDE AND EPINEPHRINE BITARTRATE 10 ML: 5; .0091 INJECTION, SOLUTION PERINEURAL at 17:32

## 2023-07-26 ASSESSMENT — ENCOUNTER SYMPTOMS
FEVER: 0
CHILLS: 0
BACK PAIN: 1
DIAPHORESIS: 0
CONSTITUTIONAL NEGATIVE: 1
NEUROLOGICAL NEGATIVE: 1
SHORTNESS OF BREATH: 0
EYES NEGATIVE: 1
RESPIRATORY NEGATIVE: 1
GASTROINTESTINAL NEGATIVE: 1

## 2023-07-26 ASSESSMENT — FIBROSIS 4 INDEX
FIB4 SCORE: 1.03
FIB4 SCORE: 1.03

## 2023-07-26 NOTE — ED PROVIDER NOTES
"ER Provider Note    Scribed for Steven Howell M.d. by Carolina Douglas. 7/26/2023  4:33 PM    Primary Care Provider: VALORIE Lawrence    CHIEF COMPLAINT  Chief Complaint   Patient presents with    Chest Pain     L sided CP. Started 5 days ago along w/ back pain. Pt did a lot of \"weed pulling\" days prior. Denies cardiac hx.    Back Pain     L upper back pain radiating to L chest.     EXTERNAL RECORDS REVIEWED  Outpatient Notes Review of records show that the patient was seen at [facility] for chest pain and was then sent here for further evaluation and labs.    HPI/ROS  LIMITATION TO HISTORY   Select: : None  OUTSIDE HISTORIAN(S):  None    German Pagan is a 68 y.o. male who presents to the ED complaining of intermittent left sided chest pain onset three days ago. The patient reports that five days ago he was out side pulling a lot of weeds and doing yard work. He notes since then he has had back pain. He notes his back pain is left sided and radiates to his chest, which is why he thinks he has the chest pain. He describes the chest pain as a tightness. He denies shortness of breath, swelling in his legs, or exacerbation with deep breaths. He denies any abdominal pain. Patient notes he has high cholesterol.  Patient notes an allergy to penicillin. Patient notes that he overall does not feel well in the room. He denied any difficulty doing his daily activities today. There are no known alleviating or exacerbating factors.      PAST MEDICAL HISTORY  History reviewed. No pertinent past medical history.    SURGICAL HISTORY  Past Surgical History:   Procedure Laterality Date    ANKLE FUSION Right 01/01/1990    ORIF, ANKLE Bilateral     VASECTOMY         FAMILY HISTORY  Family History   Problem Relation Age of Onset    Cancer Mother         Multiple Myeloma    Cancer Father         Colon    Cancer Sister         Breast    Diabetes Brother     Colon Cancer Maternal Grandmother     No Known Problems Son     " "No Known Problems Daughter     No Known Problems Daughter        SOCIAL HISTORY   reports that he quit smoking about 19 years ago. His smoking use included cigarettes. He started smoking about 53 years ago. He has a 17.00 pack-year smoking history. His smokeless tobacco use includes chew. He reports that he does not currently use alcohol. He reports that he does not currently use drugs after having used the following drugs: Methamphetamines and Marijuana.    CURRENT MEDICATIONS  Previous Medications    ACETAMINOPHEN PO    Take 2 Tablets by mouth as needed.    ASPIRIN (ASA) 325 MG TAB    Take 325 mg by mouth every 6 hours as needed.    CHOLECALCIFEROL (VITAMIN D3) 125 MCG (5000 UT) CAP    Take 1 capsule by mouth every 48 hours.    PRAVASTATIN (PRAVACHOL) 10 MG TAB    Take 1 Tablet by mouth every evening.    SILDENAFIL CITRATE (VIAGRA) 100 MG TABLET    Take 1 Tablet by mouth 1 time a day as needed for Erectile Dysfunction.       ALLERGIES  Pcn [penicillins]    PHYSICAL EXAM  BP (!) 144/92   Pulse 95   Temp 37 °C (98.6 °F) (Temporal)   Resp 16   Ht 1.803 m (5' 11\")   Wt 78.5 kg (173 lb)   SpO2 98%   BMI 24.13 kg/m²   Gen: Alert, no acute distress  HEENT: ATNC  Eyes: PERRL, EOMI, normal conjunctiva  Neck: trachea midline  Resp: no respiratory distress, CTAB. No wheezes or crackles  CV: No JVD, RRR. No M/R/G. Equal radial pulses  Abd: non-distended, non-tender  Ext: No deformities. No pedal edema, no calf tenderness  Psych: normal mood  Neuro: speech fluent     DIAGNOSTIC STUDIES    Labs:   Labs Reviewed   CBC WITH DIFFERENTIAL - Abnormal; Notable for the following components:       Result Value    Neutrophils-Polys 73.50 (*)     Lymphocytes 17.50 (*)     All other components within normal limits    Narrative:     Biotin intake of greater than 5 mg per day may interfere with  troponin levels, causing false low values.   COMP METABOLIC PANEL    Narrative:     Biotin intake of greater than 5 mg per day may interfere " with  troponin levels, causing false low values.   TROPONIN    Narrative:     Biotin intake of greater than 5 mg per day may interfere with  troponin levels, causing false low values.   ESTIMATED GFR    Narrative:     Biotin intake of greater than 5 mg per day may interfere with  troponin levels, causing false low values.   D-DIMER        EKG:   I have independently interpreted this EKG  For some reason the EKG did not crossover into the system, however I did review it.  Sinus rhythm, 98, right axis deviation, late R wave progression, no ST segment changes    Radiology:   The attending emergency physician has independently interpreted the diagnostic imaging associated with this visit and am waiting the final reading from the radiologist.   Preliminary interpretation is a follows: No acute abnormalities    Radiologist interpretation:   DX-CHEST-PORTABLE (1 VIEW)   Final Result         1. No acute cardiopulmonary abnormalities are identified.           COURSE & MEDICAL DECISION MAKING     ED Observation Status? Yes; I am placing the patient in to an observation status due to a diagnostic uncertainty as well as therapeutic intensity. Patient placed in observation status at 4:36 PM, 7/26/2023.     Observation plan is as follows: Labratory studies, imaging, EKG, and reassessment.     Upon Reevaluation, the patient's condition has: Improved; and will be discharged.    Patient discharged from ED Observation status at 5:35 PM 7/26/2023     Procedure: Trigger point injection  Indication: Musculoskeletal pain  Informed consent was obtained verbally. This included risks, benefits and alternatives to the procedure. The patient was placed in appropriate position.  The region of maximal tenderness was identified in the left upper back paraspinous muscle region.  The skin was cleansed with alcohol.  Approximately 8 mL of 0.5% bupivacaine without epinephrine was injected.  EBL: 0 mL  The patient tolerated the procedure without any  immediate complications    INITIAL ASSESSMENT, COURSE AND PLAN  Care Narrative: The patient presented with symptoms concerning for cardiac chest pain. The EKG was not ischemic and the patient's initial workup was negative for MI, pneumothorax, heart failure. The patient's symptoms, labs and vital signs are not consistent with a pulmonary embolism. The chest x-ray and symptoms are not suggestive of an aortic dissection.  Based on duration of symptoms, I do not believe serial troponins indicated at this time.  Based on the patient's clinical picture, an admission for inpatient stress test was not indicated and the patient will follow up with their outpatient provider for follow up evaluation. The patient was provided with return precautions.    He did feel improved after trigger point injection.  Although it was ordered for bupivacaine with epinephrine, the epinephrine vial had shattered during transport, therefore the trigger point injection was performed with bupivacaine without epinephrine.  Likely musculoskeletal pain causing referred pain around his costal nerves.  No neurologic abnormalities to suggest spinal cord involvement.    2:36 PM - Imaging, laboratory studies, and EKG ordered per triage protocol.     4:36 PM - Patient was evaluated at bedside; Patient presents for evaluation of left sided intermittent chest pain and left sided back pain.  Discussed with patient and/or family that results from orders are reassuring at this time. Patient notes that he overall does not feel well in the room. He denied any difficulty doing his daily activities today. Noted his Troponin in normal and there is no evidence of heart attack. Advised plan on trigger point injection. Ordered D-Dimer to rule out possible blood clot. The patient was medicated with bupivacaine 0.5% - epinephrine for their symptoms.  Patient and/or family agrees to the plan of care.    Heart Score: 3    5:35 PM-  The patient informs me their pain feels  improved following medication administration. I discussed the patient's diagnostic study results which show No acute abnormalities. Noted the patient's exam and vitals at this time are reassuring. Discussed plan for discharge; I advised the patient to return to the [facility] ED with any new or worsening symptoms. Patient and/or family was given the opportunity to ask any questions. I addressed all questions or concerns and the patient is stable for discharge at this time. Patient and/or family verbalizes understanding and agreement to this plan of care.               DISPOSITION AND DISCUSSIONS  I have discussed management of the patient with the following physicians and REID's:  None    Discussion of management with other QHP or appropriate source(s): None     Escalation of care considered, and ultimately not performed: acute inpatient care management, however at this time, the patient is most appropriate for outpatient management.    Barriers to care at this time, including but not limited to:  None .     Decision tools and prescription drugs considered including, but not limited to: HEART Score 3 .  The patient will return for new or worsening symptoms and is stable at the time of discharge.    The patient is referred to a primary physician for blood pressure management, diabetic screening, and for all other preventative health concerns.    DISPOSITION:  Patient will be discharged home in stable condition.    FOLLOW UP:  VALORIE Lawrence  [address] [facility] [id number] [phone]    Schedule an appointment as soon as possible for a visit       [facility] Emergency Dept  [address] [facility] [address] [phone]    If symptoms worsen      OUTPATIENT MEDICATIONS:  Discharge Medication List as of 7/26/2023  5:48 PM        START taking these medications    Details   lidocaine (LIDODERM) 5 % Patch Place 1 Patch on the skin every 24 hours. Apply to site of pain. Wear for 12 hours,  then remove for 12 hours, Disp-30 Patch, R-0, Normal              FINAL DIANGOSIS  1. Chest pain, unspecified type    2. Pain, upper back       I, Carolina Douglas (Scribe), am scribing for, and in the presence of, Steven Howell M.D..    Electronically signed by: Carolina Douglas (Scribe), 7/26/2023    I, Steven Howell M.D. personally performed the services described in this documentation, as scribed by Carolina Douglas in my presence, and it is both accurate and complete.      The note accurately reflects work and decisions made by me.  Steven Howell M.D.  7/26/2023  9:26 PM

## 2023-07-26 NOTE — PROGRESS NOTES
"Subjective:   German Pagan is a 68 y.o. male who presents for Pain (X5days Upper middle shoulder blade pain/radiates around upper chest/ with certain positions )      Patient presents for evaluation of left-sided upper thoracic pain which wraps around to his left chest around his heart.  His symptoms began approximately 5 days ago, and are worsening and patient did need to leave work after half day today. He does state that the pain can be relieved by position, but mostly it is always present.   Patient states that he did do a significant amount of weeding approximately a week ago, and believes this is likely musculoskeletal.  He does not have any shortness of breath, syncope, or lightheadedness.  He states that he does not have any cardiac history himself, nor does he have a family history.          Review of Systems   Constitutional: Negative.  Negative for chills, diaphoresis and fever.   HENT: Negative.     Eyes: Negative.    Respiratory: Negative.  Negative for shortness of breath.    Cardiovascular:  Positive for chest pain.   Gastrointestinal: Negative.    Genitourinary: Negative.    Musculoskeletal:  Positive for back pain.   Skin: Negative.    Neurological: Negative.        Medications, Allergies, and current problem list reviewed today in Epic.     Objective:     /64 (BP Location: Left arm, Patient Position: Sitting)   Pulse 86   Temp 36.7 °C (98.1 °F) (Temporal)   Resp 20   Ht 1.803 m (5' 11\")   Wt 78.5 kg (173 lb)   SpO2 99%     Physical Exam  Vitals reviewed.   Constitutional:       Appearance: Normal appearance.   HENT:      Head: Normocephalic and atraumatic.      Nose: Nose normal.      Mouth/Throat:      Mouth: Mucous membranes are moist.      Pharynx: Oropharynx is clear.   Eyes:      Extraocular Movements: Extraocular movements intact.      Conjunctiva/sclera: Conjunctivae normal.      Pupils: Pupils are equal, round, and reactive to light.   Cardiovascular:      Rate and Rhythm: " Normal rate and regular rhythm.      Pulses: Normal pulses.      Heart sounds: Normal heart sounds. No murmur heard.     No friction rub. No gallop.   Pulmonary:      Effort: Pulmonary effort is normal.      Breath sounds: Normal breath sounds.   Abdominal:      General: Abdomen is flat. Bowel sounds are normal.      Palpations: Abdomen is soft.   Musculoskeletal:         General: Normal range of motion.      Cervical back: Normal range of motion and neck supple.   Skin:     General: Skin is warm and dry.      Capillary Refill: Capillary refill takes less than 2 seconds.   Neurological:      General: No focal deficit present.      Mental Status: He is alert and oriented to person, place, and time.   Psychiatric:         Mood and Affect: Mood normal.         Behavior: Behavior normal.       EKG Interpretation   Ordered and interpreted by TRI Russ  Rhythm: normal sinus   Rate: 92 normal   Axis: normal   Ectopy: none   Conduction: normal   ST Segments: no acute change   T Waves: no acute change   Q Waves: none   Clinical Impression: no acute changes and normal EKG    Assessment/Plan:     Diagnosis and associated orders:     1. Chest pain, unspecified type  EKG - Clinic Performed         Comments/MDM:     Discussed with patient that his symptoms could be musculoskeletal in origin, however it is concerning that I cannot reproduce any of his symptoms with palpation or movement.  Discussed the limits of urgent care, and the inability to obtain labs for him today.  Due to his age and symptoms, I do think it safe for for patient to be evaluated further in the ER and patient is agreeable.  Patient will proceed to [facility] ER.  Transfer center was called and are aware of patient.         Differential diagnosis, natural history, supportive care, and indications for immediate follow-up discussed.    Advised the patient to follow-up with the primary care physician for recheck, reevaluation, and consideration of  further management.    Please note that this dictation was created using voice recognition software. I have made a reasonable attempt to correct obvious errors, but I expect that there are errors of grammar and possibly content that I did not discover before finalizing the note.    This note was electronically signed by TRI Russ

## 2023-07-26 NOTE — ED NOTES
Patient vital signs reassessed and documented in EPIC. Staff apologized to patient for wait times and instructed patient to notify triage tech or RN of any changes in condition.

## 2023-07-26 NOTE — ED TRIAGE NOTES
"Chief Complaint   Patient presents with    Chest Pain     L sided CP. Started 5 days ago along w/ back pain. Pt did a lot of \"weed pulling\" days prior. Denies cardiac hx.    Back Pain     L upper back pain radiating to L chest.     BP (!) 161/93   Pulse 93   Temp 37 °C (98.6 °F) (Temporal)   Resp 16   Ht 1.803 m (5' 11\")   Wt 78.5 kg (173 lb)   SpO2 99%   BMI 24.13 kg/m²     "

## 2023-07-27 NOTE — DISCHARGE INSTRUCTIONS
You were seen in the ED for chest pain. Your physical exam, EKG, chest X-ray and bloodwork evaluating your heart were performed. All of these tests were reassuring. It does not appear that you had a heart attack. The cause of your pain is unclear, however it does not appear to be dangerous at this time. At this time it is safe for you to go home.    You were treated with a trigger point injection for your back pain.      You are being sent home with a prescription for a lidocaine patch.  This should be worn at the area of worst pain for 12 hours, then removed for 12 hours.  If the prescription costs are too high, please discussed with the pharmacist about over-the-counter strength formulations that may be more economical for you.    You may also take over-the-counter pain medications as needed.    Return to the ED if you develop chest pain, lightheadedness, shortness of breath, if your pain does not improve, or for any other new or concerning symptoms.

## 2023-08-01 PROBLEM — M54.6 ACUTE LEFT-SIDED THORACIC BACK PAIN: Chronic | Status: ACTIVE | Noted: 2023-08-01

## 2023-08-01 PROBLEM — F43.21 GRIEF REACTION: Chronic | Status: ACTIVE | Noted: 2023-08-01

## 2023-08-01 PROBLEM — M54.6 ACUTE LEFT-SIDED THORACIC BACK PAIN: Status: ACTIVE | Noted: 2023-08-01

## 2023-08-01 PROBLEM — F43.21 GRIEF REACTION: Status: ACTIVE | Noted: 2023-08-01

## 2023-08-20 NOTE — DISCHARGE PLANNING
Fax from [facility] for refill on Lido patch, Faxed back denied and pt will need to follow up with PCP or be re seen in ER

## 2023-09-12 ENCOUNTER — PHYSICAL THERAPY (OUTPATIENT)
Dept: PHYSICAL THERAPY | Facility: REHABILITATION | Age: 68
End: 2023-09-12
Payer: COMMERCIAL

## 2023-09-12 DIAGNOSIS — M54.6 ACUTE LEFT-SIDED THORACIC BACK PAIN: ICD-10-CM

## 2023-09-12 PROCEDURE — 97162 PT EVAL MOD COMPLEX 30 MIN: CPT

## 2023-09-12 PROCEDURE — 97110 THERAPEUTIC EXERCISES: CPT

## 2023-09-12 SDOH — ECONOMIC STABILITY: GENERAL: QUALITY OF LIFE: GOOD

## 2023-09-12 ASSESSMENT — ENCOUNTER SYMPTOMS
QUALITY: THROBBING
ALLEVIATING FACTORS: POSITION CHANGE
AWAKENINGS PER NIGHT: 3
EXACERBATED BY: PROLONGED SITTING
PAIN SCALE: 5
EXACERBATED BY: BENDING
PAIN TIMING: IN THE MORNING
PAIN SCALE AT HIGHEST: 8
QUALITY: DULL ACHE
EXACERBATED BY: KNEELING
ALLEVIATING FACTORS: PAIN MEDICATION
PAIN SCALE AT LOWEST: 5
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
## 2023-09-13 PROBLEM — G89.29 CHRONIC BILATERAL THORACIC BACK PAIN: Status: ACTIVE | Noted: 2023-08-01

## 2023-09-14 ENCOUNTER — PHYSICAL THERAPY (OUTPATIENT)
Dept: PHYSICAL THERAPY | Facility: REHABILITATION | Age: 68
End: 2023-09-14
Payer: COMMERCIAL

## 2023-09-14 DIAGNOSIS — M54.6 ACUTE LEFT-SIDED THORACIC BACK PAIN: ICD-10-CM

## 2023-09-14 PROCEDURE — 97140 MANUAL THERAPY 1/> REGIONS: CPT

## 2023-09-14 PROCEDURE — 97110 THERAPEUTIC EXERCISES: CPT

## 2023-09-14 NOTE — OP THERAPY DAILY TREATMENT
"  Outpatient Physical Therapy  DAILY TREATMENT     [facility] Outpatient Physical Therapy [address], [address]  [place] [address]  Phone:  [phone]    Date: 09/14/2023    Patient: German Pagan  YOB: 1955  MRN: [id number]     Time Calculation    Start time: 0245  Stop time: 0330 Time Calculation (min): 45 minutes         Chief Complaint: Back Problem    Visit #: 1    SUBJECTIVE:  The patient was having soreness in his back from doing a lot of work bending up and down today     OBJECTIVE:  Current objective measures:       Decrease pain to the thoracic region; mobilize T11-L1 to (L) side by 1 grade      Therapeutic Exercises (CPT 11532):     1. posterior pelvic tilts/ ab crunches, 1 x10 reps    2. bridges, 2 x10 reps with 5 sec hold    3. clamshells, 1 x10 reps (blue tb)    4. over the chair thoracic mobs    5. trunk rotations, 3x 30 sec    Therapeutic Treatments and Modalities:     1. Manual Therapy (CPT 61891), Thoraco-Lumbar, CPA's grade 3 at T11- L1    Time-based treatments/modalities:    Physical Therapy Timed Treatment Charges  Manual therapy minutes (CPT 10069): 15 minutes  Therapeutic exercise minutes (CPT 67938): 30 minutes    ASSESSMENT:   Response to treatment:   CPA's grade 3 at T11-L1 bilaterally; tenderness at T-11 to the (L).  The patient performed trunk rotations with increased stretching to the (R) thoracic side > (L) side. Patient was given self care instruction for developing increased trunks stabilization strength. Patient needed tactile and verbal cuing for increased pelvic tilt during \"cat cow stretch from quadriped position. Patient tolerated well      PLAN/RECOMMENDATIONS:   Plan for treatment: therapy treatment to continue next visit.  Planned interventions for next visit: continue with current treatment.         "

## 2023-09-15 PROBLEM — M79.604 PAIN IN RIGHT LEG: Status: ACTIVE | Noted: 2023-09-15

## 2023-09-15 PROBLEM — M79.604 PAIN IN RIGHT LEG: Chronic | Status: ACTIVE | Noted: 2023-09-15

## 2023-09-15 PROBLEM — H93.13 TINNITUS OF BOTH EARS: Chronic | Status: ACTIVE | Noted: 2023-09-15

## 2023-09-15 PROBLEM — H93.13 TINNITUS OF BOTH EARS: Status: ACTIVE | Noted: 2023-09-15

## 2023-09-21 ENCOUNTER — PHYSICAL THERAPY (OUTPATIENT)
Dept: PHYSICAL THERAPY | Facility: REHABILITATION | Age: 68
End: 2023-09-21
Payer: COMMERCIAL

## 2023-09-21 DIAGNOSIS — M54.6 ACUTE LEFT-SIDED THORACIC BACK PAIN: ICD-10-CM

## 2023-09-21 PROCEDURE — 97110 THERAPEUTIC EXERCISES: CPT

## 2023-09-21 PROCEDURE — 97140 MANUAL THERAPY 1/> REGIONS: CPT

## 2023-09-21 PROCEDURE — 97112 NEUROMUSCULAR REEDUCATION: CPT

## 2023-09-21 NOTE — OP THERAPY DAILY TREATMENT
Outpatient Physical Therapy  DAILY TREATMENT     [facility] Outpatient Physical Therapy [place] [address] [facility], [address]  [place] [address]  Phone:  [phone]    Date: 09/21/2023    Patient: German Pagan  YOB: 1955  MRN: [id number]     Time Calculation                   Chief Complaint: No chief complaint on file.    Visit #: 3    SUBJECTIVE:  The patient reports feeling kind of stiff to his mid to lower back.    OBJECTIVE:  Current objective measures:       Decrease stiffness in thoracolumbar spine     Therapeutic Exercises (CPT 92866):     1. posterior pelvic tilts/ ab crunches, 1 x10 reps    2. bridges, 2 x10 reps with 5 sec hold    3. clamshells, 1 x10 reps (blue tb)    4. over the chair thoracic mobs, 5 x 10 sec    5. trunk rotations, 3x 30 sec    6. open book, 3 x 20 sec each    7. trunk rotations    8. modified planks with clams    9. deadlifts from floor    10. squats    Therapeutic Treatments and Modalities:     1. Manual Therapy (CPT 53257), Thoraco-Lumbar, CPA's grade 3 at T12-L4    Time-based treatments/modalities:       ASSESSMENT:   Response to treatment:   CPA's at T11-T12 without pain to the (L) side; hypomobility at L1-L4; grade 2-3 with stiffness present but no pain. Self care instructions given to patient for thoracolumbar mobilizations.    PLAN/RECOMMENDATIONS:   Plan for treatment: therapy treatment to continue next visit.  Planned interventions for next visit: continue with current treatment.

## 2023-09-28 ENCOUNTER — APPOINTMENT (OUTPATIENT)
Dept: PHYSICAL THERAPY | Facility: REHABILITATION | Age: 68
End: 2023-09-28
Payer: COMMERCIAL

## 2023-10-02 ENCOUNTER — APPOINTMENT (OUTPATIENT)
Dept: RADIOLOGY | Facility: MEDICAL CENTER | Age: 68
End: 2023-10-02
Attending: EMERGENCY MEDICINE
Payer: COMMERCIAL

## 2023-10-02 ENCOUNTER — HOSPITAL ENCOUNTER (EMERGENCY)
Facility: MEDICAL CENTER | Age: 68
End: 2023-10-02
Attending: EMERGENCY MEDICINE
Payer: COMMERCIAL

## 2023-10-02 VITALS
RESPIRATION RATE: 16 BRPM | WEIGHT: 174.16 LBS | TEMPERATURE: 99.1 F | HEIGHT: 70 IN | DIASTOLIC BLOOD PRESSURE: 82 MMHG | SYSTOLIC BLOOD PRESSURE: 154 MMHG | HEART RATE: 74 BPM | BODY MASS INDEX: 24.93 KG/M2 | OXYGEN SATURATION: 96 %

## 2023-10-02 DIAGNOSIS — C79.51 METASTATIC CANCER TO BONE (HCC): ICD-10-CM

## 2023-10-02 DIAGNOSIS — E27.8 ADRENAL MASS (HCC): ICD-10-CM

## 2023-10-02 DIAGNOSIS — K40.21 BILATERAL RECURRENT INGUINAL HERNIA WITHOUT OBSTRUCTION OR GANGRENE: ICD-10-CM

## 2023-10-02 LAB
ALBUMIN SERPL BCP-MCNC: 4.3 G/DL (ref 3.2–4.9)
ALBUMIN/GLOB SERPL: 1 G/DL
ALP SERPL-CCNC: 112 U/L (ref 30–99)
ALT SERPL-CCNC: 39 U/L (ref 2–50)
ANION GAP SERPL CALC-SCNC: 15 MMOL/L (ref 7–16)
APPEARANCE UR: CLEAR
AST SERPL-CCNC: 41 U/L (ref 12–45)
BASOPHILS # BLD AUTO: 0.6 % (ref 0–1.8)
BASOPHILS # BLD: 0.07 K/UL (ref 0–0.12)
BILIRUB SERPL-MCNC: 0.3 MG/DL (ref 0.1–1.5)
BILIRUB UR QL STRIP.AUTO: NEGATIVE
BUN SERPL-MCNC: 16 MG/DL (ref 8–22)
CALCIUM ALBUM COR SERPL-MCNC: 9.9 MG/DL (ref 8.5–10.5)
CALCIUM SERPL-MCNC: 10.1 MG/DL (ref 8.4–10.2)
CHLORIDE SERPL-SCNC: 100 MMOL/L (ref 96–112)
CO2 SERPL-SCNC: 20 MMOL/L (ref 20–33)
COLOR UR: YELLOW
CREAT SERPL-MCNC: 0.86 MG/DL (ref 0.5–1.4)
EOSINOPHIL # BLD AUTO: 0.05 K/UL (ref 0–0.51)
EOSINOPHIL NFR BLD: 0.4 % (ref 0–6.9)
ERYTHROCYTE [DISTWIDTH] IN BLOOD BY AUTOMATED COUNT: 42.5 FL (ref 35.9–50)
GFR SERPLBLD CREATININE-BSD FMLA CKD-EPI: 93 ML/MIN/1.73 M 2
GLOBULIN SER CALC-MCNC: 4.2 G/DL (ref 1.9–3.5)
GLUCOSE SERPL-MCNC: 95 MG/DL (ref 65–99)
GLUCOSE UR STRIP.AUTO-MCNC: NEGATIVE MG/DL
HCT VFR BLD AUTO: 49.1 % (ref 42–52)
HGB BLD-MCNC: 16.5 G/DL (ref 14–18)
IMM GRANULOCYTES # BLD AUTO: 0.08 K/UL (ref 0–0.11)
IMM GRANULOCYTES NFR BLD AUTO: 0.7 % (ref 0–0.9)
KETONES UR STRIP.AUTO-MCNC: NEGATIVE MG/DL
LACTATE SERPL-SCNC: 1.4 MMOL/L (ref 0.5–2)
LEUKOCYTE ESTERASE UR QL STRIP.AUTO: NEGATIVE
LYMPHOCYTES # BLD AUTO: 1.89 K/UL (ref 1–4.8)
LYMPHOCYTES NFR BLD: 15.8 % (ref 22–41)
MCH RBC QN AUTO: 29.5 PG (ref 27–33)
MCHC RBC AUTO-ENTMCNC: 33.6 G/DL (ref 32.3–36.5)
MCV RBC AUTO: 87.7 FL (ref 81.4–97.8)
MICRO URNS: NORMAL
MONOCYTES # BLD AUTO: 0.8 K/UL (ref 0–0.85)
MONOCYTES NFR BLD AUTO: 6.7 % (ref 0–13.4)
NEUTROPHILS # BLD AUTO: 9.09 K/UL (ref 1.82–7.42)
NEUTROPHILS NFR BLD: 75.8 % (ref 44–72)
NITRITE UR QL STRIP.AUTO: NEGATIVE
NRBC # BLD AUTO: 0 K/UL
NRBC BLD-RTO: 0 /100 WBC (ref 0–0.2)
PH UR STRIP.AUTO: 5.5 [PH] (ref 5–8)
PLATELET # BLD AUTO: 317 K/UL (ref 164–446)
PMV BLD AUTO: 10.5 FL (ref 9–12.9)
POTASSIUM SERPL-SCNC: 4.2 MMOL/L (ref 3.6–5.5)
PROT SERPL-MCNC: 8.5 G/DL (ref 6–8.2)
PROT UR QL STRIP: NEGATIVE MG/DL
RBC # BLD AUTO: 5.6 M/UL (ref 4.7–6.1)
RBC UR QL AUTO: NEGATIVE
SODIUM SERPL-SCNC: 135 MMOL/L (ref 135–145)
SP GR UR STRIP.AUTO: 1.01
WBC # BLD AUTO: 12 K/UL (ref 4.8–10.8)

## 2023-10-02 PROCEDURE — 36415 COLL VENOUS BLD VENIPUNCTURE: CPT

## 2023-10-02 PROCEDURE — 85025 COMPLETE CBC W/AUTO DIFF WBC: CPT

## 2023-10-02 PROCEDURE — 87040 BLOOD CULTURE FOR BACTERIA: CPT

## 2023-10-02 PROCEDURE — 81003 URINALYSIS AUTO W/O SCOPE: CPT

## 2023-10-02 PROCEDURE — 71045 X-RAY EXAM CHEST 1 VIEW: CPT

## 2023-10-02 PROCEDURE — 74177 CT ABD & PELVIS W/CONTRAST: CPT | Mod: XU

## 2023-10-02 PROCEDURE — 83605 ASSAY OF LACTIC ACID: CPT

## 2023-10-02 PROCEDURE — 76870 US EXAM SCROTUM: CPT

## 2023-10-02 PROCEDURE — 99285 EMERGENCY DEPT VISIT HI MDM: CPT

## 2023-10-02 PROCEDURE — 96376 TX/PRO/DX INJ SAME DRUG ADON: CPT

## 2023-10-02 PROCEDURE — 700111 HCHG RX REV CODE 636 W/ 250 OVERRIDE (IP): Mod: JZ | Performed by: EMERGENCY MEDICINE

## 2023-10-02 PROCEDURE — 96374 THER/PROPH/DIAG INJ IV PUSH: CPT

## 2023-10-02 PROCEDURE — 96375 TX/PRO/DX INJ NEW DRUG ADDON: CPT

## 2023-10-02 PROCEDURE — A9270 NON-COVERED ITEM OR SERVICE: HCPCS | Performed by: EMERGENCY MEDICINE

## 2023-10-02 PROCEDURE — 700117 HCHG RX CONTRAST REV CODE 255: Performed by: EMERGENCY MEDICINE

## 2023-10-02 PROCEDURE — 700102 HCHG RX REV CODE 250 W/ 637 OVERRIDE(OP): Performed by: EMERGENCY MEDICINE

## 2023-10-02 PROCEDURE — 80053 COMPREHEN METABOLIC PANEL: CPT

## 2023-10-02 RX ORDER — MORPHINE SULFATE 4 MG/ML
4 INJECTION INTRAVENOUS ONCE
Status: COMPLETED | OUTPATIENT
Start: 2023-10-02 | End: 2023-10-02

## 2023-10-02 RX ORDER — ONDANSETRON 2 MG/ML
4 INJECTION INTRAMUSCULAR; INTRAVENOUS ONCE
Status: COMPLETED | OUTPATIENT
Start: 2023-10-02 | End: 2023-10-02

## 2023-10-02 RX ORDER — HYDROCODONE BITARTRATE AND ACETAMINOPHEN 5; 325 MG/1; MG/1
1 TABLET ORAL ONCE
Status: COMPLETED | OUTPATIENT
Start: 2023-10-02 | End: 2023-10-02

## 2023-10-02 RX ORDER — HYDROMORPHONE HYDROCHLORIDE 1 MG/ML
1 INJECTION, SOLUTION INTRAMUSCULAR; INTRAVENOUS; SUBCUTANEOUS ONCE
Status: COMPLETED | OUTPATIENT
Start: 2023-10-02 | End: 2023-10-02

## 2023-10-02 RX ORDER — ACETAMINOPHEN 500 MG
1000 TABLET ORAL EVERY 6 HOURS PRN
Status: SHIPPED | COMMUNITY
End: 2023-10-13

## 2023-10-02 RX ORDER — HYDROCODONE BITARTRATE AND ACETAMINOPHEN 7.5; 325 MG/1; MG/1
1 TABLET ORAL EVERY 4 HOURS PRN
Qty: 18 TABLET | Refills: 0 | Status: SHIPPED | OUTPATIENT
Start: 2023-10-02 | End: 2023-10-02

## 2023-10-02 RX ORDER — HYDROCODONE BITARTRATE AND ACETAMINOPHEN 7.5; 325 MG/1; MG/1
1 TABLET ORAL EVERY 4 HOURS PRN
Qty: 18 TABLET | Refills: 0 | Status: SHIPPED | OUTPATIENT
Start: 2023-10-02 | End: 2023-10-05

## 2023-10-02 RX ADMIN — HYDROCODONE BITARTRATE AND ACETAMINOPHEN 1 TABLET: 5; 325 TABLET ORAL at 17:50

## 2023-10-02 RX ADMIN — IOHEXOL 100 ML: 350 INJECTION, SOLUTION INTRAVENOUS at 16:15

## 2023-10-02 RX ADMIN — MORPHINE SULFATE 4 MG: 4 INJECTION INTRAVENOUS at 14:27

## 2023-10-02 RX ADMIN — HYDROMORPHONE HYDROCHLORIDE 1 MG: 1 INJECTION, SOLUTION INTRAMUSCULAR; INTRAVENOUS; SUBCUTANEOUS at 17:50

## 2023-10-02 RX ADMIN — MORPHINE SULFATE 4 MG: 4 INJECTION INTRAVENOUS at 16:13

## 2023-10-02 RX ADMIN — ONDANSETRON 4 MG: 2 INJECTION INTRAMUSCULAR; INTRAVENOUS at 14:25

## 2023-10-02 ASSESSMENT — FIBROSIS 4 INDEX: FIB4 SCORE: 0.94

## 2023-10-02 NOTE — ED TRIAGE NOTES
"Chief Complaint   Patient presents with    Testicle Pain     X 1 month; c/o right testicle pain with swelling to Right groin and pain radiating to Right lower back +temp 100.1 at home.      Pulse 95   Temp 36.8 °C (98.3 °F)   Resp 20   Ht 1.778 m (5' 10\")   Wt 79 kg (174 lb 2.6 oz)   SpO2 98%   BMI 24.99 kg/m²     Seen by PCP for same and given a muscle relaxer, which did not relieve pain. PCP was going to order imaging but did not and is now out of town.   Pt AO4, amb w steady gait.   Pt educated on triage process, NPO status and to notify ER RN if having any concerns while waiting for a room.     "

## 2023-10-02 NOTE — ED PROVIDER NOTES
"ED Provider Note    CHIEF COMPLAINT  Chief Complaint   Patient presents with    Testicle Pain     X 1 month; c/o right testicle pain with swelling to Right groin and pain radiating to Right lower back +temp 100.1 at home.        EXTERNAL RECORDS REVIEWED  Outpatient Notes patient was seen by [facility] primary care [facility] on September 13, 2023.  Patient has chronic back pain,.  He has history of compression fracture of L1 and L2 vertebra in 1990.  He also has history of \"muscle tension\" and tinnitus with hearing issues.    HPI/ROS  LIMITATION TO HISTORY   Select: : None  OUTSIDE HISTORIAN(S):  None    German Pagan is a 68 y.o. male who presents to the ER with complaint of right testicle pain and right groin pain radiating into the right flank.  Pain has been intermittent over the last several months.  He said over the last few weeks its gotten worse.  Last night he had a low-grade temperature of 100.1.  He is also had some chills on and off since onset of the pain over the last few months.  He said he followed up with his primary care practitioner regarding this pain was told he had \"muscle spasms.\"  He is also noticed a bulge in his right groin area which she says started around the same time as the pain.  He says the bulge will get bigger and then gets smaller again at times.  Over the last few weeks he has noticed more discomfort into his right testicle.  No swelling of the testicle itself.  He says his urine is dark in color in the morning but no dysuria.  He does have frequency and urgency of urination and has for the last several months since the onset of the pain.  No trauma or injury.  Has had nausea on and off over the last few months but no vomiting.    PAST MEDICAL HISTORY   History of hepatitis.  History of chronic low back pain.  Perfect negative for    SURGICAL HISTORY   has a past surgical history that includes orif, ankle (Bilateral); ankle fusion (Right, 01/01/1990); and " "vasectomy.    FAMILY HISTORY  Family History   Problem Relation Age of Onset    Cancer Mother         Multiple Myeloma    Cancer Father         Colon    Cancer Sister         Breast    Diabetes Brother     Colon Cancer Maternal Grandmother     No Known Problems Son     No Known Problems Daughter     No Known Problems Daughter        SOCIAL HISTORY  Social History     Tobacco Use    Smoking status: Former     Current packs/day: 0.00     Average packs/day: 0.5 packs/day for 34.0 years (17.0 ttl pk-yrs)     Types: Cigarettes     Start date:      Quit date:      Years since quittin.7    Smokeless tobacco: Current     Types: Chew    Tobacco comments:     Quit smoking in    Vaping Use    Vaping Use: Never used   Substance and Sexual Activity    Alcohol use: Not Currently     Comment: Quit in     Drug use: Not Currently     Types: Methamphetamines, Marijuana     Comment: Quit in , used methamphetamine    Sexual activity: Yes     Partners: Female     Comment: committed relationship.       CURRENT MEDICATIONS  Home Medications       Reviewed by Luis Miguel Kelsey (Pharmacy Tech) on 10/02/23 at 1707  Med List Status: Complete     Medication Last Dose Status   acetaminophen (TYLENOL) 500 MG Tab 10/1/2023 Active   aspirin (ASA) 325 MG Tab 10/2/2023 Active   Cholecalciferol (VITAMIN D3) 125 MCG (5000 UT) Cap 2023 Active   pravastatin (PRAVACHOL) 10 MG Tab 10/1/2023 Active   sildenafil citrate (VIAGRA) 100 MG tablet > 3 month Active   tizanidine (ZANAFLEX) 2 MG tablet 10/2/2023 Active                    ALLERGIES  Allergies   Allergen Reactions    Pcn [Penicillins]      Pt reports that it was a childhood allergie not sure what happens        PHYSICAL EXAM  VITAL SIGNS: BP (!) 154/82   Pulse 74   Temp 37.3 °C (99.1 °F) (Temporal)   Resp 16   Ht 1.778 m (5' 10\")   Wt 79 kg (174 lb 2.6 oz)   SpO2 96%   BMI 24.99 kg/m²    Constitutional:  Well developed, well nourished; No acute distress "   HENT: Normocephalic, Atraumatic, Bilateral external ears normal, oropharyngeal examination deferred due to COVID-19 outbreak and lack of oropharyngeal complaint.  Eyes: PERRL, EOMI, Conjunctiva normal, No discharge.   Neck: Normal range of motion, supple, nontender  Lymphatic: No lymphadenopathy noted.   Cardiovascular: Normal heart rate, Normal rhythm, No murmurs, rubs or gallops   Thorax & Lungs: CTA=bilaterally;  No respiratory distress,  No wheezing rales, or rhonchi; No chest tenderness. No crepitus or subQ air  Abdomen: Soft, good bowel sounds, there is an inguinal hernia which is only partially reducible in the right groin which is tender to palpation.  no guarding no rebound, no masses, no pulsatile mass, no tenderness, no distention  : There is no testicular swelling.  No scrotal swelling or induration.  Circumcised penis.  There is no hernia able to be palpated with examination of the inguinal canal while standing.  Minimal tenderness to the right testicle.  Skin: Warm, Dry, No erythema, No rash.   Back: No tenderness to T-spine or L-spine.  There is some tenderness in the right lower lumbar paraspinous muscles and sacrum., No CVA tenderness.   Extremities: 2+ dp and pt pulses bilateral LEs;  Nontender; no pretibial edema.  There is no pain with range of motion of right hip.  Neurologic: Alert & oriented x 4, clear speech.  Strength are 5 out of 5 and equal bilaterally testing dorsiflexors and plantar flexors.  Psychiatric: appropriate, normal affect     DIAGNOSTIC STUDIES / PROCEDURES      LABS  Results for orders placed or performed during the hospital encounter of 10/02/23   Urinalysis, Culture if Indicated    Specimen: Urine, Clean Catch   Result Value Ref Range    Color Yellow     Character Clear     Specific Gravity 1.010 <1.035    Ph 5.5 5.0 - 8.0    Glucose Negative Negative mg/dL    Ketones Negative Negative mg/dL    Protein Negative Negative mg/dL    Bilirubin Negative Negative    Nitrite  Negative Negative    Leukocyte Esterase Negative Negative    Occult Blood Negative Negative    Micro Urine Req see below    CBC WITH DIFFERENTIAL   Result Value Ref Range    WBC 12.0 (H) 4.8 - 10.8 K/uL    RBC 5.60 4.70 - 6.10 M/uL    Hemoglobin 16.5 14.0 - 18.0 g/dL    Hematocrit 49.1 42.0 - 52.0 %    MCV 87.7 81.4 - 97.8 fL    MCH 29.5 27.0 - 33.0 pg    MCHC 33.6 32.3 - 36.5 g/dL    RDW 42.5 35.9 - 50.0 fL    Platelet Count 317 164 - 446 K/uL    MPV 10.5 9.0 - 12.9 fL    Neutrophils-Polys 75.80 (H) 44.00 - 72.00 %    Lymphocytes 15.80 (L) 22.00 - 41.00 %    Monocytes 6.70 0.00 - 13.40 %    Eosinophils 0.40 0.00 - 6.90 %    Basophils 0.60 0.00 - 1.80 %    Immature Granulocytes 0.70 0.00 - 0.90 %    Nucleated RBC 0.00 0.00 - 0.20 /100 WBC    Neutrophils (Absolute) 9.09 (H) 1.82 - 7.42 K/uL    Lymphs (Absolute) 1.89 1.00 - 4.80 K/uL    Monos (Absolute) 0.80 0.00 - 0.85 K/uL    Eos (Absolute) 0.05 0.00 - 0.51 K/uL    Baso (Absolute) 0.07 0.00 - 0.12 K/uL    Immature Granulocytes (abs) 0.08 0.00 - 0.11 K/uL    NRBC (Absolute) 0.00 K/uL   COMP METABOLIC PANEL   Result Value Ref Range    Sodium 135 135 - 145 mmol/L    Potassium 4.2 3.6 - 5.5 mmol/L    Chloride 100 96 - 112 mmol/L    Co2 20 20 - 33 mmol/L    Anion Gap 15.0 7.0 - 16.0    Glucose 95 65 - 99 mg/dL    Bun 16 8 - 22 mg/dL    Creatinine 0.86 0.50 - 1.40 mg/dL    Calcium 10.1 8.4 - 10.2 mg/dL    Correct Calcium 9.9 8.5 - 10.5 mg/dL    AST(SGOT) 41 12 - 45 U/L    ALT(SGPT) 39 2 - 50 U/L    Alkaline Phosphatase 112 (H) 30 - 99 U/L    Total Bilirubin 0.3 0.1 - 1.5 mg/dL    Albumin 4.3 3.2 - 4.9 g/dL    Total Protein 8.5 (H) 6.0 - 8.2 g/dL    Globulin 4.2 (H) 1.9 - 3.5 g/dL    A-G Ratio 1.0 g/dL   Lactic Acid   Result Value Ref Range    Lactic Acid 1.4 0.5 - 2.0 mmol/L   ESTIMATED GFR   Result Value Ref Range    GFR (CKD-EPI) 93 >60 mL/min/1.73 m 2        RADIOLOGY  I have independently interpreted the diagnostic imaging associated with this visit and am waiting the  "final reading from the radiologist.     My preliminary interpretation is as follows: ER MD is reviewed the patient's CT scan.  No obvious obstruction.  Patient does have what look like some bony lesions in the spine.    Radiologist interpretation:   DX-CHEST-PORTABLE (1 VIEW)   Final Result         1. No acute cardiopulmonary abnormalities are identified.      CT-ABDOMEN-PELVIS WITH   Final Result         1. Fat-containing bilateral inguinal hernias, right more than left. No incarceration.      2. Lytic lesion seen in the right eighth rib, spine and pelvic bones, concerning for myeloma or lytic metastasis.      3. There is a 3.3 cm right adrenal nodule. Correlate with nonemergent adrenal CT      [id number]   Final Result      1.  No testicular mass or inflammation identified.           COURSE & MEDICAL DECISION MAKING    ED Observation Status? Yes; I am placing the patient in to an observation status due to a diagnostic uncertainty as well as therapeutic intensity. Patient placed in observation status at 2:09 PM, 10/2/2023.     Observation plan is as follows: Patient was placed in ED observation status as he will need comprehensive work-up and evaluation for his complaint of abdominal pain and flank pain.    Upon Reevaluation, the patient's condition has: Improved; and will be discharged.    Patient discharged from ED Observation status at 1800 on October 2, 2023 (Date).     INITIAL ASSESSMENT, COURSE AND PLAN  Care Narrative: Patient presents to the ER complaining of intermittent pain into the right low back/flank radiating into the right lower abdomen and down to the right testicle.  Patient states the pain gets much worse when he walks or moves.  Pain is gotten progressively worse over the last few weeks.  He says he has been seen by his primary care APRN and physical therapist for this pain and has been told it is a \"muscle spasm.\"  He has been given muscle spasm medicine without any improvement.  Today " patient presents complaining of testicle pain.  There is no testicle swelling.  The patient does not have any swelling to the testicle on examination.  There is no significant pain in the testicle.  He does have bilateral inguinal hernia, right greater than left.  The right knee is mildly tender to palpation.  It is partially reducible when I lay the patient's and minimal   Trendelenburg.  Urine is clear.  No evidence of UTI.  Patient is afebrile.  White count is minimally elevated at 12.  Lactic acid level is 1.4.  No hypotension or tachycardic.  This time and I think the patient is septic or toxic. Alkaline phosphatase is minimally elevated at 112.  Given patient's complaint of testicle pain, he underwent an ultrasound of the testicles which is negative.  No torsion.  No signs of infection.  Given his flank pain and abdominal pain I was concerned about ureteral stone or some other form of intra-abdominal pathology.  For this reason he underwent a CT scan of the abdomen pelvis.  Patient was unfortunately found to have multiple lytic lesions in the spine and pelvis, mostly involving the right acetabulum and ilium.  I suspect that the lytic lesions are likely causing the patient pain, especially since  he states the pain has been intermittent over the last 2 months, getting worse over the last 2 weeks.  I spoke to the APRN on-call for [facility].  She assures me that patient will be able to be seen within the next couple days.  She will let the  know the patient needs an expeditious appointment so that they can start the cancer work-up and make sure all the referrals are dialed in.  Have also spoken with our  who is placed in a consult for our nurse navigator.  I put in a consult for heme-onc.  Patient understands that he likely has metastatic cancer process.  At this time I do not know where the primary is.  He will need extensive work-up for new onset metastatic cancer.  At  this time patient's pain is improved with the medications.  I will send him home with prescription for 7.5 Norco.  He has been given strict return precautions and discharge instructions and he understands treatment plan and follow-up.    I spoke with the nurse practitioner on-call for [facility] APRN Maurizio Buck.  She will be sure that the  is aware of patient and will let the  know the patient needs to be seen this week by one of the practitioners so that they can be sure he has good pain control moving forward and also be sure that all of the referrals are expeditiously secured.  We discussed starting the work-up in the outpatient setting (CEA, , PSA, etc. ) She agrees with plan and assures me that patient will be able to be seen in the office ASAP.      ADDITIONAL PROBLEM LIST  Problem #1: Right flank pain and right lower quadrant/inguinal abdominal pain.  Problem #2: Right testicle pain  Problem #3: Low-grade fever  DISPOSITION AND DISCUSSIONS  I have discussed management of the patient with the following physicians and REID's: On-call APRN for [facility]    Discussion of management with other QHP or appropriate source(s): Radiologist Dr. Whitten.   I spoke with the radiologist regarding the patient's CT scan.  He says the bony that is static lesions are everywhere, but they are most predominant in the right acetabulum and ilium.    I spoke with , Cristine.  She has placed a referral to nurse navigator.  I have also placed a referral to oncology.    Escalation of care considered, and ultimately not performed:acute inpatient care management, however at this time, the patient is most appropriate for outpatient management.  Patient is well-appearing.  He will need comprehensive outpatient oncology work-up.  This is a work-up that can be done outpatient.  No need to admit the patient for oncology work-up.  He is otherwise well-appearing.  He will be  discharged home.    Barriers to care at this time, including but not limited to: None known   .     Decision tools and prescription drugs considered including, but not limited to: Pain Medications patient says he does not do well with Percocet.  I will send him home with 7.5 mg Norco.  For his metastatic bone pain .    In prescribing controlled substances to this patient, I certify that I have obtained and reviewed the medical history of German Pagan. I have also made a good rosemary effort to obtain applicable records from other providers who have treated the patient and records did not demonstrate any increased risk of substance abuse that would prevent me from prescribing controlled substances.     I have conducted a physical exam and documented it. I have reviewed Mr. Pagan’s prescription history as maintained by the [place] Prescription Monitoring Program. No patterns for concerns    I have assessed the patient’s risk for abuse, dependency, and addiction using the validated Opioid Risk Tool available at https://www.mdcalc.com/gcgvjs-iofx-pjji-ort-narcotic-abuse. 4    Given the above, I believe the benefits of controlled substance therapy outweigh the risks. The reasons for prescribing controlled substances include non-narcotic, oral analgesic alternatives have been inadequate for pain control. Accordingly, I have discussed the risk and benefits, treatment plan, and alternative therapies with the patient.      FINAL DIAGNOSIS  1. Metastatic cancer to bone (HCC) Acute   2. Bilateral recurrent inguinal hernia without obstruction or gangrene Acute   3. Adrenal mass (HCC) Acute        This dictation has been created using voice recognition software. The accuracy of the dictation is limited by the abilities of the software. I expect there may be some errors of grammar and possibly content. I made every attempt to manually correct the errors within my dictation. However, errors related to voice recognition software  may still exist and should be interpreted within the appropriate context.     Electronically signed by: Carolina Santos M.D., 10/2/2023 1:50 PM

## 2023-10-02 NOTE — ED NOTES
Pt was evaluated by MD and orders rec'ed and done  SL placed w/ bld drawn, sent to lab  IV med's given per order  pt aware of POC  received good pain relief after med's given  was 7/10  now 2/10

## 2023-10-03 ENCOUNTER — APPOINTMENT (OUTPATIENT)
Dept: PHYSICAL THERAPY | Facility: REHABILITATION | Age: 68
End: 2023-10-03
Payer: COMMERCIAL

## 2023-10-03 NOTE — ED NOTES
Medication history reviewed with pt. Med rec is complete.  Allergies reviewed, per pt    Patient has not had any outpatient antibiotics in the last 30 days.    Pt is not on any anticoagulants

## 2023-10-03 NOTE — ED NOTES
Pt cleared for d/c  dischg instructions given to pt  made aware that Rx norco was sent to listed pharmacy   pt aware to fill and take as directed  aware that multiple verbally understands  d/c'ed to home in NAD w/ SO driving him home

## 2023-10-03 NOTE — DISCHARGE INSTRUCTIONS
Please follow-up with [facility] care within the next 1 to 2 days.  Please call first thing tomorrow to schedule your appointment.      A referral has been placed on your behalf for [facility] oncology. If you have not heard from the schedulers within the next 24 hours, please call for appointment.  You will need a comprehensive work-up with your oncologist and your primary care physician to try to determine the exact type of cancer you have.    Follow-up with Dr. Irizarry, general surgeon, for your bilateral inguinal hernias this week.  Please call for appointment.    Return to the ER for any worsening pain, changing pain, pain radiating down your legs, numbness/tingling/weakness of extremities, fevers over 100.4, shaking chills, abdominal pain, nausea, vomiting, or for any concerns.

## 2023-10-05 ENCOUNTER — PRE-ADMISSION TESTING (OUTPATIENT)
Dept: ADMISSIONS | Facility: MEDICAL CENTER | Age: 68
End: 2023-10-05
Payer: COMMERCIAL

## 2023-10-06 ENCOUNTER — ANESTHESIA (OUTPATIENT)
Dept: SURGERY | Facility: MEDICAL CENTER | Age: 68
End: 2023-10-06
Payer: COMMERCIAL

## 2023-10-06 ENCOUNTER — ANESTHESIA EVENT (OUTPATIENT)
Dept: SURGERY | Facility: MEDICAL CENTER | Age: 68
End: 2023-10-06
Payer: COMMERCIAL

## 2023-10-06 ENCOUNTER — HOSPITAL ENCOUNTER (OUTPATIENT)
Facility: MEDICAL CENTER | Age: 68
End: 2023-10-06
Attending: COLON & RECTAL SURGERY | Admitting: COLON & RECTAL SURGERY
Payer: COMMERCIAL

## 2023-10-06 VITALS
HEIGHT: 70 IN | WEIGHT: 166.23 LBS | RESPIRATION RATE: 16 BRPM | TEMPERATURE: 96.7 F | BODY MASS INDEX: 23.8 KG/M2 | SYSTOLIC BLOOD PRESSURE: 161 MMHG | DIASTOLIC BLOOD PRESSURE: 77 MMHG | OXYGEN SATURATION: 95 % | HEART RATE: 61 BPM

## 2023-10-06 PROBLEM — R11.2 NAUSEA AND VOMITING: Status: ACTIVE | Noted: 2023-10-06

## 2023-10-06 PROBLEM — K40.21 BILATERAL RECURRENT INGUINAL HERNIA WITHOUT OBSTRUCTION OR GANGRENE: Status: ACTIVE | Noted: 2023-10-06

## 2023-10-06 PROBLEM — C79.51 METASTATIC CANCER TO BONE (HCC): Status: ACTIVE | Noted: 2023-10-06

## 2023-10-06 PROCEDURE — 700101 HCHG RX REV CODE 250: Performed by: ANESTHESIOLOGY

## 2023-10-06 PROCEDURE — C1781 MESH (IMPLANTABLE): HCPCS | Performed by: COLON & RECTAL SURGERY

## 2023-10-06 PROCEDURE — 160035 HCHG PACU - 1ST 60 MINS PHASE I: Performed by: COLON & RECTAL SURGERY

## 2023-10-06 PROCEDURE — 700105 HCHG RX REV CODE 258: Performed by: COLON & RECTAL SURGERY

## 2023-10-06 PROCEDURE — 160002 HCHG RECOVERY MINUTES (STAT): Performed by: COLON & RECTAL SURGERY

## 2023-10-06 PROCEDURE — 700102 HCHG RX REV CODE 250 W/ 637 OVERRIDE(OP): Performed by: ANESTHESIOLOGY

## 2023-10-06 PROCEDURE — A9270 NON-COVERED ITEM OR SERVICE: HCPCS | Performed by: ANESTHESIOLOGY

## 2023-10-06 PROCEDURE — 160048 HCHG OR STATISTICAL LEVEL 1-5: Performed by: COLON & RECTAL SURGERY

## 2023-10-06 PROCEDURE — 160029 HCHG SURGERY MINUTES - 1ST 30 MINS LEVEL 4: Performed by: COLON & RECTAL SURGERY

## 2023-10-06 PROCEDURE — 700111 HCHG RX REV CODE 636 W/ 250 OVERRIDE (IP): Mod: JZ | Performed by: ANESTHESIOLOGY

## 2023-10-06 PROCEDURE — 160046 HCHG PACU - 1ST 60 MINS PHASE II: Performed by: COLON & RECTAL SURGERY

## 2023-10-06 PROCEDURE — 700111 HCHG RX REV CODE 636 W/ 250 OVERRIDE (IP): Performed by: ANESTHESIOLOGY

## 2023-10-06 PROCEDURE — 160009 HCHG ANES TIME/MIN: Performed by: COLON & RECTAL SURGERY

## 2023-10-06 PROCEDURE — 160041 HCHG SURGERY MINUTES - EA ADDL 1 MIN LEVEL 4: Performed by: COLON & RECTAL SURGERY

## 2023-10-06 PROCEDURE — 160025 RECOVERY II MINUTES (STATS): Performed by: COLON & RECTAL SURGERY

## 2023-10-06 DEVICE — MESH PERFIX PLUG LARGE - 4.1CM X 4.8CM (1EA/CA): Type: IMPLANTABLE DEVICE | Status: FUNCTIONAL

## 2023-10-06 DEVICE — MESH 3D MAX LEFT 8.5 X 13.7CM - MEDIUM (1EA/CA): Type: IMPLANTABLE DEVICE | Status: FUNCTIONAL

## 2023-10-06 DEVICE — MESH 3D MAX RIGHT 10.8 X 16CM - LARGE (1EA/CA): Type: IMPLANTABLE DEVICE | Status: FUNCTIONAL

## 2023-10-06 RX ORDER — LABETALOL HYDROCHLORIDE 5 MG/ML
5 INJECTION, SOLUTION INTRAVENOUS
Status: DISCONTINUED | OUTPATIENT
Start: 2023-10-06 | End: 2023-10-06 | Stop reason: HOSPADM

## 2023-10-06 RX ORDER — OXYCODONE HCL 5 MG/5 ML
10 SOLUTION, ORAL ORAL
Status: COMPLETED | OUTPATIENT
Start: 2023-10-06 | End: 2023-10-06

## 2023-10-06 RX ORDER — CEFAZOLIN SODIUM 1 G/3ML
INJECTION, POWDER, FOR SOLUTION INTRAMUSCULAR; INTRAVENOUS PRN
Status: DISCONTINUED | OUTPATIENT
Start: 2023-10-06 | End: 2023-10-06 | Stop reason: SURG

## 2023-10-06 RX ORDER — OXYCODONE HCL 5 MG/5 ML
5 SOLUTION, ORAL ORAL
Status: COMPLETED | OUTPATIENT
Start: 2023-10-06 | End: 2023-10-06

## 2023-10-06 RX ORDER — HYDROMORPHONE HYDROCHLORIDE 1 MG/ML
0.1 INJECTION, SOLUTION INTRAMUSCULAR; INTRAVENOUS; SUBCUTANEOUS
Status: DISCONTINUED | OUTPATIENT
Start: 2023-10-06 | End: 2023-10-06 | Stop reason: HOSPADM

## 2023-10-06 RX ORDER — DEXAMETHASONE SODIUM PHOSPHATE 4 MG/ML
INJECTION, SOLUTION INTRA-ARTICULAR; INTRALESIONAL; INTRAMUSCULAR; INTRAVENOUS; SOFT TISSUE PRN
Status: DISCONTINUED | OUTPATIENT
Start: 2023-10-06 | End: 2023-10-06 | Stop reason: SURG

## 2023-10-06 RX ORDER — DIPHENHYDRAMINE HYDROCHLORIDE 50 MG/ML
12.5 INJECTION INTRAMUSCULAR; INTRAVENOUS
Status: DISCONTINUED | OUTPATIENT
Start: 2023-10-06 | End: 2023-10-06 | Stop reason: HOSPADM

## 2023-10-06 RX ORDER — SODIUM CHLORIDE, SODIUM LACTATE, POTASSIUM CHLORIDE, CALCIUM CHLORIDE 600; 310; 30; 20 MG/100ML; MG/100ML; MG/100ML; MG/100ML
INJECTION, SOLUTION INTRAVENOUS CONTINUOUS
Status: DISCONTINUED | OUTPATIENT
Start: 2023-10-06 | End: 2023-10-06 | Stop reason: HOSPADM

## 2023-10-06 RX ORDER — HYDRALAZINE HYDROCHLORIDE 20 MG/ML
10 INJECTION INTRAMUSCULAR; INTRAVENOUS ONCE
Status: DISCONTINUED | OUTPATIENT
Start: 2023-10-06 | End: 2023-10-06 | Stop reason: HOSPADM

## 2023-10-06 RX ORDER — ONDANSETRON 2 MG/ML
4 INJECTION INTRAMUSCULAR; INTRAVENOUS
Status: DISCONTINUED | OUTPATIENT
Start: 2023-10-06 | End: 2023-10-06 | Stop reason: HOSPADM

## 2023-10-06 RX ORDER — HYDROMORPHONE HYDROCHLORIDE 1 MG/ML
0.4 INJECTION, SOLUTION INTRAMUSCULAR; INTRAVENOUS; SUBCUTANEOUS
Status: DISCONTINUED | OUTPATIENT
Start: 2023-10-06 | End: 2023-10-06 | Stop reason: HOSPADM

## 2023-10-06 RX ORDER — ROCURONIUM BROMIDE 10 MG/ML
INJECTION, SOLUTION INTRAVENOUS PRN
Status: DISCONTINUED | OUTPATIENT
Start: 2023-10-06 | End: 2023-10-06 | Stop reason: SURG

## 2023-10-06 RX ORDER — HYDROMORPHONE HYDROCHLORIDE 1 MG/ML
0.2 INJECTION, SOLUTION INTRAMUSCULAR; INTRAVENOUS; SUBCUTANEOUS
Status: DISCONTINUED | OUTPATIENT
Start: 2023-10-06 | End: 2023-10-06 | Stop reason: HOSPADM

## 2023-10-06 RX ORDER — LIDOCAINE HYDROCHLORIDE 20 MG/ML
INJECTION, SOLUTION EPIDURAL; INFILTRATION; INTRACAUDAL; PERINEURAL PRN
Status: DISCONTINUED | OUTPATIENT
Start: 2023-10-06 | End: 2023-10-06 | Stop reason: SURG

## 2023-10-06 RX ORDER — MEPERIDINE HYDROCHLORIDE 25 MG/ML
12.5 INJECTION INTRAMUSCULAR; INTRAVENOUS; SUBCUTANEOUS
Status: DISCONTINUED | OUTPATIENT
Start: 2023-10-06 | End: 2023-10-06 | Stop reason: HOSPADM

## 2023-10-06 RX ORDER — MIDAZOLAM HYDROCHLORIDE 1 MG/ML
1 INJECTION INTRAMUSCULAR; INTRAVENOUS
Status: DISCONTINUED | OUTPATIENT
Start: 2023-10-06 | End: 2023-10-06 | Stop reason: HOSPADM

## 2023-10-06 RX ORDER — HALOPERIDOL 5 MG/ML
1 INJECTION INTRAMUSCULAR
Status: DISCONTINUED | OUTPATIENT
Start: 2023-10-06 | End: 2023-10-06 | Stop reason: HOSPADM

## 2023-10-06 RX ADMIN — LIDOCAINE HYDROCHLORIDE 50 MG: 20 INJECTION, SOLUTION EPIDURAL; INFILTRATION; INTRACAUDAL at 08:39

## 2023-10-06 RX ADMIN — DEXAMETHASONE SODIUM PHOSPHATE 4 MG: 4 INJECTION INTRA-ARTICULAR; INTRALESIONAL; INTRAMUSCULAR; INTRAVENOUS; SOFT TISSUE at 08:50

## 2023-10-06 RX ADMIN — PROPOFOL 170 MG: 10 INJECTION, EMULSION INTRAVENOUS at 08:39

## 2023-10-06 RX ADMIN — SODIUM CHLORIDE, POTASSIUM CHLORIDE, SODIUM LACTATE AND CALCIUM CHLORIDE: 600; 310; 30; 20 INJECTION, SOLUTION INTRAVENOUS at 08:33

## 2023-10-06 RX ADMIN — FENTANYL CITRATE 100 MCG: 50 INJECTION, SOLUTION INTRAMUSCULAR; INTRAVENOUS at 08:36

## 2023-10-06 RX ADMIN — SUGAMMADEX 100 MG: 100 INJECTION, SOLUTION INTRAVENOUS at 09:14

## 2023-10-06 RX ADMIN — CEFAZOLIN 1900 MG: 1 INJECTION, POWDER, FOR SOLUTION INTRAMUSCULAR; INTRAVENOUS at 08:47

## 2023-10-06 RX ADMIN — LABETALOL HYDROCHLORIDE 5 MG: 5 INJECTION INTRAVENOUS at 09:33

## 2023-10-06 RX ADMIN — CEFAZOLIN 100 MG: 1 INJECTION, POWDER, FOR SOLUTION INTRAMUSCULAR; INTRAVENOUS at 08:44

## 2023-10-06 RX ADMIN — OXYCODONE HYDROCHLORIDE 10 MG: 5 SOLUTION ORAL at 09:28

## 2023-10-06 RX ADMIN — ROCURONIUM BROMIDE 50 MG: 50 INJECTION, SOLUTION INTRAVENOUS at 08:39

## 2023-10-06 RX ADMIN — FENTANYL CITRATE 50 MCG: 50 INJECTION, SOLUTION INTRAMUSCULAR; INTRAVENOUS at 09:28

## 2023-10-06 ASSESSMENT — PAIN DESCRIPTION - PAIN TYPE
TYPE: SURGICAL PAIN
TYPE: CHRONIC PAIN
TYPE: SURGICAL PAIN

## 2023-10-06 ASSESSMENT — PAIN SCALES - GENERAL: PAIN_LEVEL: 3

## 2023-10-06 ASSESSMENT — FIBROSIS 4 INDEX: FIB4 SCORE: 1.45

## 2023-10-06 NOTE — ANESTHESIA PROCEDURE NOTES
Airway    Date/Time: 10/6/2023 8:40 AM    Performed by: Edi Hale M.D.  Authorized by: Edi Hale M.D.    Location:  OR  Urgency:  Elective  Difficult Airway: No    Indications for Airway Management:  Anesthesia      Spontaneous Ventilation: absent    Sedation Level:  Deep  Preoxygenated: Yes    Patient Position:  Sniffing  Final Airway Type:  Endotracheal airway  Final Endotracheal Airway:  ETT  Cuffed: Yes    Technique Used for Successful ETT Placement:  Direct laryngoscopy    Insertion Site:  Oral  Blade Type:  Avel  Laryngoscope Blade/Videolaryngoscope Blade Size:  3  ETT Size (mm):  8.0  Measured from:  Lips  ETT to Lips (cm):  24  Placement Verified by: auscultation and capnometry    Cormack-Lehane Classification:  Grade IIa - partial view of glottis  Number of Attempts at Approach:  1  Number of Other Approaches Attempted:  0

## 2023-10-06 NOTE — ANESTHESIA PREPROCEDURE EVALUATION
Case: [id number] Date/Time: 10/06/23 0815    Procedure: LAPAROSCOPIC BILATERAL INGUINAL HERNIA REPAIR WITH MESH    Pre-op diagnosis: BILATERAL INGUINAL HERNIA    Location: [facility] OR 10 / SURGERY [facility]    Surgeons: Chris Hanks M.D.          Relevant Problems   Other   (positive) Arthritis of left hip       Physical Exam    Airway   Mallampati: II  TM distance: >3 FB  Neck ROM: full       Cardiovascular - normal exam  Rhythm: regular  Rate: normal  (-) murmur     Dental - normal exam           Pulmonary - normal exam  Breath sounds clear to auscultation     Abdominal    Neurological - normal exam                 Anesthesia Plan    ASA 3 (chart)       Plan - general       Airway plan will be ETT          Induction: intravenous    Postoperative Plan: Postoperative administration of opioids is intended.    Pertinent diagnostic labs and testing reviewed    Informed Consent:    Anesthetic plan and risks discussed with patient.    Use of blood products discussed with: patient whom consented to blood products.

## 2023-10-06 NOTE — OR NURSING
0920 - Pt to PACU 17B from OR.  Bedside report from anesthesiologist and RN.  Attached to monitoring, VSS, breathing is calm and unlabored on 6L oxymask with oral airway in place. Dressings to abdomen CDI bandaids in place. /90, Dr. Hale aware, per MD labetalol 5mg ordered.     0928- Given pain medicine per MAR.     0933- Pt's /100, labetalol given.     0950- Pt's /85, HR 47, notified Dr. Hale.     1000- Called and updated pt's significant other, Gladys. Ordered hydralazine for BP per Dr. Hale. Pt's pain more tolerable, tolerating sips of juice and water. Pt's BP now 157/91. Pt feels ready for phase 2.     1010- Report given to LANA Edwards in phase 2     1015- Pt transferred to phase 2 in [facility] on RA, chart sent with pt.

## 2023-10-06 NOTE — DISCHARGE INSTRUCTIONS
HOME CARE INSTRUCTIONS    ACTIVITY: Rest and take it easy for the first 24 hours.  A responsible adult is recommended to remain with you during that time.  It is normal to feel sleepy.  We encourage you to not do anything that requires balance, judgment or coordination.    FOR 24 HOURS DO NOT:  Drive, operate machinery or run household appliances.  Drink beer or alcoholic beverages.  Make important decisions or sign legal documents.    SPECIAL INSTRUCTIONS: 1. DIET: Upon discharge from the hospital you may resume your normal preoperative diet. Depending on how you are feeling and whether you have nausea or not, you may wish to stay with a bland diet for the first few days. However, you can advance this as quickly as you feel ready.     2. ACTIVITIES: After discharge from the hospital, you may resume full routine activities. However, there should be no heavy lifting (greater than 20 pounds) and no strenuous activities until after your follow-up visit. Otherwise, routine activities of daily living are acceptable.     3. DRIVING: You may drive whenever you are off pain medications and are able to perform the activities needed to drive, i.e. turning, bending, twisting, etc.     4. BATHING: You may get the wound wet 2 days after surgery. You may shower, but do not submerge in a bath for at least a week. Dressings may come off after 48 hours. You have steri-strips under your dressings. These steri-strips should stay in place. They will fall off over 5-7 days.     5. BOWEL FUNCTION: Constipation is common after an operation, especially with pain medications. The combination of pain medication and decreased activity level can cause constipation in otherwise normal patients. If you feel this is occurring, take a laxative (Miralax, Milk of Magnesia, Ex-Lax, Senokot, etc.) until the problem has resolved.     6. PAIN MEDICATION: You will be given a prescription for pain medication at discharge. Take Oxycodone you already have  at home and fill tramadol at pharmacy if needed. Please take these as directed. It is important to remember not to take medications on an empty stomach as this may cause nausea.  ICE PACKS to groin 10-15 min every hour as needed for pain and swelling. 7    .CALL IF YOU HAVE: (1) Fevers to more than 101.0 F, (2) Unusual chest or leg pain, (3) Drainage or fluid from incision that may be foul smelling, increased tenderness or soreness at the wound or the wound edges are no longer together, redness or swelling at the incision site. Please do not hesitate to call with any other questions.     8. APPOINTMENT: Contact our office at [phone] for a follow-up appointment in 2-3 weeks following your procedure. If you have any additional questions, please do not hesitate to call the office and speak to either myself or the physician on call. Office address: [address] [address]  3    DIET: To avoid nausea, slowly advance diet as tolerated, avoiding spicy or greasy foods for the first day.  Add more substantial food to your diet according to your physician's instructions.  Babies can be fed formula or breast milk as soon as they are hungry.  INCREASE FLUIDS AND FIBER TO AVOID CONSTIPATION.    SURGICAL DRESSING/BATHING: see above    MEDICATIONS: Resume taking daily medication.  Take prescribed pain medication with food.  If no medication is prescribed, you may take non-aspirin pain medication if needed.  PAIN MEDICATION CAN BE VERY CONSTIPATING.  Take a stool softener or laxative such as senokot, pericolace, or milk of magnesia if needed.     Last pain medication given oxycodone at 9:30am.    A follow-up appointment should be arranged with your doctor in 1-2 weeks; call to schedule.    You should CALL YOUR PHYSICIAN if you develop:  Fever greater than 101 degrees F.  Pain not relieved by medication, or persistent nausea or vomiting.  Excessive bleeding (blood soaking through  dressing) or unexpected drainage from the wound.  Extreme redness or swelling around the incision site, drainage of pus or foul smelling drainage.  Inability to urinate or empty your bladder within 8 hours.  Problems with breathing or chest pain.    You should call 911 if you develop problems with breathing or chest pain.  If you are unable to contact your doctor or surgical center, you should go to the nearest emergency room or urgent care center.  Physician's telephone #: [phone]    MILD FLU-LIKE SYMPTOMS ARE NORMAL.  YOU MAY EXPERIENCE GENERALIZED MUSCLE ACHES, THROAT IRRITATION, HEADACHE AND/OR SOME NAUSEA.    If any questions arise, call your doctor.  If your doctor is not available, please feel free to call the Surgical Center at [phone].  The Center is open Monday through Friday from 7AM to 7PM.      A registered nurse may call you a few days after your surgery to see how you are doing after your procedure.    You may also receive a survey in the mail within the next two weeks and we ask that you take a few moments to complete the survey and return it to us.  Our goal is to provide you with very good care and we value your comments.     Depression / Suicide Risk    As you are discharged from this [facility] facility, it is important to learn how to keep safe from harming yourself.    Recognize the warning signs:  Abrupt changes in personality, positive or negative- including increase in energy   Giving away possessions  Change in eating patterns- significant weight changes-  positive or negative  Change in sleeping patterns- unable to sleep or sleeping all the time   Unwillingness or inability to communicate  Depression  Unusual sadness, discouragement and loneliness  Talk of wanting to die  Neglect of personal appearance   Rebelliousness- reckless behavior  Withdrawal from people/activities they love  Confusion- inability to concentrate     If you or a loved one observes any of these behaviors or  has concerns about self-harm, here's what you can do:  Talk about it- your feelings and reasons for harming yourself  Remove any means that you might use to hurt yourself (examples: pills, rope, extension cords, firearm)  Get professional help from the community (Mental Health, Substance Abuse, psychological counseling)  Do not be alone:Call your Safe Contact- someone whom you trust who will be there for you.  Call your local CRISIS HOTLINE 801-2449 or [phone]  Call your local Children's Mobile Crisis Response Team [place] (804) 897-6442 or www.L[employer]seek  Call the toll free National Suicide Prevention Hotlines   National Suicide Prevention Lifeline 499-195-SASX (2594)  National Hope Line Network 800-SUICIDE (812-7519)    I acknowledge receipt and understanding of these Home Care instructions.

## 2023-10-06 NOTE — ANESTHESIA TIME REPORT
Anesthesia Start and Stop Event Times     Date Time Event    10/6/2023 0749 Ready for Procedure     0833 Anesthesia Start     0923 Anesthesia Stop        Responsible Staff  10/06/23    Name Role Begin End    Edi Hale M.D. Anesth 0833 0923        Overtime Reason:  no overtime (within assigned shift)    Comments:

## 2023-10-06 NOTE — ANESTHESIA POSTPROCEDURE EVALUATION
Patient: German Pagan    Procedure Summary     Date: 10/06/23 Room / Location: [place] [address] / SURGERY [facility]    Anesthesia Start: 0833 Anesthesia Stop: 0923    Procedure: LAPAROSCOPIC BILATERAL INGUINAL HERNIA REPAIR WITH MESH (Bilateral: Abdomen) Diagnosis: (BILATERAL INGUINAL HERNIA)    Surgeons: Chris Hanks M.D. Responsible Provider: Edi Hale M.D.    Anesthesia Type: general ASA Status: 3          Final Anesthesia Type: general  Last vitals  BP   Blood Pressure : (!) 160/80    Temp   36.5 °C (97.7 °F)    Pulse   (!) 55   Resp   18    SpO2   95 %      Anesthesia Post Evaluation    Patient location during evaluation: PACU  Patient participation: complete - patient participated  Level of consciousness: awake and alert  Pain score: 3    Airway patency: patent  Anesthetic complications: no  Cardiovascular status: hemodynamically stable  Respiratory status: acceptable  Hydration status: euvolemic    PONV: none          No notable events documented.     Nurse Pain Score: 3 (NPRS)

## 2023-10-06 NOTE — OR NURSING
Patient arrived to unit at 1018. Patient is AOx4. Transferred to chair appropriately. Patient's pain post-op controlled though pt states he has bone cancer and experiences constant generalized pain. Declines pain medication at this time. Patient's dressings to abdomen are CDI x3. Incision sites covered with bandages. Patient voiding appropriately. Tolerating liquids at this time. Discharge instructions discussed with the patient. Patient denies any further questions at this time. Patient discharged home to responsible adult at 1046.

## 2023-10-06 NOTE — OP REPORT
NAME:  German Pagan  MRN:  [id number]  :  1955      DATE OF OPERATION: 10/6/2023    PREOPERATIVE DIAGNOSIS: Bilateral Inguinal Hernia    POSTOPERATIVE DIAGNOSIS: Bilateral  Inguinal Hernia    OPERATION PERFORMED: Laparoscopic repair of Bilateral  Inguinal Hernia with Mesh    SURGEON: Chris Hanks MD    ASSISTANT:  Jed Goodwin PA-C, PA-C    ANESTHESIOLOGIST:  Anesthesiologist: Edi Hale M.D.    ANESTHESIA: General endotracheal anesthesia.      SPECIMEN: None    ESTIMATED BLOOD LOSS: < 5cc.     INDICATIONS: The patient is a 68 y.o. male with a diagnosis of right groin bulge and smaller left groin bulge with Bilateral  inguinal hernia. He is taken to the operating room today for laparoscopic repair of Bilateral inguinal hernia with mesh.     PROCEDURE: Following informed consent, the patient was properly identified, taken to the operating room, and placed in the supine position where general endotracheal anesthesia was administered. Intravenous antibiotics were administered by the anesthesiologist in the correct time interval. Sequential compression devices were employed. The abdomen was prepped and draped into a sterile field.     A small infraumbilical skin incision was made and dissection was carried to the right of midline.  The anterior rectus abdominus fascia sheath was exposed and incised.  The preperitoneal plane was developed bluntly.  The CovReferBright dissecting trocar balloon instrument was then introduced into the preperitoneal space.  The hand pump was then used to create the preperitoneal dissection.    The balloon trocar was then removed and the CO2 insufflation and optical trocar was then advanced.  The CO2 insufflation was started and the laparoscope was introduced. This demonstrated a nice preperitoneal dissection.    Two additional 5mm trocars were then placed at midline just below the umbilicus.  Blunt dissectors were used to then dissect the right inguinal floor.  A moderately large  large sized indirect inguinal hernia was present with the hernia sac extending up through the inguinal canal.  The peritoneum was slowly reduced and gradually .  The cord lipoma was also reduced.  The vessels and structures were carefully protected and a nice inguinal floor dissection was accomplished.    The right contour mesh was soaked in Kantrex solution and was trimmed so that there was a generous fenestrated keyhole.  The mesh was then rolled and introduced into the preperitoneal space.  It was then maneuvered into position.  The mesh was then secured to the periosteum along Giancarlo's ligament and the pubic ramus with the absorbable tacking instrument.  It had very nice position obliterating the hernia defect without constriction of any structures.            Attention was then turned to the left side. Blunt dissectors were used to then dissect the left inguinal floor.  A moderately sized indirect inguinal hernia was present with the hernia sac extending up through the inguinal canal.  The peritoneum was slowly reduced and gradually .  The cord lipoma was also reduced.  The vessels and structures were carefully protected and a nice inguinal floor dissection was accomplished.    The left contour mesh was soaked in Kantrex solution and was trimmed so that there was a generous fenestrated keyhole.  The mesh was then rolled and introduced into the preperitoneal space.  It was then maneuvered into position.  The mesh was then secured to the periosteum along Giancarlo's ligament and the pubic ramus with the absorbable tacking instrument.  It had very nice position obliterating the hernia defect without constriction of any structures.        The mesh was then held into place with the pneumoperitoneum allowed to escape.  The port were then removed under direct vision.  The port sites were then irrigated well.  The fascia was closed with O Vicryl suture.  The port site skin incisions were closed with  interrupted 4-0 Vicryl subcuticular sutures.  Steri-Strips and Benzoin were applied beneath sterile Band-Aids.     The patient tolerated the procedure well and there were no apparent complications. All sponge, needle, and instrument counts were correct on 2 separate occasions. He was awakened, extubated, and transferred to the recovery room in satisfactory condition.       ____________________________________   Chris Hanks MD  DD: 10/6/2023  9:49 AM    CC:  [facility];

## 2023-10-07 LAB
BACTERIA BLD CULT: NORMAL
BACTERIA BLD CULT: NORMAL
SIGNIFICANT IND 70042: NORMAL
SIGNIFICANT IND 70042: NORMAL
SITE SITE: NORMAL
SITE SITE: NORMAL
SOURCE SOURCE: NORMAL
SOURCE SOURCE: NORMAL

## 2023-10-10 ENCOUNTER — APPOINTMENT (OUTPATIENT)
Dept: PHYSICAL THERAPY | Facility: REHABILITATION | Age: 68
End: 2023-10-10
Payer: COMMERCIAL

## 2023-10-11 ENCOUNTER — HOSPITAL ENCOUNTER (OUTPATIENT)
Dept: LAB | Facility: MEDICAL CENTER | Age: 68
End: 2023-10-11
Attending: NURSE PRACTITIONER
Payer: COMMERCIAL

## 2023-10-11 ENCOUNTER — HOSPITAL ENCOUNTER (OUTPATIENT)
Dept: HEMATOLOGY ONCOLOGY | Facility: MEDICAL CENTER | Age: 68
End: 2023-10-11
Attending: NURSE PRACTITIONER
Payer: COMMERCIAL

## 2023-10-11 VITALS
HEIGHT: 69 IN | WEIGHT: 166.34 LBS | HEART RATE: 95 BPM | OXYGEN SATURATION: 97 % | RESPIRATION RATE: 12 BRPM | DIASTOLIC BLOOD PRESSURE: 78 MMHG | BODY MASS INDEX: 24.64 KG/M2 | TEMPERATURE: 97.7 F | SYSTOLIC BLOOD PRESSURE: 138 MMHG

## 2023-10-11 DIAGNOSIS — M89.9 BONE LESION: ICD-10-CM

## 2023-10-11 PROBLEM — C79.51 METASTATIC CANCER TO BONE (HCC): Status: RESOLVED | Noted: 2023-10-06 | Resolved: 2023-10-11

## 2023-10-11 LAB — PSA SERPL-MCNC: 3.54 NG/ML (ref 0–4)

## 2023-10-11 PROCEDURE — 84155 ASSAY OF PROTEIN SERUM: CPT

## 2023-10-11 PROCEDURE — 99204 OFFICE O/P NEW MOD 45 MIN: CPT | Performed by: NURSE PRACTITIONER

## 2023-10-11 PROCEDURE — 36415 COLL VENOUS BLD VENIPUNCTURE: CPT

## 2023-10-11 PROCEDURE — 86334 IMMUNOFIX E-PHORESIS SERUM: CPT

## 2023-10-11 PROCEDURE — 84153 ASSAY OF PSA TOTAL: CPT

## 2023-10-11 PROCEDURE — 84165 PROTEIN E-PHORESIS SERUM: CPT

## 2023-10-11 PROCEDURE — 99212 OFFICE O/P EST SF 10 MIN: CPT | Performed by: NURSE PRACTITIONER

## 2023-10-11 PROCEDURE — 84156 ASSAY OF PROTEIN URINE: CPT

## 2023-10-11 PROCEDURE — 84166 PROTEIN E-PHORESIS/URINE/CSF: CPT

## 2023-10-11 PROCEDURE — 82784 ASSAY IGA/IGD/IGG/IGM EACH: CPT

## 2023-10-11 PROCEDURE — 86335 IMMUNFIX E-PHORSIS/URINE/CSF: CPT

## 2023-10-11 PROCEDURE — 83521 IG LIGHT CHAINS FREE EACH: CPT

## 2023-10-11 ASSESSMENT — ENCOUNTER SYMPTOMS
BACK PAIN: 1
DIAPHORESIS: 1
DEPRESSION: 0
NERVOUS/ANXIOUS: 0
HEADACHES: 0
SHORTNESS OF BREATH: 1
DIARRHEA: 0
PALPITATIONS: 0
COUGH: 1
VOMITING: 0
NAUSEA: 0
FEVER: 0
CHILLS: 1
WEIGHT LOSS: 1
CONSTIPATION: 1
DIZZINESS: 0

## 2023-10-11 ASSESSMENT — FIBROSIS 4 INDEX: FIB4 SCORE: 1.45

## 2023-10-11 ASSESSMENT — PAIN SCALES - GENERAL: PAINLEVEL: 4=SLIGHT-MODERATE PAIN

## 2023-10-11 NOTE — PROGRESS NOTES
Subjective     German Pagan is a 68 y.o. male who presents with Cancer (IOC/HTH/Metastatic cancer to bone)        HPI    Patient referred to me, Intake Oncology Coordinator by hospital team for lytic lesions.  Patient is accompanied by significant other for today's visit.    Patient presented to the emergency department with complaints of lower back pain 10/2/2023.  Patient also stated that he was having an aching feeling in his testicles, not associated with touch.  He stated that his back pain has been going on for approximately the last year.  Back in July 2023 the pain had worsened and it was presumed to be all muscle related.  He tried various things to relieve the pain with no luck.  He also try physical therapy which he believes made his pain worse.  He is in apparent pain in the office today.  He also mentioned that he now is experiencing pain in both hips and shoulders.  He also notes that he has been having night sweats nightly consistently for the last month.  He does have weight loss of approximately 15 pounds over the last 6 months or so.  He has very mild respiratory symptoms including a cough and shortness of breath.  He does have constipation which has been going on which may be related to his pain medications.  He does complain of chest wall pain that radiates from his lower back up and around the ribs, but denies any heart palpitations.  Lastly he does complain of difficulty with urination as he has difficulty initiating a stream.    During his ER visit on 10/2/2023 he did undergo a and ultrasound of the scrotum which was negative for any abnormalities.  Chest x-ray which was negative for any acute cardiopulmonary abnormalities.  And a CT abdomen and pelvis with contrast.  CT showed fat-containing bilateral inguinal hernias, right more than left with no incarceration.  He also had a 3.3 cm right adrenal nodule which is recommended to correlate with a nonemergent adrenal CT in the future.   Lastly he had lytic lesion seen in the right eighth rib, spine and pelvic bones, differentials include lytic metastasis versus myeloma.  I did personally review the imaging report and images in detail, and I reviewed the report in detail with the patient and his significant other today.    Since this scan patient has had a hernia repair surgery with Dr. Hanks just last week on 10/6/2023.    Patient also had labs completed during his hospital visit on 10/2/2023.  Interestingly he did have an elevated total protein and globulin level at 8.5 and 4.2 respectively.  He also had an elevated alkaline phosphatase at 112.  There is no evidence of anemia, hypercalcemia or kidney dysfunction noted.    Please see past medical and surgical history below.    Patient is a former smoker, quit in 2006.  He smoked for approximately 36 years.  He stated he smoked less than a pack per day.  He does currently smoke marijuana to help him sleep.  Patient does have a history of methamphetamine use, quit in 1999.  Patient also with a history of chewing tobacco use in which she recently quit last week.  Stated he used chewing tobacco for approximately 8 years.    Patient with a significant family history of cancer in his mother who had multiple myeloma.  Father who had colon cancer.  Sister with a history of breast cancer.  All 3 of his immediate family members have passed away from their disease.  He also has a maternal grandmother who had colon cancer.  And he mentioned that he has a cousin who had multiple myeloma as well.     Allergies   Allergen Reactions    Pcn [Penicillins]      Pt reports that it was a childhood allergie not sure what happens      Current Outpatient Medications on File Prior to Encounter   Medication Sig Dispense Refill    oxyCODONE HCl 7.5 MG Tab Take 7.5 mg by mouth 4 times a day.      ondansetron (ZOFRAN) 4 MG Tab tablet Take 1 Tablet by mouth every four hours as needed for Nausea/Vomiting for up to 30 days. 180  Tablet 0    Naloxone (NARCAN) 4 MG/0.1ML Liquid One spray in one nostril for overdose and call 911. 1 Each 0    oxyCODONE immediate release (ROXICODONE) 10 MG immediate release tablet Take 1 Tablet by mouth 3 times a day as needed (Pain) for up to 7 days. 21 Tablet 0    tizanidine (ZANAFLEX) 2 MG tablet Take 2 Tablets by mouth 3 times a day as needed (MUSCLE SPASM/UPPER BACK PAIN). 60 Tablet 1    pravastatin (PRAVACHOL) 10 MG Tab TAKE 1 TABLET BY MOUTH ONCE DAILY IN THE EVENING 90 Tablet 3    Cholecalciferol (VITAMIN D3) 125 MCG (5000 UT) Cap Take 5,000 Units by mouth every 48 hours.      methocarbamol (ROBAXIN) 750 MG Tab Take 1 Tablet by mouth 3 times a day as needed (Back Spasms). (Patient not taking: Reported on 10/11/2023) 90 Tablet 0    cloNIDine (CATAPRES) 0.1 MG Tab Take 1 Tablet by mouth every 6 hours as needed (Take for SBP greater than 160 and/or DBP>100). (Patient not taking: Reported on 10/11/2023) 60 Tablet 0    acetaminophen (TYLENOL) 500 MG Tab Take 1,000 mg by mouth every 6 hours as needed. Indications: Pain (Patient not taking: Reported on 10/11/2023)      sildenafil citrate (VIAGRA) 100 MG tablet Take 1 Tablet by mouth 1 time a day as needed for Erectile Dysfunction. (Patient not taking: Reported on 10/11/2023) 10 Tablet 3    aspirin (ASA) 325 MG Tab Take 1,300 mg by mouth every 6 hours as needed. Indications: Pain (Patient not taking: Reported on 10/11/2023)       No current facility-administered medications on file prior to encounter.     Past Medical History:   Diagnosis Date    Back pain 10/05/2023    groin    Bowel habit changes 10/05/2023    constipation    Bronchitis     as a teenager    Cataract 10/05/2023    no surgery    Dental disorder     dentures    Heart burn     Hepatitis A     treated    Hepatitis C 2016    treated    High cholesterol     Hypertension     Pneumonia     as a teenager     Family History   Problem Relation Age of Onset    Cancer Mother         Multiple Myeloma     Cancer Father         Colon and Lung    Cancer Sister         Breast    Diabetes Brother     Cancer Maternal Grandmother         Colon    Colon Cancer Maternal Grandmother     No Known Problems Daughter     No Known Problems Daughter     No Known Problems Son      Social History     Socioeconomic History    Marital status: Single   Tobacco Use    Smoking status: Former     Current packs/day: 0.00     Average packs/day: 0.5 packs/day for 36.0 years (18.0 ttl pk-yrs)     Types: Cigarettes     Start date:      Quit date:      Years since quittin.7    Smokeless tobacco: Former     Types: Chew     Quit date: 10/3/2023    Tobacco comments:     Quit smoking in  - just less than 1 ppd. Used chewing tobacco for about 8 years.    Vaping Use    Vaping Use: Never used   Substance and Sexual Activity    Alcohol use: Not Currently     Comment: Quit in     Drug use: Not Currently     Types: Methamphetamines, Marijuana, Inhaled     Comment: Quit in , used methamphetamine    Sexual activity: Yes     Partners: Female     Comment: committed relationship.   Social History Narrative    Works for the [employer]         Review of Systems   Constitutional:  Positive for chills (tends to get cold often), diaphoresis (nightly for about a month), malaise/fatigue and weight loss (15 pounds over 6 months or so). Negative for fever.   Respiratory:  Positive for cough and shortness of breath.         Mild respiratory symptoms   Cardiovascular:  Positive for chest pain (chest wall pain - up the rib cage). Negative for palpitations.   Gastrointestinal:  Positive for constipation (likely from pain meds). Negative for diarrhea, nausea and vomiting.   Genitourinary:  Negative for dysuria.        Inconsistent stream    Musculoskeletal:  Positive for back pain and joint pain (bilateral hips and shoulders).   Skin:  Negative for itching and rash.        Itching only noted with pain medications   Neurological:   "Negative for dizziness and headaches.   Psychiatric/Behavioral:  Negative for depression. The patient is not nervous/anxious.               Objective     /78   Pulse 95   Temp 36.5 °C (97.7 °F)   Resp 12   Ht 1.76 m (5' 9.29\")   Wt 75.4 kg (166 lb 5.4 oz)   SpO2 97%   BMI 24.36 kg/m²      Physical Exam  Vitals reviewed.   Constitutional:       General: He is not in acute distress.     Appearance: Normal appearance. He is not diaphoretic.   HENT:      Head: Normocephalic and atraumatic.   Cardiovascular:      Rate and Rhythm: Normal rate and regular rhythm.      Heart sounds: Normal heart sounds. No murmur heard.     No friction rub. No gallop.   Pulmonary:      Effort: Pulmonary effort is normal. No respiratory distress.      Breath sounds: Normal breath sounds. No wheezing.   Abdominal:      General: Bowel sounds are normal. There is no distension.      Palpations: Abdomen is soft.      Tenderness: There is no abdominal tenderness.   Musculoskeletal:         General: Tenderness (lower back, bilateral hips and shoulder pain) present.   Skin:     General: Skin is warm and dry.   Neurological:      Mental Status: He is alert and oriented to person, place, and time.   Psychiatric:         Mood and Affect: Mood normal.         Behavior: Behavior normal.               DX-CHEST-PORTABLE (1 VIEW)    Result Date: 10/2/2023  10/2/2023 4:47 PM HISTORY/REASON FOR EXAM:  eval for lung mass Evaluation for lung mass TECHNIQUE/EXAM DESCRIPTION AND NUMBER OF VIEWS: Single portable view of the chest. COMPARISON: 7/26/23 FINDINGS: No pulmonary infiltrates or consolidations are noted. No pleural effusion. No pneumothorax. Stable cardiopericardial silhouette.     1. No acute cardiopulmonary abnormalities are identified.    [id number]    Result Date: 10/2/2023  10/2/2023 2:55 PM HISTORY/REASON FOR EXAM: Testicle Pain. TECHNIQUE/EXAM DESCRIPTION: Real-time sonography of the scrotum was performed with gray-scale, " color and duplex Doppler imaging. COMPARISON: None FINDINGS: The right testis measures 5.29 cm x 2.30 cm x 3.26 cm. Normal in size and echotexture. Normal vascularity on color Doppler. No intratesticular mass. The left testis measures 4.68 cm x 1.95 cm x 3.48 cm. Normal in size and echotexture. Normal vascularity on color Doppler. No intratesticular mass. Vascular flow is symmetric. Appearance of the epididymides are within normal limits. Small left hydrocele. Left scrotolith. No varicocele is detected. Fat-containing bilateral inguinal hernias.     1.  No testicular mass or inflammation identified.    CT-ABDOMEN-PELVIS WITH    Result Date: 10/2/2023  10/2/2023 4:14 PM HISTORY/REASON FOR EXAM:  Pain; IV contrast only--eval for incarcerated inguinal hernia. TECHNIQUE/EXAM DESCRIPTION:   CT scan of the abdomen and pelvis with contrast. Contrast-enhanced helical scanning was obtained from the diaphragmatic domes through the pubic symphysis following the bolus administration of nonionic contrast without complication. 100 mL of Omnipaque 350 nonionic contrast was administered without complication. Low dose optimization technique was utilized for this CT exam including automated exposure control and adjustment of the mA and/or kV according to patient size. COMPARISON: No prior studies available. FINDINGS: Lower Chest: Lytic lesions seen in the right 8th rib. Liver: Normal. Spleen: Unremarkable. Pancreas: Unremarkable. Gallbladder: No calcified stones. Biliary: There is no biliary dilatation. Adrenal glands: There is a 3.3 cm right adrenal nodule. Kidneys: No hydronephrosis. Bilateral kidneys are enhancing symmetrically.. Bowel: There is no bowel wall thickening or abnormal dilatation. Lymph nodes: No adenopathy. Vasculature: The abdominal aorta is normal in caliber. Peritoneum: Unremarkable without ascites. Musculoskeletal: Multiple lytic lesions seen throughout the lumbar spine and pelvic bone Pelvis: Fat-containing  bilateral inguinal hernias, right more than left. No pelvic free fluid. Unremarkable urinary bladder.     1. Fat-containing bilateral inguinal hernias, right more than left. No incarceration. 2. Lytic lesion seen in the right eighth rib, spine and pelvic bones, concerning for myeloma or lytic metastasis. 3. There is a 3.3 cm right adrenal nodule. Correlate with nonemergent adrenal CT        Latest Reference Range & Units 10/02/23 14:15   WBC 4.8 - 10.8 K/uL 12.0 (H)   RBC 4.70 - 6.10 M/uL 5.60   Hemoglobin 14.0 - 18.0 g/dL 16.5   Hematocrit 42.0 - 52.0 % 49.1   MCV 81.4 - 97.8 fL 87.7   MCH 27.0 - 33.0 pg 29.5   MCHC 32.3 - 36.5 g/dL 33.6   RDW 35.9 - 50.0 fL 42.5   Platelet Count 164 - 446 K/uL 317   MPV 9.0 - 12.9 fL 10.5   Neutrophils-Polys 44.00 - 72.00 % 75.80 (H)   Neutrophils (Absolute) 1.82 - 7.42 K/uL 9.09 (H)   Lymphocytes 22.00 - 41.00 % 15.80 (L)   Lymphs (Absolute) 1.00 - 4.80 K/uL 1.89   Monocytes 0.00 - 13.40 % 6.70   Monos (Absolute) 0.00 - 0.85 K/uL 0.80   Eosinophils 0.00 - 6.90 % 0.40   Eos (Absolute) 0.00 - 0.51 K/uL 0.05   Basophils 0.00 - 1.80 % 0.60   Baso (Absolute) 0.00 - 0.12 K/uL 0.07   Immature Granulocytes 0.00 - 0.90 % 0.70   Immature Granulocytes (abs) 0.00 - 0.11 K/uL 0.08   Nucleated RBC 0.00 - 0.20 /100 WBC 0.00   NRBC (Absolute) K/uL 0.00   Sodium 135 - 145 mmol/L 135   Potassium 3.6 - 5.5 mmol/L 4.2   Chloride 96 - 112 mmol/L 100   Co2 20 - 33 mmol/L 20   Anion Gap 7.0 - 16.0  15.0   Glucose 65 - 99 mg/dL 95   Bun 8 - 22 mg/dL 16   Creatinine 0.50 - 1.40 mg/dL 0.86   GFR (CKD-EPI) >60 mL/min/1.73 m 2 93   Calcium 8.4 - 10.2 mg/dL 10.1   Correct Calcium 8.5 - 10.5 mg/dL 9.9   AST(SGOT) 12 - 45 U/L 41   ALT(SGPT) 2 - 50 U/L 39   Alkaline Phosphatase 30 - 99 U/L 112 (H)   Total Bilirubin 0.1 - 1.5 mg/dL 0.3   Albumin 3.2 - 4.9 g/dL 4.3   Total Protein 6.0 - 8.2 g/dL 8.5 (H)   Globulin 1.9 - 3.5 g/dL 4.2 (H)   A-G Ratio g/dL 1.0   Lactic Acid 0.5 - 2.0 mmol/L 1.4               Assessment & Plan       1. Bone lesion  PROSTATE SPECIFIC AG DIAGNOSTIC    Monoclonal Protein and FLC, Serum    Monoclonal Protein, Ur (24 hr or Random)              1. Patient with concerning lytic lesions on the right eighth rib, spine and pelvic bones seen on most recent CT abdomen and pelvis with contrast.  Findings are suspicious for malignancy, metastatic lesions versus multiple myeloma.  I will start off with lab work for further evaluation that we will then determine the next best step.    Therefore, I have requested a diagnostic PSA lab be completed as well as monoclonal protein studies including SPEP, immunoglobulins, immunofixation, serum light chains as well as a UPEP.  Based on these labs we will then determine what the next step would be in confirming or ruling out malignancy.  I will contact patient via phone with lab results once completed.    2.  Patient did have AN adrenal nodule on the right measuring 3.3 cm which was nonspecific.  He will need further evaluation but that may be done during his further work-up, or he may need a CT abdomen without contrast in the future.  This will be addressed once labs have been completed.    Patient did verbalize understanding is agreement with the plan discussed above.      Please note that this dictation was created using voice recognition software. I have made every reasonable attempt to correct obvious errors, but I expect that there are errors of grammar and possibly content that I did not discover before finalizing the note.

## 2023-10-13 PROBLEM — K59.03 DRUG-INDUCED CONSTIPATION: Status: ACTIVE | Noted: 2023-10-13

## 2023-10-13 PROBLEM — G89.18 POSTOPERATIVE ABDOMINAL PAIN: Status: ACTIVE | Noted: 2023-10-13

## 2023-10-13 PROBLEM — G89.3 PAIN FROM BONE METASTASES (HCC): Status: ACTIVE | Noted: 2023-10-13

## 2023-10-13 PROBLEM — R39.12 WEAK URINE STREAM: Status: ACTIVE | Noted: 2023-10-13

## 2023-10-13 PROBLEM — C79.51 PAIN FROM BONE METASTASES (HCC): Status: ACTIVE | Noted: 2023-10-13

## 2023-10-13 PROBLEM — R10.9 POSTOPERATIVE ABDOMINAL PAIN: Status: ACTIVE | Noted: 2023-10-13

## 2023-10-14 LAB
ALBUMIN SERPL ELPH-MCNC: 3.75 G/DL (ref 3.75–5.01)
ALPHA1 GLOB SERPL ELPH-MCNC: 0.48 G/DL (ref 0.19–0.46)
ALPHA2 GLOB SERPL ELPH-MCNC: 1.42 G/DL (ref 0.48–1.05)
B-GLOBULIN SERPL ELPH-MCNC: 0.8 G/DL (ref 0.48–1.1)
EER MONOCLONAL PROTEIN AND FLC, SERUM Q5224: ABNORMAL
GAMMA GLOB SERPL ELPH-MCNC: 0.85 G/DL (ref 0.62–1.51)
IGA SERPL-MCNC: 105 MG/DL (ref 68–408)
IGG SERPL-MCNC: 887 MG/DL (ref 768–1632)
IGM SERPL-MCNC: 66 MG/DL (ref 35–263)
INTERPRETATION SERPL IFE-IMP: ABNORMAL
INTERPRETATION SERPL IFE-IMP: ABNORMAL
KAPPA LC FREE SER-MCNC: 38.96 MG/L (ref 3.3–19.4)
KAPPA LC FREE/LAMBDA FREE SER NEPH: 2.5 {RATIO} (ref 0.26–1.65)
LAMBDA LC FREE SERPL-MCNC: 15.57 MG/L (ref 5.71–26.3)
MONOCLONAL PROTEIN NL11656: 0.3 G/DL
PROT SERPL-MCNC: 7.3 G/DL (ref 6.3–8.2)

## 2023-10-16 ENCOUNTER — TELEPHONE (OUTPATIENT)
Dept: HEMATOLOGY ONCOLOGY | Facility: MEDICAL CENTER | Age: 68
End: 2023-10-16
Payer: COMMERCIAL

## 2023-10-16 DIAGNOSIS — D47.2 MONOCLONAL GAMMOPATHY OF UNDETERMINED SIGNIFICANCE: ICD-10-CM

## 2023-10-16 DIAGNOSIS — M89.9 BONE LESION: ICD-10-CM

## 2023-10-16 DIAGNOSIS — D47.2 IGG MONOCLONAL GAMMOPATHY: ICD-10-CM

## 2023-10-17 ENCOUNTER — APPOINTMENT (OUTPATIENT)
Dept: PHYSICAL THERAPY | Facility: REHABILITATION | Age: 68
End: 2023-10-17
Payer: COMMERCIAL

## 2023-10-17 LAB
ALBUMIN 24H MFR UR ELPH: 25.8 %
ALPHA1 GLOB 24H MFR UR ELPH: 20.8 %
ALPHA2 GLOB 24H MFR UR ELPH: 22.4 %
B-GLOBULIN 24H MFR UR ELPH: 20.9 %
COLLECT DURATION TIME SPEC: NORMAL HRS
EER MONOCLONAL PROTEIN STUDY, 24 HOUR U Q5964: NORMAL
GAMMA GLOB 24H MFR UR ELPH: 10.1 %
INTERPRETATION UR IFE-IMP: NORMAL
M PROTEIN 24H MFR UR ELPH: 4 %
M PROTEIN 24H UR ELPH-MRATE: NORMAL MG/24 HRS
PROT 24H UR-MRATE: NORMAL MG/D (ref 40–150)
PROT UR-MCNC: 18 MG/DL
SPECIMEN VOL ?TM UR: NORMAL ML

## 2023-10-17 NOTE — TELEPHONE ENCOUNTER
Patient originally seen back on 10/11/2023 after referral from hospital team for lytic lesions. Patient presented to the emergency department with complaints of lower back pain 10/2/2023.  During his ER visit on 10/2/2023 he did undergo a and ultrasound of the scrotum which was negative for any abnormalities.  Chest x-ray which was negative for any acute cardiopulmonary abnormalities.  And a CT abdomen and pelvis with contrast.  CT showed fat-containing bilateral inguinal hernias, right more than left with no incarceration.  He also had a 3.3 cm right adrenal nodule which is recommended to correlate with a nonemergent adrenal CT in the future.  Lastly he had lytic lesion seen in the right eighth rib, spine and pelvic bones, differentials include lytic metastasis versus myeloma.    In these findings I requested that patient undergo labs including multiple myeloma labs as well as a PSA.    On 10/11/2023 patient did have an SPEP which showed a monoclonal spike in the gamma region with the immunofixation gel pattern showing an IgG type kappa monoclonal protein.  Immunoglobulins A, G and M are all within normal limits.  He did have an elevation of the kappa light chain with an elevated light chain ratio at 2.50.  Patient also had a PSA that had trended up from his last 1 in 2021 which was now at 3.54.    I did personally contact the patient to discuss the results on the phone.  I discussed with him that the SPEP and immunofixation labs did show a monoclonal spike making this more suspicious for a possible to myeloma however the labs were not overwhelmingly abnormal.  We discussed the possibility of a bone marrow biopsy versus moving forward with a biopsy of one of the lytic lesions.  Therefore I will go ahead and proceed with a PET/CT for further evaluation as it is warranted with the diagnosis of MGUS which patient does have now at this time.  Based on the PET/CT findings with then determine whether the next best step  would be a bone marrow biopsy versus biopsy of one of the lytic lesions versus biopsying something that is shown on imaging concerning.  Patient will follow-up with me in the clinic to review the results of the PET/CT and discuss further plan of care at that time.       Latest Reference Range & Units 10/11/23 11:19   Albumin 3.75 - 5.01 g/dL 3.75   Gamma Globulin 0.62 - 1.51 g/dL 0.85   Alpha-1 Globulin 0.19 - 0.46 g/dL 0.48 (H)   Alpha-2 Globulin 0.48 - 1.05 g/dL 1.42 (H)   Beta Globulin 0.48 - 1.10 g/dL 0.80   Free Kappa Light Chains 3.30 - 19.40 mg/L 38.96 (H)   Free Lambda Light Chains 5.71 - 26.30 mg/L 15.57   Kappa-Lambda Ratio 0.26 - 1.65  2.50 (H)   Immunofixation  BAN Done   Monoclonal Protein g/dL 0.30      Latest Reference Range & Units 10/11/23 11:19   Immunoglobulin A 68 - 408 mg/dL 105   Immunoglobulin G 768 - 1632 mg/dL 887   Immunoglobulin M 35 - 263 mg/dL 66          Latest Reference Range & Units 10/11/23 11:19   Prostatic Specific Antigen Tot 0.00 - 4.00 ng/mL 3.54         1. Bone lesion  CT-PETCT-WHOLE BODY      2. IgG monoclonal gammopathy  CT-PETCT-WHOLE BODY

## 2023-10-18 ENCOUNTER — TELEPHONE (OUTPATIENT)
Dept: HEMATOLOGY ONCOLOGY | Facility: MEDICAL CENTER | Age: 68
End: 2023-10-18
Payer: COMMERCIAL

## 2023-10-18 ENCOUNTER — HOSPITAL ENCOUNTER (OUTPATIENT)
Dept: RADIOLOGY | Facility: MEDICAL CENTER | Age: 68
End: 2023-10-18
Attending: NURSE PRACTITIONER
Payer: COMMERCIAL

## 2023-10-18 DIAGNOSIS — R77.9 ABNORMALITY OF PLASMA PROTEIN: ICD-10-CM

## 2023-10-18 DIAGNOSIS — D47.2 IGG MONOCLONAL GAMMOPATHY: ICD-10-CM

## 2023-10-18 DIAGNOSIS — R94.8 ABNORMAL POSITRON EMISSION TOMOGRAPHY (PET) SCAN: ICD-10-CM

## 2023-10-18 PROCEDURE — A9552 F18 FDG: HCPCS

## 2023-10-18 NOTE — TELEPHONE ENCOUNTER
Patient originally seen back on 10/11/2023 after referral from hospital team for lytic lesions. Patient presented to the emergency department with complaints of lower back pain 10/2/2023.  During his ER visit on 10/2/2023 he did undergo a and ultrasound of the scrotum which was negative for any abnormalities.  Chest x-ray which was negative for any acute cardiopulmonary abnormalities.  And a CT abdomen and pelvis with contrast.  CT showed fat-containing bilateral inguinal hernias, right more than left with no incarceration.  He also had a 3.3 cm right adrenal nodule which is recommended to correlate with a nonemergent adrenal CT in the future.  Lastly he had lytic lesion seen in the right eighth rib, spine and pelvic bones, differentials include lytic metastasis versus myeloma.     In these findings I requested that patient undergo labs including multiple myeloma labs as well as a PSA.     On 10/11/2023 patient did have an SPEP which showed a monoclonal spike in the gamma region with the immunofixation gel pattern showing an IgG type kappa monoclonal protein.  Immunoglobulins A, G and M are all within normal limits.  He did have an elevation of the kappa light chain with an elevated light chain ratio at 2.50.  Patient also had a PSA that had trended up from his last one in 2021 which was now at 3.54.    On 10/11/23 she also had a UPEP which did show monoclonal spike in the gamma region with the urine immunofixation being positive for monoclonal free kappa light chains.  There was an additional faint band detected in IgG.    Based on these findings I did recommend the patient undergo a PET/CT for further evaluation to determine whether bone marrow biopsy would be warranted versus biopsy of the lytic lesion versus biopsy of another mass if indicated.  Patient completed the CT on 10/18/2023, today which does show multiple lytic lesions throughout the axial and proximal appendicular skeleton with associated increased  activity on PET scan consistent with multiple myeloma.  There is also some increased activity corresponding with normal size left mediastinal and hilar node and there was also bilateral adrenal nodules with moderate activity.  I did review the PET/CT reports in detail and reviewed them with Dr. Ramirez.    Contacted the patient letting him know the results, and recommendation is to proceed with bone marrow biopsy has multiple myeloma is high in the differential.  Patient did verbalize understanding's and agreed with the plan to proceed with bone marrow biopsy.  This is currently scheduled for this coming Friday, 10/20/2023.  I will have patient follow-up with me in the clinic the following week on 10/25/2023 to review the results and discuss further plan of care.    In preparation of bone marrow biopsy patient in need for a CBC within 24 hours.  I have requested CBC be completed tomorrow.  I have also requested a 82 microglobulin level as well.        CT-PETCT-WHOLE BODY    Result Date: 10/18/2023  10/18/2023 8:54 AM HISTORY/REASON FOR EXAM:  Monoclonal gammopathy.  Lytic bone lesions, concern for multiple myeloma. TECHNIQUE/EXAM DESCRIPTION AND NUMBER OF VIEWS: PET body imaging. Initially, 11.29 mCi F-18 FDG was administered intravenously under standardized conditions. Approximately 45 minutes after FDG administration, the patient was placed in the supine position on the PET CT table. Blood glucose level was 94 mg/dL. Low dose spiral CT imaging from the cranial vertex through the feet was performed. Whole body PET imaging was then performed from the cranial vertex through the feet. CT images, PET images, and PET/CT fused images were reviewed on a PACS 3D workstation. The limited non-contrast CT data are used primarily for attenuation correction and anatomic correlation.  Evaluation of solid organs and bowel are especially limited utilizing this technique. COMPARISON: CT abdomen and pelvis 10/2/2023 FINDINGS: Head and  neck: No abnormal focal FDG activity. Chest: No abnormal activity in the lungs.  Increased activity within normal size prevascular and LEFT hilar lymph nodes, max SUV 5.87. Abdomen and pelvis: No abnormal focal FDG activity. Musculoskeletal: Multiple foci of increased activity in the spine, proximal humeri, bilateral ribs, LEFT scapula, sternum, pelvis, and proximal femurs. Lower extremities:  No additional foci of increased activity. Incidental findings on CT: Multiple lytic bone lesions.  Expansile lesion in the LEFT posterior 9th rib distorts the underlying pleura.  RIGHT adrenal nodule measuring 3.5 x 3.4 cm with mildly increased activity, max SUV 4.74.  LEFT adrenal nodule measuring 1.6 cm with mildly increased activity, max SUV 3.71.     1.  Multiple lytic lesions throughout the axial and proximal appendicular skeleton with associated increased activity on PET imaging, most consistent with multiple myeloma. 2.  Increased activity corresponding with normal size LEFT mediastinal and hilar lymph nodes, potentially neoplastic. 3.  Bilateral adrenal nodules, larger on the RIGHT, with moderate activity, benign versus neoplastic process.    DX-CHEST-PORTABLE (1 VIEW)    Result Date: 10/2/2023  10/2/2023 4:47 PM HISTORY/REASON FOR EXAM:  eval for lung mass Evaluation for lung mass TECHNIQUE/EXAM DESCRIPTION AND NUMBER OF VIEWS: Single portable view of the chest. COMPARISON: 7/26/23 FINDINGS: No pulmonary infiltrates or consolidations are noted. No pleural effusion. No pneumothorax. Stable cardiopericardial silhouette.     1. No acute cardiopulmonary abnormalities are identified.    [id number]    Result Date: 10/2/2023  10/2/2023 2:55 PM HISTORY/REASON FOR EXAM: Testicle Pain. TECHNIQUE/EXAM DESCRIPTION: Real-time sonography of the scrotum was performed with gray-scale, color and duplex Doppler imaging. COMPARISON: None FINDINGS: The right testis measures 5.29 cm x 2.30 cm x 3.26 cm. Normal in size and  echotexture. Normal vascularity on color Doppler. No intratesticular mass. The left testis measures 4.68 cm x 1.95 cm x 3.48 cm. Normal in size and echotexture. Normal vascularity on color Doppler. No intratesticular mass. Vascular flow is symmetric. Appearance of the epididymides are within normal limits. Small left hydrocele. Left scrotolith. No varicocele is detected. Fat-containing bilateral inguinal hernias.     1.  No testicular mass or inflammation identified.    CT-ABDOMEN-PELVIS WITH    Result Date: 10/2/2023  10/2/2023 4:14 PM HISTORY/REASON FOR EXAM:  Pain; IV contrast only--eval for incarcerated inguinal hernia. TECHNIQUE/EXAM DESCRIPTION:   CT scan of the abdomen and pelvis with contrast. Contrast-enhanced helical scanning was obtained from the diaphragmatic domes through the pubic symphysis following the bolus administration of nonionic contrast without complication. 100 mL of Omnipaque 350 nonionic contrast was administered without complication. Low dose optimization technique was utilized for this CT exam including automated exposure control and adjustment of the mA and/or kV according to patient size. COMPARISON: No prior studies available. FINDINGS: Lower Chest: Lytic lesions seen in the right 8th rib. Liver: Normal. Spleen: Unremarkable. Pancreas: Unremarkable. Gallbladder: No calcified stones. Biliary: There is no biliary dilatation. Adrenal glands: There is a 3.3 cm right adrenal nodule. Kidneys: No hydronephrosis. Bilateral kidneys are enhancing symmetrically.. Bowel: There is no bowel wall thickening or abnormal dilatation. Lymph nodes: No adenopathy. Vasculature: The abdominal aorta is normal in caliber. Peritoneum: Unremarkable without ascites. Musculoskeletal: Multiple lytic lesions seen throughout the lumbar spine and pelvic bone Pelvis: Fat-containing bilateral inguinal hernias, right more than left. No pelvic free fluid. Unremarkable urinary bladder.     1. Fat-containing bilateral  inguinal hernias, right more than left. No incarceration. 2. Lytic lesion seen in the right eighth rib, spine and pelvic bones, concerning for myeloma or lytic metastasis. 3. There is a 3.3 cm right adrenal nodule. Correlate with nonemergent adrenal CT         1. IgG monoclonal gammopathy  CBC WITH DIFFERENTIAL    BETA-2-MICROGLOBULIN      2. Abnormality of plasma protein  CBC WITH DIFFERENTIAL    BETA-2-MICROGLOBULIN      3. Abnormal positron emission tomography (PET) scan  CBC WITH DIFFERENTIAL    BETA-2-MICROGLOBULIN

## 2023-10-19 ENCOUNTER — HOSPITAL ENCOUNTER (OUTPATIENT)
Dept: LAB | Facility: MEDICAL CENTER | Age: 68
End: 2023-10-19
Attending: NURSE PRACTITIONER
Payer: COMMERCIAL

## 2023-10-19 ENCOUNTER — HOSPITAL ENCOUNTER (OUTPATIENT)
Dept: LAB | Facility: MEDICAL CENTER | Age: 68
End: 2023-10-19
Attending: PHYSICIAN ASSISTANT
Payer: COMMERCIAL

## 2023-10-19 DIAGNOSIS — R94.8 ABNORMAL POSITRON EMISSION TOMOGRAPHY (PET) SCAN: ICD-10-CM

## 2023-10-19 DIAGNOSIS — R77.9 ABNORMALITY OF PLASMA PROTEIN: ICD-10-CM

## 2023-10-19 DIAGNOSIS — D47.2 IGG MONOCLONAL GAMMOPATHY: ICD-10-CM

## 2023-10-19 LAB
BASOPHILS # BLD AUTO: 0.6 % (ref 0–1.8)
BASOPHILS # BLD: 0.06 K/UL (ref 0–0.12)
EOSINOPHIL # BLD AUTO: 0.06 K/UL (ref 0–0.51)
EOSINOPHIL NFR BLD: 0.6 % (ref 0–6.9)
ERYTHROCYTE [DISTWIDTH] IN BLOOD BY AUTOMATED COUNT: 40.6 FL (ref 35.9–50)
HCT VFR BLD AUTO: 45.1 % (ref 42–52)
HGB BLD-MCNC: 14.6 G/DL (ref 14–18)
IMM GRANULOCYTES # BLD AUTO: 0.06 K/UL (ref 0–0.11)
IMM GRANULOCYTES NFR BLD AUTO: 0.6 % (ref 0–0.9)
LYMPHOCYTES # BLD AUTO: 1.39 K/UL (ref 1–4.8)
LYMPHOCYTES NFR BLD: 13.3 % (ref 22–41)
MCH RBC QN AUTO: 28.9 PG (ref 27–33)
MCHC RBC AUTO-ENTMCNC: 32.4 G/DL (ref 32.3–36.5)
MCV RBC AUTO: 89.1 FL (ref 81.4–97.8)
MONOCYTES # BLD AUTO: 0.57 K/UL (ref 0–0.85)
MONOCYTES NFR BLD AUTO: 5.5 % (ref 0–13.4)
NEUTROPHILS # BLD AUTO: 8.28 K/UL (ref 1.82–7.42)
NEUTROPHILS NFR BLD: 79.4 % (ref 44–72)
NRBC # BLD AUTO: 0 K/UL
NRBC BLD-RTO: 0 /100 WBC (ref 0–0.2)
PLATELET # BLD AUTO: 332 K/UL (ref 164–446)
PMV BLD AUTO: 10.7 FL (ref 9–12.9)
PSA SERPL-MCNC: 1.94 NG/ML (ref 0–4)
RBC # BLD AUTO: 5.06 M/UL (ref 4.7–6.1)
WBC # BLD AUTO: 10.4 K/UL (ref 4.8–10.8)

## 2023-10-19 PROCEDURE — 84153 ASSAY OF PSA TOTAL: CPT | Mod: GA

## 2023-10-19 PROCEDURE — 82232 ASSAY OF BETA-2 PROTEIN: CPT

## 2023-10-19 PROCEDURE — 36415 COLL VENOUS BLD VENIPUNCTURE: CPT

## 2023-10-19 PROCEDURE — 85025 COMPLETE CBC W/AUTO DIFF WBC: CPT

## 2023-10-20 ENCOUNTER — HOSPITAL ENCOUNTER (OUTPATIENT)
Dept: HEMATOLOGY ONCOLOGY | Facility: MEDICAL CENTER | Age: 68
End: 2023-10-20
Attending: NURSE PRACTITIONER
Payer: COMMERCIAL

## 2023-10-20 ENCOUNTER — HOSPITAL ENCOUNTER (OUTPATIENT)
Facility: MEDICAL CENTER | Age: 68
End: 2023-10-20
Attending: INTERNAL MEDICINE
Payer: COMMERCIAL

## 2023-10-20 VITALS
HEART RATE: 100 BPM | SYSTOLIC BLOOD PRESSURE: 153 MMHG | DIASTOLIC BLOOD PRESSURE: 85 MMHG | TEMPERATURE: 97 F | RESPIRATION RATE: 18 BRPM | OXYGEN SATURATION: 97 %

## 2023-10-20 DIAGNOSIS — D47.2 IGG MONOCLONAL GAMMOPATHY: ICD-10-CM

## 2023-10-20 DIAGNOSIS — R94.8 ABNORMAL POSITRON EMISSION TOMOGRAPHY (PET) SCAN: ICD-10-CM

## 2023-10-20 DIAGNOSIS — R77.9 ABNORMALITY OF PLASMA PROTEIN: ICD-10-CM

## 2023-10-20 DIAGNOSIS — C79.51 METASTASIS TO BONE (HCC): ICD-10-CM

## 2023-10-20 LAB — PATHOLOGY CONSULT NOTE: NORMAL

## 2023-10-20 PROCEDURE — 88369 M/PHMTRC ALYSISHQUANT/SEMIQ: CPT

## 2023-10-20 PROCEDURE — 38222 DX BONE MARROW BX & ASPIR: CPT | Performed by: NURSE PRACTITIONER

## 2023-10-20 PROCEDURE — 88341 IMHCHEM/IMCYTCHM EA ADD ANTB: CPT | Mod: XU

## 2023-10-20 PROCEDURE — 88342 IMHCHEM/IMCYTCHM 1ST ANTB: CPT | Mod: XU

## 2023-10-20 PROCEDURE — 88313 SPECIAL STAINS GROUP 2: CPT | Mod: 91

## 2023-10-20 PROCEDURE — 88305 TISSUE EXAM BY PATHOLOGIST: CPT

## 2023-10-20 PROCEDURE — 88311 DECALCIFY TISSUE: CPT

## 2023-10-20 PROCEDURE — 88368 INSITU HYBRIDIZATION MANUAL: CPT

## 2023-10-20 PROCEDURE — 88360 TUMOR IMMUNOHISTOCHEM/MANUAL: CPT

## 2023-10-20 PROCEDURE — 38222 DX BONE MARROW BX & ASPIR: CPT | Mod: RT | Performed by: NURSE PRACTITIONER

## 2023-10-20 ASSESSMENT — PAIN SCALES - GENERAL
PAINLEVEL: 4=SLIGHT-MODERATE PAIN
PAINLEVEL: 4=SLIGHT-MODERATE PAIN

## 2023-10-20 NOTE — PROGRESS NOTES
German Pagan presents for bone marrow biopsy to r/o multiple myeloma.    Procedure completed by ADWOA Negrete.  Precepting provider: Dr. Ramirez    See procedure note      1. Metastasis to bone (HCC)  Consent for Non-Surgery w/o Sedation      2. IgG monoclonal gammopathy  Consent for Non-Surgery w/o Sedation

## 2023-10-20 NOTE — PROCEDURES
[facility] - Hematology Oncology   Bone Marrow Biopsy/Aspiration Outpatient Procedure      Date/Time:      10/20/23 /  0936  Patient Name: German Pagan    Performed by: JESSICA NegretePNaveenRCATALINA    Consent:  Consent obtained:       written and verbal  Consent given by:       Patient  Risks discussed:        Bleeding, infection, pain    Universal Protocol:  Procedure explained and questions answered to patient or proxy's satisfaction: yes  Test results available and properly labeled: yes  Immediately prior to procedure a time out was called: yes    Site/side marked: yes    Patient identity confirmed:  Verbally with patient     Pre-procedure details:  Procedure type:          Bone Marrow and Aspirate  Requesting Provider:  TRI Wagner for IOC  Indications:                  R/O Multiple Myeloma     Position:                    Lateral    Buttock laterality:      Right    Local anesthetic:       1% Lidocaine     Subcutaneous vol:    1 mL      Preparation:              Patient was prepped and draped in usual sterile fashion     Sedation     Patient Sedated:       No   Procedure details     Aspirate obtained:     5 mL followed by 5 mL with a #11 g Jamshidi needle    Biopsy performed:     1 Core    Number of attempts: 1  Post-procedure    Puncture site:            Adhesive bandage applied and direct pressure applied     Patient tolerance    of procedure:             Tolerated well, no immediate complications

## 2023-10-21 LAB — B2 MICROGLOB SERPL-MCNC: 2.4 MG/L

## 2023-10-25 ENCOUNTER — HOSPITAL ENCOUNTER (OUTPATIENT)
Dept: HEMATOLOGY ONCOLOGY | Facility: MEDICAL CENTER | Age: 68
End: 2023-10-25
Attending: NURSE PRACTITIONER
Payer: COMMERCIAL

## 2023-10-25 VITALS
HEIGHT: 70 IN | DIASTOLIC BLOOD PRESSURE: 72 MMHG | HEART RATE: 101 BPM | BODY MASS INDEX: 24.11 KG/M2 | OXYGEN SATURATION: 95 % | TEMPERATURE: 98.4 F | SYSTOLIC BLOOD PRESSURE: 152 MMHG | WEIGHT: 168.43 LBS | RESPIRATION RATE: 12 BRPM

## 2023-10-25 DIAGNOSIS — R94.8 ABNORMAL POSITRON EMISSION TOMOGRAPHY (PET) SCAN: ICD-10-CM

## 2023-10-25 DIAGNOSIS — M89.8X9 BONE PAIN: ICD-10-CM

## 2023-10-25 DIAGNOSIS — D47.2 IGG MONOCLONAL GAMMOPATHY: ICD-10-CM

## 2023-10-25 DIAGNOSIS — M89.9 BONE LESION: ICD-10-CM

## 2023-10-25 PROCEDURE — 99214 OFFICE O/P EST MOD 30 MIN: CPT | Performed by: NURSE PRACTITIONER

## 2023-10-25 PROCEDURE — 99212 OFFICE O/P EST SF 10 MIN: CPT | Performed by: NURSE PRACTITIONER

## 2023-10-25 ASSESSMENT — PAIN SCALES - GENERAL: PAINLEVEL: 6=MODERATE PAIN

## 2023-10-25 ASSESSMENT — FIBROSIS 4 INDEX: FIB4 SCORE: 1.38

## 2023-10-25 ASSESSMENT — ENCOUNTER SYMPTOMS
NAUSEA: 1
CONSTIPATION: 1
VOMITING: 1

## 2023-10-25 NOTE — PROGRESS NOTES
Subjective     German Pagan is a 68 y.o. male who presents with Other (Bone marrow results )           HPI    Patient seen today in follow-up for biopsy results.  Presents accompanied by his wife for today's visit.    Patient originally seen back on 10/11/2023 after referral from hospital team for lytic lesions. Patient presented to the emergency department with complaints of lower back pain 10/2/2023.  Patient also stated that he was having an aching feeling in his testicles, not associated with touch.  He stated that his back pain has been going on for approximately the last year.  Back in July 2023 the pain had worsened and it was presumed to be all muscle related.  He tried various things to relieve the pain with no luck.  He also try physical therapy which he believes made his pain worse.  He is in apparent pain in the office today.  He also mentioned that he now is experiencing pain in both hips and shoulders.  He also notes that he has been having night sweats nightly consistently for the last month.  He does have weight loss of approximately 15 pounds over the last 6 months or so.  He has very mild respiratory symptoms including a cough and shortness of breath.  He does have constipation which has been going on which may be related to his pain medications.  He does complain of chest wall pain that radiates from his lower back up and around the ribs, but denies any heart palpitations.  Lastly he does complain of difficulty with urination as he has difficulty initiating a stream.     During his ER visit on 10/2/2023 he did undergo a and ultrasound of the scrotum which was negative for any abnormalities.  Chest x-ray which was negative for any acute cardiopulmonary abnormalities.  And a CT abdomen and pelvis with contrast.  CT showed fat-containing bilateral inguinal hernias, right more than left with no incarceration.  He also had a 3.3 cm right adrenal nodule which is recommended to correlate with a  nonemergent adrenal CT in the future.  Lastly he had lytic lesion seen in the right eighth rib, spine and pelvic bones, differentials include lytic metastasis versus myeloma.    Patient with a significant family history of cancer in his mother who had multiple myeloma.  Father who had colon cancer.  Sister with a history of breast cancer.  All 3 of his immediate family members have passed away from their disease.  He also has a maternal grandmother who had colon cancer.  And he mentioned that he has a cousin who had multiple myeloma as well.    Patient did have a hernia repair surgery with Dr. Hanks on 10/6/2023.     Patient also had labs completed during his hospital visit on 10/2/2023.  Interestingly he did have an elevated total protein and globulin level at 8.5 and 4.2 respectively.  He also had an elevated alkaline phosphatase at 112.  There is no evidence of anemia, hypercalcemia or kidney dysfunction noted. I requested that patient undergo labs including multiple myeloma labs as well as a PSA.     On 10/11/2023 patient did have an SPEP which showed a monoclonal spike in the gamma region with the immunofixation gel pattern showing an IgG type kappa monoclonal protein.  Immunoglobulins A, G and M are all within normal limits.  He did have an elevation of the kappa light chain with an elevated light chain ratio at 2.50.  Patient also had a PSA that had trended up from his last 1 in 2021 which was now at 3.54. UPEP which did show monoclonal spike in the gamma region with the urine immunofixation being positive for monoclonal free kappa light chains.  There was an additional faint band detected in IgG.    Based on these findings I did recommend the patient undergo a PET/CT for further evaluation to determine whether bone marrow biopsy would be warranted versus biopsy of the lytic lesion versus biopsy of another mass if indicated.  Patient completed the CT on 10/18/2023, today which does show multiple lytic lesions  throughout the axial and proximal appendicular skeleton with associated increased activity on PET scan consistent with multiple myeloma.  There is also some increased activity corresponding with normal size left mediastinal and hilar node and there was also bilateral adrenal nodules with moderate activity.     I requested patient undergo bone marrow biopsy which was completed on Friday, 10/20/2023.  Patient stated he tolerated the biopsy but it did hurt.  Unfortunately we do not have a clear-cut answer with the bone marrow biopsy.  The aspirate did have hypercellular bone marrow demonstrating atypical cohesive clusters of cells with epithelioid cytologic features as well.  There was only staining with .  Plasma cells are borderline increased at 3-5% of the marrow cellularity with kappa restriction noted on the AUSTIN stains and flow cytometry.  According to the pathologist the atypical cells are cut through on the core blocks A1 so further work-up on the specimen could not be performed.  The atypical cells were negative for multiple keratin stains and several other carcinoma specific markers for prostate, lung and squamous cells.  The cells are positive for  which does correlate with plasma cells, multiple myeloma but they were negative for both kappa and lambda AUSTIN.  Per pathologist carcinomas of melanomas can both be stained with  and due to the epithelioid cells, questionable melanoma needs to be ruled out.  Therefore recommendation is for patient to undergo biopsy of one of the lytic lesions for further evaluation.  I did discuss these results and recommendations in detail with the patient and his wife today.    He has been continuously experiencing significant pain.  He has been prescribed oxycodone 10 mg per his PCP.  He can take this every 6 hours.  He states that he gets maybe about 2-1/2 hours of relief and then pain returns.  He has been having drenching night sweats as well.  He was  diaphoretic in the office today as he was in excruciating pain.  He also has been noticing some increased pain in the left shoulder as well as underneath in the axillary area.  There is no adenopathy noted on physical examination today.  He also is been experiencing little bit of nausea every once while that comes about unexpectedly.  He was prescribed some antiemetics but when he becomes nauseous he does not have enough time to get the medication before he vomits.  He has been doing this every once in a while.  Lastly he has been experiencing some constipation and is doing Dulcolax as needed with stool softeners 2 tablets 3 times a day.    Allergies   Allergen Reactions    Pcn [Penicillins]      Pt reports that it was a childhood allergie not sure what happens      Current Outpatient Medications on File Prior to Encounter   Medication Sig Dispense Refill    oxyCODONE immediate release (ROXICODONE) 10 MG immediate release tablet Take 1 Tablet by mouth every 6 hours as needed for Moderate Pain for up to 6 days. 24 Tablet 0    ondansetron (ZOFRAN) 4 MG Tab tablet Take 1 Tablet by mouth every four hours as needed for Nausea/Vomiting for up to 30 days. 180 Tablet 0    Naloxone (NARCAN) 4 MG/0.1ML Liquid One spray in one nostril for overdose and call 911. 1 Each 0    pravastatin (PRAVACHOL) 10 MG Tab TAKE 1 TABLET BY MOUTH ONCE DAILY IN THE EVENING 90 Tablet 3    Cholecalciferol (VITAMIN D3) 125 MCG (5000 UT) Cap Take 5,000 Units by mouth every 48 hours.       No current facility-administered medications on file prior to encounter.       Review of Systems   Constitutional:  Positive for malaise/fatigue.   Gastrointestinal:  Positive for constipation, nausea and vomiting.   Musculoskeletal:         Severe pain all over - mostly in the chest/thorax and the right lower back. Also pain under left arm with no adenopathy appreciated on exam              Objective     BP (!) 152/72   Pulse (!) 101   Temp 36.9 °C (98.4 °F)  "(Temporal)   Resp 12   Ht 1.778 m (5' 10\")   Wt 76.4 kg (168 lb 6.9 oz)   SpO2 95%   BMI 24.17 kg/m²      Physical Exam  Vitals reviewed.   Constitutional:       General: He is in acute distress (d/t pain).      Appearance: He is diaphoretic (d/t pain).   Cardiovascular:      Rate and Rhythm: Tachycardia present.   Neurological:      Mental Status: He is alert and oriented to person, place, and time.   Psychiatric:         Mood and Affect: Mood normal.         Behavior: Behavior normal.              PATHOLOGY  FINAL DIAGNOSIS:     Peripheral blood smear and CBC:          Mild neutrophilia.   Bone marrow aspirate, clot, and core biopsy:          Hypercellular bone marrow demonstrating atypical cohesive           clusters of cells, with epithelioid cytologic features, but           only staining with , composing   50% of core biopsy.  See           comment.          Plasma cells borderline increased at 3-5% of marrow cellularity           with a kappa restriction noted on AUSTIN stains and flow           cytometry.          Usual trilineage hematopoiesis with no dysplasia noted.          No increase in blasts.          Cytogenetic studies are ordered and results will be reported in           separate addenda.          [place] Red special stain is negative for amyloid.     Comment: Unfortunately, the atypical cells are cut through on the core   block A1 so further work up on this specimen cannot be performed.  The   atypical cells are negative for multiple keratin stains and several   other carcinoma specific markers for prostate, lung and squamous cells.    The cells are positive for , which correlate with plasma cells   (multiple myeloma) but are negative for both Kappa and Lambda AUSTIN   staining which is unusual.  Carcinomas and melanomas can both stain   with .  As stated before IHC stains rule out that these cells are   carcinoma but unfortunately the pertinent cells were cut through before "   melanoma markers could be run.  If metastatic melanoma is ruled out,   then this correlates with a diagnosis of multiple myeloma.  It is   recommended that a target bone core biopsy of one of the lytic lesions   be performed with some tissue submitted in RPMI for flow and FISH   studies as needed as well as further work up to make sure these cells   are not metastatic melanoma.  These findings were discussed over the   phone with Dr. Ramirez and over Voalte messaging with Bess RACHEL.  At the request of VIRGINIE Gamble, MM FISH will be attempted   on this specimen but if negative and lesional cells are plasma cells   may consider repeating on targeted biopsy.  Results will be reported in   a separate addendum.       IMAGING   CT-PETCT-WHOLE BODY    Result Date: 10/18/2023  10/18/2023 8:54 AM HISTORY/REASON FOR EXAM:  Monoclonal gammopathy.  Lytic bone lesions, concern for multiple myeloma. TECHNIQUE/EXAM DESCRIPTION AND NUMBER OF VIEWS: PET body imaging. Initially, 11.29 mCi F-18 FDG was administered intravenously under standardized conditions. Approximately 45 minutes after FDG administration, the patient was placed in the supine position on the PET CT table. Blood glucose level was 94 mg/dL. Low dose spiral CT imaging from the cranial vertex through the feet was performed. Whole body PET imaging was then performed from the cranial vertex through the feet. CT images, PET images, and PET/CT fused images were reviewed on a PACS 3D workstation. The limited non-contrast CT data are used primarily for attenuation correction and anatomic correlation.  Evaluation of solid organs and bowel are especially limited utilizing this technique. COMPARISON: CT abdomen and pelvis 10/2/2023 FINDINGS: Head and neck: No abnormal focal FDG activity. Chest: No abnormal activity in the lungs.  Increased activity within normal size prevascular and LEFT hilar lymph nodes, max SUV 5.87. Abdomen and pelvis: No abnormal focal  FDG activity. Musculoskeletal: Multiple foci of increased activity in the spine, proximal humeri, bilateral ribs, LEFT scapula, sternum, pelvis, and proximal femurs. Lower extremities:  No additional foci of increased activity. Incidental findings on CT: Multiple lytic bone lesions.  Expansile lesion in the LEFT posterior 9th rib distorts the underlying pleura.  RIGHT adrenal nodule measuring 3.5 x 3.4 cm with mildly increased activity, max SUV 4.74.  LEFT adrenal nodule measuring 1.6 cm with mildly increased activity, max SUV 3.71.     1.  Multiple lytic lesions throughout the axial and proximal appendicular skeleton with associated increased activity on PET imaging, most consistent with multiple myeloma. 2.  Increased activity corresponding with normal size LEFT mediastinal and hilar lymph nodes, potentially neoplastic. 3.  Bilateral adrenal nodules, larger on the RIGHT, with moderate activity, benign versus neoplastic process.        CT-ABDOMEN-PELVIS WITH    Result Date: 10/2/2023  10/2/2023 4:14 PM HISTORY/REASON FOR EXAM:  Pain; IV contrast only--eval for incarcerated inguinal hernia. TECHNIQUE/EXAM DESCRIPTION:   CT scan of the abdomen and pelvis with contrast. Contrast-enhanced helical scanning was obtained from the diaphragmatic domes through the pubic symphysis following the bolus administration of nonionic contrast without complication. 100 mL of Omnipaque 350 nonionic contrast was administered without complication. Low dose optimization technique was utilized for this CT exam including automated exposure control and adjustment of the mA and/or kV according to patient size. COMPARISON: No prior studies available. FINDINGS: Lower Chest: Lytic lesions seen in the right 8th rib. Liver: Normal. Spleen: Unremarkable. Pancreas: Unremarkable. Gallbladder: No calcified stones. Biliary: There is no biliary dilatation. Adrenal glands: There is a 3.3 cm right adrenal nodule. Kidneys: No hydronephrosis. Bilateral  kidneys are enhancing symmetrically.. Bowel: There is no bowel wall thickening or abnormal dilatation. Lymph nodes: No adenopathy. Vasculature: The abdominal aorta is normal in caliber. Peritoneum: Unremarkable without ascites. Musculoskeletal: Multiple lytic lesions seen throughout the lumbar spine and pelvic bone Pelvis: Fat-containing bilateral inguinal hernias, right more than left. No pelvic free fluid. Unremarkable urinary bladder.     1. Fat-containing bilateral inguinal hernias, right more than left. No incarceration. 2. Lytic lesion seen in the right eighth rib, spine and pelvic bones, concerning for myeloma or lytic metastasis. 3. There is a 3.3 cm right adrenal nodule. Correlate with nonemergent adrenal CT            Assessment & Plan     1. IgG monoclonal gammopathy  IR-NEEDLE BX-DEEP BONE    Referral to Oncology Palliative Care      2. Bone lesion  IR-NEEDLE BX-DEEP BONE    Referral to Oncology Palliative Care      3. Abnormal positron emission tomography (PET) scan  IR-NEEDLE BX-DEEP BONE    Referral to Oncology Palliative Care      4. Bone pain  Referral to Oncology Palliative Care             1.  As mentioned above unfortunately bone marrow biopsy not conclusive.  Patient in need for further work-up which would include biopsy of the lytic lesions.  I have ordered a stat biopsy one of the bone lesions for patient to undergo ASAP.  He did verbalize understanding and agreed with the plan.    2.  Patient with significant pain currently being managed by his primary care office.  He has been taking oxycodone 10 mg, every 6 hours.  He stated he gets very minimal relief up to maybe 2-1/2 hours at a time.  I have personally spoken with our palliative care physician, Dr. Leblanc who has agreed to see the patient see if he can help with his pain management at this time while we are continuing to work-up this highly suspicious cancer diagnosis.  Patient is in agreement to be seen and therefore is being seen  tomorrow, 10/26/2023 with Dr. Leblanc for symptom management.    3.  He has been experiencing constipation and nausea vomiting.  We discussed while being on narcotics he should be on a medication such as MiraLAX daily.  I discussed with patient that he could have this discussion with Dr. Leblanc tomorrow as well.  Patient appreciative of the recommendations.    4.  I will contact the patient via phone with regards to the results of the bone biopsy and get him in to be seen immediately by one of the medical oncologist once we are able to confirm a diagnosis.  Patient and wife both verbalized understanding and were appreciative of this plan.      Please note that this dictation was created using voice recognition software. I have made every reasonable attempt to correct obvious errors, but I expect that there are errors of grammar and possibly content that I did not discover before finalizing the note.

## 2023-10-25 NOTE — ADDENDUM NOTE
Encounter addended by: Korinne Mitchell, Med Ass't on: 10/25/2023 4:11 PM   Actions taken: Charge Capture section accepted

## 2023-10-26 ENCOUNTER — HOSPITAL ENCOUNTER (OUTPATIENT)
Dept: HEMATOLOGY ONCOLOGY | Facility: MEDICAL CENTER | Age: 68
End: 2023-10-26
Attending: FAMILY MEDICINE
Payer: COMMERCIAL

## 2023-10-26 ENCOUNTER — APPOINTMENT (OUTPATIENT)
Dept: PHYSICAL THERAPY | Facility: REHABILITATION | Age: 68
End: 2023-10-26
Payer: COMMERCIAL

## 2023-10-26 ENCOUNTER — APPOINTMENT (OUTPATIENT)
Dept: ADMISSIONS | Facility: MEDICAL CENTER | Age: 68
End: 2023-10-26
Attending: INTERNAL MEDICINE
Payer: COMMERCIAL

## 2023-10-26 VITALS
SYSTOLIC BLOOD PRESSURE: 122 MMHG | DIASTOLIC BLOOD PRESSURE: 62 MMHG | OXYGEN SATURATION: 98 % | TEMPERATURE: 97 F | HEIGHT: 70 IN | WEIGHT: 167.33 LBS | RESPIRATION RATE: 17 BRPM | BODY MASS INDEX: 23.96 KG/M2 | HEART RATE: 87 BPM

## 2023-10-26 DIAGNOSIS — G89.3 PAIN FROM BONE METASTASES (HCC): ICD-10-CM

## 2023-10-26 DIAGNOSIS — G89.3 CANCER RELATED PAIN: ICD-10-CM

## 2023-10-26 DIAGNOSIS — C79.51 PAIN FROM BONE METASTASES (HCC): ICD-10-CM

## 2023-10-26 PROCEDURE — 99214 OFFICE O/P EST MOD 30 MIN: CPT | Performed by: FAMILY MEDICINE

## 2023-10-26 PROCEDURE — 99212 OFFICE O/P EST SF 10 MIN: CPT | Performed by: FAMILY MEDICINE

## 2023-10-26 RX ORDER — MORPHINE SULFATE 15 MG/1
15 TABLET, FILM COATED, EXTENDED RELEASE ORAL EVERY 12 HOURS
Qty: 14 TABLET | Refills: 0 | Status: SHIPPED | OUTPATIENT
Start: 2023-10-26 | End: 2023-10-26 | Stop reason: SDUPTHER

## 2023-10-26 RX ORDER — MORPHINE SULFATE 15 MG/1
15 TABLET, FILM COATED, EXTENDED RELEASE ORAL EVERY 12 HOURS
Qty: 14 TABLET | Refills: 0 | Status: SHIPPED | OUTPATIENT
Start: 2023-10-26 | End: 2023-11-01 | Stop reason: SDUPTHER

## 2023-10-26 RX ORDER — MORPHINE SULFATE 15 MG/1
15 TABLET ORAL EVERY 4 HOURS PRN
Qty: 42 TABLET | Refills: 0 | Status: SHIPPED | OUTPATIENT
Start: 2023-10-26 | End: 2023-10-26 | Stop reason: SDUPTHER

## 2023-10-26 RX ORDER — MORPHINE SULFATE 15 MG/1
15 TABLET ORAL EVERY 4 HOURS PRN
Qty: 42 TABLET | Refills: 0 | Status: SHIPPED | OUTPATIENT
Start: 2023-10-26 | End: 2023-11-01 | Stop reason: SDUPTHER

## 2023-10-26 RX ORDER — TAMSULOSIN HYDROCHLORIDE 0.4 MG/1
0.4 CAPSULE ORAL EVERY EVENING
COMMUNITY

## 2023-10-26 ASSESSMENT — PAIN SCALES - GENERAL: PAINLEVEL: 3=SLIGHT PAIN

## 2023-10-26 ASSESSMENT — FIBROSIS 4 INDEX: FIB4 SCORE: 1.38

## 2023-10-27 PROBLEM — Z85.828 HX OF SQUAMOUS CELL CARCINOMA OF SKIN: Status: ACTIVE | Noted: 2023-10-27

## 2023-10-30 ENCOUNTER — TELEPHONE (OUTPATIENT)
Dept: HEMATOLOGY ONCOLOGY | Facility: MEDICAL CENTER | Age: 68
End: 2023-10-30
Payer: COMMERCIAL

## 2023-10-30 ASSESSMENT — ENCOUNTER SYMPTOMS
NAUSEA: 0
FEVER: 0
WEIGHT LOSS: 0
CONSTIPATION: 0
PALPITATIONS: 0
SHORTNESS OF BREATH: 0
COUGH: 0
HEADACHES: 0
DEPRESSION: 0
BLURRED VISION: 0
BRUISES/BLEEDS EASILY: 0
VOMITING: 0
CHILLS: 0
BACK PAIN: 1
DIARRHEA: 0
NERVOUS/ANXIOUS: 0

## 2023-10-30 NOTE — TELEPHONE ENCOUNTER
Phone Number Called: [phone]    Call outcome: Spoke to patient regarding message below.    Message:I called to let the patient know that there is a sooner appointment available for his procedure. He said he would like to keep his doctor's appointment scheduled.

## 2023-10-30 NOTE — PROGRESS NOTES
Subjective:     Chief Complaint   Patient presents with    New Patient     Symptom and pain management      German Pagan is a 68 y.o. male here today for evaluation of oncological pain.  The role of palliative care was discussed and they were open to a conversation.  The patient shared that he has recently developed musculoskeletal pain and was subsequently found to have likely cancer.  He is currently undergoing work-up to identify the type of cancer but due to his pain he was referred to palliative medicine for management while his cancer work-up continues.  Pain is most intense in his back.  Is worsened with movement does not radiate, has had minimal relief with medications to date.  Pain is interfering with his daily activities.  Does not currently have any nausea vomiting, diarrhea or constipation.  Patient reports he is anxious to learn more about his cancer and see what options are available to him.  We discussed that the role of palliative medicine will evolve as he finds out more about his cancer and we will be there to support him.    Problem   Cancer Related Pain        Current medicines (including changes today)  Current Outpatient Medications   Medication Sig Dispense Refill    tamsulosin (FLOMAX) 0.4 MG capsule Take 0.4 mg by mouth 1/2 hour after breakfast.      morphine (MS IR) 15 MG tablet Take 1 Tablet by mouth every four hours as needed for Severe Pain for up to 7 days. 42 Tablet 0    morphine ER (MS CONTIN) 15 MG Tab CR tablet Take 1 Tablet by mouth every 12 hours for 7 days. 14 Tablet 0    ondansetron (ZOFRAN) 4 MG Tab tablet Take 1 Tablet by mouth every four hours as needed for Nausea/Vomiting for up to 30 days. 180 Tablet 0    Naloxone (NARCAN) 4 MG/0.1ML Liquid One spray in one nostril for overdose and call 911. 1 Each 0    pravastatin (PRAVACHOL) 10 MG Tab TAKE 1 TABLET BY MOUTH ONCE DAILY IN THE EVENING 90 Tablet 3    Cholecalciferol (VITAMIN D3) 125 MCG (5000 UT) Cap Take 5,000 Units  "by mouth every 48 hours.       No current facility-administered medications for this encounter.       Social History     Tobacco Use    Smoking status: Former     Current packs/day: 0.00     Average packs/day: 0.5 packs/day for 36.0 years (18.0 ttl pk-yrs)     Types: Cigarettes     Start date:      Quit date:      Years since quittin.8    Smokeless tobacco: Former     Types: Chew     Quit date: 10/3/2023    Tobacco comments:     Quit smoking in  - just less than 1 ppd. Used chewing tobacco for about 8 years.    Vaping Use    Vaping Use: Never used   Substance Use Topics    Alcohol use: Not Currently     Comment: Quit in     Drug use: Not Currently     Types: Methamphetamines, Marijuana, Inhaled     Comment: Quit in , used methamphetamine     Past Medical History:   Diagnosis Date    Back pain 10/05/2023    groin    Bowel habit changes 10/05/2023    constipation    Bronchitis     as a teenager    Cataract 10/05/2023    no surgery    Dental disorder     dentures    Heart burn     Hepatitis A     treated    Hepatitis C 2016    treated    High cholesterol     Hypertension     Pneumonia     as a teenager      Family History   Problem Relation Age of Onset    Cancer Mother         Multiple Myeloma    Cancer Father         Colon and Lung    Cancer Sister         Breast    Diabetes Brother     Cancer Maternal Grandmother         Colon    Colon Cancer Maternal Grandmother     No Known Problems Daughter     No Known Problems Daughter     No Known Problems Son     Cancer Other         Multiple myeloma         Objective:     Vitals:    10/26/23 0720   BP: 122/62   BP Location: Right arm   Patient Position: Sitting   BP Cuff Size: Adult   Pulse: 87   Resp: 17   Temp: 36.1 °C (97 °F)   TempSrc: Temporal   SpO2: 98%   Weight: 75.9 kg (167 lb 5.3 oz)   Height: 1.778 m (5' 10\")     Body mass index is 24.01 kg/m².     Review of Systems   Constitutional:  Positive for malaise/fatigue. Negative for chills, " fever and weight loss.   HENT:  Negative for congestion and hearing loss.    Eyes:  Negative for blurred vision.   Respiratory:  Negative for cough and shortness of breath.    Cardiovascular:  Negative for chest pain and palpitations.   Gastrointestinal:  Negative for constipation, diarrhea, nausea and vomiting.   Musculoskeletal:  Positive for back pain. Negative for joint pain.   Skin:  Negative for rash.   Neurological:  Negative for headaches.   Endo/Heme/Allergies:  Does not bruise/bleed easily.   Psychiatric/Behavioral:  Negative for depression. The patient is not nervous/anxious.        Physical Exam:   Gen: Well developed, well nourished in no acute distress.   Skin: Pink, warm, and dry  HEENT: conjunctiva non-injected, sclera non-icteric. EOMs intact.   Nasal mucosa without edema nor erythema.   Pinna normal.    Oral mucous membranes pink and moist with no lesions.  Neck: Supple, trachea midline. No adenopathy or masses in the neck or supraclavicular regions.  Lungs: Effort is normal.   CV: regular rate  Abdomen: flat  Ext: no edema, color normal, vascularity normal, temperature normal  Alert and oriented Eye contact is good, speech goal directed, affect calm    Assessment and Plan:   Cancer related pain  Patient with clinical and imaging findings consistent with metastatic cancer.  Is currently undergoing work-up for diagnosis of his cancer.  Does have significant bone pain due to his metastatic disease.  Discussed pain control options patient would like to transition to 15 mg extended release morphine twice a day and 15 mg breakthrough every 4 hours as needed.   reviewed, no signs of diversion or abuse, controlled substance agreement on file, risk and benefits of medications discussed.  Follow-up in 1 week for reevaluation    35 minutes in total spent on patient encounter including chart review and consultation with patient's oncological providers.    Followup: Return in about 1 week (around  11/2/2023).    Burak Leblanc M.D.

## 2023-10-30 NOTE — ASSESSMENT & PLAN NOTE
Patient with clinical and imaging findings consistent with metastatic cancer.  Is currently undergoing work-up for diagnosis of his cancer.  Does have significant bone pain due to his metastatic disease.  Discussed pain control options patient would like to transition to 15 mg extended release morphine twice a day and 15 mg breakthrough every 4 hours as needed.   reviewed, no signs of diversion or abuse, controlled substance agreement on file, risk and benefits of medications discussed.  Follow-up in 1 week for reevaluation

## 2023-10-31 ENCOUNTER — TELEPHONE (OUTPATIENT)
Dept: HEMATOLOGY ONCOLOGY | Facility: MEDICAL CENTER | Age: 68
End: 2023-10-31
Payer: COMMERCIAL

## 2023-10-31 PROBLEM — R11.2 NAUSEA AND VOMITING: Status: RESOLVED | Noted: 2023-10-06 | Resolved: 2023-10-31

## 2023-10-31 PROBLEM — R39.12 WEAK URINE STREAM: Chronic | Status: ACTIVE | Noted: 2023-10-13

## 2023-10-31 PROBLEM — G89.3 CANCER RELATED PAIN: Chronic | Status: ACTIVE | Noted: 2023-10-13

## 2023-10-31 PROBLEM — C79.51 METASTASIS TO BONE (HCC): Chronic | Status: ACTIVE | Noted: 2023-10-06

## 2023-10-31 PROBLEM — K59.03 DRUG-INDUCED CONSTIPATION: Chronic | Status: ACTIVE | Noted: 2023-10-13

## 2023-10-31 PROBLEM — G89.18 POSTOPERATIVE ABDOMINAL PAIN: Status: RESOLVED | Noted: 2023-10-13 | Resolved: 2023-10-31

## 2023-10-31 PROBLEM — R10.9 POSTOPERATIVE ABDOMINAL PAIN: Status: RESOLVED | Noted: 2023-10-13 | Resolved: 2023-10-31

## 2023-10-31 PROBLEM — K40.21 BILATERAL RECURRENT INGUINAL HERNIA WITHOUT OBSTRUCTION OR GANGRENE: Status: RESOLVED | Noted: 2023-10-06 | Resolved: 2023-10-31

## 2023-11-01 ENCOUNTER — HOSPITAL ENCOUNTER (OUTPATIENT)
Dept: HEMATOLOGY ONCOLOGY | Facility: MEDICAL CENTER | Age: 68
End: 2023-11-01
Attending: FAMILY MEDICINE
Payer: COMMERCIAL

## 2023-11-01 VITALS
HEART RATE: 84 BPM | DIASTOLIC BLOOD PRESSURE: 74 MMHG | BODY MASS INDEX: 24.56 KG/M2 | HEIGHT: 69 IN | OXYGEN SATURATION: 99 % | SYSTOLIC BLOOD PRESSURE: 132 MMHG | TEMPERATURE: 97.9 F | RESPIRATION RATE: 17 BRPM | WEIGHT: 165.79 LBS

## 2023-11-01 DIAGNOSIS — G89.3 PAIN FROM BONE METASTASES (HCC): ICD-10-CM

## 2023-11-01 DIAGNOSIS — C79.51 PAIN FROM BONE METASTASES (HCC): ICD-10-CM

## 2023-11-01 DIAGNOSIS — G89.3 CANCER RELATED PAIN: ICD-10-CM

## 2023-11-01 PROCEDURE — 99212 OFFICE O/P EST SF 10 MIN: CPT | Performed by: FAMILY MEDICINE

## 2023-11-01 PROCEDURE — 99213 OFFICE O/P EST LOW 20 MIN: CPT | Performed by: FAMILY MEDICINE

## 2023-11-01 RX ORDER — MORPHINE SULFATE 15 MG/1
15 TABLET, FILM COATED, EXTENDED RELEASE ORAL EVERY 12 HOURS
Qty: 28 TABLET | Refills: 0 | Status: SHIPPED | OUTPATIENT
Start: 2023-11-01 | End: 2023-11-15

## 2023-11-01 RX ORDER — MORPHINE SULFATE 15 MG/1
15 TABLET ORAL EVERY 4 HOURS PRN
Qty: 42 TABLET | Refills: 0 | Status: SHIPPED | OUTPATIENT
Start: 2023-11-01 | End: 2023-11-08

## 2023-11-01 ASSESSMENT — FIBROSIS 4 INDEX: FIB4 SCORE: 1.38

## 2023-11-03 ENCOUNTER — PRE-ADMISSION TESTING (OUTPATIENT)
Dept: ADMISSIONS | Facility: MEDICAL CENTER | Age: 68
End: 2023-11-03
Attending: INTERNAL MEDICINE
Payer: COMMERCIAL

## 2023-11-03 DIAGNOSIS — Z01.812 PRE-OPERATIVE LABORATORY EXAMINATION: ICD-10-CM

## 2023-11-05 ASSESSMENT — ENCOUNTER SYMPTOMS
NAUSEA: 0
FEVER: 0
BACK PAIN: 1
VOMITING: 0
PALPITATIONS: 0
COUGH: 0
CONSTIPATION: 0
CHILLS: 0
DIARRHEA: 0
SHORTNESS OF BREATH: 0

## 2023-11-05 NOTE — ASSESSMENT & PLAN NOTE
Patient continues to proceed with work-up of his current cancer in order to identify potential treatments.  Is followed with palliative for symptom management of metastatic disease.  Has had an improvement of his symptoms with the addition of long-acting morphine.  Now uses his short acting only occasionally.  Will refill current dosing.   reviewed, no signs of diversion or abuse, controlled substance agreement on file, risk and benefits of medication discussed.  We will follow-up in 2 weeks for reevaluation.

## 2023-11-05 NOTE — PROGRESS NOTES
Subjective:     Chief Complaint   Patient presents with    Follow-Up     1 week follow up/ symptom management     German Pagan is a 68 y.o. male here today for follow-up on oncological pain.  Patient reports since last visit he did have an improvement in his symptoms with the addition of long-acting morphine.  He also reports rarely needing his breakthrough morphine.  He continues to wait for the work-up and confirmation on the type of his cancer so he may begin treatments.  Does not endorse any new symptoms.  No other questions or concerns today.    Problem   Cancer Related Pain        Current medicines (including changes today)  Current Outpatient Medications   Medication Sig Dispense Refill    morphine ER (MS CONTIN) 15 MG Tab CR tablet Take 1 Tablet by mouth every 12 hours for 14 days. 28 Tablet 0    morphine (MS IR) 15 MG tablet Take 1 Tablet by mouth every four hours as needed for Severe Pain for up to 7 days. 42 Tablet 0    tamsulosin (FLOMAX) 0.4 MG capsule Take 0.4 mg by mouth every evening.      Naloxone (NARCAN) 4 MG/0.1ML Liquid One spray in one nostril for overdose and call 911. 1 Each 0    pravastatin (PRAVACHOL) 10 MG Tab TAKE 1 TABLET BY MOUTH ONCE DAILY IN THE EVENING 90 Tablet 3    Cholecalciferol (VITAMIN D3) 125 MCG (5000 UT) Cap Take 5,000 Units by mouth every 48 hours.       No current facility-administered medications for this encounter.       Social History     Tobacco Use    Smoking status: Former     Current packs/day: 0.00     Average packs/day: 0.5 packs/day for 36.0 years (18.0 ttl pk-yrs)     Types: Cigarettes     Start date:      Quit date: 2006     Years since quittin.8    Smokeless tobacco: Former     Types: Chew     Quit date: 10/3/2023    Tobacco comments:     Quit smoking in  - just less than 1 ppd. Used chewing tobacco for about 8 years.    Vaping Use    Vaping Use: Never used   Substance Use Topics    Alcohol use: Not Currently     Comment: Quit in     Drug  "use: Not Currently     Types: Methamphetamines, Marijuana, Inhaled     Comment: Quit in 1999, used methamphetamine     Past Medical History:   Diagnosis Date    Back pain 10/05/2023    groin    Bilateral recurrent inguinal hernia without obstruction or gangrene 10/6/2023    Bowel habit changes 10/05/2023    constipation    Bronchitis     as a teenager    Cataract 10/05/2023    no surgery    Dental disorder     dentures    Heart burn     Hepatitis A     treated    Hepatitis C 2016    treated    High cholesterol     Hypertension     Nausea and vomiting 10/6/2023    Pneumonia     as a teenager    Postoperative abdominal pain 10/13/2023      Family History   Problem Relation Age of Onset    Cancer Mother         Multiple Myeloma    Cancer Father         Colon and Lung    Cancer Sister         Breast    Diabetes Brother     Cancer Maternal Grandmother         Colon    Colon Cancer Maternal Grandmother     No Known Problems Daughter     No Known Problems Daughter     No Known Problems Son     Cancer Other         Multiple myeloma         Objective:     Vitals:    11/01/23 1049   BP: 132/74   BP Location: Right arm   Patient Position: Sitting   BP Cuff Size: Adult   Pulse: 84   Resp: 17   Temp: 36.6 °C (97.9 °F)   TempSrc: Temporal   SpO2: 99%   Weight: 75.2 kg (165 lb 12.6 oz)   Height: 1.753 m (5' 9\")     Body mass index is 24.48 kg/m².   Review of Systems   Constitutional:  Negative for chills and fever.   Respiratory:  Negative for cough and shortness of breath.    Cardiovascular:  Negative for chest pain and palpitations.   Gastrointestinal:  Negative for constipation, diarrhea, nausea and vomiting.   Musculoskeletal:  Positive for back pain.       Physical Exam:   Gen: Well developed, well nourished in no acute distress.   Skin: Pink, warm, and dry  HEENT: conjunctiva non-injected, sclera non-icteric. EOMs intact.   Nasal mucosa without edema nor erythema.   Pinna normal.    Oral mucous membranes pink and moist with " no lesions.  Neck: Supple, trachea midline. No adenopathy or masses in the neck or supraclavicular regions.  Lungs: Effort is normal. Clear to auscultation bilaterally with good excursion.  CV: regular rate and rhythm, no murmurs  Abdomen: soft, nontender, + BS. No HSM.  No CVAT  Ext: no edema, color normal, vascularity normal, temperature normal  Alert and oriented Eye contact is good, speech goal directed, affect calm    Assessment and Plan:   Cancer related pain  Patient continues to proceed with work-up of his current cancer in order to identify potential treatments.  Is followed with palliative for symptom management of metastatic disease.  Has had an improvement of his symptoms with the addition of long-acting morphine.  Now uses his short acting only occasionally.  Will refill current dosing.   reviewed, no signs of diversion or abuse, controlled substance agreement on file, risk and benefits of medication discussed.  We will follow-up in 2 weeks for reevaluation.    25 minutes in total spent on patient encounter including chart review and consultation with patient's oncological providers.    Followup: Return in about 2 weeks (around 11/15/2023).    Burak Leblanc M.D.

## 2023-11-06 ENCOUNTER — APPOINTMENT (OUTPATIENT)
Dept: ADMISSIONS | Facility: MEDICAL CENTER | Age: 68
End: 2023-11-06
Attending: INTERNAL MEDICINE
Payer: COMMERCIAL

## 2023-11-06 DIAGNOSIS — Z01.812 PRE-OPERATIVE LABORATORY EXAMINATION: ICD-10-CM

## 2023-11-06 LAB
ERYTHROCYTE [DISTWIDTH] IN BLOOD BY AUTOMATED COUNT: 40.8 FL (ref 35.9–50)
HCT VFR BLD AUTO: 44.7 % (ref 42–52)
HGB BLD-MCNC: 14.2 G/DL (ref 14–18)
INR PPP: 1.17 (ref 0.87–1.13)
MCH RBC QN AUTO: 28 PG (ref 27–33)
MCHC RBC AUTO-ENTMCNC: 31.8 G/DL (ref 32.3–36.5)
MCV RBC AUTO: 88 FL (ref 81.4–97.8)
PLATELET # BLD AUTO: 256 K/UL (ref 164–446)
PMV BLD AUTO: 10.8 FL (ref 9–12.9)
PROTHROMBIN TIME: 15 SEC (ref 12–14.6)
RBC # BLD AUTO: 5.08 M/UL (ref 4.7–6.1)
WBC # BLD AUTO: 8.8 K/UL (ref 4.8–10.8)

## 2023-11-06 PROCEDURE — 85610 PROTHROMBIN TIME: CPT

## 2023-11-06 PROCEDURE — 85027 COMPLETE CBC AUTOMATED: CPT

## 2023-11-06 PROCEDURE — 36415 COLL VENOUS BLD VENIPUNCTURE: CPT

## 2023-11-08 ENCOUNTER — APPOINTMENT (OUTPATIENT)
Dept: RADIOLOGY | Facility: MEDICAL CENTER | Age: 68
End: 2023-11-08
Attending: NURSE PRACTITIONER
Payer: COMMERCIAL

## 2023-11-08 ENCOUNTER — HOSPITAL ENCOUNTER (OUTPATIENT)
Facility: MEDICAL CENTER | Age: 68
End: 2023-11-08
Attending: INTERNAL MEDICINE | Admitting: INTERNAL MEDICINE
Payer: COMMERCIAL

## 2023-11-08 VITALS
OXYGEN SATURATION: 96 % | RESPIRATION RATE: 16 BRPM | SYSTOLIC BLOOD PRESSURE: 126 MMHG | WEIGHT: 164.46 LBS | BODY MASS INDEX: 23.55 KG/M2 | HEART RATE: 75 BPM | TEMPERATURE: 97.4 F | HEIGHT: 70 IN | DIASTOLIC BLOOD PRESSURE: 71 MMHG

## 2023-11-08 DIAGNOSIS — R94.8 ABNORMAL POSITRON EMISSION TOMOGRAPHY (PET) SCAN: ICD-10-CM

## 2023-11-08 DIAGNOSIS — M89.9 BONE LESION: ICD-10-CM

## 2023-11-08 DIAGNOSIS — D47.2 IGG MONOCLONAL GAMMOPATHY: ICD-10-CM

## 2023-11-08 LAB — PATHOLOGY CONSULT NOTE: NORMAL

## 2023-11-08 PROCEDURE — 160002 HCHG RECOVERY MINUTES (STAT)

## 2023-11-08 PROCEDURE — 88341 IMHCHEM/IMCYTCHM EA ADD ANTB: CPT | Mod: 91

## 2023-11-08 PROCEDURE — 77012 CT SCAN FOR NEEDLE BIOPSY: CPT

## 2023-11-08 PROCEDURE — 88311 DECALCIFY TISSUE: CPT

## 2023-11-08 PROCEDURE — 88342 IMHCHEM/IMCYTCHM 1ST ANTB: CPT

## 2023-11-08 PROCEDURE — 700111 HCHG RX REV CODE 636 W/ 250 OVERRIDE (IP): Mod: JZ

## 2023-11-08 PROCEDURE — 160035 HCHG PACU - 1ST 60 MINS PHASE I

## 2023-11-08 PROCEDURE — 20225 BONE BIOPSY TROCAR/NDL DEEP: CPT

## 2023-11-08 PROCEDURE — 88307 TISSUE EXAM BY PATHOLOGIST: CPT

## 2023-11-08 PROCEDURE — 700105 HCHG RX REV CODE 258: Performed by: RADIOLOGY

## 2023-11-08 PROCEDURE — 160046 HCHG PACU - 1ST 60 MINS PHASE II

## 2023-11-08 RX ORDER — MORPHINE SULFATE 4 MG/ML
4 INJECTION INTRAVENOUS
Status: DISCONTINUED | OUTPATIENT
Start: 2023-11-08 | End: 2023-11-08 | Stop reason: HOSPADM

## 2023-11-08 RX ORDER — MIDAZOLAM HYDROCHLORIDE 1 MG/ML
INJECTION INTRAMUSCULAR; INTRAVENOUS
Status: COMPLETED
Start: 2023-11-08 | End: 2023-11-08

## 2023-11-08 RX ORDER — ONDANSETRON 2 MG/ML
4 INJECTION INTRAMUSCULAR; INTRAVENOUS PRN
Status: DISCONTINUED | OUTPATIENT
Start: 2023-11-08 | End: 2023-11-08 | Stop reason: HOSPADM

## 2023-11-08 RX ORDER — OXYCODONE HYDROCHLORIDE 5 MG/1
5 TABLET ORAL
Status: DISCONTINUED | OUTPATIENT
Start: 2023-11-08 | End: 2023-11-08 | Stop reason: HOSPADM

## 2023-11-08 RX ORDER — OXYCODONE HYDROCHLORIDE 10 MG/1
10 TABLET ORAL
Status: DISCONTINUED | OUTPATIENT
Start: 2023-11-08 | End: 2023-11-08 | Stop reason: HOSPADM

## 2023-11-08 RX ORDER — MIDAZOLAM HYDROCHLORIDE 1 MG/ML
.5-2 INJECTION INTRAMUSCULAR; INTRAVENOUS PRN
Status: DISCONTINUED | OUTPATIENT
Start: 2023-11-08 | End: 2023-11-08 | Stop reason: HOSPADM

## 2023-11-08 RX ORDER — ONDANSETRON 2 MG/ML
4 INJECTION INTRAMUSCULAR; INTRAVENOUS EVERY 8 HOURS PRN
Status: DISCONTINUED | OUTPATIENT
Start: 2023-11-08 | End: 2023-11-08 | Stop reason: HOSPADM

## 2023-11-08 RX ORDER — SODIUM CHLORIDE 9 MG/ML
500 INJECTION, SOLUTION INTRAVENOUS
Status: DISCONTINUED | OUTPATIENT
Start: 2023-11-08 | End: 2023-11-08 | Stop reason: HOSPADM

## 2023-11-08 RX ADMIN — MIDAZOLAM HYDROCHLORIDE 1 MG: 1 INJECTION, SOLUTION INTRAMUSCULAR; INTRAVENOUS at 13:10

## 2023-11-08 RX ADMIN — MIDAZOLAM HYDROCHLORIDE 1 MG: 1 INJECTION INTRAMUSCULAR; INTRAVENOUS at 13:10

## 2023-11-08 RX ADMIN — FENTANYL CITRATE 50 MCG: 50 INJECTION, SOLUTION INTRAMUSCULAR; INTRAVENOUS at 13:10

## 2023-11-08 ASSESSMENT — FIBROSIS 4 INDEX: FIB4 SCORE: 1.8

## 2023-11-08 NOTE — DISCHARGE INSTRUCTIONS
HOME CARE INSTRUCTIONS    ACTIVITY: Rest and take it easy for the first 24 hours.  A responsible adult is recommended to remain with you during that time.  It is normal to feel sleepy.  We encourage you to not do anything that requires balance, judgment or coordination.    FOR 24 HOURS DO NOT:  Drive, operate machinery or run household appliances.  Drink beer or alcoholic beverages.  Make important decisions or sign legal documents.    SPECIAL INSTRUCTIONS: Needle Biopsy of the Bone, Care After  This sheet gives you information about how to care for yourself after your procedure. Your health care provider may also give you more specific instructions. If you have problems or questions, contact your health care provider.  What can I expect after the procedure?  After the procedure, it is common to have soreness or tenderness at the puncture site.  Follow these instructions at home:  Puncture care  Follow instructions from your health care provider about how to take care of your puncture site. Make sure you:  Wash your hands with soap and water before and after you change your bandage (dressing). If soap and water are not available, use hand .  Change your dressing as told by your health care provider.  Check your puncture site every day for signs of infection. Check for:  Redness, swelling, or worsening pain.  Fluid or blood.  Warmth.  Pus or a bad smell.  General instructions  Take over-the-counter and prescription medicines only as told by your health care provider.  Do not drive for 24 hours if you were given a sedative during your procedure.  Return to your normal activities as told by your health care provider.  Keep all follow-up visits as told by your health care provider. This is important.  Contact a health care provider if:  You have redness, swelling, or worsening pain at the site of your puncture.  You have fluid or blood coming from your puncture site.  Your puncture site feels warm to the  touch.  You have pus or a bad smell coming from your puncture site.  You have a fever.  You have persistent nausea or vomiting.  Get help right away if:  You develop a rash.  You have difficulty breathing.  Summary  After the procedure, it is common to have soreness or tenderness at the puncture site.  Follow instructions from your health care provider about how to take care of your puncture site.  Contact a health care provider if you have any signs of infection.  Keep all follow-up visits as told by your health care provider. This is important.      DIET: To avoid nausea, slowly advance diet as tolerated, avoiding spicy or greasy foods for the first day.  Add more substantial food to your diet according to your physician's instructions.  Babies can be fed formula or breast milk as soon as they are hungry.  INCREASE FLUIDS AND FIBER TO AVOID CONSTIPATION.    SURGICAL DRESSING/BATHING: Keep dressing on for 24-48 hours. Ok to shower after.    MEDICATIONS: Resume taking daily medication.  Take prescribed pain medication with food.  If no medication is prescribed, you may take non-aspirin pain medication if needed.  PAIN MEDICATION CAN BE VERY CONSTIPATING.  Take a stool softener or laxative such as senokot, pericolace, or milk of magnesia if needed.    Last pain medication given at ___.    A follow-up appointment should be arranged with your doctor in 1-2 weeks; call to schedule.    You should CALL YOUR PHYSICIAN if you develop:  Fever greater than 101 degrees F.  Pain not relieved by medication, or persistent nausea or vomiting.  Excessive bleeding (blood soaking through dressing) or unexpected drainage from the wound.  Extreme redness or swelling around the incision site, drainage of pus or foul smelling drainage.  Inability to urinate or empty your bladder within 8 hours.  Problems with breathing or chest pain.    You should call 911 if you develop problems with breathing or chest pain.  If you are unable to  contact your doctor or surgical center, you should go to the nearest emergency room or urgent care center.  Physician's telephone #: [phone]    MILD FLU-LIKE SYMPTOMS ARE NORMAL.  YOU MAY EXPERIENCE GENERALIZED MUSCLE ACHES, THROAT IRRITATION, HEADACHE AND/OR SOME NAUSEA.    If any questions arise, call your doctor.  If your doctor is not available, please feel free to call the Surgical Center at [phone].  The Center is open Monday through Friday from 7AM to 7PM.      A registered nurse may call you a few days after your surgery to see how you are doing after your procedure.    You may also receive a survey in the mail within the next two weeks and we ask that you take a few moments to complete the survey and return it to us.  Our goal is to provide you with very good care and we value your comments.     Depression / Suicide Risk    As you are discharged from this [facility] facility, it is important to learn how to keep safe from harming yourself.    Recognize the warning signs:  Abrupt changes in personality, positive or negative- including increase in energy   Giving away possessions  Change in eating patterns- significant weight changes-  positive or negative  Change in sleeping patterns- unable to sleep or sleeping all the time   Unwillingness or inability to communicate  Depression  Unusual sadness, discouragement and loneliness  Talk of wanting to die  Neglect of personal appearance   Rebelliousness- reckless behavior  Withdrawal from people/activities they love  Confusion- inability to concentrate     If you or a loved one observes any of these behaviors or has concerns about self-harm, here's what you can do:  Talk about it- your feelings and reasons for harming yourself  Remove any means that you might use to hurt yourself (examples: pills, rope, extension cords, firearm)  Get professional help from the community (Mental Health, Substance Abuse, psychological counseling)  Do not be  alone:Call your Safe Contact- someone whom you trust who will be there for you.  Call your local CRISIS HOTLINE 647-5553 or 740-175-4895  Call your local Children's Mobile Crisis Response Team [place] (376) 830-1180 or www.W-locate  Call the toll free National Suicide Prevention Hotlines   National Suicide Prevention Lifeline 391-493-NYVX (7329)  [facility] 800-SZKBMIZ (888-8857)    I acknowledge receipt and understanding of these Home Care instructions.

## 2023-11-08 NOTE — OR SURGEON
+Immediate Post- Operative Note        Findings: PET + osseous lesions. Hx multiple myeloma.       Procedure(s): CT guided L iliac wing bx.      Estimated Blood Loss: Less than 5 ml        Complications: None            11/8/2023     1320 PM     Daryl Madsen M.D.

## 2023-11-08 NOTE — OR NURSING
1350: Handoff report received from ppu RNMaude. Patient awake, resting in bed. States minimal pain to biopsy site, declines intervention. Wife updated on status.     1400: S/o at bedside. Per doctor Tena, one hour bedrest.     1415: Patient sitting up, drinking water, denies needs.     1435: Bedrest completed.     1440: Discharge instructions reviewed with patient and s/o. Belongings on gurney, patient dressed. PIv removed. Site remains soft, dressing C/D/I. Patient transported to [facility] via wheelchair, all belongings with patient.

## 2023-11-08 NOTE — PROGRESS NOTES
Pt presents to CT 4. Patient was consented by MD at bedside, confirmed by this RN and consent at bedside. Pt transferred to exam table in prone position. Patient underwent a Bone Biopsy by Dr. Madsen. Procedure site was marked by MD and verified using imaging guidance. Pt placed on monitor, prepped and draped in a sterile fashion. Vitals were taken every 5 minutes and remained stable during procedure (see doc flow sheet for results). CO2 waveform capnography was monitored and remained WNL throughout procedure. Report called to Maude SCHWARTZ. Pt transported by stretcher with RN to [facility].     Specimen: Bone x2 cores: 1 in RPMI and 1 in Formalin hand delivered to lab.

## 2023-11-12 NOTE — H&P
History and Physical    Date: 11/12/2023    PCP: VALORIE Lawrence      CC: HX multiple myeloma with osseous lesions.     HPI: This is a 68 y.o. male who is presenting for CT guided L iliac bone biopsy.    Past Medical History:   Diagnosis Date    Back pain 10/05/2023    groin    Bilateral recurrent inguinal hernia without obstruction or gangrene 10/6/2023    Bowel habit changes 10/05/2023    constipation    Bronchitis     as a teenager    Cataract 10/05/2023    no surgery    Dental disorder     dentures    Heart burn     Hepatitis A     treated    Hepatitis C 2016    treated    High cholesterol     Hypertension     Nausea and vomiting 10/6/2023    Pneumonia     as a teenager    Postoperative abdominal pain 10/13/2023       Past Surgical History:   Procedure Laterality Date    INGUINAL HERNIA LAPAROSCOPIC BILATERAL Bilateral 10/6/2023    Procedure: LAPAROSCOPIC BILATERAL INGUINAL HERNIA REPAIR WITH MESH;  Surgeon: Chris Hanks M.D.;  Location: SURGERY [facility];  Service: General    ANKLE FUSION Right 01/01/1990    ORIF, ANKLE Bilateral     VASECTOMY         No current facility-administered medications for this encounter.     Current Outpatient Medications   Medication Sig Dispense Refill    morphine ER (MS CONTIN) 15 MG Tab CR tablet Take 1 Tablet by mouth every 12 hours for 14 days. 28 Tablet 0    tamsulosin (FLOMAX) 0.4 MG capsule Take 0.4 mg by mouth every evening.      Naloxone (NARCAN) 4 MG/0.1ML Liquid One spray in one nostril for overdose and call 911. 1 Each 0    pravastatin (PRAVACHOL) 10 MG Tab TAKE 1 TABLET BY MOUTH ONCE DAILY IN THE EVENING 90 Tablet 3    Cholecalciferol (VITAMIN D3) 125 MCG (5000 UT) Cap Take 5,000 Units by mouth every 48 hours.          Social History     Socioeconomic History    Marital status: Single     Spouse name: Not on file    Number of children: Not on file    Years of education: Not on file    Highest education level: Not on file   Occupational History    Not on file    Tobacco Use    Smoking status: Former     Current packs/day: 0.00     Average packs/day: 0.5 packs/day for 36.0 years (18.0 ttl pk-yrs)     Types: Cigarettes     Start date:      Quit date:      Years since quittin.8    Smokeless tobacco: Former     Types: Chew     Quit date: 10/3/2023    Tobacco comments:     Quit smoking in  - just less than 1 ppd. Used chewing tobacco for about 8 years.    Vaping Use    Vaping Use: Never used   Substance and Sexual Activity    Alcohol use: Not Currently     Comment: Quit in     Drug use: Not Currently     Types: Methamphetamines, Marijuana, Inhaled     Comment: Quit in , used methamphetamine    Sexual activity: Yes     Partners: Female     Comment: committed relationship.   Other Topics Concern    Not on file   Social History Narrative    Works for the [employer]     Social Determinants of Health     Financial Resource Strain: Not on file   Food Insecurity: Not on file   Transportation Needs: Not on file   Physical Activity: Not on file   Stress: Not on file   Social Connections: Not on file   Intimate Partner Violence: Not on file   Housing Stability: Not on file       Family History   Problem Relation Age of Onset    Cancer Mother         Multiple Myeloma    Cancer Father         Colon and Lung    Cancer Sister         Breast    Diabetes Brother     Cancer Maternal Grandmother         Colon    Colon Cancer Maternal Grandmother     No Known Problems Daughter     No Known Problems Daughter     No Known Problems Son     Cancer Other         Multiple myeloma       Allergies:  Pcn [penicillins]    Review of Systems:  Negative    Physical Exam    Vital Signs  Blood Pressure : 126/71   Temperature: 36.3 °C (97.4 °F)   Pulse: 75   Respiration: 16   Pulse Oximetry: 96 %        Labs:                    Radiology:  CT-NEEDLE BX-DEEP BONE   Final Result      CT GUIDED LEFT ILIAC DEEP BONE BIOPSY.                Assessment and Plan: This is a  68 y.o. male who is presenting for CT guided L iliac bone biopsy.

## 2023-11-14 ENCOUNTER — HOSPITAL ENCOUNTER (OUTPATIENT)
Dept: LAB | Facility: MEDICAL CENTER | Age: 68
End: 2023-11-14
Attending: NURSE PRACTITIONER
Payer: COMMERCIAL

## 2023-11-14 ENCOUNTER — HOSPITAL ENCOUNTER (OUTPATIENT)
Dept: HEMATOLOGY ONCOLOGY | Facility: MEDICAL CENTER | Age: 68
End: 2023-11-14
Attending: FAMILY MEDICINE
Payer: COMMERCIAL

## 2023-11-14 ENCOUNTER — PATIENT OUTREACH (OUTPATIENT)
Dept: ONCOLOGY | Facility: MEDICAL CENTER | Age: 68
End: 2023-11-14
Payer: COMMERCIAL

## 2023-11-14 ENCOUNTER — TELEPHONE (OUTPATIENT)
Dept: HEMATOLOGY ONCOLOGY | Facility: MEDICAL CENTER | Age: 68
End: 2023-11-14

## 2023-11-14 VITALS
HEIGHT: 70 IN | WEIGHT: 165.4 LBS | SYSTOLIC BLOOD PRESSURE: 128 MMHG | TEMPERATURE: 98.4 F | DIASTOLIC BLOOD PRESSURE: 66 MMHG | OXYGEN SATURATION: 98 % | HEART RATE: 89 BPM | BODY MASS INDEX: 23.68 KG/M2 | RESPIRATION RATE: 17 BRPM

## 2023-11-14 DIAGNOSIS — C22.0 HEPATOCELLULAR CARCINOMA METASTATIC TO BONE (HCC): ICD-10-CM

## 2023-11-14 DIAGNOSIS — C79.51 METASTASIS TO BONE (HCC): ICD-10-CM

## 2023-11-14 DIAGNOSIS — G89.3 CANCER RELATED PAIN: ICD-10-CM

## 2023-11-14 DIAGNOSIS — C79.51 HEPATOCELLULAR CARCINOMA METASTATIC TO BONE (HCC): ICD-10-CM

## 2023-11-14 PROCEDURE — 36415 COLL VENOUS BLD VENIPUNCTURE: CPT

## 2023-11-14 PROCEDURE — 99212 OFFICE O/P EST SF 10 MIN: CPT | Performed by: FAMILY MEDICINE

## 2023-11-14 PROCEDURE — 99213 OFFICE O/P EST LOW 20 MIN: CPT | Performed by: FAMILY MEDICINE

## 2023-11-14 PROCEDURE — 82105 ALPHA-FETOPROTEIN SERUM: CPT

## 2023-11-14 RX ORDER — MORPHINE SULFATE 15 MG/1
15 TABLET ORAL EVERY 4 HOURS PRN
Qty: 56 TABLET | Refills: 0 | Status: SHIPPED | OUTPATIENT
Start: 2023-11-14 | End: 2023-11-28

## 2023-11-14 RX ORDER — MORPHINE SULFATE 30 MG/1
30 TABLET, FILM COATED, EXTENDED RELEASE ORAL EVERY 12 HOURS
Qty: 28 TABLET | Refills: 0 | Status: SHIPPED | OUTPATIENT
Start: 2023-11-14 | End: 2023-11-28

## 2023-11-14 ASSESSMENT — ENCOUNTER SYMPTOMS
NAUSEA: 0
PALPITATIONS: 0
BACK PAIN: 1
CHILLS: 0
FEVER: 0
DIARRHEA: 0
CONSTIPATION: 0
COUGH: 0
SHORTNESS OF BREATH: 0
VOMITING: 0

## 2023-11-14 ASSESSMENT — PAIN SCALES - GENERAL: PAINLEVEL: 7=MODERATE-SEVERE PAIN

## 2023-11-14 ASSESSMENT — FIBROSIS 4 INDEX: FIB4 SCORE: 1.8

## 2023-11-14 NOTE — TELEPHONE ENCOUNTER
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  I did review the pathology report with Dr. Linn and he is requesting an AFP.  Have also placed an order to medical oncology and patient is currently scheduled to be seen by Dr. Linn on 11/16/2023.  Patient was seen by Dr. Leblanc, palliative care who had discussed the results with the patient today.  Patient is aware and in agreement with the plan.          FINAL DIAGNOSIS:     A. Left iliac bone biopsy:          Metastatic carcinoma with hepatoid differentiation.          See comment.     Comment: Sections show metastatic carcinoma with striking morphologic   and immunohistochemical features of hepatocellular carcinoma (HCC). The   tumor is positive for , CAM5.2 and heppar1, and is negative for   CK7, CK20, CK5/6, pankeratin, PSA, NKX3.1, CDX2, NapsinA, TTF1, p63,   synaptophysin, chromograninA, SOX10, MelanA, S100, Calretinin, inhibin,   HMB45, and PAX8 (some of these markers were performed on the recent   bone marrow biopsy). P53 stain shows overexpression and may suggest   tp53 mutation. Recent PET CT and CT with contrast show only lytic bony   lesions, bilateral adrenal masses, and possible hilar adenopathy which   is indeterminate for metastatic lymphadenopathy. The liver is   reportedly unremarkable. Based on the current biopsy alone, metastatic   hepatocellular carcinoma (HCC) would be most likely. However, if this   possibility is definitively excluded by the radiologic findings,   metastatic carcinoma with hepatoid differentiation is the appropriate   diagnosis. Carcinoma with hepatoid differentiation may arise from   numerous sites throughout the body, and clinical/radiologic correlation   would be needed to determine the site of origin. There is adequate   tumor for any additional testing required (block A1 or A2).                                            Diagnosis performed by:                                       CHRISTELLE MARTINEZ DO

## 2023-11-14 NOTE — PROGRESS NOTES
Subjective:     Chief Complaint   Patient presents with    Follow-Up     2 wk FV / pain and symptom mgmnt      German Pagan is a 68 y.o. male here today for on symptom management for oncological pain.  Since her last visit the patient reports he had his biopsy completed but has developed worsening pain near the biopsy site.  He reports a dull achy to sharp pain in his left buttocks that radiates down the back of his leg.  The pain has been worsening and is aggravated by movement and palpation.  He is taking his long and short acting morphine to control the pain.  If it was not for the new pain related to his biopsy he reports his other oncological pain is stable and well-controlled with his medications.  We also then spent part of the visit discussing his biopsy results and that they are pointing to a possible hepatocellular carcinoma and that the next steps would be to complete additional lab work today and then follow-up with medical oncology to discuss treatment.  We then discussed symptom management and due to the worsening pain and need for regularly scheduled short acting morphine through shared decision making we plan to increase his long-acting to help better control his symptoms.  He reports no problems with constipation.  We then concluded the visit with a plan to follow-up in 2 weeks.    Problem   Cancer Related Pain        Current medicines (including changes today)  Current Outpatient Medications   Medication Sig Dispense Refill    morphine ER (MS CONTIN) 30 MG Tab CR tablet Take 1 Tablet by mouth every 12 hours for 14 days. 28 Tablet 0    morphine (MS IR) 15 MG tablet Take 1 Tablet by mouth every four hours as needed for Severe Pain for up to 14 days. 56 Tablet 0    morphine ER (MS CONTIN) 15 MG Tab CR tablet Take 1 Tablet by mouth every 12 hours for 14 days. 28 Tablet 0    tamsulosin (FLOMAX) 0.4 MG capsule Take 0.4 mg by mouth every evening.      Naloxone (NARCAN) 4 MG/0.1ML Liquid One spray in  one nostril for overdose and call 911. 1 Each 0    pravastatin (PRAVACHOL) 10 MG Tab TAKE 1 TABLET BY MOUTH ONCE DAILY IN THE EVENING 90 Tablet 3    Cholecalciferol (VITAMIN D3) 125 MCG (5000 UT) Cap Take 5,000 Units by mouth every 48 hours.       No current facility-administered medications for this encounter.       Social History     Tobacco Use    Smoking status: Former     Current packs/day: 0.00     Average packs/day: 0.5 packs/day for 36.0 years (18.0 ttl pk-yrs)     Types: Cigarettes     Start date:      Quit date:      Years since quittin.8    Smokeless tobacco: Former     Types: Chew     Quit date: 10/3/2023    Tobacco comments:     Quit smoking in  - just less than 1 ppd. Used chewing tobacco for about 8 years.    Vaping Use    Vaping Use: Never used   Substance Use Topics    Alcohol use: Not Currently     Comment: Quit in     Drug use: Not Currently     Types: Methamphetamines, Marijuana, Inhaled     Comment: Quit in , used methamphetamine     Past Medical History:   Diagnosis Date    Back pain 10/05/2023    groin    Bilateral recurrent inguinal hernia without obstruction or gangrene 10/6/2023    Bowel habit changes 10/05/2023    constipation    Bronchitis     as a teenager    Cataract 10/05/2023    no surgery    Dental disorder     dentures    Heart burn     Hepatitis A     treated    Hepatitis C 2016    treated    High cholesterol     Hypertension     Nausea and vomiting 10/6/2023    Pneumonia     as a teenager    Postoperative abdominal pain 10/13/2023      Family History   Problem Relation Age of Onset    Cancer Mother         Multiple Myeloma    Cancer Father         Colon and Lung    Cancer Sister         Breast    Diabetes Brother     Cancer Maternal Grandmother         Colon    Colon Cancer Maternal Grandmother     No Known Problems Daughter     No Known Problems Daughter     No Known Problems Son     Cancer Other         Multiple myeloma         Objective:     Vitals:     "11/14/23 1045   BP: 128/66   BP Location: Left arm   Patient Position: Sitting   BP Cuff Size: Adult   Pulse: 89   Resp: 17   Temp: 36.9 °C (98.4 °F)   TempSrc: Temporal   SpO2: 98%   Weight: 75 kg (165 lb 6.4 oz)   Height: 1.778 m (5' 10\")     Body mass index is 23.73 kg/m².     Review of Systems   Constitutional:  Positive for malaise/fatigue. Negative for chills and fever.   Respiratory:  Negative for cough and shortness of breath.    Cardiovascular:  Negative for chest pain and palpitations.   Gastrointestinal:  Negative for constipation, diarrhea, nausea and vomiting.   Musculoskeletal:  Positive for back pain.     Physical Exam:   Gen: Well developed, well nourished in no acute distress.   Skin: Pink, warm, and dry  HEENT: conjunctiva non-injected, sclera non-icteric. EOMs intact.   Nasal mucosa without edema nor erythema.   Pinna normal.    Oral mucous membranes pink and moist with no lesions.  Neck: Supple, trachea midline. No adenopathy or masses in the neck or supraclavicular regions.  Lungs: Effort is normal.   CV: regular rate   Abdomen: flat  Ext: Left leg sciatic distribution tender to palpation.  Alert and oriented Eye contact is good, speech goal directed, affect calm    Assessment and Plan:   Cancer related pain  Patient with metastatic cancer of possible hepatocellular carcinoma in origin.  Patient continues with oncological pain and has developed new pain in the area of his biopsy.  Pain has been worsening over the last few days.  On exam no signs suggestive of infection.  We will continue to monitor.  Will increase to 30 mg morphine extended release twice daily and 15 mg morphine for breakthrough.   reviewed, no signs of diversion or abuse, controlled substance agreement on file, risk and benefits of medications discussed.  2-week refill sent to pharmacy.  Follow-up in 2 weeks for reevaluation.    25 minutes in total spent on patient encounter including chart review and consultation with " patient's oncological providers.    Followup: Return in about 2 weeks (around 11/28/2023).    Burak Leblanc M.D.

## 2023-11-14 NOTE — ADDENDUM NOTE
Encounter addended by: Nelda Jain, Med Ass't on: 11/14/2023 3:07 PM   Actions taken: Charge Capture section accepted

## 2023-11-14 NOTE — PROGRESS NOTES
"Oncology Nurse Navigation (ONN) Assessment:  DIOR Pedraza reached out to patient to follow up on provider's urgent referral. Patient was aware of the new patient consult visit with Dr. Linn on 11.16.2023.  He is familiar with office location.  Patient is established with Dr. Leblanc for oncological pain.  I introduced myself, my role and resources at [facility]. ONN explained I am here to provide support, education, guidance and to assist with any barriers to care.  NN asked patient if there were any urgent issues or questions he was having at this time.  Patient shared, \"Not yet, we're just starting all this\".   Patient's support team is his significant other, Gladys.  They live in [place] and he is employed as a [employer] . ONN introduction letter & Cancer Support Services Calendar sent to patient's personal email today.  Patient verbalized understanding of information provided. Current questions have been answered & he was encouraged to contact Navigation with future questions or concerns.       BARRIERS ASSESSMENT:  TRANSPORTATION:  No barriers, patient stated \"my girlfriend is my \".  EMPLOYMENT:   [employer]   FINANCIAL:  no barriers  INSURANCE:  [facility], Medicare A/B  SUPPORT SYSTEM:  Gladys, his girlfriend  PSYCHOLOGICAL:   No DST, patient sees Dr. Linn on 11/16/23.  COMMUNICATION:  no barriers   FAMILY CARE:   no barriers  SELF CARE:  no barriers         INTERVENTION:  ONN introduction letter & [facility] Support Services Calendar sent to patient today.  "

## 2023-11-14 NOTE — ASSESSMENT & PLAN NOTE
Patient with metastatic cancer of possible hepatocellular carcinoma in origin.  Patient continues with oncological pain and has developed new pain in the area of his biopsy.  Pain has been worsening over the last few days.  On exam no signs suggestive of infection.  We will continue to monitor.  Will increase to 30 mg morphine extended release twice daily and 15 mg morphine for breakthrough.   reviewed, no signs of diversion or abuse, controlled substance agreement on file, risk and benefits of medications discussed.  2-week refill sent to pharmacy.  Follow-up in 2 weeks for reevaluation.

## 2023-11-16 ENCOUNTER — HOSPITAL ENCOUNTER (OUTPATIENT)
Dept: HEMATOLOGY ONCOLOGY | Facility: MEDICAL CENTER | Age: 68
End: 2023-11-16
Attending: STUDENT IN AN ORGANIZED HEALTH CARE EDUCATION/TRAINING PROGRAM
Payer: COMMERCIAL

## 2023-11-16 VITALS
TEMPERATURE: 96.6 F | HEART RATE: 111 BPM | RESPIRATION RATE: 18 BRPM | WEIGHT: 165.12 LBS | HEIGHT: 70 IN | SYSTOLIC BLOOD PRESSURE: 128 MMHG | DIASTOLIC BLOOD PRESSURE: 70 MMHG | BODY MASS INDEX: 23.64 KG/M2 | OXYGEN SATURATION: 98 %

## 2023-11-16 DIAGNOSIS — C22.0 HEPATOCELLULAR CARCINOMA (HCC): ICD-10-CM

## 2023-11-16 LAB — AFP-TM SERPL-MCNC: 80 NG/ML (ref 0–9)

## 2023-11-16 PROCEDURE — 99212 OFFICE O/P EST SF 10 MIN: CPT | Performed by: STUDENT IN AN ORGANIZED HEALTH CARE EDUCATION/TRAINING PROGRAM

## 2023-11-16 PROCEDURE — 99215 OFFICE O/P EST HI 40 MIN: CPT | Performed by: STUDENT IN AN ORGANIZED HEALTH CARE EDUCATION/TRAINING PROGRAM

## 2023-11-16 RX ORDER — 0.9 % SODIUM CHLORIDE 0.9 %
10 VIAL (ML) INJECTION PRN
Status: CANCELLED | OUTPATIENT
Start: 2024-01-08

## 2023-11-16 RX ORDER — METHYLPREDNISOLONE SODIUM SUCCINATE 125 MG/2ML
125 INJECTION, POWDER, LYOPHILIZED, FOR SOLUTION INTRAMUSCULAR; INTRAVENOUS PRN
Status: CANCELLED | OUTPATIENT
Start: 2024-01-09

## 2023-11-16 RX ORDER — 0.9 % SODIUM CHLORIDE 0.9 %
VIAL (ML) INJECTION PRN
Status: CANCELLED | OUTPATIENT
Start: 2024-01-09

## 2023-11-16 RX ORDER — 0.9 % SODIUM CHLORIDE 0.9 %
10 VIAL (ML) INJECTION PRN
Status: CANCELLED | OUTPATIENT
Start: 2024-01-09

## 2023-11-16 RX ORDER — ONDANSETRON 8 MG/1
8 TABLET, ORALLY DISINTEGRATING ORAL PRN
Status: CANCELLED | OUTPATIENT
Start: 2024-01-09

## 2023-11-16 RX ORDER — 0.9 % SODIUM CHLORIDE 0.9 %
VIAL (ML) INJECTION PRN
Status: CANCELLED | OUTPATIENT
Start: 2024-01-08

## 2023-11-16 RX ORDER — SODIUM CHLORIDE 9 MG/ML
INJECTION, SOLUTION INTRAVENOUS CONTINUOUS
Status: CANCELLED | OUTPATIENT
Start: 2024-01-09

## 2023-11-16 RX ORDER — DIPHENHYDRAMINE HYDROCHLORIDE 50 MG/ML
50 INJECTION INTRAMUSCULAR; INTRAVENOUS PRN
Status: CANCELLED | OUTPATIENT
Start: 2024-01-09

## 2023-11-16 RX ORDER — 0.9 % SODIUM CHLORIDE 0.9 %
3 VIAL (ML) INJECTION PRN
Status: CANCELLED | OUTPATIENT
Start: 2024-01-09

## 2023-11-16 RX ORDER — 0.9 % SODIUM CHLORIDE 0.9 %
3 VIAL (ML) INJECTION PRN
Status: CANCELLED | OUTPATIENT
Start: 2024-01-08

## 2023-11-16 RX ORDER — PROCHLORPERAZINE MALEATE 10 MG
10 TABLET ORAL EVERY 6 HOURS PRN
Status: CANCELLED | OUTPATIENT
Start: 2024-01-09

## 2023-11-16 RX ORDER — ONDANSETRON 2 MG/ML
4 INJECTION INTRAMUSCULAR; INTRAVENOUS PRN
Status: CANCELLED | OUTPATIENT
Start: 2024-01-09

## 2023-11-16 RX ORDER — EPINEPHRINE 1 MG/ML(1)
0.5 AMPUL (ML) INJECTION PRN
Status: CANCELLED | OUTPATIENT
Start: 2024-01-09

## 2023-11-16 ASSESSMENT — ENCOUNTER SYMPTOMS
BRUISES/BLEEDS EASILY: 0
DIAPHORESIS: 0
SHORTNESS OF BREATH: 0
HEARTBURN: 0
SPUTUM PRODUCTION: 0
ORTHOPNEA: 0
TINGLING: 0
BACK PAIN: 0
COUGH: 0
MYALGIAS: 0
FEVER: 0
DIZZINESS: 0
DOUBLE VISION: 0
CHILLS: 0
CONSTIPATION: 0
NERVOUS/ANXIOUS: 0
DIARRHEA: 0
WHEEZING: 0
WEAKNESS: 1
SINUS PAIN: 0
HEADACHES: 0
SORE THROAT: 0
VOMITING: 0
BLURRED VISION: 0
BLOOD IN STOOL: 0
NAUSEA: 0
ABDOMINAL PAIN: 0
INSOMNIA: 0
PALPITATIONS: 0
WEIGHT LOSS: 1

## 2023-11-16 ASSESSMENT — FIBROSIS 4 INDEX: FIB4 SCORE: 1.8

## 2023-11-17 NOTE — PROGRESS NOTES
Consult:  Hematology/Oncology      Referring Physician: TRI Gamble  Primary Care:  VALORIE Lawrence    Diagnosis: Hepatocellular carcinoma    Chief Complaint:  Evaluation for systemic therapy    History of Presenting Illness:  German Pagan is a 68 y.o.  man who presents to the clinic for evaluation for systemic therapy for a new diagnosis of hepatocellular carcinoma. The patient had presented to the hospital with low back pain which had been worsening over several months, and was found on imaging to have lytic lesions throughout his skeleton. He was managed with pain control and discharged for outpatient evaluation for potential multiple myeloma. He was sent to our intake oncology coordinator, who worked him up for multiple myeloma. His bone marrow biopsy only showed 3-5% plasma cells though, without any specific signature on blood work significant for myeloma. A whole body PET scan revealed the lytic lesions and a few hypermetabolic nodes, but no mass in the liver. The patient then underwent a biopsy of one of the bone lesions, and that revealed hepatocellular carcinoma. He then had an AFP drawn which was elevated at 80. He was subsequently referred to me for evaluation. Of note, he has a history of hepatitis C, treated in 2016, in the setting of prior IV drug use (he has not used in many years).     Interval History:  Patient is here for consultation. He is suffering from severe pain in his left hip which has made it difficult for him to walk. He has been losing weight and has been feeling more fatigued, but up until the biopsy of the left hip, his pain was controlled. However, his pain in his hip is now persisting despite using morphine. He is following with Dr. Leblanc from palliative care as well. His wife is with him.     Past Medical History:   Diagnosis Date    Back pain 10/05/2023    groin    Bilateral recurrent inguinal hernia without obstruction or gangrene 10/6/2023    Bowel  habit changes 10/05/2023    constipation    Bronchitis     as a teenager    Cataract 10/05/2023    no surgery    Dental disorder     dentures    Heart burn     Hepatitis A     treated    Hepatitis C 2016    treated    High cholesterol     Hypertension     Nausea and vomiting 10/6/2023    Pneumonia     as a teenager    Postoperative abdominal pain 10/13/2023       Past Surgical History:   Procedure Laterality Date    INGUINAL HERNIA LAPAROSCOPIC BILATERAL Bilateral 10/6/2023    Procedure: LAPAROSCOPIC BILATERAL INGUINAL HERNIA REPAIR WITH MESH;  Surgeon: Chris Hanks M.D.;  Location: SURGERY [facility];  Service: General    ANKLE FUSION Right 1990    ORIF, ANKLE Bilateral     VASECTOMY         Social History     Socioeconomic History    Marital status: Single     Spouse name: Not on file    Number of children: Not on file    Years of education: Not on file    Highest education level: Not on file   Occupational History    Not on file   Tobacco Use    Smoking status: Former     Current packs/day: 0.00     Average packs/day: 0.5 packs/day for 36.0 years (18.0 ttl pk-yrs)     Types: Cigarettes     Start date:      Quit date:      Years since quittin.8    Smokeless tobacco: Former     Types: Chew     Quit date: 10/3/2023    Tobacco comments:     Quit smoking in  - just less than 1 ppd. Used chewing tobacco for about 8 years.    Vaping Use    Vaping Use: Never used   Substance and Sexual Activity    Alcohol use: Not Currently     Comment: Quit in     Drug use: Not Currently     Types: Methamphetamines, Marijuana, Inhaled     Comment: Quit in , used methamphetamine    Sexual activity: Yes     Partners: Female     Comment: committed relationship.   Other Topics Concern    Not on file   Social History Narrative    Works for the [employer]     Social Determinants of Health     Financial Resource Strain: Not on file   Food Insecurity: Not on file   Transportation Needs: Not  on file   Physical Activity: Not on file   Stress: Not on file   Social Connections: Not on file   Intimate Partner Violence: Not on file   Housing Stability: Not on file       Family History   Problem Relation Age of Onset    Cancer Mother         Multiple Myeloma    Cancer Father         Colon and Lung    Cancer Sister         Breast    Diabetes Brother     Cancer Maternal Grandmother         Colon    Colon Cancer Maternal Grandmother     No Known Problems Daughter     No Known Problems Daughter     No Known Problems Son     Cancer Other         Multiple myeloma       OB History   No obstetric history on file.       Allergies as of 11/16/2023 - Reviewed 11/16/2023   Allergen Reaction Noted    Pcn [penicillins]  05/19/2017         Current Outpatient Medications:     morphine ER (MS CONTIN) 30 MG Tab CR tablet, Take 1 Tablet by mouth every 12 hours for 14 days., Disp: 28 Tablet, Rfl: 0    morphine (MS IR) 15 MG tablet, Take 1 Tablet by mouth every four hours as needed for Severe Pain for up to 14 days., Disp: 56 Tablet, Rfl: 0    tamsulosin (FLOMAX) 0.4 MG capsule, Take 0.4 mg by mouth every evening., Disp: , Rfl:     Naloxone (NARCAN) 4 MG/0.1ML Liquid, One spray in one nostril for overdose and call 911., Disp: 1 Each, Rfl: 0    pravastatin (PRAVACHOL) 10 MG Tab, TAKE 1 TABLET BY MOUTH ONCE DAILY IN THE EVENING, Disp: 90 Tablet, Rfl: 3    Cholecalciferol (VITAMIN D3) 125 MCG (5000 UT) Cap, Take 5,000 Units by mouth every 48 hours., Disp: , Rfl:     Review of Systems:  Review of Systems   Constitutional:  Positive for malaise/fatigue and weight loss. Negative for chills, diaphoresis and fever.   HENT:  Negative for hearing loss, nosebleeds, sinus pain and sore throat.    Eyes:  Negative for blurred vision and double vision.   Respiratory:  Negative for cough, sputum production, shortness of breath and wheezing.    Cardiovascular:  Negative for chest pain, palpitations, orthopnea and leg swelling.   Gastrointestinal:  " Negative for abdominal pain, blood in stool, constipation, diarrhea, heartburn, melena, nausea and vomiting.   Genitourinary:  Negative for dysuria, frequency, hematuria and urgency.   Musculoskeletal:  Positive for joint pain (L hip pain). Negative for back pain and myalgias.   Skin:  Negative for rash.   Neurological:  Positive for weakness. Negative for dizziness, tingling and headaches.   Endo/Heme/Allergies:  Does not bruise/bleed easily.   Psychiatric/Behavioral:  The patient is not nervous/anxious and does not have insomnia.           Physical Exam:  Vitals:    11/16/23 1103   BP: 128/70   BP Location: Right arm   Patient Position: Sitting   BP Cuff Size: Adult   Pulse: (!) 111   Resp: 18   Temp: 35.9 °C (96.6 °F)   TempSrc: Temporal   SpO2: 98%   Weight: 74.9 kg (165 lb 2 oz)   Height: 1.778 m (5' 10\")       DESC; KARNOFSKY SCALE WITH ECOG EQUIVALENT: 80, Normal activity with effort; some signs or symptoms of disease (ECOG equivalent 1)    DISTRESS LEVEL: mild distress    Physical Exam     Depression Screening    Little interest or pleasure in doing things?      Feeling down, depressed , or hopeless?     Trouble falling or staying asleep, or sleeping too much?      Feeling tired or having little energy?      Poor appetite or overeating?      Feeling bad about yourself - or that you are a failure or have let yourself or your family down?     Trouble concentrating on things, such as reading the newspaper or watching television?     Moving or speaking so slowly that other people could have noticed.  Or the opposite - being so fidgety or restless that you have been moving around a lot more than usual?      Thoughts that you would be better off dead, or of hurting yourself?      Patient Health Questionnaire Score:         If depressive symptoms identified deferred to follow up visit unless specifically addressed in assesment and plan.    Interpretation of PHQ-9 Total Score   Score Severity   1-4 No Depression "   5-9 Mild Depression   10-14 Moderate Depression   15-19 Moderately Severe Depression   20-27 Severe Depression    Labs:  Hospital Outpatient Visit on 11/14/2023   Component Date Value Ref Range Status    Alpha Fetoprotein 11/14/2023 80 (H)  0 - 9 ng/mL Final    Comment: INTERPRETIVE INFORMATION: Alpha Fetoprotein Tumor Marker  The [employer] Access DxI AFP method is used. Results  obtained with different assay methods or kits cannot be used  interchangeably. AFP is a valuable aid in the management of  nonseminomatous testicular cancer patients when used in  conjunction with information available from the clinical  evaluation and other diagnostic procedures. Increased AFP  concentrations have also been observed in ataxia telangiectasia,  hereditary tyrosinemia, primary hepatocellular carcinoma,  teratocarcinoma, gastrointestinal tract cancers with and without  liver metastases, and in benign hepatic conditions such as acute  viral hepatitis, chronic active hepatitis, and cirrhosis. The  result cannot be interpreted as absolute evidence of the presence  or absence of malignant disease. The result is not interpretable  as a tumor marker in pregnant females.  Access complete set of age- and/or gender-specific reference  intervals for this test in the                            [facility] Test Directory  ([facility]).  Performed By: [facility]  [address] [place] [address]  : Haim Becker MD, PhD  CLIA Number: [id number]         Imaging:   All listed images below have been independently reviewed by me. I agree with the findings as summarized below:    CT-NEEDLE BX-DEEP BONE    Result Date: 11/8/2023 11/8/2023 1:07 PM HISTORY/REASON FOR EXAM:  multiple lytic lesions seen on imaging. Questionable myeloma but bone marrow not helpful.; Bone marrow not helpful - need bone biopsy of lytic lesion of your choice - per path please obtain tissue in RPMI for flow and FISH  as well as for further tissue sampling as well. TECHNIQUE/EXAM DESCRIPTION: Left iliac deep bone biopsy with CT guidance. Low dose optimization technique was utilized for this CT exam including automated exposure control and adjustment of the mA and/or kV according to patient size. PROCEDURE: Informed consent was obtained. Moderate sedation was provided. Pulse oximetry and continuous cardiac monitoring by the nurse was performed throughout the exam. Intraservice time was 20 minutes. Localizing CT images were obtained with the patient in prone position. The skin was prepped with ChloraPrep and draped in a sterile fashion. Following local anesthesia with 1% Lidocaine, a 11 G guiding needle was placed and needle position confirmed with CT. Using coaxial technique, an 13 G core biopsy needle was placed into the target lesion in the left iliac crest and needle position confirmed with CT. A total of 1 samples were obtained in this fashion and submitted in formalin. A single additional specimen was placed in RPMI for further testing. The guiding needle was removed and limited CT images obtained through the biopsy site show no evidence of significant parenchymal hemorrhage. The patient tolerated the procedure well. COMPARISON: None available. COMPLICATIONS: No immediate complications FINDINGS: The localizing CT images show satisfactory needle position within the target lesion.     CT GUIDED LEFT ILIAC DEEP BONE BIOPSY.    CT-PETCT-WHOLE BODY    Result Date: 10/18/2023  10/18/2023 8:54 AM HISTORY/REASON FOR EXAM:  Monoclonal gammopathy.  Lytic bone lesions, concern for multiple myeloma. TECHNIQUE/EXAM DESCRIPTION AND NUMBER OF VIEWS: PET body imaging. Initially, 11.29 mCi F-18 FDG was administered intravenously under standardized conditions. Approximately 45 minutes after FDG administration, the patient was placed in the supine position on the PET CT table. Blood glucose level was 94 mg/dL. Low dose spiral CT imaging from  the cranial vertex through the feet was performed. Whole body PET imaging was then performed from the cranial vertex through the feet. CT images, PET images, and PET/CT fused images were reviewed on a PACS 3D workstation. The limited non-contrast CT data are used primarily for attenuation correction and anatomic correlation.  Evaluation of solid organs and bowel are especially limited utilizing this technique. COMPARISON: CT abdomen and pelvis 10/2/2023 FINDINGS: Head and neck: No abnormal focal FDG activity. Chest: No abnormal activity in the lungs.  Increased activity within normal size prevascular and LEFT hilar lymph nodes, max SUV 5.87. Abdomen and pelvis: No abnormal focal FDG activity. Musculoskeletal: Multiple foci of increased activity in the spine, proximal humeri, bilateral ribs, LEFT scapula, sternum, pelvis, and proximal femurs. Lower extremities:  No additional foci of increased activity. Incidental findings on CT: Multiple lytic bone lesions.  Expansile lesion in the LEFT posterior 9th rib distorts the underlying pleura.  RIGHT adrenal nodule measuring 3.5 x 3.4 cm with mildly increased activity, max SUV 4.74.  LEFT adrenal nodule measuring 1.6 cm with mildly increased activity, max SUV 3.71.     1.  Multiple lytic lesions throughout the axial and proximal appendicular skeleton with associated increased activity on PET imaging, most consistent with multiple myeloma. 2.  Increased activity corresponding with normal size LEFT mediastinal and hilar lymph nodes, potentially neoplastic. 3.  Bilateral adrenal nodules, larger on the RIGHT, with moderate activity, benign versus neoplastic process.       Pathology:    FINAL DIAGNOSIS:     A. Left iliac bone biopsy:          Metastatic carcinoma with hepatoid differentiation.          See comment.     Comment: Sections show metastatic carcinoma with striking morphologic   and immunohistochemical features of hepatocellular carcinoma (HCC). The   tumor is  positive for , CAM5.2 and heppar1, and is negative for   CK7, CK20, CK5/6, pankeratin, PSA, NKX3.1, CDX2, NapsinA, TTF1, p63,   synaptophysin, chromograninA, SOX10, MelanA, S100, Calretinin, inhibin,   HMB45, and PAX8 (some of these markers were performed on the recent   bone marrow biopsy). P53 stain shows overexpression and may suggest   tp53 mutation. Recent PET CT and CT with contrast show only lytic bony   lesions, bilateral adrenal masses, and possible hilar adenopathy which   is indeterminate for metastatic lymphadenopathy. The liver is   reportedly unremarkable. Based on the current biopsy alone, metastatic   hepatocellular carcinoma (HCC) would be most likely. However, if this   possibility is definitively excluded by the radiologic findings,   metastatic carcinoma with hepatoid differentiation is the appropriate   diagnosis. Carcinoma with hepatoid differentiation may arise from   numerous sites throughout the body, and clinical/radiologic correlation   would be needed to determine the site of origin. There is adequate   tumor for any additional testing required (block A1 or A2).                                           Diagnosis performed by:                                       CHRISTELLE MARTINEZ DO     Assessment & Plan:  1. Hepatocellular carcinoma (HCC)  Referral to Radiation Oncology          This is a 68 year old  man with hepatocellular carcinoma, cTxNxM1 stage IV disease. He presents for evaluation for systemic therapy.     Current Diagnosis and Staging: Hepatocellular carcinoma, cTxNxM1 stage IV disease    Treatment Plan: Durvalumab with tremelimumab    Treatment Citation: HIMALAYA trial, [facility] Evidence 2023    Plan of Care:    Primary Therapy: Durva/treme to start in next 2 weeks  Supportive Therapy: Denosumab for fracture prevention; refer to radiation oncology for palliative XRT to hip  Toxicity: Patient is getting antineoplastic therapy and needs monitoring of blood counts, hepatic  function, and renal function due to potential for organ dysfunction.   Labs: CBC with diff, CMP, PT, AFP monitoring  Imaging: Repeat CT scans after C3 of therapy  Treatment Planning: Patient has hepatocellular carcinoma by pathology, though no clear primary identified. He will proceed with STRIDE regimen for palliative therapy, and will be referred for palliative XRT as well.   Consultations: Radiation oncology (Dr. Ruvalcaba); Palliative care (Dr. Leblanc)  Code Status: Full  Miscellaneous: NA  Return for Follow Up: For start of therapy    Any questions and concerns raised by the patient were answered to the best of my ability. Thank you for allowing me to participate in the care for this patient. Please feel free to contact me for any questions or concerns.     Total time spent on chart review, clinic encounter, and documentation: 64 minutes.

## 2023-11-20 ENCOUNTER — HOSPITAL ENCOUNTER (OUTPATIENT)
Dept: HEMATOLOGY ONCOLOGY | Facility: MEDICAL CENTER | Age: 68
End: 2023-11-20
Attending: STUDENT IN AN ORGANIZED HEALTH CARE EDUCATION/TRAINING PROGRAM
Payer: COMMERCIAL

## 2023-11-20 DIAGNOSIS — C80.1 ANXIETY ASSOCIATED WITH CANCER DIAGNOSIS (HCC): ICD-10-CM

## 2023-11-20 DIAGNOSIS — Z79.899 HIGH RISK MEDICATION USE: ICD-10-CM

## 2023-11-20 DIAGNOSIS — C79.51 METASTASIS TO BONE (HCC): Chronic | ICD-10-CM

## 2023-11-20 DIAGNOSIS — C22.0 HEPATOCELLULAR CARCINOMA (HCC): ICD-10-CM

## 2023-11-20 DIAGNOSIS — F41.1 ANXIETY ASSOCIATED WITH CANCER DIAGNOSIS (HCC): ICD-10-CM

## 2023-11-20 RX ORDER — DIAZEPAM 5 MG/1
5 TABLET ORAL
Qty: 1 TABLET | Refills: 0 | Status: SHIPPED | OUTPATIENT
Start: 2023-11-20 | End: 2023-11-21 | Stop reason: SDUPTHER

## 2023-11-20 NOTE — PROGRESS NOTES
The following appointments, labs, and medications have been provided to the patient:  Line: Peripheral IV  ECHO: NA  WBC Support (g-csf): NA  Labs: AFP serum tumor marker, prothrombin time, cbc, cmp, tsh, free thyroxine  Follow up appts: TBD  Chemo/immunotherapy class: Completed 11/20/2023  Chemotherapy Regimen: Tremelimumab + Durvalumab  Nausea medication: zofran/compazine prescribed  Other treatment specific meds: NA  Oral chemo follow up: NA  Pain medication: Per provider discretion  Nurse yessica: Referred on 11/17/2023  Dietician:  JOCE  SW: NA  FRA: Referred on 11/17/2023    Additional info/teaching for immunotherapy patients:  If hospitalized, inform staff of immunotherapy treatment and have them contact oncologist - immunotherapy alert card provided.    Additional teaching:  Handout on link to NCCN guidelines given    Patient was provided with drug specific handouts and [facility] side effects sheet. Patient verbalized that questions and concerns regarding treatment have been addressed. Consent to proceed with treatment was reviewed and signed during this visit.    Spent 60 minutes of continuous, non-interrupted, face-to-face patient contact in which greater than 50% of the visit was spent counseling and coordinating of care.    Patient c/o increasing/worsening pain in left hip where he had CT guided L iliac wing bx.  Upon assessment left hip is slightly warmer to touch than right hip and slightly swollen.  No overt s/s of infection.  Pt is having difficulties ambulating due to the pain. Discussed with Dr. Linn and received verbal orders for MRI of left hip.  MRI scheduled STAT to first available Nov. 28 at 2pm [facility] location.  Dr. Leblanc assessed pt's pain and advised pt to increase MS IR 15mg to 30mg twice daily and to report back on if the pain medication is helping. Pt and wife in agreement.

## 2023-11-21 DIAGNOSIS — C80.1 ANXIETY ASSOCIATED WITH CANCER DIAGNOSIS (HCC): ICD-10-CM

## 2023-11-21 DIAGNOSIS — F41.1 ANXIETY ASSOCIATED WITH CANCER DIAGNOSIS (HCC): ICD-10-CM

## 2023-11-21 RX ORDER — PROCHLORPERAZINE MALEATE 10 MG
10 TABLET ORAL EVERY 6 HOURS PRN
Qty: 30 TABLET | Refills: 3 | Status: SHIPPED | OUTPATIENT
Start: 2023-11-21

## 2023-11-21 RX ORDER — DIAZEPAM 5 MG/1
5 TABLET ORAL
Qty: 1 TABLET | Refills: 0 | Status: SHIPPED | OUTPATIENT
Start: 2023-11-21 | End: 2023-11-21

## 2023-11-21 RX ORDER — ONDANSETRON 4 MG/1
4 TABLET, FILM COATED ORAL EVERY 4 HOURS PRN
Qty: 20 TABLET | Refills: 3 | Status: SHIPPED | OUTPATIENT
Start: 2023-11-21

## 2023-11-21 NOTE — PROGRESS NOTES
Called patient and notified him Dr. Linn is ordering valium for self administration prior to his MRI on 11/28/2023.  Provided pt instructions to take the valium after he arrives to check in for his procedure.  Pt verbalized understanding and agreed.

## 2023-11-22 ENCOUNTER — HOSPITAL ENCOUNTER (OUTPATIENT)
Dept: RADIATION ONCOLOGY | Facility: MEDICAL CENTER | Age: 68
End: 2023-11-22

## 2023-11-22 ENCOUNTER — HOSPITAL ENCOUNTER (OUTPATIENT)
Dept: RADIATION ONCOLOGY | Facility: MEDICAL CENTER | Age: 68
End: 2023-11-30
Attending: RADIOLOGY
Payer: COMMERCIAL

## 2023-11-22 VITALS
HEART RATE: 94 BPM | HEIGHT: 70 IN | OXYGEN SATURATION: 97 % | SYSTOLIC BLOOD PRESSURE: 137 MMHG | WEIGHT: 162.04 LBS | BODY MASS INDEX: 23.2 KG/M2 | DIASTOLIC BLOOD PRESSURE: 69 MMHG

## 2023-11-22 DIAGNOSIS — C79.51 METASTASIS TO BONE (HCC): Chronic | ICD-10-CM

## 2023-11-22 DIAGNOSIS — C79.51 METASTASIS TO BONE (HCC): ICD-10-CM

## 2023-11-22 DIAGNOSIS — C22.0 HEPATOCELLULAR CARCINOMA (HCC): ICD-10-CM

## 2023-11-22 PROCEDURE — 99205 OFFICE O/P NEW HI 60 MIN: CPT | Mod: 25 | Performed by: RADIOLOGY

## 2023-11-22 PROCEDURE — 77470 SPECIAL RADIATION TREATMENT: CPT | Mod: 26 | Performed by: RADIOLOGY

## 2023-11-22 PROCEDURE — 77334 RADIATION TREATMENT AID(S): CPT | Mod: 26 | Performed by: RADIOLOGY

## 2023-11-22 PROCEDURE — 77290 THER RAD SIMULAJ FIELD CPLX: CPT | Mod: 26 | Performed by: RADIOLOGY

## 2023-11-22 PROCEDURE — 99214 OFFICE O/P EST MOD 30 MIN: CPT | Mod: 25 | Performed by: RADIOLOGY

## 2023-11-22 PROCEDURE — 77334 RADIATION TREATMENT AID(S): CPT | Performed by: RADIOLOGY

## 2023-11-22 PROCEDURE — 77470 SPECIAL RADIATION TREATMENT: CPT | Performed by: RADIOLOGY

## 2023-11-22 PROCEDURE — 77290 THER RAD SIMULAJ FIELD CPLX: CPT | Performed by: RADIOLOGY

## 2023-11-22 PROCEDURE — 77263 THER RADIOLOGY TX PLNG CPLX: CPT | Performed by: RADIOLOGY

## 2023-11-22 RX ORDER — SENNOSIDES A AND B 8.6 MG/1
8.6 TABLET, FILM COATED ORAL
COMMUNITY

## 2023-11-22 ASSESSMENT — FIBROSIS 4 INDEX: FIB4 SCORE: 1.8

## 2023-11-22 ASSESSMENT — PAIN SCALES - GENERAL: PAINLEVEL: 8=MODERATE-SEVERE PAIN

## 2023-11-22 ASSESSMENT — LIFESTYLE VARIABLES
SMOKING_STATUS: NO
TOBACCO_USE: NO

## 2023-11-22 NOTE — CONSULTS
RADIATION ONCOLOGY CONSULT    DATE OF SERVICE: 11/22/2023    IDENTIFICATION:    Cancer Staging   Hepatocellular carcinoma (HCC)  Staging form: Liver, AJCC 8th Edition  - Clinical: Stage IVB (cTX, cNX, pM1) - Signed by Monroe Linn D.O. on 11/16/2023        HISTORY OF PRESENT ILLNESS: I had the pleasure of seeing Mr. Pagan today in consultation at the request of Dr. Linn for left ilium bone metastasis causing symptomatic pain.  Patient was originally presented with lower back pain and had imaging which showed lytic lesions throughout the skeleton patient had a PET/CT which showed lytic lesions and a few hypermetabolic nodes but no mass of the liver and underwent biopsy of bone lesion which revealed hepatocellular carcinoma.  AFP drawn was elevated 80.  Currently taking MS Contin 15 mg twice daily and morphine for breakthrough.    PAST MEDICAL HISTORY:   Past Medical History:   Diagnosis Date    Back pain 10/05/2023    groin    Bilateral recurrent inguinal hernia without obstruction or gangrene 10/06/2023    Bowel habit changes 10/05/2023    constipation    Bronchitis     as a teenager    Cataract 10/05/2023    no surgery    Dental disorder     dentures    Heart burn     Hepatitis A     treated    Hepatitis C 2016    treated    Hepatocellular carcinoma (HCC)     High cholesterol     Hypertension     Metastasis to bone (HCC)     Nausea and vomiting 10/06/2023    Pneumonia     as a teenager    Postoperative abdominal pain 10/13/2023       PAST SURGICAL HISTORY:  Past Surgical History:   Procedure Laterality Date    INGUINAL HERNIA LAPAROSCOPIC BILATERAL Bilateral 10/06/2023    Procedure: LAPAROSCOPIC BILATERAL INGUINAL HERNIA REPAIR WITH MESH;  Surgeon: Chris Hanks M.D.;  Location: SURGERY [facility];  Service: General    ANKLE FUSION Right 01/01/1990    BONE BIOPSY Left     Iliac bone    ORIF, ANKLE Bilateral     VASECTOMY         CURRENT MEDICATIONS:  Current Outpatient Medications   Medication Sig Dispense Refill     sennosides (SENOKOT) 8.6 MG Tab Take 8.6 mg by mouth 1 time a day as needed.      magnesium citrate Solution Take 300 mL by mouth one time.      prochlorperazine (COMPAZINE) 10 MG Tab Take 1 Tablet by mouth every 6 hours as needed for Nausea/Vomiting. 30 Tablet 3    morphine ER (MS CONTIN) 30 MG Tab CR tablet Take 1 Tablet by mouth every 12 hours for 14 days. 28 Tablet 0    morphine (MS IR) 15 MG tablet Take 1 Tablet by mouth every four hours as needed for Severe Pain for up to 14 days. 56 Tablet 0    tamsulosin (FLOMAX) 0.4 MG capsule Take 0.4 mg by mouth every evening.      Naloxone (NARCAN) 4 MG/0.1ML Liquid One spray in one nostril for overdose and call 911. 1 Each 0    pravastatin (PRAVACHOL) 10 MG Tab TAKE 1 TABLET BY MOUTH ONCE DAILY IN THE EVENING 90 Tablet 3    Cholecalciferol (VITAMIN D3) 125 MCG (5000 UT) Cap Take 5,000 Units by mouth every 48 hours.      ondansetron (ZOFRAN) 4 MG Tab tablet Take 1 Tablet by mouth every four hours as needed for Nausea/Vomiting. 20 Tablet 3     No current facility-administered medications for this encounter.       ALLERGIES:    Pcn [penicillins]    FAMILY HISTORY:    Family History   Problem Relation Age of Onset    Cancer Mother         Multiple Myeloma    Cancer Father         Colon and Lung    Cancer Sister         Breast    Diabetes Brother     Cancer Maternal Grandmother         Colon    Colon Cancer Maternal Grandmother     No Known Problems Daughter     No Known Problems Daughter     No Known Problems Son     Cancer Other         Multiple myeloma       SOCIAL HISTORY:    Social History     Tobacco Use    Smoking status: Former     Current packs/day: 0.00     Average packs/day: 0.5 packs/day for 36.0 years (18.0 ttl pk-yrs)     Types: Cigarettes     Start date:      Quit date: 2006     Years since quittin.9    Smokeless tobacco: Former     Types: Chew     Quit date: 10/3/2023    Tobacco comments:     Quit smoking in 2006 - just less than 1 ppd. Used  "chewing tobacco for about 8 years.    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  Lives with: SO    REVIEW OF SYSTEMS:    Comprehensive review of systems is negative with the exception of the aforementioned HPI, PMH, and PSH bullets in accordance with CMS guidelines.        PHYSICAL EXAM:    Vitals:    11/22/23 1321   BP: 137/69   BP Location: Right arm   Patient Position: Sitting   BP Cuff Size: Adult   Pulse: 94   SpO2: 97%   Weight: 73.5 kg (162 lb 0.6 oz)   Height: 1.778 m (5' 10\")   Pain Score: 8=Moderate-Severe Pain      1= Restricted in physically strenuous activity, but ambulatory and able to carry out work of a light sedentary nature, e.g., light housework, office work.    PAIN:  Pain Score: 8=Moderate-Severe Pain  Pain Scale: 0-10  Pain Assessement: Initial  Pain Location, Orientation and Scale: Hip: Left : Chronic  : 8  What makes the pain better: Morphine and Leg Elevation   What makes the pain worse: Standing/ Movement     Physical Exam  Vitals reviewed.   HENT:      Head: Normocephalic.   Eyes:      Pupils: Pupils are equal, round, and reactive to light.   Cardiovascular:      Rate and Rhythm: Normal rate.   Pulmonary:      Effort: Pulmonary effort is normal.   Abdominal:      General: Abdomen is flat.   Musculoskeletal:         General: Normal range of motion.   Neurological:      General: No focal deficit present.      Mental Status: He is alert.   Psychiatric:         Mood and Affect: Mood normal.              IMPRESSION:    Cancer Staging   Hepatocellular carcinoma (HCC)  Staging form: Liver, AJCC 8th Edition  - Clinical: Stage IVB (cTX, cNX, pM1) - Signed by Monroe Linn D.O. on 11/16/2023     "     RECOMMENDATIONS:   I discussed the role for palliative radiation for the treatment of metastatic hepatocellular carcinoma with left ilium involvement.  Will plan for 30-40 Gray in 5 fractions to start next week.  We discussed risk benefits and patient amenable to treatment.      The patient would like to proceed forward with treatment and we will set up CT simulation for treatment planning imaging today. That will consist of supine immobilization, possibly IV contrast depending on kidney function and targeting requirements, appropriate skin surface marking for radiation treatment.     We will then complete the behind the scenes planning process over several days using appropriate image fusion, target delineation, dosimetry,  checks, and treatment scheduling.      We discussed the risks, benefits and side effects of treatment and the patient is amenable to treatment.  If patient has any questions or concerns, he should feel free to contact me.    Thank you for the opportunity to participate in his care.  If any questions or comments, please do not hesitate in calling.

## 2023-11-22 NOTE — CT SIMULATION
PATIENT NAME German Pagan   PRIMARY PHYSICIAN Buck, Bill [phone]   REFERRING PHYSICIAN No ref. provider found 1955     Hepatocellular carcinoma (HCC)  Staging form: Liver, AJCC 8th Edition  - Clinical: Stage IVB (cTX, cNX, pM1) - Signed by Monroe Linn D.O. on 11/16/2023         Treatment Planning CT Simulation        Order Questions       Question Answer    Is this for a new course of treatment? Yes    Is this an Addendum? No    Implanted Device/Pacemaker No    Simulation Status Initial    Treatment Site Hip    Laterality Left    Treatment Technique SBRT    Treatment Pattern/Frequency Single Fx    Concurrent Chemotherapy No    CT Technique 3D    Slice Thickness 1mm    Scan Extent Pelvis    Bowel Preparation No    Treatment Device(s) Body Fix    Patient Attire Gown    Patient Position Supine    Patient Orientation Head First    Arm Position Up    Treatment Machine TB1 - STx    Treatment Image Guidance CBCT    Frequency (CBCT) Daily    Image Guidance Match Bone    Treatment Planning Image Fusion CT/PET    Special Physics Consult Stereotactic    Other Orders Special Tx Procedure     Weekly Physics Check

## 2023-11-22 NOTE — PROGRESS NOTES
"Patient was seen today in clinic with Dr. Ruvalcaba for consultation.  Vitals signs and weight were obtained and pain assessment was completed.  Allergies and medications were reviewed with the patient.      Vitals/Pain:  Vitals:    11/22/23 1321   BP: 137/69   BP Location: Right arm   Patient Position: Sitting   BP Cuff Size: Adult   Pulse: 94   SpO2: 97%   Weight: 73.5 kg (162 lb 0.6 oz)   Height: 1.778 m (5' 10\")   Pain Score: 8=Moderate-Severe Pain  Pain Scale: 0-10  Pain Assessement: Initial  Pain Location, Orientation and Scale: Hip: Left : Chronic  : 8  What makes the pain better: Morphine and Leg Elevation   What makes the pain worse: Standing/ Movement       Allergies:   Pcn [penicillins]    Current Medications:  Current Outpatient Medications   Medication Sig Dispense Refill    sennosides (SENOKOT) 8.6 MG Tab Take 8.6 mg by mouth 1 time a day as needed.      magnesium citrate Solution Take 300 mL by mouth one time.      prochlorperazine (COMPAZINE) 10 MG Tab Take 1 Tablet by mouth every 6 hours as needed for Nausea/Vomiting. 30 Tablet 3    morphine ER (MS CONTIN) 30 MG Tab CR tablet Take 1 Tablet by mouth every 12 hours for 14 days. 28 Tablet 0    morphine (MS IR) 15 MG tablet Take 1 Tablet by mouth every four hours as needed for Severe Pain for up to 14 days. 56 Tablet 0    tamsulosin (FLOMAX) 0.4 MG capsule Take 0.4 mg by mouth every evening.      Naloxone (NARCAN) 4 MG/0.1ML Liquid One spray in one nostril for overdose and call 911. 1 Each 0    pravastatin (PRAVACHOL) 10 MG Tab TAKE 1 TABLET BY MOUTH ONCE DAILY IN THE EVENING 90 Tablet 3    Cholecalciferol (VITAMIN D3) 125 MCG (5000 UT) Cap Take 5,000 Units by mouth every 48 hours.      ondansetron (ZOFRAN) 4 MG Tab tablet Take 1 Tablet by mouth every four hours as needed for Nausea/Vomiting. 20 Tablet 3     No current facility-administered medications for this encounter.         PCP:  Heber Douglas R.N.  "

## 2023-11-23 NOTE — RADIATION COMPLETION NOTES
Clinical Treatment Planning Note    DATE OF SERVICE: 11/22/2023    DIAGNOSIS:  Hepatocellular carcinoma (HCC)  Staging form: Liver, AJCC 8th Edition  - Clinical: Stage IVB (cTX, cNX, pM1) - Signed by Monroe Linn D.O. on 11/16/2023         IMAGING REVIEWED:  [] CT     [] MRI     [x] PET/CT     [] BONE SCAN     [] MAMMO     [] OTHER      TREATMENT INTENT:   [] CURATIVE     [] MAINTENANCE     [x]  PALLIATIVE      []  SUPPORTIVE     []  PROPHYLACTIC     [] BENIGN     []  CONSOLIDATIVE      [] DEFINITIVE   []  OLOGIMETASTATIC      LINE OF TREATMENT:  [] ADJUVANT   [] DEFINITIVE   [] NEOADJUVANT   [] RE-TREATMENT      TECHNIQUE PLANNED:  [] IMRT   [] 3D   [x] SBRT   [] SRS/SRT   [] HDR   [] ELECTRON       IMRT JUSTIFICATION:  []   An immediately adjacent area has been previously irradiated and abutting portals must be established with high precision.    []  Dose escalation is planned to deliver radiation doses in excess of those commonly utilized for similar tumors with conventional treatment.    [x]  The target volume is concave or convex, and the critical normal tissues are within or around that convexity or concavity.    []  The target volume is in close proximity to critical structures that must be protected.    []  The volume of interest must be covered with narrow margins to adequately protect  immediately adjacent structures.      FIELDS & BLOCKING:  [x] COMPLEX BLOCKS     []  = 3 TX AREAS     [x]  ARCS     []  CUSTOM SHEILD        []  SIMPLE BLOCK      CHEMOTHERAPY:  []  CONCURRENT     []  INDUCTION     [x] SEQUENTIAL     []  <30 DAYS FROM XRT      NOTES:  OAR CONSTRAINTS: (GUIDELINES ONLY NOT ABSOLUTE) QUANTEC OR AS STATED     One fraction Three fractions Five fractions    Serial Issue Max critical volume above threshold Threshold dose    (Gy) Max point dose (Gy)^a  Threshold dose   (Gy) Max point dose (Gy)^a Threshold dose   (Gy) Max point dose (Gy)^a End point (greater than or equal to Grade3)   Optic pathway  <0.2 cc 8 10 15.3 (5.1 Gy/fx) 17.4 (5.8 Gy/fx) 23 (4.6 Gy/fx) 25 (5 Gy/fx) Neuritis    Cochlea      9  17.1 (5.7 Gy/fx)  25 (5 Gy/fx) Hearing loss    Brainstem  (non medulla) <0.5 cc 10 15 18 (6 Gy/fx) 23.1 (7.7 Gy/fx) 23 (4.6 Gy/fx)   31 (6.2 Gy/fx) Cranial neuropathy   Spinal chord   and medulla <0.35 cc      <1.2 cc 10      7 14 18 (6 Gy/fx)    12.3 (4.1 Gy/fx) 21.9 (7.3 Gy/fx) 23 (4.6 Gy/fx)    14.5 (2.9 Gy/fx) 30 (6 Gy/fx) Myelitis   Spinal cord subvolume (5-6mm above and below level treated per Alberto) <10% of  subvolume 10 14 18 (6 Gy/fx) 21.9 (7.3 Gy/fx) 23 (4.6 Gy/fx) 30 (6 Gy/fx) Myelitis   Cauda equina  <5 cc 14 16 21.9 (7.3 Gy/fx) 24 (8 Gy/fx) 30 (6 Gy/fx) 32 (6.4 Gy/fx) Neuritis   Sacral plexus <5 cc 14.4 16 22.5 (7.5 Gy/fx) 24 (8 (Gy/fx) 30 (6 Gy/fx) 32 (6.4 Gy/fx) Neuropathy   Esophagus^b <5 cc 11.9 15.4 17.7 (5.9 Gy/fx) 25.2 (8.4 Gy/fx) 19.5 (3.9 Gy/fx) 35 (7 Gy/fx) Stenosis/fistula   Brachial plexus <3 cc 14 17.5 20.4 (6.8 Gy/fx 24 (8 Gy/fx) 27 (5.4 Gy/fx) 30.5 (6.1 Gy/fx) Neuropathy   Heart/ pericardium <15 cc 16 22 24 (8 Gy/fx) 30 (10 Gy/fx) 32 (6.4 Gy/fx) 38 (7.6 Gy/fx) Pericarditis   Great vessels <10 cc 31 37 39 (13 Gy/fx) 45 (15 Gy/fx) 47 (9.4 Gy/fx) 53 (10.6 Gy/fx) Aneurysm   Trachea and large bronchus^b <4 cc 10.5 20.2 15 (5 Gy/fx) 30 (10 Gy/fx) 16.5 (3.3 Gy/fx) 40 (8 Gy/fx) Stenosis/ fistula   Bronchus- smaller airways <0.5 cc 12.4 13.3 18.9 (6.3 Gy/fx) 23.1 (7.7 Gy/fx) 21 (4.2 Gy/fx) 33 (6.6 Gy/fx) Stenosis with atelectasis   Rib <1 cc      <30 cc 22 30 28.8 (9.6 Gy/fx)    30.0 (10.0 Gy/fx) 36.9 (12.3 Gy/fx) 35 (7 Gy/fx) 43 (8.6 Gy/fx) Pain or fracture   Skin <10 cc 23 26 30 (10 Gy/fx) 33 (11 Gy/fx) 36.5 (7.3 Gy/fx) 39.5 (7.9 Gy/fx) Ulceration   Stomach <10 cc 11.2 12.4 16.5 (5.5 Gy/fx) 22.2 (7.4 Gy/fx) 18 (3.6 Gy/fx) 32 (6.4 Gy/fx) Ulceration/fistula   Duodenum^b <5 cc      <10 cc 11.2      9 12.4 16.5 (5.5 Gy/fx)    11.4 (3.8 Gy/fx) 22.2 (7.4 Gy/fx) 18 (3.6 Gy/fx)    12.5 (2.5 Gy/fx) 32  (6.4 Gy/fx) Ulceration   Jejunum/ Ileum^b  <5 cc 11.9 15.4 17.7 (5.9 Gy/fx) 25.2 (8.4 Gy/fx) 19.5 (3.9 Gy/fx) 35 (7 Gy/fx) Enteritis/ obstruction   Colon^b <20 cc 14.3 18.4 24 (8 Gy/fx) 28.2 (9.4 gy/fx) 25 (5 Gy/fx) 38 (7.6 Gy/fx) Colitis/ fistula   Rectum^b <20 cc 14.3 18.4 24 (8 Gy/fx) 28.2 (9.4 gy/fx) 25 (5 Gy/fx) 38 (7.6 Gy/fx) Proctitis/ fistula   Bladder wall <15 cc 11.4 18.4 16.8 (5.6 Gy/fx) 28.2 (9.4 Gy/fx) 18.3 (3.65 Gy/fx) 38 (7.6 Gy/fx) Cystitis/ fistula   Penile bulb <3 cc 14 34 21.9 (7.3 Gy/fx) 42 (14 Gy/fx) 30 (6 Gy/fx) 50 (10 Gy/fx) Impotence   Femoral heads (right and left) <10 cc 14  21.9 (7.3 Gy/fx)  30 (6 Gy/fx)  Necrosis   Renal hilium/ vascular trunk <2/3 volume 1.6 18.6 (6.2 Gy/fx)   23 (4.6 Gy/fx)  Malignant hypertension         One fraction Three fractions Five fractions    Parallel tissue Max critical volume below threshold Threshold dose (Gy) Max point dose (Gy)^a Threshold dose (Gy) Max point dose (Gy)^a Threshold dose (Gy) Max point dose (Gy)^a End point (greater than or equal to Grade 3)   Lung (right and left) 1500 cc 7 NA- Parallel tissue 11.6 (2.96 Gy/fx) NA- Parallel tissue 12.5 (2.5 Gy/fx) NA- Parallel tissue Basic lung function    Lung (right and left) 1000 cc 7.4 NA- Parallel tissue 12.4 (3.1 Gy/fx) NA- Parallel tissue 13.5 (2.7 Gy/fx) NA- Parallel tissue Pneumonitis   Liver 700 cc 9.1 NA- Parallel tissue 19.2 (4.8 Gy/fx) NA- Parallel tissue 21 (4.2 Gy/fx) NA- Parallel tissue Basic liver function   Renal Cortex (right and left) 200 cc 8.4 NA- Parallel tissue 16 (4 Gy/fx) NA- Parallel tissue 17.5 (3.5 Gy/fx) NA- Parallel tissue Basic renal function   ^a “Point” defined as 0.035 cc or less.  ^b Avoid circumferential irradiation.

## 2023-11-23 NOTE — RADIATION COMPLETION NOTES
DATE OF SERVICE: 11/22/2023    DIAGNOSIS:  Hepatocellular carcinoma (HCC)  Staging form: Liver, AJCC 8th Edition  - Clinical: Stage IVB (cTX, cNX, pM1) - Signed by Monroe Linn D.O. on 11/16/2023       DATE OF SERVICE: 11/22/2023    TYPE OF SIMULATION: SBRT left pelvis    GOAL OF TREATMENT:   [] Curative  [x] Palliative  [] Oligometastatic    CONTRAST:    [] IV Contrast*  [] Oral Contrast               POSITION:    [x]  Supine  [] Prone     IMMOBILIZATION DEVICE: [x]  Body-Fix  [] Omniboard  []  Bellyboard    PROCEDURE: Patient placed in immobilization device.  Images viewed and approved.  Images reconstructed as need.  Image data sets transferred to [facility] for planning.    I have personally reviewed the relevant data, performed the target localization, and determined all relevant factors for this patient’s simulation.    *Omnipaque 80 -100cc IVP in conjunction with 500cc NS

## 2023-11-23 NOTE — RADIATION COMPLETION NOTES
PATIENT NAME German Pagan   PRIMARY PHYSICIAN Buck, Bill [phone]   REFERRING PHYSICIAN No ref. provider found 1955     DATE OF SERVICE: 11/22/2023    DIAGNOSIS:  Hepatocellular carcinoma (HCC)  Staging form: Liver, AJCC 8th Edition  - Clinical: Stage IVB (cTX, cNX, pM1) - Signed by Monroe Linn D.O. on 11/16/2023         SPECIAL TREATMENT PROCEDURE NOTE:  Considerable additional effort required in the management of this case because of administration of Stereotactic Radiotherapy, which may result in increased normal tissue toxicity and require greater effort in contouring and treatment because of greater precision.

## 2023-11-28 ENCOUNTER — APPOINTMENT (OUTPATIENT)
Dept: RADIOLOGY | Facility: MEDICAL CENTER | Age: 68
End: 2023-11-28
Attending: STUDENT IN AN ORGANIZED HEALTH CARE EDUCATION/TRAINING PROGRAM
Payer: COMMERCIAL

## 2023-11-28 ENCOUNTER — PHARMACY VISIT (OUTPATIENT)
Dept: PHARMACY | Facility: MEDICAL CENTER | Age: 68
End: 2023-11-28
Payer: COMMERCIAL

## 2023-11-28 ENCOUNTER — HOSPITAL ENCOUNTER (OUTPATIENT)
Dept: HEMATOLOGY ONCOLOGY | Facility: MEDICAL CENTER | Age: 68
End: 2023-11-28
Attending: FAMILY MEDICINE
Payer: COMMERCIAL

## 2023-11-28 VITALS
DIASTOLIC BLOOD PRESSURE: 64 MMHG | BODY MASS INDEX: 22.87 KG/M2 | SYSTOLIC BLOOD PRESSURE: 118 MMHG | HEART RATE: 97 BPM | OXYGEN SATURATION: 97 % | RESPIRATION RATE: 17 BRPM | TEMPERATURE: 97.5 F | WEIGHT: 159.4 LBS

## 2023-11-28 DIAGNOSIS — G89.3 CANCER RELATED PAIN: ICD-10-CM

## 2023-11-28 DIAGNOSIS — C22.0 HEPATOCELLULAR CARCINOMA (HCC): ICD-10-CM

## 2023-11-28 DIAGNOSIS — C79.51 METASTASIS TO BONE (HCC): Chronic | ICD-10-CM

## 2023-11-28 PROCEDURE — 99212 OFFICE O/P EST SF 10 MIN: CPT | Performed by: FAMILY MEDICINE

## 2023-11-28 PROCEDURE — 99213 OFFICE O/P EST LOW 20 MIN: CPT | Performed by: FAMILY MEDICINE

## 2023-11-28 PROCEDURE — 73723 MRI JOINT LWR EXTR W/O&W/DYE: CPT | Mod: LT

## 2023-11-28 PROCEDURE — RXMED WILLOW AMBULATORY MEDICATION CHARGE: Performed by: FAMILY MEDICINE

## 2023-11-28 PROCEDURE — A9579 GAD-BASE MR CONTRAST NOS,1ML: HCPCS | Performed by: STUDENT IN AN ORGANIZED HEALTH CARE EDUCATION/TRAINING PROGRAM

## 2023-11-28 PROCEDURE — 700117 HCHG RX CONTRAST REV CODE 255: Performed by: STUDENT IN AN ORGANIZED HEALTH CARE EDUCATION/TRAINING PROGRAM

## 2023-11-28 RX ORDER — MORPHINE SULFATE 30 MG/1
30 TABLET ORAL EVERY 6 HOURS PRN
Qty: 56 TABLET | Refills: 0 | Status: SHIPPED | OUTPATIENT
Start: 2023-11-28 | End: 2023-12-12 | Stop reason: SDUPTHER

## 2023-11-28 RX ORDER — MORPHINE SULFATE 60 MG/1
60 TABLET, FILM COATED, EXTENDED RELEASE ORAL EVERY 12 HOURS
Qty: 28 TABLET | Refills: 0 | Status: SHIPPED | OUTPATIENT
Start: 2023-11-28 | End: 2023-12-12 | Stop reason: SDUPTHER

## 2023-11-28 RX ADMIN — GADOTERIDOL 15 ML: 279.3 INJECTION, SOLUTION INTRAVENOUS at 15:18

## 2023-11-28 ASSESSMENT — PAIN SCALES - GENERAL: PAINLEVEL: 8=MODERATE-SEVERE PAIN

## 2023-11-28 ASSESSMENT — FIBROSIS 4 INDEX: FIB4 SCORE: 1.8

## 2023-11-29 ASSESSMENT — ENCOUNTER SYMPTOMS
BACK PAIN: 1
PALPITATIONS: 0
DIARRHEA: 0
SHORTNESS OF BREATH: 0
CONSTIPATION: 0
FEVER: 0
CHILLS: 0
COUGH: 0
NAUSEA: 0
WEIGHT LOSS: 1
VOMITING: 0

## 2023-11-29 NOTE — PROGRESS NOTES
Subjective:     Chief Complaint   Patient presents with    Follow-Up     Hepatocellular carcinoma/ 2 wk Fv / symptom mgmt     German Pagan is a 68 y.o. male here today for follow-up on symptom management for oncological pain.  Since last visit patient was able to see medical oncology and is set up to begin chemotherapies.  He reports he continues to have significant pain including the pain in his left hip which has been present since his biopsy.  He reports he is supposed to have an MRI today to further evaluate that hip as well.  He also continues to have significant pain on the right side of his chest.  He has been using his long and short acting morphine and reports his pain with medications decreases to a 7 or 8 out of 10.  When asked if he was getting up and doing what he needs to do both him and his wife acknowledge no and indicated that he spends much of his time sitting on the couch as the pain is limiting his activity.  This led to discussion of how to proceed with his pain management with options to include increasing his current doses to potentially rotating to a medication like methadone.  We concluded the conversation with a plan to increase his morphine but that subsequent changes may likely include switching to methadone.    Problem   Cancer Related Pain        Current medicines (including changes today)  Current Outpatient Medications   Medication Sig Dispense Refill    morphine ER (MS CONTIN) 60 MG Tab CR tablet Take 1 Tablet by mouth every 12 hours for 14 days. 28 Tablet 0    morphine (MS IR) 30 MG tablet Take 1 Tablet by mouth every 6 hours as needed for Severe Pain for up to 14 days. 56 Tablet 0    sennosides (SENOKOT) 8.6 MG Tab Take 8.6 mg by mouth 1 time a day as needed.      magnesium citrate Solution Take 300 mL by mouth one time.      prochlorperazine (COMPAZINE) 10 MG Tab Take 1 Tablet by mouth every 6 hours as needed for Nausea/Vomiting. 30 Tablet 3    tamsulosin (FLOMAX) 0.4 MG  capsule Take 0.4 mg by mouth every evening.      Naloxone (NARCAN) 4 MG/0.1ML Liquid One spray in one nostril for overdose and call 911. 1 Each 0    pravastatin (PRAVACHOL) 10 MG Tab TAKE 1 TABLET BY MOUTH ONCE DAILY IN THE EVENING 90 Tablet 3    Cholecalciferol (VITAMIN D3) 125 MCG (5000 UT) Cap Take 5,000 Units by mouth every 48 hours.      ondansetron (ZOFRAN) 4 MG Tab tablet Take 1 Tablet by mouth every four hours as needed for Nausea/Vomiting. 20 Tablet 3     No current facility-administered medications for this encounter.       Social History     Tobacco Use    Smoking status: Former     Current packs/day: 0.00     Average packs/day: 0.5 packs/day for 36.0 years (18.0 ttl pk-yrs)     Types: Cigarettes     Start date:      Quit date:      Years since quittin.9    Smokeless tobacco: Former     Types: Chew     Quit date: 10/3/2023    Tobacco comments:     Quit smoking in  - just less than 1 ppd. Used chewing tobacco for about 8 years.    Vaping Use    Vaping Use: Never used   Substance Use Topics    Alcohol use: Not Currently     Comment: Quit in     Drug use: Not Currently     Types: Methamphetamines, Marijuana, Inhaled     Comment: Quit in , used methamphetamine     Past Medical History:   Diagnosis Date    Back pain 10/05/2023    groin    Bilateral recurrent inguinal hernia without obstruction or gangrene 10/06/2023    Bowel habit changes 10/05/2023    constipation    Bronchitis     as a teenager    Cataract 10/05/2023    no surgery    Dental disorder     dentures    Heart burn     Hepatitis A     treated    Hepatitis C 2016    treated    Hepatocellular carcinoma (HCC)     High cholesterol     Hypertension     Metastasis to bone (HCC)     Nausea and vomiting 10/06/2023    Pneumonia     as a teenager    Postoperative abdominal pain 10/13/2023      Family History   Problem Relation Age of Onset    Cancer Mother         Multiple Myeloma    Cancer Father         Colon and Lung    Cancer  Sister         Breast    Diabetes Brother     Cancer Maternal Grandmother         Colon    Colon Cancer Maternal Grandmother     No Known Problems Daughter     No Known Problems Daughter     No Known Problems Son     Cancer Other         Multiple myeloma         Objective:     Vitals:    11/28/23 1047   BP: 118/64   BP Location: Right arm   Patient Position: Sitting   BP Cuff Size: Adult   Pulse: 97   Resp: 17   Temp: 36.4 °C (97.5 °F)   TempSrc: Temporal   SpO2: 97%   Weight: 72.3 kg (159 lb 6.4 oz)     Body mass index is 22.87 kg/m².     Review of Systems   Constitutional:  Positive for malaise/fatigue and weight loss. Negative for chills and fever.   Respiratory:  Negative for cough and shortness of breath.    Cardiovascular:  Negative for chest pain and palpitations.   Gastrointestinal:  Negative for constipation, diarrhea, nausea and vomiting.   Musculoskeletal:  Positive for back pain.       Physical Exam:   Gen: Well developed, well nourished in mild distress.   Skin: Pink, warm, and dry  HEENT: conjunctiva non-injected, sclera non-icteric. EOMs intact.   Nasal mucosa without edema nor erythema.   Pinna normal.    Oral mucous membranes pink and moist with no lesions.  Neck: Supple, trachea midline. No adenopathy or masses in the neck or supraclavicular regions.  Lungs: Effort is normal.   CV: regular rate   Abdomen: Flat  Ext: no edema, color normal, vascularity normal, temperature normal  Alert and oriented Eye contact is good, speech goal directed, affect calm    Assessment and Plan:   Cancer related pain  Patient with metastatic hepatocellular carcinoma including bony metastases.  Does have significant pain related to his disease burden.  Has been using long and short acting morphine with the current MEDD of approximately 200 mg.  Patient has significant distress that is impacting his daily activity and spends much of his time on the couch.  Will increase his long-acting to 60 mg twice a day and short  acting to 30 mg as needed.  If this does not control his pain will likely discuss transitioning to methadone at next appointment.   reviewed, no signs of diversion or abuse, controlled substance agreement on file, risk and benefits of medications discussed.  Refill sent to pharmacy plan to follow-up in 2 weeks for reevaluation.    26 minutes in total spent on patient encounter including chart review and consultation with patient's oncological providers.    Followup: Return in about 2 weeks (around 12/12/2023).    Burak Leblanc M.D.

## 2023-11-30 ENCOUNTER — PATIENT OUTREACH (OUTPATIENT)
Dept: ONCOLOGY | Facility: MEDICAL CENTER | Age: 68
End: 2023-11-30
Payer: COMMERCIAL

## 2023-11-30 PROCEDURE — 77334 RADIATION TREATMENT AID(S): CPT | Performed by: RADIOLOGY

## 2023-11-30 PROCEDURE — 77295 3-D RADIOTHERAPY PLAN: CPT | Mod: 26 | Performed by: RADIOLOGY

## 2023-11-30 PROCEDURE — 77334 RADIATION TREATMENT AID(S): CPT | Mod: 26 | Performed by: RADIOLOGY

## 2023-11-30 PROCEDURE — 77300 RADIATION THERAPY DOSE PLAN: CPT | Performed by: RADIOLOGY

## 2023-11-30 PROCEDURE — 77300 RADIATION THERAPY DOSE PLAN: CPT | Mod: 26 | Performed by: RADIOLOGY

## 2023-11-30 PROCEDURE — 77295 3-D RADIOTHERAPY PLAN: CPT | Performed by: RADIOLOGY

## 2023-12-01 ENCOUNTER — HOSPITAL ENCOUNTER (OUTPATIENT)
Dept: RADIATION ONCOLOGY | Facility: MEDICAL CENTER | Age: 68
End: 2023-12-31
Attending: RADIOLOGY
Payer: COMMERCIAL

## 2023-12-01 PROCEDURE — 77370 RADIATION PHYSICS CONSULT: CPT | Performed by: RADIOLOGY

## 2023-12-04 ENCOUNTER — HOSPITAL ENCOUNTER (OUTPATIENT)
Dept: RADIATION ONCOLOGY | Facility: MEDICAL CENTER | Age: 68
End: 2023-12-04

## 2023-12-04 LAB
CHEMOTHERAPY INFUSION START DATE: NORMAL
CHEMOTHERAPY RECORDS: 3500
CHEMOTHERAPY RECORDS: 7
CHEMOTHERAPY RECORDS: NORMAL
CHEMOTHERAPY RX CANCER: NORMAL
DATE 1ST CHEMO CANCER: NORMAL
RAD ONC ARIA COURSE LAST TREATMENT DATE: NORMAL
RAD ONC ARIA COURSE TREATMENT ELAPSED DAYS: NORMAL
RAD ONC ARIA REFERENCE POINT DOSAGE GIVEN TO DATE: 7
RAD ONC ARIA REFERENCE POINT ID: NORMAL
RAD ONC ARIA REFERENCE POINT SESSION DOSAGE GIVEN: 7

## 2023-12-04 PROCEDURE — 77435 SBRT MANAGEMENT: CPT | Performed by: RADIOLOGY

## 2023-12-04 PROCEDURE — 77280 THER RAD SIMULAJ FIELD SMPL: CPT | Performed by: RADIOLOGY

## 2023-12-04 PROCEDURE — 77280 THER RAD SIMULAJ FIELD SMPL: CPT | Mod: 26 | Performed by: RADIOLOGY

## 2023-12-04 PROCEDURE — 77373 STRTCTC BDY RAD THER TX DLVR: CPT | Performed by: RADIOLOGY

## 2023-12-06 ENCOUNTER — HOSPITAL ENCOUNTER (OUTPATIENT)
Dept: RADIATION ONCOLOGY | Facility: MEDICAL CENTER | Age: 68
End: 2023-12-06
Payer: COMMERCIAL

## 2023-12-06 LAB
CHEMOTHERAPY INFUSION START DATE: NORMAL
CHEMOTHERAPY RECORDS: 3500
CHEMOTHERAPY RECORDS: 7
CHEMOTHERAPY RECORDS: NORMAL
CHEMOTHERAPY RX CANCER: NORMAL
DATE 1ST CHEMO CANCER: NORMAL
RAD ONC ARIA COURSE LAST TREATMENT DATE: NORMAL
RAD ONC ARIA COURSE TREATMENT ELAPSED DAYS: NORMAL
RAD ONC ARIA REFERENCE POINT DOSAGE GIVEN TO DATE: 14
RAD ONC ARIA REFERENCE POINT ID: NORMAL
RAD ONC ARIA REFERENCE POINT SESSION DOSAGE GIVEN: 7

## 2023-12-06 PROCEDURE — 77373 STRTCTC BDY RAD THER TX DLVR: CPT | Performed by: RADIOLOGY

## 2023-12-06 PROCEDURE — 77280 THER RAD SIMULAJ FIELD SMPL: CPT | Performed by: RADIOLOGY

## 2023-12-06 PROCEDURE — 77280 THER RAD SIMULAJ FIELD SMPL: CPT | Mod: 26 | Performed by: RADIOLOGY

## 2023-12-08 ENCOUNTER — HOSPITAL ENCOUNTER (OUTPATIENT)
Dept: RADIATION ONCOLOGY | Facility: MEDICAL CENTER | Age: 68
End: 2023-12-08
Payer: COMMERCIAL

## 2023-12-08 LAB
CHEMOTHERAPY INFUSION START DATE: NORMAL
CHEMOTHERAPY RECORDS: 3500
CHEMOTHERAPY RECORDS: 7
CHEMOTHERAPY RECORDS: NORMAL
CHEMOTHERAPY RX CANCER: NORMAL
DATE 1ST CHEMO CANCER: NORMAL
RAD ONC ARIA COURSE LAST TREATMENT DATE: NORMAL
RAD ONC ARIA COURSE TREATMENT ELAPSED DAYS: NORMAL
RAD ONC ARIA REFERENCE POINT DOSAGE GIVEN TO DATE: 21
RAD ONC ARIA REFERENCE POINT ID: NORMAL
RAD ONC ARIA REFERENCE POINT SESSION DOSAGE GIVEN: 7

## 2023-12-08 PROCEDURE — 77280 THER RAD SIMULAJ FIELD SMPL: CPT | Mod: 26 | Performed by: RADIOLOGY

## 2023-12-08 PROCEDURE — 77373 STRTCTC BDY RAD THER TX DLVR: CPT | Performed by: RADIOLOGY

## 2023-12-08 PROCEDURE — 77336 RADIATION PHYSICS CONSULT: CPT | Mod: XU | Performed by: RADIOLOGY

## 2023-12-08 PROCEDURE — 77280 THER RAD SIMULAJ FIELD SMPL: CPT | Performed by: RADIOLOGY

## 2023-12-11 ENCOUNTER — HOSPITAL ENCOUNTER (OUTPATIENT)
Dept: RADIATION ONCOLOGY | Facility: MEDICAL CENTER | Age: 68
End: 2023-12-11

## 2023-12-11 LAB
CHEMOTHERAPY INFUSION START DATE: NORMAL
CHEMOTHERAPY RECORDS: 3500
CHEMOTHERAPY RECORDS: 7
CHEMOTHERAPY RECORDS: NORMAL
CHEMOTHERAPY RX CANCER: NORMAL
DATE 1ST CHEMO CANCER: NORMAL
RAD ONC ARIA COURSE LAST TREATMENT DATE: NORMAL
RAD ONC ARIA COURSE TREATMENT ELAPSED DAYS: NORMAL
RAD ONC ARIA REFERENCE POINT DOSAGE GIVEN TO DATE: 28
RAD ONC ARIA REFERENCE POINT ID: NORMAL
RAD ONC ARIA REFERENCE POINT SESSION DOSAGE GIVEN: 7

## 2023-12-11 PROCEDURE — 77280 THER RAD SIMULAJ FIELD SMPL: CPT | Performed by: RADIOLOGY

## 2023-12-11 PROCEDURE — 77373 STRTCTC BDY RAD THER TX DLVR: CPT | Performed by: RADIOLOGY

## 2023-12-11 PROCEDURE — 77280 THER RAD SIMULAJ FIELD SMPL: CPT | Mod: 26 | Performed by: RADIOLOGY

## 2023-12-11 NOTE — PROCEDURES
DATE OF SERVICE: 12/11/2023    DIAGNOSIS:  Hepatocellular carcinoma (HCC)  Staging form: Liver, AJCC 8th Edition  - Clinical: Stage IVB (cTX, cNX, pM1) - Signed by Monroe Linn D.O. on 11/16/2023       TREATMENT:  Radiation Therapy Episodes       Active Episodes       Radiation Therapy: SBRT                   Radiation Treatments         Plan Last Treated On Elapsed Days Fractions Treated Prescribed Fraction Dose (cGy) Prescribed Total Dose (cGy)    L Hip SBRT 12/11/2023 7 @ [id number] 4 of 5 700 3,500                  Reference Point Last Treated On Elapsed Days Most Recent Session Dose (cGy) Total Dose (cGy)    GTV 12/11/2023 7 @ [id number] 700 2,800                            STEREOTACTIC PROCEDURE NOTE:    Called by [employer] machine to verify treatment parameters including:  treatment site, treatment dose, and treatment setup prior to stereotactic treatment..    Patient was placed in the treatment position with use of immobilization device and  laser guidance. CBCT images were acquired for target localization.  Images were reviewed in the axial, coronal, and saggital views and shifts were made as necessary to ensure that patient position matched simulation position.      Treatment delivered per  prescription.  The medical physicist was present throughout the set-up, verification and treatment delivery to oversee the procedure and ensure all parameters agreed with the computerized plan.    I have personally reviewed the relevant data, performed the target localization, and determined all relevant factors for this patient’s simulation.

## 2023-12-12 ENCOUNTER — HOSPITAL ENCOUNTER (OUTPATIENT)
Dept: HEMATOLOGY ONCOLOGY | Facility: MEDICAL CENTER | Age: 68
End: 2023-12-12
Attending: FAMILY MEDICINE
Payer: COMMERCIAL

## 2023-12-12 ENCOUNTER — PHARMACY VISIT (OUTPATIENT)
Dept: PHARMACY | Facility: MEDICAL CENTER | Age: 68
End: 2023-12-12
Payer: COMMERCIAL

## 2023-12-12 VITALS
TEMPERATURE: 97.9 F | BODY MASS INDEX: 23.02 KG/M2 | SYSTOLIC BLOOD PRESSURE: 110 MMHG | DIASTOLIC BLOOD PRESSURE: 68 MMHG | HEART RATE: 83 BPM | OXYGEN SATURATION: 96 % | WEIGHT: 160.4 LBS

## 2023-12-12 DIAGNOSIS — C22.0 HEPATOCELLULAR CARCINOMA (HCC): ICD-10-CM

## 2023-12-12 DIAGNOSIS — G89.3 CANCER RELATED PAIN: ICD-10-CM

## 2023-12-12 PROCEDURE — 99213 OFFICE O/P EST LOW 20 MIN: CPT | Performed by: FAMILY MEDICINE

## 2023-12-12 PROCEDURE — RXMED WILLOW AMBULATORY MEDICATION CHARGE: Performed by: FAMILY MEDICINE

## 2023-12-12 PROCEDURE — 99212 OFFICE O/P EST SF 10 MIN: CPT | Performed by: FAMILY MEDICINE

## 2023-12-12 RX ORDER — MORPHINE SULFATE 60 MG/1
60 TABLET, FILM COATED, EXTENDED RELEASE ORAL EVERY 12 HOURS
Qty: 28 TABLET | Refills: 0 | Status: SHIPPED | OUTPATIENT
Start: 2023-12-12 | End: 2023-12-26

## 2023-12-12 RX ORDER — MORPHINE SULFATE 30 MG/1
30 TABLET ORAL EVERY 6 HOURS PRN
Qty: 56 TABLET | Refills: 0 | Status: SHIPPED | OUTPATIENT
Start: 2023-12-12 | End: 2024-02-21 | Stop reason: SDUPTHER

## 2023-12-12 ASSESSMENT — FIBROSIS 4 INDEX: FIB4 SCORE: 1.8

## 2023-12-12 ASSESSMENT — PAIN SCALES - GENERAL: PAINLEVEL: 7=MODERATE-SEVERE PAIN

## 2023-12-14 ENCOUNTER — HOSPITAL ENCOUNTER (OUTPATIENT)
Dept: RADIATION ONCOLOGY | Facility: MEDICAL CENTER | Age: 68
End: 2023-12-14

## 2023-12-14 LAB
CHEMOTHERAPY INFUSION START DATE: NORMAL
CHEMOTHERAPY INFUSION START DATE: NORMAL
CHEMOTHERAPY INFUSION STOP DATE: NORMAL
CHEMOTHERAPY RECORDS: 3500
CHEMOTHERAPY RECORDS: 3500
CHEMOTHERAPY RECORDS: 7
CHEMOTHERAPY RECORDS: 7
CHEMOTHERAPY RECORDS: NORMAL
CHEMOTHERAPY RX CANCER: NORMAL
CHEMOTHERAPY RX CANCER: NORMAL
DATE 1ST CHEMO CANCER: NORMAL
DATE 1ST CHEMO CANCER: NORMAL
RAD ONC ARIA COURSE LAST TREATMENT DATE: NORMAL
RAD ONC ARIA COURSE LAST TREATMENT DATE: NORMAL
RAD ONC ARIA COURSE TREATMENT ELAPSED DAYS: NORMAL
RAD ONC ARIA COURSE TREATMENT ELAPSED DAYS: NORMAL
RAD ONC ARIA REFERENCE POINT DOSAGE GIVEN TO DATE: 35
RAD ONC ARIA REFERENCE POINT DOSAGE GIVEN TO DATE: 35
RAD ONC ARIA REFERENCE POINT ID: NORMAL
RAD ONC ARIA REFERENCE POINT ID: NORMAL
RAD ONC ARIA REFERENCE POINT SESSION DOSAGE GIVEN: 7

## 2023-12-14 PROCEDURE — 77373 STRTCTC BDY RAD THER TX DLVR: CPT | Performed by: RADIOLOGY

## 2023-12-14 PROCEDURE — 77280 THER RAD SIMULAJ FIELD SMPL: CPT | Performed by: RADIOLOGY

## 2023-12-14 PROCEDURE — 77280 THER RAD SIMULAJ FIELD SMPL: CPT | Mod: 26 | Performed by: RADIOLOGY

## 2023-12-14 NOTE — PROCEDURES
DATE OF SERVICE: 12/14/2023    DIAGNOSIS:  Hepatocellular carcinoma (HCC)  Staging form: Liver, AJCC 8th Edition  - Clinical: Stage IVB (cTX, cNX, pM1) - Signed by Monroe Linn D.O. on 11/16/2023       TREATMENT:  Radiation Therapy Episodes       Active Episodes       Radiation Therapy: SBRT                   Radiation Treatments         Plan Last Treated On Elapsed Days Fractions Treated Prescribed Fraction Dose (cGy) Prescribed Total Dose (cGy)    L Hip SBRT 12/14/2023 10 @ [id number] 5 of 5 700 3,500                  Reference Point Last Treated On Elapsed Days Most Recent Session Dose (cGy) Total Dose (cGy)    GTV 12/14/2023 10 @ [id number] 700 3,500                            STEREOTACTIC PROCEDURE NOTE:    Called by [employer] machine to verify treatment parameters including:  treatment site, treatment dose, and treatment setup prior to stereotactic treatment..    Patient was placed in the treatment position with use of immobilization device and  laser guidance. CBCT images were acquired for target localization.  Images were reviewed in the axial, coronal, and saggital views and shifts were made as necessary to ensure that patient position matched simulation position.      Treatment delivered per  prescription.  The medical physicist was present throughout the set-up, verification and treatment delivery to oversee the procedure and ensure all parameters agreed with the computerized plan.    I have personally reviewed the relevant data, performed the target localization, and determined all relevant factors for this patient’s simulation.

## 2023-12-15 NOTE — ADDENDUM NOTE
Encounter addended by: Marquita Graves, Med Ass't on: 12/15/2023 7:14 AM   Actions taken: Pend clinical note

## 2023-12-15 NOTE — RADIATION COMPLETION NOTES
END OF TREATMENT SUMMARY    Patient name:  German Pagan    Primary Physician:  VALORIE Lawrence MRN: [id number]  CSN: [id number]   Referring physician:  Dr. Kaveh DANIEL: 1955, 68 y.o.       TREATMENT SUMMARY:        Course First Treatment Date 2023    Course Last Treatment Date 2023   Course Elapsed Days 10 @ [id number]   Course Intent Palliative     Radiation Therapy Episodes       Active Episodes       Radiation Therapy: SBRT                   Radiation Treatments         Plan Last Treated On Elapsed Days Fractions Treated Prescribed Fraction Dose (cGy) Prescribed Total Dose (cGy)    L Hip SBRT 2023 10 @ [id number] 5 of 5 700 3,500                  Reference Point Last Treated On Elapsed Days Most Recent Session Dose (cGy) Total Dose (cGy)    GTV 2023 10 @ [id number] -- 3,500                                     STAGE:   Hepatocellular carcinoma (HCC)  Staging form: Liver, AJCC 8th Edition  - Clinical: Stage IVB (cTX, cNX, pM1) - Signed by Monroe Linn D.O. on 2023       TREATMENT INDICATION:   palliative     CONCURRENT SYSTEMIC TREATMENT:   sequential     RT COURSE DISCONTINUED EARLY:   No     PATIENT EXPERIENCE:        No data to display                 FOLLOW-UP PLAN:   8 Weeks     COMMENT:          ANATOMIC TARGET SUMMARY    ANATOMIC TARGET MODALITY TECHNIQUE   pelvis   External beam, photons SBRT            COMMENT:         DIAGRAMS:      DOSE VOLUME HISTOGRAMS:

## 2023-12-18 ASSESSMENT — ENCOUNTER SYMPTOMS
CONSTIPATION: 1
FEVER: 0
SHORTNESS OF BREATH: 0
BACK PAIN: 1
COUGH: 0
CHILLS: 0
NAUSEA: 0
DIARRHEA: 0
WEIGHT LOSS: 1
PALPITATIONS: 0
VOMITING: 0

## 2023-12-18 NOTE — PROGRESS NOTES
Subjective:     Chief Complaint   Patient presents with    Hepatic Cancer     2 wk fV      German Pagan is a 68 y.o. male here today for follow-up on oncological pain.  Patient reports that since last visit when we increased his long-acting morphine he has had a significant improvement in his pain symptoms along with his short acting morphine for breakthrough.  They have also had visits with medical oncology and beginning treatments for his cancer.  He does continue with his pain in his back and hip but the pain previously attributed to his biopsy site has improved some.  He does use a walker for stability.  He had no new concerns today and we concluded the visit with a plan to refill his medications and follow-up in 2 weeks for reevaluation.    Problem   Cancer Related Pain        Current medicines (including changes today)  Current Outpatient Medications   Medication Sig Dispense Refill    morphine (MS IR) 30 MG tablet Take 1 Tablet by mouth every 6 hours as needed for Severe Pain for up to 14 days. 56 Tablet 0    morphine ER (MS CONTIN) 60 MG Tab CR tablet Take 1 Tablet by mouth every 12 hours for 14 days. 28 Tablet 0    sennosides (SENOKOT) 8.6 MG Tab Take 8.6 mg by mouth 1 time a day as needed.      magnesium citrate Solution Take 300 mL by mouth one time.      prochlorperazine (COMPAZINE) 10 MG Tab Take 1 Tablet by mouth every 6 hours as needed for Nausea/Vomiting. 30 Tablet 3    tamsulosin (FLOMAX) 0.4 MG capsule Take 0.4 mg by mouth every evening.      Naloxone (NARCAN) 4 MG/0.1ML Liquid One spray in one nostril for overdose and call 911. 1 Each 0    pravastatin (PRAVACHOL) 10 MG Tab TAKE 1 TABLET BY MOUTH ONCE DAILY IN THE EVENING 90 Tablet 3    Cholecalciferol (VITAMIN D3) 125 MCG (5000 UT) Cap Take 5,000 Units by mouth every 48 hours.      ondansetron (ZOFRAN) 4 MG Tab tablet Take 1 Tablet by mouth every four hours as needed for Nausea/Vomiting. 20 Tablet 3     No current facility-administered  medications for this encounter.       Social History     Tobacco Use    Smoking status: Former     Current packs/day: 0.00     Average packs/day: 0.5 packs/day for 36.0 years (18.0 ttl pk-yrs)     Types: Cigarettes     Start date:      Quit date: 2006     Years since quittin.9    Smokeless tobacco: Former     Types: Chew     Quit date: 10/3/2023    Tobacco comments:     Quit smoking in 2006 - just less than 1 ppd. Used chewing tobacco for about 8 years.    Vaping Use    Vaping Use: Never used   Substance Use Topics    Alcohol use: Not Currently     Comment: Quit in     Drug use: Not Currently     Types: Methamphetamines, Marijuana, Inhaled     Comment: Quit in , used methamphetamine     Past Medical History:   Diagnosis Date    Back pain 10/05/2023    groin    Bilateral recurrent inguinal hernia without obstruction or gangrene 10/06/2023    Bowel habit changes 10/05/2023    constipation    Bronchitis     as a teenager    Cataract 10/05/2023    no surgery    Dental disorder     dentures    Heart burn     Hepatitis A     treated    Hepatitis C 2016    treated    Hepatocellular carcinoma (HCC)     High cholesterol     Hypertension     Metastasis to bone (HCC)     Nausea and vomiting 10/06/2023    Pneumonia     as a teenager    Postoperative abdominal pain 10/13/2023      Family History   Problem Relation Age of Onset    Cancer Mother         Multiple Myeloma    Cancer Father         Colon and Lung    Cancer Sister         Breast    Diabetes Brother     Cancer Maternal Grandmother         Colon    Colon Cancer Maternal Grandmother     No Known Problems Daughter     No Known Problems Daughter     No Known Problems Son     Cancer Other         Multiple myeloma         Objective:     Vitals:    23 1038   BP: 110/68   BP Location: Right arm   Patient Position: Sitting   BP Cuff Size: Adult   Pulse: 83   Temp: 36.6 °C (97.9 °F)   TempSrc: Temporal   SpO2: 96%   Weight: 72.8 kg (160 lb 6.4 oz)     Body  mass index is 23.02 kg/m².     Review of Systems   Constitutional:  Positive for malaise/fatigue and weight loss. Negative for chills and fever.   Respiratory:  Negative for cough and shortness of breath.    Cardiovascular:  Negative for chest pain and palpitations.   Gastrointestinal:  Positive for constipation. Negative for diarrhea, nausea and vomiting.   Musculoskeletal:  Positive for back pain and joint pain.       Physical Exam:   Gen: Well developed, well nourished in no acute distress.   Skin: Pink, warm, and dry  HEENT: conjunctiva non-injected, sclera non-icteric. EOMs intact.   Nasal mucosa without edema nor erythema.   Pinna normal.    Oral mucous membranes pink and moist with no lesions.  Neck: Supple, trachea midline. No adenopathy or masses in the neck or supraclavicular regions.  Lungs: Effort is normal.  CV: regular rate  Abdomen: Flat  Ext: no edema, color normal, vascularity normal, temperature normal  Alert and oriented Eye contact is good, speech goal directed, affect calm    Assessment and Plan:   Cancer related pain  Patient with known metastatic hepatocellular carcinoma with bony metastases.  We have been slowly titrating his medications and at last visit increased his long-acting to 60 mg twice a day of morphine which has provided a significant improvement in his relief.  He is also using short acting morphine for breakthrough.  At this time he is not requesting any further adjustment to his medicines and just a refill.  PDMP reviewed, controlled subs agreement on file, risk and benefits of medication discussed, no signs of diversion or abuse.  Will refill medications and follow-up in 2 weeks for reevaluation.  Will be starting immunotherapy for his cancer so we do hope that we can begin to see an improvement in his pain symptoms through treatment.    27 minutes in total spent on patient encounter including chart review and consultation with patient's oncological providers.    Followup:  Return in about 2 weeks (around 12/26/2023).    Burak Leblanc M.D.

## 2023-12-18 NOTE — ASSESSMENT & PLAN NOTE
Patient with known metastatic hepatocellular carcinoma with bony metastases.  We have been slowly titrating his medications and at last visit increased his long-acting to 60 mg twice a day of morphine which has provided a significant improvement in his relief.  He is also using short acting morphine for breakthrough.  At this time he is not requesting any further adjustment to his medicines and just a refill.  PDMP reviewed, controlled subs agreement on file, risk and benefits of medication discussed, no signs of diversion or abuse.  Will refill medications and follow-up in 2 weeks for reevaluation.  Will be starting immunotherapy for his cancer so we do hope that we can begin to see an improvement in his pain symptoms through treatment.

## 2023-12-21 ENCOUNTER — APPOINTMENT (OUTPATIENT)
Dept: HEMATOLOGY ONCOLOGY | Facility: MEDICAL CENTER | Age: 68
End: 2023-12-21
Payer: COMMERCIAL

## 2023-12-21 ENCOUNTER — TELEPHONE (OUTPATIENT)
Dept: HEMATOLOGY ONCOLOGY | Facility: MEDICAL CENTER | Age: 68
End: 2023-12-21
Payer: COMMERCIAL

## 2023-12-21 NOTE — TELEPHONE ENCOUNTER
Patient wife just call that patient is not feeling good and he doesn't want to do infusion tomorrow and wants a call back to reschedule his appointments for 12/21/2023 with Lil and 12/22/2023 with infusion. Please call her to reschedule. Thank you

## 2023-12-22 ENCOUNTER — APPOINTMENT (OUTPATIENT)
Dept: ONCOLOGY | Facility: MEDICAL CENTER | Age: 68
End: 2023-12-22
Attending: STUDENT IN AN ORGANIZED HEALTH CARE EDUCATION/TRAINING PROGRAM
Payer: COMMERCIAL

## 2023-12-22 NOTE — ADDENDUM NOTE
Encounter addended by: Robert Ruvalcaba M.D. on: 12/22/2023 8:35 AM   Actions taken: Clinical Note Signed, Problem List reviewed, Medication List reviewed, Allergies reviewed

## 2023-12-28 ENCOUNTER — PHARMACY VISIT (OUTPATIENT)
Dept: PHARMACY | Facility: MEDICAL CENTER | Age: 68
End: 2023-12-28
Payer: COMMERCIAL

## 2023-12-28 ENCOUNTER — HOSPITAL ENCOUNTER (OUTPATIENT)
Dept: HEMATOLOGY ONCOLOGY | Facility: MEDICAL CENTER | Age: 68
End: 2023-12-28
Attending: FAMILY MEDICINE
Payer: COMMERCIAL

## 2023-12-28 VITALS
OXYGEN SATURATION: 96 % | TEMPERATURE: 97.4 F | RESPIRATION RATE: 16 BRPM | HEART RATE: 103 BPM | BODY MASS INDEX: 23.02 KG/M2 | SYSTOLIC BLOOD PRESSURE: 102 MMHG | DIASTOLIC BLOOD PRESSURE: 70 MMHG | HEIGHT: 70 IN

## 2023-12-28 DIAGNOSIS — G89.3 CANCER RELATED PAIN: ICD-10-CM

## 2023-12-28 DIAGNOSIS — C22.0 HEPATOCELLULAR CARCINOMA (HCC): ICD-10-CM

## 2023-12-28 PROCEDURE — RXMED WILLOW AMBULATORY MEDICATION CHARGE: Performed by: FAMILY MEDICINE

## 2023-12-28 PROCEDURE — 99212 OFFICE O/P EST SF 10 MIN: CPT | Performed by: FAMILY MEDICINE

## 2023-12-28 PROCEDURE — 99213 OFFICE O/P EST LOW 20 MIN: CPT | Performed by: FAMILY MEDICINE

## 2023-12-28 RX ORDER — MORPHINE SULFATE 30 MG/1
30 TABLET ORAL EVERY 6 HOURS PRN
Qty: 56 TABLET | Refills: 0 | Status: SHIPPED | OUTPATIENT
Start: 2023-12-28 | End: 2024-01-11 | Stop reason: SDUPTHER

## 2023-12-28 RX ORDER — MORPHINE SULFATE 100 MG/1
100 TABLET ORAL EVERY 12 HOURS
Qty: 28 TABLET | Refills: 0 | Status: SHIPPED | OUTPATIENT
Start: 2023-12-28 | End: 2024-01-11 | Stop reason: SDUPTHER

## 2023-12-28 ASSESSMENT — ENCOUNTER SYMPTOMS
PALPITATIONS: 0
SHORTNESS OF BREATH: 0
CHILLS: 0
COUGH: 0
DIARRHEA: 0
FEVER: 0
NAUSEA: 0
BACK PAIN: 1
WEIGHT LOSS: 1
VOMITING: 0
CONSTIPATION: 0

## 2023-12-28 NOTE — TELEPHONE ENCOUNTER
Patient was seen in the office today by Dr. Leblanc (palliative care).  Patient is feeling better and would like to have his infusion therapy scheduled for whenever the next available appointment that infusion services has available.  Informed patient and his significant other that I would reach out to infusion scheduling and will call them with updated dates/times.

## 2023-12-28 NOTE — ASSESSMENT & PLAN NOTE
Patient with known metastatic hepatocellular carcinoma.  Currently completing radiation and will be beginning immunotherapy shortly.  Does have significant pain associated with his tumor burden.  Has recently had a worsening of his pain.  Currently using 60 mg long-acting morphine plus 30 mg as needed's.  Current MEDD of 240 mg.  Through shared decision making patient would like to trial increasing his long-acting to improve symptom management and decrease need for breakthrough pain medications.  If this does not have the desired effect we will discuss transition to likely methadone at next appointment.  PDMP reviewed, no signs of diversion or abuse, risk and benefits of medication discussed, controlled subs agreement on file.  2-week refill sent to pharmacy we will follow-up in 2 weeks to reevaluate symptoms.

## 2023-12-28 NOTE — PROGRESS NOTES
Subjective:     Chief Complaint   Patient presents with    Hepatic Cancer     liver cancer/ 2 wk FV/ symptom mgmt     German Pagan is a 68 y.o. male here today for follow-up on symptom management for oncological pain.  Patient was accompanied by his wife to the visit.  Patient reports since last visit he did have a worsening of his pain even to the extent he had to cancel his last infusion appointment.  Pain is still related to his metastatic lesions in his hip and back.  Has been using his long-acting morphine +4 doses of short acting daily.  We then discussed options for continued symptom management including increasing current dosing versus transition to another opioid versus transition to methadone.  Patient would like to trial an increase of his long-acting morphine and if that does not decrease his need for breakthrough pain we would then transition to methadone at his next appointment.  He does not endorse any trouble with constipation or nausea.  He does report his pain has had significant impact on his quality of life.  This led to a goals of care discussion in which I reinforced that he is the captain of the ship and if it anytime he was not achieving a quality of life acceptable him with treatments we could transition to a comfort focused plan of care.  We concluded the conversation with a plan to follow-up in 2 weeks for reevaluation.    Problem   Cancer Related Pain        Current medicines (including changes today)  Current Outpatient Medications   Medication Sig Dispense Refill    morphine SR (MS CONTIN) 100 MG Tab CR tablet Take 1 Tablet by mouth every 12 hours for 14 days. 28 Tablet 0    morphine (MS IR) 30 MG tablet Take 1 Tablet by mouth every 6 hours as needed for Severe Pain for up to 14 days. 56 Tablet 0    sennosides (SENOKOT) 8.6 MG Tab Take 8.6 mg by mouth 1 time a day as needed.      magnesium citrate Solution Take 300 mL by mouth one time.      prochlorperazine (COMPAZINE) 10 MG Tab  Take 1 Tablet by mouth every 6 hours as needed for Nausea/Vomiting. 30 Tablet 3    tamsulosin (FLOMAX) 0.4 MG capsule Take 0.4 mg by mouth every evening.      Naloxone (NARCAN) 4 MG/0.1ML Liquid One spray in one nostril for overdose and call 911. 1 Each 0    pravastatin (PRAVACHOL) 10 MG Tab TAKE 1 TABLET BY MOUTH ONCE DAILY IN THE EVENING 90 Tablet 3    Cholecalciferol (VITAMIN D3) 125 MCG (5000 UT) Cap Take 5,000 Units by mouth every 48 hours.      ondansetron (ZOFRAN) 4 MG Tab tablet Take 1 Tablet by mouth every four hours as needed for Nausea/Vomiting. 20 Tablet 3     No current facility-administered medications for this encounter.       Social History     Tobacco Use    Smoking status: Former     Current packs/day: 0.00     Average packs/day: 0.5 packs/day for 36.0 years (18.0 ttl pk-yrs)     Types: Cigarettes     Start date:      Quit date:      Years since quittin.0    Smokeless tobacco: Former     Types: Chew     Quit date: 10/3/2023    Tobacco comments:     Quit smoking in  - just less than 1 ppd. Used chewing tobacco for about 8 years.    Vaping Use    Vaping Use: Never used   Substance Use Topics    Alcohol use: Not Currently     Comment: Quit in     Drug use: Not Currently     Types: Methamphetamines, Marijuana, Inhaled     Comment: Quit in , used methamphetamine     Past Medical History:   Diagnosis Date    Back pain 10/05/2023    groin    Bilateral recurrent inguinal hernia without obstruction or gangrene 10/06/2023    Bowel habit changes 10/05/2023    constipation    Bronchitis     as a teenager    Cataract 10/05/2023    no surgery    Dental disorder     dentures    Heart burn     Hepatitis A     treated    Hepatitis C 2016    treated    Hepatocellular carcinoma (HCC)     High cholesterol     Hypertension     Metastasis to bone (HCC)     Nausea and vomiting 10/06/2023    Pneumonia     as a teenager    Postoperative abdominal pain 10/13/2023      Family History   Problem  "Relation Age of Onset    Cancer Mother         Multiple Myeloma    Cancer Father         Colon and Lung    Cancer Sister         Breast    Diabetes Brother     Cancer Maternal Grandmother         Colon    Colon Cancer Maternal Grandmother     No Known Problems Daughter     No Known Problems Daughter     No Known Problems Son     Cancer Other         Multiple myeloma         Objective:     Vitals:    12/28/23 1028   BP: 102/70   BP Location: Right arm   Patient Position: Sitting   BP Cuff Size: Adult   Pulse: (!) 103   Resp: 16   Temp: 36.3 °C (97.4 °F)   TempSrc: Temporal   SpO2: 96%   Height: 1.778 m (5' 10\")     Body mass index is 23.02 kg/m².     Review of Systems   Constitutional:  Positive for malaise/fatigue and weight loss. Negative for chills and fever.   Respiratory:  Negative for cough and shortness of breath.    Cardiovascular:  Negative for chest pain and palpitations.   Gastrointestinal:  Negative for constipation, diarrhea, nausea and vomiting.   Musculoskeletal:  Positive for back pain.     Physical Exam:   Gen: Thin appearing. In no acute distress.   Skin: Pink, warm, and dry  HEENT: conjunctiva non-injected, sclera non-icteric. EOMs intact.   Nasal mucosa without edema nor erythema.   Pinna normal.    Oral mucous membranes pink and moist with no lesions.  Neck: Supple, trachea midline. No adenopathy or masses in the neck or supraclavicular regions.  Lungs: Effort is normal.   CV: regular rate and rhythm  Abdomen: Flat  Ext: no edema, color normal, vascularity normal, temperature normal  Alert and oriented Eye contact is good, speech goal directed, affect calm    Assessment and Plan:   Cancer related pain  Patient with known metastatic hepatocellular carcinoma.  Currently completing radiation and will be beginning immunotherapy shortly.  Does have significant pain associated with his tumor burden.  Has recently had a worsening of his pain.  Currently using 60 mg long-acting morphine plus 30 mg as " needed's.  Current MEDD of 240 mg.  Through shared decision making patient would like to trial increasing his long-acting to improve symptom management and decrease need for breakthrough pain medications.  If this does not have the desired effect we will discuss transition to likely methadone at next appointment.  PDMP reviewed, no signs of diversion or abuse, risk and benefits of medication discussed, controlled subs agreement on file.  2-week refill sent to pharmacy we will follow-up in 2 weeks to reevaluate symptoms.    26 minutes in total spent on patient encounter including chart review and consultation with patient's oncological providers.    Followup: Return in about 2 weeks (around 1/11/2024).    Burak Leblanc M.D.

## 2023-12-29 ENCOUNTER — APPOINTMENT (OUTPATIENT)
Dept: HEMATOLOGY ONCOLOGY | Facility: MEDICAL CENTER | Age: 68
End: 2023-12-29
Payer: COMMERCIAL

## 2024-01-01 ENCOUNTER — HOME CARE VISIT (OUTPATIENT)
Dept: HOSPICE | Facility: HOSPICE | Age: 69
End: 2024-01-01
Payer: MEDICARE

## 2024-01-01 ENCOUNTER — PHARMACY VISIT (OUTPATIENT)
Dept: PHARMACY | Facility: MEDICAL CENTER | Age: 69
End: 2024-01-01
Payer: COMMERCIAL

## 2024-01-01 ENCOUNTER — APPOINTMENT (OUTPATIENT)
Dept: ONCOLOGY | Facility: MEDICAL CENTER | Age: 69
End: 2024-01-01
Attending: STUDENT IN AN ORGANIZED HEALTH CARE EDUCATION/TRAINING PROGRAM
Payer: MEDICARE

## 2024-01-01 ENCOUNTER — HOSPITAL ENCOUNTER (OUTPATIENT)
Dept: HEMATOLOGY ONCOLOGY | Facility: MEDICAL CENTER | Age: 69
End: 2024-05-03
Attending: NURSE PRACTITIONER
Payer: MEDICARE

## 2024-01-01 ENCOUNTER — HOSPICE ADMISSION (OUTPATIENT)
Dept: HOSPICE | Facility: HOSPICE | Age: 69
End: 2024-01-01
Payer: MEDICARE

## 2024-01-01 ENCOUNTER — HOSPITAL ENCOUNTER (OUTPATIENT)
Dept: RADIATION ONCOLOGY | Facility: MEDICAL CENTER | Age: 69
End: 2024-01-01
Attending: RADIOLOGY
Payer: MEDICARE

## 2024-01-01 VITALS
TEMPERATURE: 98 F | RESPIRATION RATE: 14 BRPM | DIASTOLIC BLOOD PRESSURE: 60 MMHG | HEIGHT: 69 IN | BODY MASS INDEX: 21.09 KG/M2 | OXYGEN SATURATION: 92 % | SYSTOLIC BLOOD PRESSURE: 102 MMHG | WEIGHT: 142.4 LBS | HEART RATE: 89 BPM

## 2024-01-01 VITALS — RESPIRATION RATE: 20 BRPM

## 2024-01-01 VITALS — SYSTOLIC BLOOD PRESSURE: 122 MMHG | DIASTOLIC BLOOD PRESSURE: 62 MMHG | HEART RATE: 68 BPM | RESPIRATION RATE: 16 BRPM

## 2024-01-01 VITALS — DIASTOLIC BLOOD PRESSURE: 60 MMHG | RESPIRATION RATE: 16 BRPM | SYSTOLIC BLOOD PRESSURE: 116 MMHG | HEART RATE: 98 BPM

## 2024-01-01 VITALS — RESPIRATION RATE: 20 BRPM | HEART RATE: 65 BPM

## 2024-01-01 VITALS — HEART RATE: 112 BPM | SYSTOLIC BLOOD PRESSURE: 122 MMHG | RESPIRATION RATE: 14 BRPM | DIASTOLIC BLOOD PRESSURE: 84 MMHG

## 2024-01-01 VITALS — RESPIRATION RATE: 16 BRPM | DIASTOLIC BLOOD PRESSURE: 70 MMHG | SYSTOLIC BLOOD PRESSURE: 108 MMHG | HEART RATE: 68 BPM

## 2024-01-01 VITALS — DIASTOLIC BLOOD PRESSURE: 82 MMHG | RESPIRATION RATE: 16 BRPM | HEART RATE: 72 BPM | SYSTOLIC BLOOD PRESSURE: 130 MMHG

## 2024-01-01 VITALS — HEART RATE: 64 BPM | DIASTOLIC BLOOD PRESSURE: 62 MMHG | RESPIRATION RATE: 16 BRPM | SYSTOLIC BLOOD PRESSURE: 112 MMHG

## 2024-01-01 VITALS — HEART RATE: 64 BPM | RESPIRATION RATE: 24 BRPM

## 2024-01-01 DIAGNOSIS — G89.3 CANCER RELATED PAIN: ICD-10-CM

## 2024-01-01 DIAGNOSIS — C22.0 HEPATOCELLULAR CARCINOMA (HCC): ICD-10-CM

## 2024-01-01 DIAGNOSIS — C79.51 METASTASIS TO BONE (HCC): Chronic | ICD-10-CM

## 2024-01-01 DIAGNOSIS — C22.0 HEPATOCELLULAR CARCINOMA (HCC): Primary | ICD-10-CM

## 2024-01-01 DIAGNOSIS — F41.9 ANXIETY: ICD-10-CM

## 2024-01-01 DIAGNOSIS — C22.0 HEPATOCELLULAR CARCINOMA (HCC): Primary | Chronic | ICD-10-CM

## 2024-01-01 DIAGNOSIS — Z09 ONCOLOGY FOLLOW-UP ENCOUNTER: ICD-10-CM

## 2024-01-01 PROCEDURE — S9126 HOSPICE CARE, IN THE HOME, P: HCPCS

## 2024-01-01 PROCEDURE — G0299 HHS/HOSPICE OF RN EA 15 MIN: HCPCS

## 2024-01-01 PROCEDURE — RXMED WILLOW AMBULATORY MEDICATION CHARGE: Performed by: NURSE PRACTITIONER

## 2024-01-01 PROCEDURE — 99214 OFFICE O/P EST MOD 30 MIN: CPT | Performed by: NURSE PRACTITIONER

## 2024-01-01 PROCEDURE — RXMED WILLOW AMBULATORY MEDICATION CHARGE: Performed by: REHABILITATION PRACTITIONER

## 2024-01-01 PROCEDURE — RXMED WILLOW AMBULATORY MEDICATION CHARGE: Performed by: STUDENT IN AN ORGANIZED HEALTH CARE EDUCATION/TRAINING PROGRAM

## 2024-01-01 PROCEDURE — RXMED WILLOW AMBULATORY MEDICATION CHARGE: Performed by: FAMILY MEDICINE

## 2024-01-01 PROCEDURE — 665036 HSPC NOTICE OF ELECTION NOE

## 2024-01-01 RX ORDER — MORPHINE SULFATE 30 MG/1
30 TABLET ORAL EVERY 4 HOURS PRN
Qty: 75 TABLET | Refills: 0 | Status: SHIPPED | OUTPATIENT
Start: 2024-01-01 | End: 2024-01-01

## 2024-01-01 RX ORDER — ONDANSETRON 4 MG/1
4-8 TABLET, ORALLY DISINTEGRATING ORAL EVERY 6 HOURS PRN
Qty: 30 TABLET | Refills: 23 | Status: SHIPPED | OUTPATIENT
Start: 2024-01-01 | End: 2025-05-08

## 2024-01-01 RX ORDER — DEXAMETHASONE 2 MG/1
4 TABLET ORAL EVERY MORNING
Qty: 60 TABLET | Refills: 11 | Status: SHIPPED | OUTPATIENT
Start: 2024-01-01 | End: 2024-01-01

## 2024-01-01 RX ORDER — MORPHINE SULFATE 100 MG/5ML
40 SOLUTION ORAL
Qty: 480 ML | Refills: 0 | Status: SHIPPED | OUTPATIENT
Start: 2024-01-01 | End: 2024-01-01 | Stop reason: DRUGHIGH

## 2024-01-01 RX ORDER — SALIVA STIMULANT COMB. NO.3
1 SPRAY, NON-AEROSOL (ML) MUCOUS MEMBRANE 4 TIMES DAILY PRN
Qty: 44.3 ML | Refills: 23 | Status: SHIPPED | OUTPATIENT
Start: 2024-01-01 | End: 2025-05-08

## 2024-01-01 RX ORDER — OMEPRAZOLE 20 MG/1
20 CAPSULE, DELAYED RELEASE ORAL DAILY
Qty: 30 CAPSULE | Refills: 11 | Status: SHIPPED | OUTPATIENT
Start: 2024-01-01 | End: 2024-01-01

## 2024-01-01 RX ORDER — MORPHINE SULFATE 30 MG/1
30-60 TABLET ORAL
Qty: 240 TABLET | Refills: 0 | Status: SHIPPED | OUTPATIENT
Start: 2024-01-01 | End: 2024-01-01

## 2024-01-01 RX ORDER — METHADONE HYDROCHLORIDE 10 MG/1
10 TABLET ORAL EVERY 8 HOURS
Qty: 90 TABLET | Refills: 0 | Status: SHIPPED | OUTPATIENT
Start: 2024-01-01 | End: 2024-01-01

## 2024-01-01 RX ORDER — DEXAMETHASONE 4 MG/1
4 TABLET ORAL 2 TIMES DAILY
Qty: 60 TABLET | Refills: 11 | Status: SHIPPED | OUTPATIENT
Start: 2024-01-01

## 2024-01-01 RX ORDER — METHADONE HYDROCHLORIDE 10 MG/1
40 TABLET ORAL EVERY 8 HOURS
Qty: 720 TABLET | Refills: 0 | Status: SHIPPED | OUTPATIENT
Start: 2024-01-01 | End: 2024-01-01

## 2024-01-01 RX ORDER — METHADONE HYDROCHLORIDE 5 MG/1
5 TABLET ORAL EVERY 8 HOURS
Qty: 90 TABLET | Refills: 0 | Status: SHIPPED | OUTPATIENT
Start: 2024-01-01 | End: 2024-01-01

## 2024-01-01 RX ORDER — METHADONE HYDROCHLORIDE 10 MG/ML
45 CONCENTRATE ORAL EVERY 8 HOURS
Qty: 1000 ML | Refills: 0 | Status: SHIPPED | OUTPATIENT
Start: 2024-01-01 | End: 2025-06-15

## 2024-01-01 RX ORDER — LORAZEPAM 2 MG/ML
1-2 CONCENTRATE ORAL
Qty: 360 ML | Refills: 0 | Status: SHIPPED | OUTPATIENT
Start: 2024-01-01 | End: 2025-05-08

## 2024-01-01 RX ORDER — NALOXONE HYDROCHLORIDE 4 MG/.1ML
SPRAY NASAL
Qty: 1 EACH | Refills: 0 | Status: SHIPPED | OUTPATIENT
Start: 2024-01-01

## 2024-01-01 RX ORDER — METHADONE HYDROCHLORIDE 10 MG/1
50 TABLET ORAL EVERY 12 HOURS
Qty: 600 TABLET | Refills: 0 | Status: SHIPPED | OUTPATIENT
Start: 2024-01-01 | End: 2024-01-01 | Stop reason: SDUPTHER

## 2024-01-01 RX ORDER — METHADONE HYDROCHLORIDE 10 MG/1
40 TABLET ORAL EVERY 12 HOURS
Qty: 480 TABLET | Refills: 0 | Status: SHIPPED | OUTPATIENT
Start: 2024-01-01 | End: 2024-01-01 | Stop reason: SDUPTHER

## 2024-01-01 RX ORDER — SENNA AND DOCUSATE SODIUM 50; 8.6 MG/1; MG/1
2 TABLET, FILM COATED ORAL 2 TIMES DAILY PRN
Qty: 28 TABLET | Refills: 10 | Status: SHIPPED | OUTPATIENT
Start: 2024-01-01 | End: 2025-05-08

## 2024-01-01 RX ORDER — BISACODYL 10 MG
10 SUPPOSITORY, RECTAL RECTAL PRN
Qty: 5 SUPPOSITORY | Refills: 10 | Status: SHIPPED | OUTPATIENT
Start: 2024-01-01 | End: 2025-05-08

## 2024-01-01 RX ORDER — FLUOXETINE HYDROCHLORIDE 20 MG/1
20 CAPSULE ORAL DAILY
Qty: 30 CAPSULE | Refills: 11 | Status: SHIPPED | OUTPATIENT
Start: 2024-01-01 | End: 2024-01-01

## 2024-01-01 RX ORDER — MORPHINE SULFATE 100 MG/5ML
30 SOLUTION ORAL
Qty: 240 ML | Refills: 0 | Status: SHIPPED | OUTPATIENT
Start: 2024-01-01 | End: 2024-01-01 | Stop reason: SDUPTHER

## 2024-01-01 RX ORDER — METHADONE HYDROCHLORIDE 10 MG/ML
45 CONCENTRATE ORAL EVERY 8 HOURS
Qty: 1000 ML | Refills: 0 | Status: SHIPPED | OUTPATIENT
Start: 2024-01-01 | End: 2024-01-01 | Stop reason: SDUPTHER

## 2024-01-01 RX ORDER — MORPHINE SULFATE 100 MG/5ML
40-60 SOLUTION ORAL
Qty: 480 ML | Refills: 0 | Status: SHIPPED | OUTPATIENT
Start: 2024-01-01 | End: 2025-06-15

## 2024-01-01 RX ORDER — ACETAMINOPHEN 650 MG/1
650 SUPPOSITORY RECTAL EVERY 6 HOURS PRN
Qty: 5 SUPPOSITORY | Refills: 10 | Status: SHIPPED | OUTPATIENT
Start: 2024-01-01 | End: 2025-05-08

## 2024-01-01 RX ORDER — FLUOXETINE HYDROCHLORIDE 20 MG/1
20 CAPSULE ORAL DAILY
Qty: 30 CAPSULE | Refills: 11 | Status: SHIPPED | OUTPATIENT
Start: 2024-01-01 | End: 2025-05-08

## 2024-01-01 RX ORDER — TAMSULOSIN HYDROCHLORIDE 0.4 MG/1
0.4 CAPSULE ORAL EVERY EVENING
Qty: 30 CAPSULE | Refills: 11 | Status: SHIPPED | OUTPATIENT
Start: 2024-01-01 | End: 2025-05-08

## 2024-01-01 RX ORDER — METHADONE HYDROCHLORIDE 10 MG/1
20 TABLET ORAL EVERY 8 HOURS PRN
Qty: 360 TABLET | Refills: 0 | Status: SHIPPED | OUTPATIENT
Start: 2024-01-01 | End: 2024-01-01 | Stop reason: SDUPTHER

## 2024-01-01 RX ORDER — PROCHLORPERAZINE 25 MG
25 SUPPOSITORY, RECTAL RECTAL EVERY 6 HOURS PRN
Qty: 15 SUPPOSITORY | Refills: 23 | Status: SHIPPED | OUTPATIENT
Start: 2024-01-01 | End: 2025-05-08

## 2024-01-01 RX ORDER — LORAZEPAM 0.5 MG/1
0.5 TABLET ORAL EVERY 8 HOURS PRN
Qty: 60 TABLET | Refills: 0 | Status: SHIPPED | OUTPATIENT
Start: 2024-01-01 | End: 2024-01-01

## 2024-01-01 RX ORDER — MORPHINE SULFATE 100 MG/5ML
40-60 SOLUTION ORAL
Qty: 480 ML | Refills: 0 | Status: SHIPPED | OUTPATIENT
Start: 2024-01-01 | End: 2024-01-01 | Stop reason: SDUPTHER

## 2024-01-01 RX ORDER — MORPHINE SULFATE 100 MG/5ML
40-60 SOLUTION ORAL
Qty: 480 ML | Refills: 0 | Status: SHIPPED | OUTPATIENT
Start: 2024-01-01 | End: 2025-05-20

## 2024-01-01 RX ORDER — METHADONE HYDROCHLORIDE 10 MG/1
35 TABLET ORAL EVERY 8 HOURS
Qty: 630 TABLET | Refills: 0 | Status: SHIPPED | OUTPATIENT
Start: 2024-01-01 | End: 2025-05-15

## 2024-01-01 RX ORDER — PROCHLORPERAZINE MALEATE 10 MG
TABLET ORAL
Qty: 30 TABLET | Refills: 11 | OUTPATIENT
Start: 2024-01-01

## 2024-01-01 RX ORDER — LORAZEPAM 2 MG/ML
4 CONCENTRATE ORAL
Qty: 360 ML | Refills: 0 | Status: SHIPPED | OUTPATIENT
Start: 2024-01-01 | End: 2025-06-15

## 2024-01-01 SDOH — ECONOMIC STABILITY: GENERAL

## 2024-01-01 ASSESSMENT — PAIN SCALES - PAIN ASSESSMENT IN ADVANCED DEMENTIA (PAINAD)
BODYLANGUAGE: 2 - RIGID. FISTS CLENCHED. KNEES PULLED UP. PULLING OR PUSHING AWAY. STRIKING OUT.
TOTALSCORE: 6
NEGVOCALIZATION: 0 - NONE.
FACIALEXPRESSION: 1 - SAD. FRIGHTENED. FROWN.
CONSOLABILITY: 1 - DISTRACTED OR REASSURED BY VOICE OR TOUCH.
CONSOLABILITY: 0 - NO NEED TO CONSOLE.
BODYLANGUAGE: 0 - RELAXED.
TOTALSCORE: 2
FACIALEXPRESSION: 0 - SMILING OR INEXPRESSIVE.
CONSOLABILITY: 1 - DISTRACTED OR REASSURED BY VOICE OR TOUCH.
TOTALSCORE: 5
NEGVOCALIZATION: 0 - NONE.
BODYLANGUAGE: 2 - RIGID. FISTS CLENCHED. KNEES PULLED UP. PULLING OR PUSHING AWAY. STRIKING OUT.
FACIALEXPRESSION: 0 - SMILING OR INEXPRESSIVE.
NEGVOCALIZATION: 1 - OCCASIONAL MOAN OR GROAN. LOW-LEVEL SPEECH WITH A NEGATIVE OR DISAPPROVING QUALITY.

## 2024-01-01 ASSESSMENT — ENCOUNTER SYMPTOMS
MOTTLING: 1
INCREASED FATIGUE: 1
HIGHEST PAIN SEVERITY IN PAST 24 HOURS: 10/10
SHORTNESS OF BREATH: 1
SLEEP QUALITY: FAIR
PERSON REPORTING PAIN: CAREGIVER
CONSTIPATION: 1
WHEEZING: 0
MUSCLE WEAKNESS: 1
DECREASED TO NO URINARY OUTPUT: 1
PAIN LOCATION: R KNEE
STOOL FREQUENCY: LESS THAN DAILY
FATIGUES EASILY: 1
HIGHEST PAIN SEVERITY IN PAST 24 HOURS: 10/10
LIMITED RANGE OF MOTION: 1
SUBJECTIVE PAIN PROGRESSION: GRADUALLY IMPROVING
LAST BOWEL MOVEMENT: 66979
CONSTIPATION: 1
CONSTIPATION: 1
BOWEL PATTERN NORMAL: 1
PAIN SEVERITY GOAL: 5/10
FATIGUE: 1
NAUSEA: 1
LOWEST PAIN SEVERITY IN PAST 24 HOURS: 3/10
PAIN SEVERITY GOAL: 2/10
STOOL FREQUENCY: DAILY
HIGHEST PAIN SEVERITY IN PAST 24 HOURS: 6/10
PAIN LOCATION - PAIN SEVERITY: 8/10
PAIN: 1
HIGHEST PAIN SEVERITY IN PAST 24 HOURS: 10/10
FLATUS: 1
PERSON REPORTING PAIN: PATIENT
LAST BOWEL MOVEMENT: 66967
HIGHEST PAIN SEVERITY IN PAST 24 HOURS: 8/10
LAST BOWEL MOVEMENT: 66969
NAUSEA: 1
TINGLING: 0
FATIGUE: 1
SHORTNESS OF BREATH: 0
BOWEL PATTERN NORMAL: 1
LAST BOWEL MOVEMENT: 66987
SHORTNESS OF BREATH: 1
WEIGHT LOSS: 0
NAUSEA: 0
HIGHEST PAIN SEVERITY IN PAST 24 HOURS: 8/10
PAIN: 1
LOWEST PAIN SEVERITY IN PAST 24 HOURS: 8/10
PAIN LOCATION: R LEG
LAST BOWEL MOVEMENT: 66994
PAIN LOCATION - PAIN SEVERITY: 8/10
PERSON REPORTING PAIN: PATIENT
MUSCLE WEAKNESS: 1
LOWEST PAIN SEVERITY IN PAST 24 HOURS: 2/10
STOOL FREQUENCY: DAILY
STOOL FREQUENCY: DAILY
PAIN: 1
LOWEST PAIN SEVERITY IN PAST 24 HOURS: 6/10
FATIGUES EASILY: 1
PAIN SEVERITY GOAL: 5/10
APNEIC BREATHING: 1
MYALGIAS: 1
PAIN SEVERITY GOAL: 0/10
LOWEST PAIN SEVERITY IN PAST 24 HOURS: 4/10
DYSPNEA ON EXERTION: 1
FATIGUE: 1
DECREASED ORAL INTAKE: 1
HIGHEST PAIN SEVERITY IN PAST 24 HOURS: 10/10
LOWEST PAIN SEVERITY IN PAST 24 HOURS: 10/10
HIGHEST PAIN SEVERITY IN PAST 24 HOURS: 7/10
FEVER: 0
CONSTIPATION: 1
PAIN SEVERITY GOAL: 5/10
FATIGUE: 1
FATIGUES EASILY: 1
PAIN: 1
DECREASED ORAL INTAKE: 1
LAST BOWEL MOVEMENT: 66974
LIMITED RANGE OF MOTION: 1
INCREASED SLEEPING: 1
DEPRESSED MOOD: 1
VOMITING: 1
STOOL FREQUENCY: LESS THAN DAILY
APNEIC BREATHING: 1
COUGH: 0
INCREASED FATIGUE: 1
BACK PAIN: 1
SUBJECTIVE PAIN PROGRESSION: GRADUALLY WORSENING
MUSCLE WEAKNESS: 1
DEPRESSED MOOD: 1
LOWEST PAIN SEVERITY IN PAST 24 HOURS: 2/10
ONSET QUALITY: PERSISTENT
INCREASED SLEEPING: 1
DECREASED ORAL INTAKE: 1
INSOMNIA: 0
LAST BOWEL MOVEMENT: 67003
VOMITING: 0
SUBJECTIVE PAIN PROGRESSION: UNCHANGED
MOTTLING: 1
INCREASED SLEEPING: 1
PAIN SEVERITY GOAL: 2/10
INCREASED FATIGUE: 1
FLATUS: 1
SLEEP QUALITY: ADEQUATE
SLEEP QUALITY: ADEQUATE
STOOL FREQUENCY: LESS THAN DAILY
HEADACHES: 0
DYSPNEA ACTIVITY LEVEL: AFTER AMBULATING MORE THAN 20 FT
BLOOD IN STOOL: 0
DEPRESSION: 1
STOOL FREQUENCY: DAILY
PAIN SEVERITY GOAL: 3/10
DIZZINESS: 0
RESPIRATORY PAIN: 1
LOWEST PAIN SEVERITY IN PAST 24 HOURS: 4/10
LIMITED RANGE OF MOTION: 1
MUSCLE WEAKNESS: 1
PAIN LOCATION - PAIN SEVERITY: 8/10
PALPITATIONS: 1
PERSON REPORTING PAIN: DIRECT OBSERVATION
DIARRHEA: 0
CONSTIPATION: 1
PALPITATIONS: 0
UNABLE TO COMMUNICATE PAIN: 1
FLATUS: 1
DYSPNEA ACTIVITY LEVEL: AT REST
PAIN LOCATION: LOWER BACK
PAIN: 1
MUSCLE WEAKNESS: 1
BOWEL PATTERN NORMAL: 1
CHILLS: 0
PAIN SEVERITY GOAL: 4/10
FATIGUES EASILY: 1

## 2024-01-01 ASSESSMENT — PATIENT HEALTH QUESTIONNAIRE - PHQ9: CLINICAL INTERPRETATION OF PHQ2 SCORE: 0

## 2024-01-01 ASSESSMENT — ACTIVITIES OF DAILY LIVING (ADL)
CURRENT_FUNCTION: STAND BY ASSIST
AMBULATION_REQUIRES_ASSISTANCE: 1
CURRENT_FUNCTION: TWO PERSON
AMBULATION ASSISTANCE: STAND BY ASSIST
MONEY MANAGEMENT (EXPENSES/BILLS): NEEDS ASSISTANCE
AMBULATION ASSISTANCE: NON-AMBULATORY
PHYSICAL_TRANSFER_REQUIRES_ASSISTANCE: 1
AMBULATION ASSISTANCE: STAND BY ASSIST
MONEY MANAGEMENT (EXPENSES/BILLS): NEEDS ASSISTANCE
CURRENT_FUNCTION: STAND BY ASSIST
AMBULATION ASSISTANCE: STAND BY ASSIST
AMBULATION ASSISTANCE: STAND BY ASSIST
CURRENT_FUNCTION: STAND BY ASSIST

## 2024-01-01 ASSESSMENT — SOCIAL DETERMINANTS OF HEALTH (SDOH)
ACTIVE STRESSOR - NO STRESS FACTORS: 1
ACTIVE STRESSOR - HEALTH CHANGES: 1
ACTIVE STRESSOR - NO STRESS FACTORS: 1
ACTIVE STRESSOR - NO STRESS FACTORS: 1

## 2024-01-01 ASSESSMENT — FIBROSIS 4 INDEX: FIB4 SCORE: 1.67

## 2024-01-02 ASSESSMENT — LIFESTYLE VARIABLES
TOBACCO_USE: NO
SMOKING_STATUS: NO

## 2024-01-02 NOTE — TELEPHONE ENCOUNTER
I contacted patient's significant other, Gladys, and informed her that infusion services has his Cycle 1; Day 1 scheduled on Tuesday, January 9, 2024 at 7:00 am.  I scheduled his pre-chemo visit with TRI Marcelo, on Friday, January 5, 2024 at 1:30 pm.  Gladys was very appreciative of the update and they will be at their scheduled appointments.

## 2024-01-05 ENCOUNTER — HOSPITAL ENCOUNTER (OUTPATIENT)
Dept: HEMATOLOGY ONCOLOGY | Facility: MEDICAL CENTER | Age: 69
End: 2024-01-05
Attending: NURSE PRACTITIONER
Payer: COMMERCIAL

## 2024-01-05 ENCOUNTER — HOSPITAL ENCOUNTER (OUTPATIENT)
Facility: MEDICAL CENTER | Age: 69
End: 2024-01-05
Attending: NURSE PRACTITIONER
Payer: COMMERCIAL

## 2024-01-05 ENCOUNTER — HOSPITAL ENCOUNTER (OUTPATIENT)
Dept: RADIATION ONCOLOGY | Facility: MEDICAL CENTER | Age: 69
End: 2024-01-31
Attending: RADIOLOGY
Payer: MEDICARE

## 2024-01-05 VITALS
DIASTOLIC BLOOD PRESSURE: 66 MMHG | OXYGEN SATURATION: 98 % | HEART RATE: 111 BPM | BODY MASS INDEX: 20.99 KG/M2 | HEIGHT: 70 IN | TEMPERATURE: 97.4 F | WEIGHT: 146.6 LBS | SYSTOLIC BLOOD PRESSURE: 120 MMHG

## 2024-01-05 DIAGNOSIS — C22.0 HEPATOCELLULAR CARCINOMA (HCC): ICD-10-CM

## 2024-01-05 DIAGNOSIS — Z79.899 HIGH RISK MEDICATION USE: ICD-10-CM

## 2024-01-05 LAB
ALBUMIN SERPL BCP-MCNC: 4.3 G/DL (ref 3.2–4.9)
ALBUMIN/GLOB SERPL: 1.2 G/DL
ALP SERPL-CCNC: 182 U/L (ref 30–99)
ALT SERPL-CCNC: 85 U/L (ref 2–50)
ANION GAP SERPL CALC-SCNC: 17 MMOL/L (ref 7–16)
AST SERPL-CCNC: 88 U/L (ref 12–45)
BASOPHILS # BLD AUTO: 0.7 % (ref 0–1.8)
BASOPHILS # BLD: 0.06 K/UL (ref 0–0.12)
BILIRUB SERPL-MCNC: 0.4 MG/DL (ref 0.1–1.5)
BUN SERPL-MCNC: 15 MG/DL (ref 8–22)
CALCIUM ALBUM COR SERPL-MCNC: 10 MG/DL (ref 8.5–10.5)
CALCIUM SERPL-MCNC: 10.2 MG/DL (ref 8.5–10.5)
CHLORIDE SERPL-SCNC: 97 MMOL/L (ref 96–112)
CO2 SERPL-SCNC: 23 MMOL/L (ref 20–33)
CREAT SERPL-MCNC: 1.07 MG/DL (ref 0.5–1.4)
EOSINOPHIL # BLD AUTO: 0.47 K/UL (ref 0–0.51)
EOSINOPHIL NFR BLD: 5.2 % (ref 0–6.9)
ERYTHROCYTE [DISTWIDTH] IN BLOOD BY AUTOMATED COUNT: 43.8 FL (ref 35.9–50)
GFR SERPLBLD CREATININE-BSD FMLA CKD-EPI: 74 ML/MIN/1.73 M 2
GLOBULIN SER CALC-MCNC: 3.5 G/DL (ref 1.9–3.5)
GLUCOSE SERPL-MCNC: 93 MG/DL (ref 65–99)
HCT VFR BLD AUTO: 44.1 % (ref 42–52)
HGB BLD-MCNC: 14.5 G/DL (ref 14–18)
IMM GRANULOCYTES # BLD AUTO: 0.06 K/UL (ref 0–0.11)
IMM GRANULOCYTES NFR BLD AUTO: 0.7 % (ref 0–0.9)
LYMPHOCYTES # BLD AUTO: 1.32 K/UL (ref 1–4.8)
LYMPHOCYTES NFR BLD: 14.7 % (ref 22–41)
MCH RBC QN AUTO: 27.8 PG (ref 27–33)
MCHC RBC AUTO-ENTMCNC: 32.9 G/DL (ref 32.3–36.5)
MCV RBC AUTO: 84.5 FL (ref 81.4–97.8)
MONOCYTES # BLD AUTO: 0.55 K/UL (ref 0–0.85)
MONOCYTES NFR BLD AUTO: 6.1 % (ref 0–13.4)
NEUTROPHILS # BLD AUTO: 6.53 K/UL (ref 1.82–7.42)
NEUTROPHILS NFR BLD: 72.6 % (ref 44–72)
NRBC # BLD AUTO: 0 K/UL
NRBC BLD-RTO: 0 /100 WBC (ref 0–0.2)
PLATELET # BLD AUTO: 290 K/UL (ref 164–446)
PMV BLD AUTO: 10.2 FL (ref 9–12.9)
POTASSIUM SERPL-SCNC: 4.4 MMOL/L (ref 3.6–5.5)
PROT SERPL-MCNC: 7.8 G/DL (ref 6–8.2)
RBC # BLD AUTO: 5.22 M/UL (ref 4.7–6.1)
SODIUM SERPL-SCNC: 137 MMOL/L (ref 135–145)
T4 FREE SERPL-MCNC: 1.24 NG/DL (ref 0.93–1.7)
TSH SERPL DL<=0.005 MIU/L-ACNC: 4.29 UIU/ML (ref 0.38–5.33)
WBC # BLD AUTO: 9 K/UL (ref 4.8–10.8)

## 2024-01-05 PROCEDURE — 84443 ASSAY THYROID STIM HORMONE: CPT

## 2024-01-05 PROCEDURE — 82105 ALPHA-FETOPROTEIN SERUM: CPT

## 2024-01-05 PROCEDURE — 80053 COMPREHEN METABOLIC PANEL: CPT

## 2024-01-05 PROCEDURE — 99214 OFFICE O/P EST MOD 30 MIN: CPT | Performed by: NURSE PRACTITIONER

## 2024-01-05 PROCEDURE — 99212 OFFICE O/P EST SF 10 MIN: CPT | Performed by: NURSE PRACTITIONER

## 2024-01-05 PROCEDURE — 85025 COMPLETE CBC W/AUTO DIFF WBC: CPT

## 2024-01-05 PROCEDURE — 36415 COLL VENOUS BLD VENIPUNCTURE: CPT | Performed by: NURSE PRACTITIONER

## 2024-01-05 PROCEDURE — 84439 ASSAY OF FREE THYROXINE: CPT

## 2024-01-05 ASSESSMENT — ENCOUNTER SYMPTOMS
VOMITING: 0
HEADACHES: 0
MYALGIAS: 0
FEVER: 0
PALPITATIONS: 0
SHORTNESS OF BREATH: 1
WHEEZING: 0
NAUSEA: 1
DIAPHORESIS: 1
DIARRHEA: 0
COUGH: 0
CHILLS: 1
WEIGHT LOSS: 0
INSOMNIA: 0
TINGLING: 1
CONSTIPATION: 1
DIZZINESS: 0

## 2024-01-05 ASSESSMENT — FIBROSIS 4 INDEX: FIB4 SCORE: 1.8

## 2024-01-05 NOTE — PROGRESS NOTES
Subjective     German Pagan is a 68 y.o. male who presents with Hepatic Cancer (Kaveh/Prechemo)            HPI  Mr. Pagan presents for evaluation prior to cycle 1 tremelimumab, durvalumab for treatment of stage IVb, cTxNx pM1, hepatocellular carcinoma.  He is accompanied by his wife for today's visit.    Patient was in his usual state of health until 10/2/2023 when he presented to ED for worsening back pain, testicular pain worsening over approximately 1 month. Patient noted weight loss and night sweats. CT completed during ED visit showed lytic lesion in right eighth rib, spine, pelvic bones, concerning for lytic metastases versus myeloma. He was referred to [facility] for further evaluation of abnormal SPEP and imaging. Bone marrow biopsy completed 10/20/23 showed some atypical findings but nothing conclusive. Patient was advised to undergo biopsy of lytic lesion for further evaluation. Bone biopsy completed 11/8/23 confirmed metastatic hepatocellular carcinoma. He completed 5 fractions palliative SBRT to left hip, per Dr. Ruvalcaba, from 12/4-12/14/23. He was advised to initiate systemic therapy in form of tremelimumab, durvalumab.    Patient reports intermittent hot flashes and diaphoresis when he is in pain, which is being managed by palliative care.  He reports tophus like lesions at left elbow, approximately 4 cm.  Intermittent nausea is well-managed with as needed meds.  Intermittent constipation is managed by senna twice daily.  Patient is otherwise at his baseline.    Review of Systems   Constitutional:  Positive for chills (hot flashes) and diaphoresis (when in pain). Negative for fever, malaise/fatigue and weight loss.   Respiratory:  Positive for shortness of breath (with activity). Negative for cough and wheezing.    Cardiovascular:  Negative for chest pain, palpitations and leg swelling.   Gastrointestinal:  Positive for constipation (senna BID) and nausea (prn meds helped). Negative for diarrhea and  "vomiting.   Genitourinary:  Negative for dysuria.   Musculoskeletal:  Negative for joint pain and myalgias.        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 Negative for dizziness and headaches.   Psychiatric/Behavioral:  The patient does not have insomnia.      Allergies   Allergen Reactions    Pcn [Penicillins]      Pt reports that it was a childhood allergie not sure what happens          Current Outpatient Medications on File Prior to Encounter   Medication Sig Dispense Refill    sennosides (SENOKOT) 8.6 MG Tab Take 8.6 mg by mouth 1 time a day as needed.      prochlorperazine (COMPAZINE) 10 MG Tab Take 1 Tablet by mouth every 6 hours as needed for Nausea/Vomiting. 30 Tablet 3    tamsulosin (FLOMAX) 0.4 MG capsule Take 0.4 mg by mouth every evening.      Naloxone (NARCAN) 4 MG/0.1ML Liquid One spray in one nostril for overdose and call 911. 1 Each 0    pravastatin (PRAVACHOL) 10 MG Tab TAKE 1 TABLET BY MOUTH ONCE DAILY IN THE EVENING 90 Tablet 3    Cholecalciferol (VITAMIN D3) 125 MCG (5000 UT) Cap Take 5,000 Units by mouth every 48 hours.      ondansetron (ZOFRAN) 4 MG Tab tablet Take 1 Tablet by mouth every four hours as needed for Nausea/Vomiting. 20 Tablet 3     No current facility-administered medications on file prior to encounter.              Objective     /66 (BP Location: Right arm, Patient Position: Sitting, BP Cuff Size: Adult)   Pulse (!) 111   Temp 36.3 °C (97.4 °F) (Temporal)   Ht 1.778 m (5' 10\")   Wt 66.5 kg (146 lb 9.6 oz)   SpO2 98%   BMI 21.03 kg/m²      Physical Exam  Vitals reviewed.   Constitutional:       General: He is not in acute distress.     Appearance: He is well-developed. He is not diaphoretic.   HENT:      Head: Normocephalic and atraumatic.   Eyes:      General: No scleral icterus.        Right eye: No discharge.         Left eye: No discharge.   Cardiovascular: "      Rate and Rhythm: Normal rate and regular rhythm.      Heart sounds: Normal heart sounds. No murmur heard.     No friction rub. No gallop.   Pulmonary:      Effort: Pulmonary effort is normal. No respiratory distress.      Breath sounds: Normal breath sounds. No wheezing.   Abdominal:      General: There is no distension.      Palpations: Abdomen is soft.      Tenderness: There is no abdominal tenderness.   Musculoskeletal:         General: Normal range of motion.      Cervical back: Normal range of motion.   Skin:     General: Skin is warm and dry.      Coloration: Skin is not pale.      Findings: No erythema or rash.   Neurological:      Mental Status: He is alert and oriented to person, place, and time.   Psychiatric:         Behavior: Behavior normal.         Allergies   Allergen Reactions    Pcn [Penicillins]      Pt reports that it was a childhood allergie not sure what happens          Current Outpatient Medications on File Prior to Encounter   Medication Sig Dispense Refill    sennosides (SENOKOT) 8.6 MG Tab Take 8.6 mg by mouth 1 time a day as needed.      prochlorperazine (COMPAZINE) 10 MG Tab Take 1 Tablet by mouth every 6 hours as needed for Nausea/Vomiting. 30 Tablet 3    tamsulosin (FLOMAX) 0.4 MG capsule Take 0.4 mg by mouth every evening.      Naloxone (NARCAN) 4 MG/0.1ML Liquid One spray in one nostril for overdose and call 911. 1 Each 0    pravastatin (PRAVACHOL) 10 MG Tab TAKE 1 TABLET BY MOUTH ONCE DAILY IN THE EVENING 90 Tablet 3    Cholecalciferol (VITAMIN D3) 125 MCG (5000 UT) Cap Take 5,000 Units by mouth every 48 hours.      ondansetron (ZOFRAN) 4 MG Tab tablet Take 1 Tablet by mouth every four hours as needed for Nausea/Vomiting. 20 Tablet 3     No current facility-administered medications on file prior to encounter.              Assessment & Plan     1. Hepatocellular carcinoma (HCC)  CBC WITH DIFFERENTIAL    Comp Metabolic Panel    TSH    FREE THYROXINE    AFP SERUM TUMOR MARKER       2. High risk medication use  TSH    FREE THYROXINE        1.  HCC: Diagnosed 11/8/23; s/p 5 fractions palliative SBRT 12/4-12/14/23; initiated tremelimumab, durvalumab 1/5/24.    Patient feeling well overall.  CBC, CMP, free T4, TSH, AFP will be evaluated prior to dosing and if within acceptable limits patient will proceed with cycle 1 of treatment and return in 1 month for evaluation prior to cycle 2, sooner as needed. IN the interim he will be periodically evaluated per palliative provider as well.      The patient verbalized agreement and understanding of current plan. All questions and concerns were addressed at time of visit.    Please note that this dictation was created using voice recognition software. I have made every reasonable attempt to correct obvious errors, but I expect that there are errors of grammar and possibly content that I did not discover before finalizing the note.

## 2024-01-05 NOTE — PROGRESS NOTES
Completed SBRT L Hip 35Gy in 5 fractions completed 12/14/23. Starting immunotherapy STRIDE regimen next week. Pain slightly improved. Dr. Linn following closely if pain persists after 2-3months of IO after reimaging will consider additional radiation to other bone lesions

## 2024-01-05 NOTE — PROGRESS NOTES
German Pagan is a 68 y.o. male here for a non-provider visit for: Lab Draws  on 1/5/2024 at 2:45 PM    Procedure Performed: Venipuncture     Anatomical site: Left Wrist     Equipment used: 23g butterfly    Labs drawn: CBC w/diff, CMP, TSH, and afp serum tumor marker and free thyroxine    Ordering Provider: TRI Theodore    Drawn by:  Rubina Shah CMA

## 2024-01-07 LAB — AFP-TM SERPL-MCNC: 293 NG/ML (ref 0–9)

## 2024-01-07 NOTE — PROGRESS NOTES
"Pharmacy Chemotherapy Calculations    Dx: Hepatocellular carcinoma  Cycle 1, Day 1  Previous treatment = n/a    Protocol: Tremelimumab-actl + Durvalumab  Tremelimumab-actl 300 mg IV over 60 minutes followed by  Durvalumab 1500 mg IV over 60 minutes   28-day cycle for 1 cycle  ~Followed by~  Durvalumab 1500 mg IV over 60 minutes   28-day cycle until DP/UT  NCCN Guidelines for Hepatocellular Carcinoma V.1.2023.  Charu et al. NEJM Evid. 2022;1(8):8.    Allergies:  Pcn [penicillins]       /83   Pulse (!) 110   Temp 36.2 °C (97.2 °F) (Temporal)   Resp 17   Ht 1.74 m (5' 8.5\")   Wt 68.1 kg (150 lb 2.1 oz)   SpO2 93%   BMI 22.49 kg/m²  Body surface area is 1.81 meters squared.    Labs 1/5/24:  ANC~ 6530 Plt = 290k   Hgb = 14.5     SCr = 1.07 mg/dL CrCl ~ 61 mL/min   AST/ALT/AP = 88/85/182 TBili = 0.4  TSH = 4.29 Free T4 = 1.24    Also receiving Xgeva every 28 days, last dose 1/9/24    Tremelimumab-actl 300 mg fixed dose   No calculation required, OK to treat with final dose = 300 mg    Durvalumab 1500 mg fixed dose   No calculation required, OK to treat with final dose = 1500 mg    Noah Sanabria, PharmD  "

## 2024-01-09 ENCOUNTER — OUTPATIENT INFUSION SERVICES (OUTPATIENT)
Dept: ONCOLOGY | Facility: MEDICAL CENTER | Age: 69
End: 2024-01-09
Attending: STUDENT IN AN ORGANIZED HEALTH CARE EDUCATION/TRAINING PROGRAM
Payer: COMMERCIAL

## 2024-01-09 VITALS
DIASTOLIC BLOOD PRESSURE: 83 MMHG | OXYGEN SATURATION: 93 % | TEMPERATURE: 97.2 F | RESPIRATION RATE: 17 BRPM | BODY MASS INDEX: 22.24 KG/M2 | WEIGHT: 150.13 LBS | HEIGHT: 69 IN | HEART RATE: 110 BPM | SYSTOLIC BLOOD PRESSURE: 127 MMHG

## 2024-01-09 DIAGNOSIS — C22.0 HEPATOCELLULAR CARCINOMA (HCC): ICD-10-CM

## 2024-01-09 PROCEDURE — 700111 HCHG RX REV CODE 636 W/ 250 OVERRIDE (IP): Mod: JZ,JG | Performed by: STUDENT IN AN ORGANIZED HEALTH CARE EDUCATION/TRAINING PROGRAM

## 2024-01-09 PROCEDURE — 96417 CHEMO IV INFUS EACH ADDL SEQ: CPT

## 2024-01-09 PROCEDURE — 96413 CHEMO IV INFUSION 1 HR: CPT

## 2024-01-09 PROCEDURE — 96372 THER/PROPH/DIAG INJ SC/IM: CPT

## 2024-01-09 PROCEDURE — 700105 HCHG RX REV CODE 258: Performed by: STUDENT IN AN ORGANIZED HEALTH CARE EDUCATION/TRAINING PROGRAM

## 2024-01-09 RX ORDER — CLONIDINE HYDROCHLORIDE 0.1 MG/1
0.1 TABLET ORAL PRN
COMMUNITY

## 2024-01-09 RX ORDER — DIPHENHYDRAMINE HYDROCHLORIDE 50 MG/ML
50 INJECTION INTRAMUSCULAR; INTRAVENOUS PRN
Status: DISCONTINUED | OUTPATIENT
Start: 2024-01-09 | End: 2024-01-09 | Stop reason: HOSPADM

## 2024-01-09 RX ORDER — EPINEPHRINE 1 MG/ML(1)
0.5 AMPUL (ML) INJECTION PRN
Status: DISCONTINUED | OUTPATIENT
Start: 2024-01-09 | End: 2024-01-09 | Stop reason: HOSPADM

## 2024-01-09 RX ORDER — DIPHENHYDRAMINE HCL 25 MG
25 CAPSULE ORAL PRN
COMMUNITY
Start: 2023-11-01

## 2024-01-09 RX ORDER — METHYLPREDNISOLONE SODIUM SUCCINATE 125 MG/2ML
125 INJECTION, POWDER, LYOPHILIZED, FOR SOLUTION INTRAMUSCULAR; INTRAVENOUS PRN
Status: DISCONTINUED | OUTPATIENT
Start: 2024-01-09 | End: 2024-01-09 | Stop reason: HOSPADM

## 2024-01-09 RX ORDER — ONDANSETRON 2 MG/ML
4 INJECTION INTRAMUSCULAR; INTRAVENOUS PRN
Status: DISCONTINUED | OUTPATIENT
Start: 2024-01-09 | End: 2024-01-09 | Stop reason: HOSPADM

## 2024-01-09 RX ADMIN — SODIUM CHLORIDE 1500 MG: 9 INJECTION, SOLUTION INTRAVENOUS at 10:06

## 2024-01-09 RX ADMIN — DENOSUMAB 120 MG: 120 INJECTION SUBCUTANEOUS at 07:56

## 2024-01-09 RX ADMIN — SODIUM CHLORIDE 300 MG: 9 INJECTION, SOLUTION INTRAVENOUS at 08:06

## 2024-01-09 ASSESSMENT — FIBROSIS 4 INDEX: FIB4 SCORE: 2.3

## 2024-01-09 ASSESSMENT — PAIN DESCRIPTION - PAIN TYPE: TYPE: CHRONIC PAIN

## 2024-01-09 NOTE — PROGRESS NOTES
Chemotherapy Verification - PRIMARY RN  C1 D1    Height = 174 cm  Weight = 68.1 kg  BSA = 1.81 m^2       Medication: tremelimumab-actl (IMJUDO)  Dose: 300 mg set dose  Calculated Dose: 300 mg set dose                             (In mg/m2, AUC, mg/kg)     Medication: durvalumab (IMFINZI)  Dose: 1500 mg set dose  Calculated Dose: 1500 mg set dose                             (In mg/m2, AUC, mg/kg)        I confirm this process was performed independently with the BSA and all final chemotherapy dosing calculations congruent.  Any discrepancies of 10% or greater have been addressed with the chemotherapy pharmacist. The resolution of the discrepancy has been documented in the EPIC progress notes.

## 2024-01-09 NOTE — PROGRESS NOTES
"Pharmacy Chemotherapy Calculation:    Dx: Hepatocellular carcinoma cTxNxM1 stage IV         Protocol: Durvalumab with Tremelimumab   *Dosing Reference*     Tremelimumab-actl 300 mg (weight ? 30 kg) IV over 60 minutes on Day 1 followed by Durvalumab 1,500 mg (weight ? 30 kg) IV over 60 minutes on Day 1   28-day cycle for 1 cycle   ~Followed by~  Durvalumab 1,500 mg (weight ? 30 kg) IV over 60 minutes on Day 1  28-day cycle until disease progression or unacceptable toxicity     NCCN Guidelines® for Hepatocellular Carcinoma V.1.2023.   Charu et al. NEJM Evid. 2022;1(8):8.a    Allergies:  Pcn [penicillins]       /83   Pulse (!) 110   Temp 36.2 °C (97.2 °F) (Temporal)   Resp 17   Ht 1.74 m (5' 8.5\")   Wt 68.1 kg (150 lb 2.1 oz)   SpO2 93%   BMI 22.49 kg/m²  Body surface area is 1.81 meters squared.    Labs 1/5/23:  ANC~ 6530 Plt = 290k   Hgb = 14.5     SCr = 1.07 mg/dL CrCl ~ 61.1 mL/min   AST/ALT/AP = 88/85/182 TBili = 0.4    TSH = 4.290   Free T4 = 1.24    Xgeva q28d, most recent dose given on 1/9/24  Drug Order   (Drug name, dose, route, IV Fluid & volume, frequency, number of doses) Cycle 1  Previous treatment: n/a     Medication =Tremelimumab-acti   Base Dose = 300 mg  Fixed dose; no calc required  Final Dose = 300 mg  Route = IV  Fluid & Volume =  mL  Admin Duration = Over 60 minutes          Fixed dose, OK to treat with final dose   Medication = Durvalumab  Base Dose = 1500 mg  Fixed dose; no calc required  Final Dose = 1500 mg  Route = IV  Fluid & Volume =  mL  Admin Duration = Over 60 minutes          Fixed dose, OK to treat with final dose     By my signature below, I confirm this process was performed independently with the BSA and all final chemotherapy dosing calculations congruent. I have reviewed the above chemotherapy order and that my calculation of the final dose and BSA (when applicable) corroborate those calculations of the  pharmacist. Discrepancies of 10% or " greater in the written dose have been addressed and documented within the EPIC Progress notes.    Mariola Meng, RebecaD

## 2024-01-09 NOTE — PROGRESS NOTES
German arrives to [facility] for C1 D1 Imjudo/Imfinzi and Xgeva for hepatocellular carcinoma with bone mets. Patient denies acute health concerns. Denies s/s active infections, open wounds, and mouth sores. Patient denies recent/upcoming invasive dental procedures as he has upper and lower full dentures. Chemo education completed by oncologist's office on 11/20/23. Re-inforced immunotherapy education. Plan of care discussed. Questions/concerns answered prior to start of treatment.     Labs drawn on 1/5, reviewed, and meets parameters to proceed with treatment. Imjudo infused, with an inline 0.2 micron filter, over 1 hour without adverse s/s. 1 hour post-Imjudo monitoring completed. Imfinzi then infused, with an inline 0.2 micron filter, over 1 hour without adverse s/s. Patient tolerated infusions well. PIV with brisk blood return post-chemo, flushed with 20 ml NS, and removed with tip intact. Patient has his next appt. He will be getting pre-chemo labs drawn at an outpatient lab prior to next treatment. Discharged home to self/family care in no apparent distress.

## 2024-01-09 NOTE — PROGRESS NOTES
Chemotherapy Verification - SECONDARY RN       Height = 174 cm  Weight = 68.1 kg  BSA = 1.81 m2       Medication: Imjudo  Dose: 300 mg set dose  Calculated Dose: 300 mg                             (In mg/m2, AUC, mg/kg)     Medication: Imfinzi  Dose: 1500 mg set dose  Calculated Dose: 1500 mg                             (In mg/m2, AUC, mg/kg)    I confirm that this process was performed independently.

## 2024-01-11 ENCOUNTER — PHARMACY VISIT (OUTPATIENT)
Dept: PHARMACY | Facility: MEDICAL CENTER | Age: 69
End: 2024-01-11
Payer: COMMERCIAL

## 2024-01-11 ENCOUNTER — APPOINTMENT (OUTPATIENT)
Dept: HEMATOLOGY ONCOLOGY | Facility: MEDICAL CENTER | Age: 69
End: 2024-01-11
Payer: MEDICARE

## 2024-01-11 DIAGNOSIS — G89.3 CANCER RELATED PAIN: ICD-10-CM

## 2024-01-11 DIAGNOSIS — C22.0 HEPATOCELLULAR CARCINOMA (HCC): ICD-10-CM

## 2024-01-11 PROCEDURE — RXMED WILLOW AMBULATORY MEDICATION CHARGE: Performed by: FAMILY MEDICINE

## 2024-01-11 RX ORDER — MORPHINE SULFATE 30 MG/1
30 TABLET ORAL EVERY 6 HOURS PRN
Qty: 20 TABLET | Refills: 0 | Status: SHIPPED | OUTPATIENT
Start: 2024-01-11 | End: 2024-01-15 | Stop reason: SDUPTHER

## 2024-01-11 RX ORDER — MORPHINE SULFATE 100 MG/1
100 TABLET ORAL EVERY 12 HOURS
Qty: 10 TABLET | Refills: 0 | Status: SHIPPED | OUTPATIENT
Start: 2024-01-11 | End: 2024-01-11 | Stop reason: SDUPTHER

## 2024-01-11 RX ORDER — MORPHINE SULFATE 100 MG/1
100 TABLET ORAL EVERY 12 HOURS
Qty: 10 TABLET | Refills: 0 | Status: SHIPPED | OUTPATIENT
Start: 2024-01-11 | End: 2024-01-15

## 2024-01-15 ENCOUNTER — PHARMACY VISIT (OUTPATIENT)
Dept: PHARMACY | Facility: MEDICAL CENTER | Age: 69
End: 2024-01-15
Payer: COMMERCIAL

## 2024-01-15 ENCOUNTER — HOSPITAL ENCOUNTER (OUTPATIENT)
Dept: HEMATOLOGY ONCOLOGY | Facility: MEDICAL CENTER | Age: 69
End: 2024-01-15
Attending: FAMILY MEDICINE
Payer: COMMERCIAL

## 2024-01-15 VITALS
RESPIRATION RATE: 12 BRPM | BODY MASS INDEX: 22.58 KG/M2 | OXYGEN SATURATION: 97 % | SYSTOLIC BLOOD PRESSURE: 132 MMHG | WEIGHT: 149 LBS | HEIGHT: 68 IN | HEART RATE: 91 BPM | DIASTOLIC BLOOD PRESSURE: 70 MMHG | TEMPERATURE: 98.2 F

## 2024-01-15 DIAGNOSIS — C22.0 HEPATOCELLULAR CARCINOMA (HCC): ICD-10-CM

## 2024-01-15 DIAGNOSIS — G89.3 CANCER RELATED PAIN: ICD-10-CM

## 2024-01-15 DIAGNOSIS — F41.9 ANXIETY: ICD-10-CM

## 2024-01-15 PROCEDURE — 99214 OFFICE O/P EST MOD 30 MIN: CPT | Performed by: FAMILY MEDICINE

## 2024-01-15 PROCEDURE — RXMED WILLOW AMBULATORY MEDICATION CHARGE: Performed by: FAMILY MEDICINE

## 2024-01-15 PROCEDURE — 99212 OFFICE O/P EST SF 10 MIN: CPT | Performed by: FAMILY MEDICINE

## 2024-01-15 RX ORDER — MORPHINE SULFATE 30 MG/1
60 TABLET ORAL EVERY 4 HOURS PRN
Qty: 84 TABLET | Refills: 0 | Status: SHIPPED | OUTPATIENT
Start: 2024-01-15 | End: 2024-01-22

## 2024-01-15 RX ORDER — METHADONE HYDROCHLORIDE 5 MG/1
TABLET ORAL
Qty: 32 TABLET | Refills: 0 | Status: SHIPPED | OUTPATIENT
Start: 2024-01-15 | End: 2024-01-29 | Stop reason: SDUPTHER

## 2024-01-15 RX ORDER — LORAZEPAM 0.5 MG/1
0.5 TABLET ORAL EVERY 8 HOURS PRN
Qty: 3 TABLET | Refills: 0 | Status: SHIPPED | OUTPATIENT
Start: 2024-01-15 | End: 2024-01-17 | Stop reason: SDUPTHER

## 2024-01-15 ASSESSMENT — FIBROSIS 4 INDEX: FIB4 SCORE: 2.3

## 2024-01-15 ASSESSMENT — PAIN SCALES - GENERAL: PAINLEVEL: 8=MODERATE-SEVERE PAIN

## 2024-01-16 PROBLEM — F41.9 ANXIETY: Status: ACTIVE | Noted: 2024-01-16

## 2024-01-16 ASSESSMENT — ENCOUNTER SYMPTOMS
PALPITATIONS: 0
NAUSEA: 0
VOMITING: 0
CONSTIPATION: 0
DIARRHEA: 0
BACK PAIN: 1
NERVOUS/ANXIOUS: 1
CHILLS: 0
FEVER: 0
COUGH: 0
SHORTNESS OF BREATH: 0

## 2024-01-16 NOTE — PROGRESS NOTES
Subjective:     Chief Complaint   Patient presents with    New Patient     liver cancer/ 2 wk FV/ symptom mgmt     German Pagan is a 68 y.o. male here today for follow-up on symptom management for oncological pain.  Patient is accompanied by his wife.  They report he continues to experience significant pain related to his bony metastases.  He has completed radiation on his hip and has had an improvement but has had significant worsening in the pain in his left arm.  He has episodic flares of the pain which also can bring on anxiety/panic attack symptoms.  He continues to use his long and short acting morphine with up to 6 short acting doses per day.  He reports he does spend significant amount of time on the couch as movement exacerbates his pain.  He continues to tolerate his treatments well.  We then had a discussion on how best to proceed with his pain and through shared decision making it was agreed to initiate methadone for his symptoms.  We discussed the risk and benefits of methadone including serious heart arrhythmias.  Following EKG in the office we decided on a plan to start methadone use morphine for breakthrough while titrating methadone as well as the addition of Ativan to help symptoms during his episodic flares of pain.  He does not endorse any nausea or vomiting or diarrhea or constipation currently.  Concluded the conversation with a plan to follow-up in 1 week and further titrate methadone.    Problem   Anxiety   Cancer Related Pain        Current medicines (including changes today)  Current Outpatient Medications   Medication Sig Dispense Refill    methadone (DOLOPHINE) 5 MG Tab Take 1/2 tab every 8hrs for 7 days and then one tablet every 8hrs for 7 days 32 Tablet 0    morphine (MS IR) 30 MG tablet Take 2 Tablets by mouth every four hours as needed for Severe Pain (while transitioning to methadone) for up to 7 days. 84 Tablet 0    LORazepam (ATIVAN) 0.5 MG Tab Take 1 Tablet by mouth every 8  hours as needed for Anxiety for up to 7 days. 3 Tablet 0    cloNIDine (CATAPRES) 0.1 MG Tab Take 0.1 mg by mouth as needed. PRN FOR SYSTOLIC BP > 160      diphenhydrAMINE (BENADRYL ALLERGY) 25 MG capsule Take 25 mg by mouth as needed. FOR SKIN ITCHING      sennosides (SENOKOT) 8.6 MG Tab Take 8.6 mg by mouth 1 time a day as needed.      ondansetron (ZOFRAN) 4 MG Tab tablet Take 1 Tablet by mouth every four hours as needed for Nausea/Vomiting. 20 Tablet 3    prochlorperazine (COMPAZINE) 10 MG Tab Take 1 Tablet by mouth every 6 hours as needed for Nausea/Vomiting. 30 Tablet 3    tamsulosin (FLOMAX) 0.4 MG capsule Take 0.4 mg by mouth every evening.      Naloxone (NARCAN) 4 MG/0.1ML Liquid One spray in one nostril for overdose and call 911. 1 Each 0    pravastatin (PRAVACHOL) 10 MG Tab TAKE 1 TABLET BY MOUTH ONCE DAILY IN THE EVENING 90 Tablet 3    Cholecalciferol (VITAMIN D3) 125 MCG (5000 UT) Cap Take 5,000 Units by mouth every 48 hours.       No current facility-administered medications for this encounter.       Social History     Tobacco Use    Smoking status: Former     Current packs/day: 0.00     Average packs/day: 0.5 packs/day for 36.0 years (18.0 ttl pk-yrs)     Types: Cigarettes     Start date:      Quit date: 2006     Years since quittin.0    Smokeless tobacco: Former     Types: Chew     Quit date: 10/3/2023    Tobacco comments:     Quit smoking in  - just less than 1 ppd. Used chewing tobacco for about 8 years.    Vaping Use    Vaping Use: Never used   Substance Use Topics    Alcohol use: Not Currently     Comment: Quit in     Drug use: Not Currently     Types: Methamphetamines, Marijuana, Inhaled     Comment: Quit in , used methamphetamine     Past Medical History:   Diagnosis Date    Back pain 10/05/2023    groin    Bilateral recurrent inguinal hernia without obstruction or gangrene 10/06/2023    Bowel habit changes 10/05/2023    constipation    Bronchitis     as a teenager     "Cataract 10/05/2023    no surgery    Dental disorder     dentures    Heart burn     Hepatitis A     treated    Hepatitis C 2016    treated    Hepatocellular carcinoma (HCC)     High cholesterol     Hypertension     Metastasis to bone (HCC)     Nausea and vomiting 10/06/2023    Pneumonia     as a teenager    Postoperative abdominal pain 10/13/2023      Family History   Problem Relation Age of Onset    Cancer Mother         Multiple Myeloma    Cancer Father         Colon and Lung    Cancer Sister         Breast    Diabetes Brother     Cancer Maternal Grandmother         Colon    Colon Cancer Maternal Grandmother     No Known Problems Daughter     No Known Problems Daughter     No Known Problems Son     Cancer Other         Multiple myeloma         Objective:     Vitals:    01/15/24 1056   BP: 132/70   Pulse: 91   Resp: 12   Temp: 36.8 °C (98.2 °F)   TempSrc: Temporal   SpO2: 97%   Weight: 67.6 kg (149 lb)   Height: 1.727 m (5' 8\")     Body mass index is 22.66 kg/m².     Review of Systems   Constitutional:  Positive for malaise/fatigue. Negative for chills and fever.   Respiratory:  Negative for cough and shortness of breath.    Cardiovascular:  Negative for chest pain and palpitations.   Gastrointestinal:  Negative for constipation, diarrhea, nausea and vomiting.   Musculoskeletal:  Positive for back pain and joint pain.   Psychiatric/Behavioral:  The patient is nervous/anxious.      Physical Exam:   Gen: Moderate distress due to pain  Skin: Pink, warm, and dry  HEENT: conjunctiva non-injected, sclera non-icteric. EOMs intact.   Nasal mucosa without edema nor erythema.   Pinna normal.    Oral mucous membranes pink and moist with no lesions.  Neck: Supple, trachea midline. No adenopathy or masses in the neck or supraclavicular regions.  Lungs: Effort is normal.   CV: regular rate and rhythm  Abdomen: Flat  Ext: no edema, color normal, vascularity normal, temperature normal  Alert and oriented Eye contact is good, " speech goal directed, affect calm    Assessment and Plan:   Cancer related pain  Patient with known metastatic hepatocellular carcinoma.  Has significant metastatic disease burden in the skeleton.  Pain has been steadily increasing even to the point of patient considering presentation to ER for relief.  Has been using long and short acting morphine with an MEDD of 380 mg.  Patient continues to have pain that is impacting his quality life and daily functioning.  He is following with radiation oncology for palliative treatment of some of his lesions.  Through shared decision making plan was to transition to methadone for pain relief.  In office EKG was performed QTc of 451.  Discussed with patient risk and benefits of methadone including serious or fatal heart arrhythmias.  To feel that patient would benefit from the use of methadone for his symptoms.  Will start with 2.5 mg of methadone 3 times a day as well as 30 to 60 mg of immediate release morphine for breakthrough every 4 hours while titrating methadone.  PDMP reviewed, no signs of diversion or abuse, risk and benefits of medication discussed, controlled substance agreement on file.  Will follow-up in 1 week to reevaluate methadone and continue titration.  Patient will discontinue his long-acting morphine.    Anxiety  Patient with anxiety symptoms secondary to pain.  Anxiety has also exacerbated his episodic pain flares.  Will start 0.5 mg of Ativan every 8 hours as needed to assist in his symptoms.  Risk and benefits of medication discussed, PDMP reviewed, controlled substance agreement on file.      36 minutes in total spent on patient encounter including chart review and consultation with patient's oncological providers.    Followup: Return in about 1 week (around 1/22/2024).    Burak Leblanc M.D.

## 2024-01-16 NOTE — ASSESSMENT & PLAN NOTE
Patient with known metastatic hepatocellular carcinoma.  Has significant metastatic disease burden in the skeleton.  Pain has been steadily increasing even to the point of patient considering presentation to ER for relief.  Has been using long and short acting morphine with an MEDD of 380 mg.  Patient continues to have pain that is impacting his quality life and daily functioning.  He is following with radiation oncology for palliative treatment of some of his lesions.  Through shared decision making plan was to transition to methadone for pain relief.  In office EKG was performed QTc of 451.  Discussed with patient risk and benefits of methadone including serious or fatal heart arrhythmias.  To feel that patient would benefit from the use of methadone for his symptoms.  Will start with 2.5 mg of methadone 3 times a day as well as 30 to 60 mg of immediate release morphine for breakthrough every 4 hours while titrating methadone.  PDMP reviewed, no signs of diversion or abuse, risk and benefits of medication discussed, controlled substance agreement on file.  Will follow-up in 1 week to reevaluate methadone and continue titration.  Patient will discontinue his long-acting morphine.

## 2024-01-16 NOTE — ASSESSMENT & PLAN NOTE
Patient with anxiety symptoms secondary to pain.  Anxiety has also exacerbated his episodic pain flares.  Will start 0.5 mg of Ativan every 8 hours as needed to assist in his symptoms.  Risk and benefits of medication discussed, PDMP reviewed, controlled substance agreement on file.

## 2024-01-17 ENCOUNTER — TELEPHONE (OUTPATIENT)
Dept: HEMATOLOGY ONCOLOGY | Facility: MEDICAL CENTER | Age: 69
End: 2024-01-17

## 2024-01-17 ENCOUNTER — APPOINTMENT (OUTPATIENT)
Dept: HEMATOLOGY ONCOLOGY | Facility: MEDICAL CENTER | Age: 69
End: 2024-01-17
Payer: MEDICARE

## 2024-01-17 DIAGNOSIS — F41.9 ANXIETY: ICD-10-CM

## 2024-01-17 RX ORDER — LORAZEPAM 0.5 MG/1
0.5 TABLET ORAL EVERY 8 HOURS PRN
Qty: 42 TABLET | Refills: 0 | Status: SHIPPED | OUTPATIENT
Start: 2024-01-17 | End: 2024-01-31

## 2024-01-17 NOTE — TELEPHONE ENCOUNTER
Patient girlfriend called with patient next to her asking for a new Rx for Lorazepam 0.5 mg tab. Patient is out and they need more medication because he took them every 8 hours. Patient also wants a call back when the new Rx has been sent to the pharmacy.

## 2024-01-18 ENCOUNTER — PATIENT OUTREACH (OUTPATIENT)
Dept: ONCOLOGY | Facility: MEDICAL CENTER | Age: 69
End: 2024-01-18
Payer: COMMERCIAL

## 2024-01-18 NOTE — PROGRESS NOTES
Oncology nurse navigator called patient to follow up on a distress score related to excessive worry and fears.  Patient states that he is doing good and that he is using the ativan along with visits the Dr. Leblanc to manage his anxiety.  Patient denies any needs or barriers at this time.

## 2024-01-22 ENCOUNTER — HOSPITAL ENCOUNTER (OUTPATIENT)
Dept: HEMATOLOGY ONCOLOGY | Facility: MEDICAL CENTER | Age: 69
End: 2024-01-22
Attending: FAMILY MEDICINE
Payer: COMMERCIAL

## 2024-01-22 VITALS
SYSTOLIC BLOOD PRESSURE: 108 MMHG | DIASTOLIC BLOOD PRESSURE: 58 MMHG | HEART RATE: 120 BPM | WEIGHT: 148 LBS | TEMPERATURE: 97.8 F | OXYGEN SATURATION: 12 % | HEIGHT: 68 IN | RESPIRATION RATE: 12 BRPM | BODY MASS INDEX: 22.43 KG/M2

## 2024-01-22 DIAGNOSIS — C79.51 METASTASIS TO BONE (HCC): Chronic | ICD-10-CM

## 2024-01-22 DIAGNOSIS — G89.3 CANCER RELATED PAIN: Chronic | ICD-10-CM

## 2024-01-22 DIAGNOSIS — F41.9 ANXIETY: ICD-10-CM

## 2024-01-22 PROCEDURE — 99212 OFFICE O/P EST SF 10 MIN: CPT | Performed by: FAMILY MEDICINE

## 2024-01-22 PROCEDURE — 99213 OFFICE O/P EST LOW 20 MIN: CPT | Performed by: FAMILY MEDICINE

## 2024-01-22 ASSESSMENT — FIBROSIS 4 INDEX: FIB4 SCORE: 2.3

## 2024-01-22 ASSESSMENT — PAIN SCALES - GENERAL: PAINLEVEL: 8=MODERATE-SEVERE PAIN

## 2024-01-22 NOTE — PROGRESS NOTES
Subjective:     Chief Complaint   Patient presents with    Cancer     [facility]/ liver cancer/ 2 wk [facility]/ symptom mgmt     German Pagan is a 68 y.o. male here today for follow-up on symptom management for oncological pain.  Patient is accompanied by his wife today.  He reports that overall the first week of his transition to methadone has gone okay he continues with hip and elbow pain but has not had any significant exacerbation of his pain and his only required 30 mg of morphine a few times a day for breakthrough.  He reports he has no new pain but that his most distracting pain is currently that in his elbow which will have episodic flares of sharp shooting pain that goes from his elbow up his arm.  He reports he did get some relief from the lorazepam and is due to  his new prescription today.  Does not endorse any problems with constipation or diarrhea or nausea and vomiting.  He will begin his increase methadone dosing to 5 mg 3 times a day tomorrow.  I again reiterated that there would be a 2 to 3-week period of adjusting the methadone before he starts to get significant relief.  We concluded the visit with a plan to follow-up in 1 week for reevaluation of his pain and next adjustment of his methadone dosing.    Problem   Anxiety   Cancer Related Pain        Current medicines (including changes today)  Current Outpatient Medications   Medication Sig Dispense Refill    LORazepam (ATIVAN) 0.5 MG Tab Take 1 Tablet by mouth every 8 hours as needed for Anxiety for up to 14 days. 42 Tablet 0    methadone (DOLOPHINE) 5 MG Tab Take 1/2 tab every 8hrs for 7 days and then one tablet every 8hrs for 7 days 32 Tablet 0    morphine (MS IR) 30 MG tablet Take 2 Tablets by mouth every four hours as needed for Severe Pain (while transitioning to methadone) for up to 7 days. 84 Tablet 0    cloNIDine (CATAPRES) 0.1 MG Tab Take 0.1 mg by mouth as needed. PRN FOR SYSTOLIC BP > 160      diphenhydrAMINE (BENADRYL ALLERGY)  25 MG capsule Take 25 mg by mouth as needed. FOR SKIN ITCHING      sennosides (SENOKOT) 8.6 MG Tab Take 8.6 mg by mouth 1 time a day as needed.      ondansetron (ZOFRAN) 4 MG Tab tablet Take 1 Tablet by mouth every four hours as needed for Nausea/Vomiting. 20 Tablet 3    prochlorperazine (COMPAZINE) 10 MG Tab Take 1 Tablet by mouth every 6 hours as needed for Nausea/Vomiting. 30 Tablet 3    tamsulosin (FLOMAX) 0.4 MG capsule Take 0.4 mg by mouth every evening.      Naloxone (NARCAN) 4 MG/0.1ML Liquid One spray in one nostril for overdose and call 911. 1 Each 0    pravastatin (PRAVACHOL) 10 MG Tab TAKE 1 TABLET BY MOUTH ONCE DAILY IN THE EVENING 90 Tablet 3    Cholecalciferol (VITAMIN D3) 125 MCG (5000 UT) Cap Take 5,000 Units by mouth every 48 hours.       No current facility-administered medications for this encounter.       Social History     Tobacco Use    Smoking status: Former     Current packs/day: 0.00     Average packs/day: 0.5 packs/day for 36.0 years (18.0 ttl pk-yrs)     Types: Cigarettes     Start date:      Quit date: 2006     Years since quittin.0    Smokeless tobacco: Former     Types: Chew     Quit date: 10/3/2023    Tobacco comments:     Quit smoking in  - just less than 1 ppd. Used chewing tobacco for about 8 years.    Vaping Use    Vaping Use: Never used   Substance Use Topics    Alcohol use: Not Currently     Comment: Quit in     Drug use: Not Currently     Types: Methamphetamines, Marijuana, Inhaled     Comment: Quit in , used methamphetamine     Past Medical History:   Diagnosis Date    Back pain 10/05/2023    groin    Bilateral recurrent inguinal hernia without obstruction or gangrene 10/06/2023    Bowel habit changes 10/05/2023    constipation    Bronchitis     as a teenager    Cataract 10/05/2023    no surgery    Dental disorder     dentures    Heart burn     Hepatitis A     treated    Hepatitis C 2016    treated    Hepatocellular carcinoma (HCC)     High cholesterol      "Hypertension     Metastasis to bone (HCC)     Nausea and vomiting 10/06/2023    Pneumonia     as a teenager    Postoperative abdominal pain 10/13/2023      Family History   Problem Relation Age of Onset    Cancer Mother         Multiple Myeloma    Cancer Father         Colon and Lung    Cancer Sister         Breast    Diabetes Brother     Cancer Maternal Grandmother         Colon    Colon Cancer Maternal Grandmother     No Known Problems Daughter     No Known Problems Daughter     No Known Problems Son     Cancer Other         Multiple myeloma         Objective:     Vitals:    01/22/24 1051   BP: 108/58   Pulse: (!) 120   Resp: 12   Temp: 36.6 °C (97.8 °F)   TempSrc: Temporal   SpO2: (!) 12%   Weight: 67.1 kg (148 lb)   Height: 1.727 m (5' 8\")     Body mass index is 22.5 kg/m².     Physical Exam:   Gen: Mild distress  Skin: Pink, warm, and dry  HEENT: conjunctiva non-injected, sclera non-icteric. EOMs intact.   Nasal mucosa without edema nor erythema.   Pinna normal.    Oral mucous membranes pink and moist with no lesions.  Neck: Supple, trachea midline. No adenopathy or masses in the neck or supraclavicular regions.  Lungs: Effort is normal.   CV: regular rate and rhythm  Abdomen: Flat  Ext: no edema, color normal, vascularity normal, temperature normal  Alert and oriented Eye contact is good, speech goal directed, affect calm    Assessment and Plan:   Cancer related pain  Patient with metastatic hepatocellular carcinoma.  Does have significant disease burden with bony metastases.  Was previously on morphine long and short acting but began transition to methadone last week.  Will be increasing tomorrow to 5 mg 3 times a day and continues to have a morphine immediate release 30 mg tablets available as needed every 4 hours.  He is doing well with this transition without minimal pain crises symptoms.  PDMP reviewed, no signs of diversion or abuse, risk and benefits of medication discussed, controlled subs agreement on " file.  Does not need any refills of his methadone or morphine at this visit.  Will follow-up in 1 week to reassess symptoms and likely continue titration of methadone.    Anxiety  Stable.  Controlled with Ativan.  Has a refill waiting for him at the pharmacy.  PDMP reviewed as noted above.    26 minutes in total spent on patient encounter including chart review and consultation with patient's oncological providers.    Followup: Return in about 1 week (around 1/29/2024).    Burak Leblanc M.D.

## 2024-01-22 NOTE — ASSESSMENT & PLAN NOTE
Stable.  Controlled with Ativan.  Has a refill waiting for him at the pharmacy.  PDMP reviewed as noted above.

## 2024-01-22 NOTE — ASSESSMENT & PLAN NOTE
Patient with metastatic hepatocellular carcinoma.  Does have significant disease burden with bony metastases.  Was previously on morphine long and short acting but began transition to methadone last week.  Will be increasing tomorrow to 5 mg 3 times a day and continues to have a morphine immediate release 30 mg tablets available as needed every 4 hours.  He is doing well with this transition without minimal pain crises symptoms.  PDMP reviewed, no signs of diversion or abuse, risk and benefits of medication discussed, controlled subs agreement on file.  Does not need any refills of his methadone or morphine at this visit.  Will follow-up in 1 week to reassess symptoms and likely continue titration of methadone.

## 2024-01-29 ENCOUNTER — PHARMACY VISIT (OUTPATIENT)
Dept: PHARMACY | Facility: MEDICAL CENTER | Age: 69
End: 2024-01-29
Payer: COMMERCIAL

## 2024-01-29 ENCOUNTER — HOSPITAL ENCOUNTER (OUTPATIENT)
Dept: HEMATOLOGY ONCOLOGY | Facility: MEDICAL CENTER | Age: 69
End: 2024-01-29
Attending: FAMILY MEDICINE
Payer: COMMERCIAL

## 2024-01-29 VITALS
HEART RATE: 113 BPM | SYSTOLIC BLOOD PRESSURE: 100 MMHG | RESPIRATION RATE: 15 BRPM | WEIGHT: 145.28 LBS | HEIGHT: 68 IN | TEMPERATURE: 98.1 F | DIASTOLIC BLOOD PRESSURE: 58 MMHG | BODY MASS INDEX: 22.02 KG/M2 | OXYGEN SATURATION: 97 %

## 2024-01-29 DIAGNOSIS — G89.3 CANCER RELATED PAIN: ICD-10-CM

## 2024-01-29 DIAGNOSIS — C22.0 HEPATOCELLULAR CARCINOMA (HCC): ICD-10-CM

## 2024-01-29 PROCEDURE — 99212 OFFICE O/P EST SF 10 MIN: CPT | Performed by: FAMILY MEDICINE

## 2024-01-29 PROCEDURE — 99213 OFFICE O/P EST LOW 20 MIN: CPT | Performed by: FAMILY MEDICINE

## 2024-01-29 PROCEDURE — RXMED WILLOW AMBULATORY MEDICATION CHARGE: Performed by: FAMILY MEDICINE

## 2024-01-29 RX ORDER — METHADONE HYDROCHLORIDE 5 MG/1
TABLET ORAL
Qty: 32 TABLET | Refills: 0 | Status: SHIPPED | OUTPATIENT
Start: 2024-01-29 | End: 2024-02-05 | Stop reason: SDUPTHER

## 2024-01-29 ASSESSMENT — FIBROSIS 4 INDEX: FIB4 SCORE: 2.3

## 2024-01-29 ASSESSMENT — PAIN SCALES - GENERAL: PAINLEVEL: 6=MODERATE PAIN

## 2024-01-30 ENCOUNTER — TELEPHONE (OUTPATIENT)
Dept: HEMATOLOGY ONCOLOGY | Facility: MEDICAL CENTER | Age: 69
End: 2024-01-30
Payer: COMMERCIAL

## 2024-01-30 ASSESSMENT — ENCOUNTER SYMPTOMS
NAUSEA: 0
VOMITING: 0
FEVER: 0
SHORTNESS OF BREATH: 0
DIARRHEA: 0
BACK PAIN: 1
CHILLS: 0
CONSTIPATION: 0
PALPITATIONS: 0
COUGH: 0

## 2024-01-30 NOTE — PROGRESS NOTES
Subjective:     Chief Complaint   Patient presents with    Cancer     liver cancer/ 2 wk [facility]/ symptom mgmt     German Pagan is a 68 y.o. male here today for follow-up on symptom management for oncological pain.  Patient is accompanied by his wife.  They report that with the latest change of methadone he has had a positive response to his pain symptoms.  He does continue with pain in his left hip and left arm.  Continues to use 2-3 breakthrough doses of morphine daily.  But overall feels we are headed in the right direction.  We then discussed our current plan remains in place and we will increase his methadone again for this coming week as well as the following week when I let him know we likely will reach the level of pain relief he needs.  He does not endorse any constipation or diarrhea.  We concluded the visit with a plan to increase his methadone and follow-up in 2 weeks for reevaluation.    Problem   Cancer Related Pain        Current medicines (including changes today)  Current Outpatient Medications   Medication Sig Dispense Refill    methadone (DOLOPHINE) 5 MG Tab Take 1 and 1/2 tabs every 8hrs for 7 days and then two tablets every 8hrs for 7 days 32 Tablet 0    LORazepam (ATIVAN) 0.5 MG Tab Take 1 Tablet by mouth every 8 hours as needed for Anxiety for up to 14 days. 42 Tablet 0    cloNIDine (CATAPRES) 0.1 MG Tab Take 0.1 mg by mouth as needed. PRN FOR SYSTOLIC BP > 160      diphenhydrAMINE (BENADRYL ALLERGY) 25 MG capsule Take 25 mg by mouth as needed. FOR SKIN ITCHING      sennosides (SENOKOT) 8.6 MG Tab Take 8.6 mg by mouth 1 time a day as needed.      ondansetron (ZOFRAN) 4 MG Tab tablet Take 1 Tablet by mouth every four hours as needed for Nausea/Vomiting. 20 Tablet 3    prochlorperazine (COMPAZINE) 10 MG Tab Take 1 Tablet by mouth every 6 hours as needed for Nausea/Vomiting. 30 Tablet 3    tamsulosin (FLOMAX) 0.4 MG capsule Take 0.4 mg by mouth every evening.      Naloxone (NARCAN) 4 MG/0.1ML  Liquid One spray in one nostril for overdose and call 911. 1 Each 0    pravastatin (PRAVACHOL) 10 MG Tab TAKE 1 TABLET BY MOUTH ONCE DAILY IN THE EVENING 90 Tablet 3    Cholecalciferol (VITAMIN D3) 125 MCG (5000 UT) Cap Take 5,000 Units by mouth every 48 hours.       No current facility-administered medications for this encounter.       Social History     Tobacco Use    Smoking status: Former     Current packs/day: 0.00     Average packs/day: 0.5 packs/day for 36.0 years (18.0 ttl pk-yrs)     Types: Cigarettes     Start date:      Quit date:      Years since quittin.0    Smokeless tobacco: Former     Types: Chew     Quit date: 10/3/2023    Tobacco comments:     Quit smoking in  - just less than 1 ppd. Used chewing tobacco for about 8 years.    Vaping Use    Vaping Use: Never used   Substance Use Topics    Alcohol use: Not Currently     Comment: Quit in     Drug use: Not Currently     Types: Methamphetamines, Marijuana, Inhaled     Comment: Quit in , used methamphetamine     Past Medical History:   Diagnosis Date    Back pain 10/05/2023    groin    Bilateral recurrent inguinal hernia without obstruction or gangrene 10/06/2023    Bowel habit changes 10/05/2023    constipation    Bronchitis     as a teenager    Cataract 10/05/2023    no surgery    Dental disorder     dentures    Heart burn     Hepatitis A     treated    Hepatitis C 2016    treated    Hepatocellular carcinoma (HCC)     High cholesterol     Hypertension     Metastasis to bone (HCC)     Nausea and vomiting 10/06/2023    Pneumonia     as a teenager    Postoperative abdominal pain 10/13/2023      Family History   Problem Relation Age of Onset    Cancer Mother         Multiple Myeloma    Cancer Father         Colon and Lung    Cancer Sister         Breast    Diabetes Brother     Cancer Maternal Grandmother         Colon    Colon Cancer Maternal Grandmother     No Known Problems Daughter     No Known Problems Daughter     No Known  "Problems Son     Cancer Other         Multiple myeloma         Objective:     Vitals:    01/29/24 1125   BP: 100/58   BP Location: Right arm   Patient Position: Sitting   Pulse: (!) 113   Resp: 15   Temp: 36.7 °C (98.1 °F)   TempSrc: Temporal   SpO2: 97%   Weight: 65.9 kg (145 lb 4.5 oz)   Height: 1.727 m (5' 8\")     Body mass index is 22.09 kg/m².     Review of Systems   Constitutional:  Positive for malaise/fatigue. Negative for chills and fever.   Respiratory:  Negative for cough and shortness of breath.    Cardiovascular:  Negative for chest pain and palpitations.   Gastrointestinal:  Negative for constipation, diarrhea, nausea and vomiting.   Musculoskeletal:  Positive for back pain and joint pain.     Physical Exam:   Gen: Well developed, well nourished in no acute distress.   Skin: Pink, warm, and dry  HEENT: conjunctiva non-injected, sclera non-icteric. EOMs intact.   Nasal mucosa without edema nor erythema.   Pinna normal.    Oral mucous membranes pink and moist with no lesions.  Neck: Supple, trachea midline. No adenopathy or masses in the neck or supraclavicular regions.  Lungs: Effort is normal.   CV: regular rate and rhythm,   Abdomen: Flat  Ext: no edema, color normal, vascularity normal, temperature normal  Alert and oriented Eye contact is good, speech goal directed, affect calm    Assessment and Plan:   Cancer related pain  Patient with significant metastatic disease burden of hepatocellular carcinoma.  Have been in the process of transitioning to methadone for pain relief.  Has had a good response to methadone and will be increasing methadone to 7.5 mg 3 times a day followed by 10 mg 3 times a day the following week.  Repeat EKG in the office and no evidence of prolonged QTc.  Is using morphine 15 to 30 mg for breakthrough.  Use of breakthrough is downtrending.  2-week prescription sent to pharmacy.  PDMP reviewed, no signs of diversion or abuse, risk and benefits of medication discussed, " controlled substance agreement on file.  Will follow-up in 2 weeks for reevaluation.    26 minutes in total spent on patient encounter including chart review and consultation with patient's oncological providers.    Followup: Return in about 2 weeks (around 2/12/2024).    Burak Leblanc M.D.

## 2024-01-30 NOTE — TELEPHONE ENCOUNTER
Phone Number Called: [phone]    Call outcome: Spoke to patient regarding message below.    Message:I called to speak with German about his prescription; he verified the quantity for me. I let him know we would be in touch with more information.

## 2024-01-30 NOTE — ASSESSMENT & PLAN NOTE
Patient with significant metastatic disease burden of hepatocellular carcinoma.  Have been in the process of transitioning to methadone for pain relief.  Has had a good response to methadone and will be increasing methadone to 7.5 mg 3 times a day followed by 10 mg 3 times a day the following week.  Repeat EKG in the office and no evidence of prolonged QTc.  Is using morphine 15 to 30 mg for breakthrough.  Use of breakthrough is downtrending.  2-week prescription sent to pharmacy.  PDMP reviewed, no signs of diversion or abuse, risk and benefits of medication discussed, controlled substance agreement on file.  Will follow-up in 2 weeks for reevaluation.

## 2024-02-02 PROBLEM — L29.9 PRURITUS: Status: ACTIVE | Noted: 2024-02-02

## 2024-02-02 PROBLEM — C22.0 HEPATOCELLULAR CARCINOMA (HCC): Chronic | Status: ACTIVE | Noted: 2023-11-16

## 2024-02-02 PROBLEM — K59.03 DRUG-INDUCED CONSTIPATION: Chronic | Status: RESOLVED | Noted: 2023-10-13 | Resolved: 2024-02-02

## 2024-02-02 PROBLEM — M79.604 PAIN IN RIGHT LEG: Chronic | Status: RESOLVED | Noted: 2023-09-15 | Resolved: 2024-02-02

## 2024-02-02 PROBLEM — R79.89 ELEVATED LFTS: Status: ACTIVE | Noted: 2024-02-02

## 2024-02-02 PROBLEM — L29.9 PRURITUS: Chronic | Status: ACTIVE | Noted: 2024-02-02

## 2024-02-04 RX ORDER — ONDANSETRON 2 MG/ML
4 INJECTION INTRAMUSCULAR; INTRAVENOUS PRN
Status: CANCELLED | OUTPATIENT
Start: 2024-02-06

## 2024-02-04 RX ORDER — EPINEPHRINE 1 MG/ML(1)
0.5 AMPUL (ML) INJECTION PRN
Status: CANCELLED | OUTPATIENT
Start: 2024-02-06

## 2024-02-04 RX ORDER — 0.9 % SODIUM CHLORIDE 0.9 %
10 VIAL (ML) INJECTION PRN
Status: CANCELLED | OUTPATIENT
Start: 2024-02-06

## 2024-02-04 RX ORDER — ONDANSETRON 8 MG/1
8 TABLET, ORALLY DISINTEGRATING ORAL PRN
Status: CANCELLED | OUTPATIENT
Start: 2024-02-06

## 2024-02-04 RX ORDER — 0.9 % SODIUM CHLORIDE 0.9 %
3 VIAL (ML) INJECTION PRN
Status: CANCELLED | OUTPATIENT
Start: 2024-02-05

## 2024-02-04 RX ORDER — 0.9 % SODIUM CHLORIDE 0.9 %
10 VIAL (ML) INJECTION PRN
Status: CANCELLED | OUTPATIENT
Start: 2024-02-05

## 2024-02-04 RX ORDER — PROCHLORPERAZINE MALEATE 10 MG
10 TABLET ORAL EVERY 6 HOURS PRN
Status: CANCELLED | OUTPATIENT
Start: 2024-02-06

## 2024-02-04 RX ORDER — 0.9 % SODIUM CHLORIDE 0.9 %
VIAL (ML) INJECTION PRN
Status: CANCELLED | OUTPATIENT
Start: 2024-02-05

## 2024-02-04 RX ORDER — METHYLPREDNISOLONE SODIUM SUCCINATE 125 MG/2ML
125 INJECTION, POWDER, LYOPHILIZED, FOR SOLUTION INTRAMUSCULAR; INTRAVENOUS PRN
Status: CANCELLED | OUTPATIENT
Start: 2024-02-06

## 2024-02-04 RX ORDER — DIPHENHYDRAMINE HYDROCHLORIDE 50 MG/ML
50 INJECTION INTRAMUSCULAR; INTRAVENOUS PRN
Status: CANCELLED | OUTPATIENT
Start: 2024-02-06

## 2024-02-04 RX ORDER — SODIUM CHLORIDE 9 MG/ML
INJECTION, SOLUTION INTRAVENOUS CONTINUOUS
Status: CANCELLED | OUTPATIENT
Start: 2024-02-06

## 2024-02-04 RX ORDER — 0.9 % SODIUM CHLORIDE 0.9 %
3 VIAL (ML) INJECTION PRN
Status: CANCELLED | OUTPATIENT
Start: 2024-02-06

## 2024-02-04 RX ORDER — 0.9 % SODIUM CHLORIDE 0.9 %
VIAL (ML) INJECTION PRN
Status: CANCELLED | OUTPATIENT
Start: 2024-02-06

## 2024-02-05 ENCOUNTER — HOSPITAL ENCOUNTER (OUTPATIENT)
Dept: HEMATOLOGY ONCOLOGY | Facility: MEDICAL CENTER | Age: 69
End: 2024-02-05
Attending: STUDENT IN AN ORGANIZED HEALTH CARE EDUCATION/TRAINING PROGRAM
Payer: MEDICARE

## 2024-02-05 ENCOUNTER — HOSPITAL ENCOUNTER (OUTPATIENT)
Dept: LAB | Facility: MEDICAL CENTER | Age: 69
End: 2024-02-05
Attending: STUDENT IN AN ORGANIZED HEALTH CARE EDUCATION/TRAINING PROGRAM
Payer: COMMERCIAL

## 2024-02-05 VITALS
TEMPERATURE: 98.2 F | HEIGHT: 68 IN | OXYGEN SATURATION: 92 % | DIASTOLIC BLOOD PRESSURE: 62 MMHG | SYSTOLIC BLOOD PRESSURE: 102 MMHG | HEART RATE: 92 BPM | RESPIRATION RATE: 12 BRPM | BODY MASS INDEX: 22.13 KG/M2 | WEIGHT: 146 LBS

## 2024-02-05 DIAGNOSIS — G89.3 CANCER RELATED PAIN: ICD-10-CM

## 2024-02-05 DIAGNOSIS — Z85.828 HX OF SQUAMOUS CELL CARCINOMA OF SKIN: ICD-10-CM

## 2024-02-05 DIAGNOSIS — C79.51 METASTASIS TO BONE (HCC): Chronic | ICD-10-CM

## 2024-02-05 DIAGNOSIS — C22.0 HEPATOCELLULAR CARCINOMA (HCC): Chronic | ICD-10-CM

## 2024-02-05 DIAGNOSIS — C22.0 HEPATOCELLULAR CARCINOMA (HCC): ICD-10-CM

## 2024-02-05 DIAGNOSIS — Z79.899 HIGH RISK MEDICATION USE: ICD-10-CM

## 2024-02-05 LAB
ALBUMIN SERPL BCP-MCNC: 3.4 G/DL (ref 3.2–4.9)
ALBUMIN/GLOB SERPL: 1 G/DL
ALP SERPL-CCNC: 169 U/L (ref 30–99)
ALT SERPL-CCNC: 66 U/L (ref 2–50)
ANION GAP SERPL CALC-SCNC: 12 MMOL/L (ref 7–16)
AST SERPL-CCNC: 52 U/L (ref 12–45)
BASOPHILS # BLD AUTO: 1.5 % (ref 0–1.8)
BASOPHILS # BLD: 0.14 K/UL (ref 0–0.12)
BILIRUB SERPL-MCNC: 0.3 MG/DL (ref 0.1–1.5)
BUN SERPL-MCNC: 9 MG/DL (ref 8–22)
CALCIUM ALBUM COR SERPL-MCNC: 9.1 MG/DL (ref 8.5–10.5)
CALCIUM SERPL-MCNC: 8.6 MG/DL (ref 8.5–10.5)
CHLORIDE SERPL-SCNC: 103 MMOL/L (ref 96–112)
CO2 SERPL-SCNC: 20 MMOL/L (ref 20–33)
CREAT SERPL-MCNC: 0.78 MG/DL (ref 0.5–1.4)
EOSINOPHIL # BLD AUTO: 1.48 K/UL (ref 0–0.51)
EOSINOPHIL NFR BLD: 15.9 % (ref 0–6.9)
ERYTHROCYTE [DISTWIDTH] IN BLOOD BY AUTOMATED COUNT: 51.1 FL (ref 35.9–50)
GFR SERPLBLD CREATININE-BSD FMLA CKD-EPI: 95 ML/MIN/1.73 M 2
GLOBULIN SER CALC-MCNC: 3.3 G/DL (ref 1.9–3.5)
GLUCOSE SERPL-MCNC: 95 MG/DL (ref 65–99)
HCT VFR BLD AUTO: 37.3 % (ref 42–52)
HGB BLD-MCNC: 11.8 G/DL (ref 14–18)
IMM GRANULOCYTES # BLD AUTO: 0.11 K/UL (ref 0–0.11)
IMM GRANULOCYTES NFR BLD AUTO: 1.2 % (ref 0–0.9)
INR PPP: 1.24 (ref 0.87–1.13)
LYMPHOCYTES # BLD AUTO: 1.25 K/UL (ref 1–4.8)
LYMPHOCYTES NFR BLD: 13.4 % (ref 22–41)
MCH RBC QN AUTO: 27.1 PG (ref 27–33)
MCHC RBC AUTO-ENTMCNC: 31.6 G/DL (ref 32.3–36.5)
MCV RBC AUTO: 85.6 FL (ref 81.4–97.8)
MONOCYTES # BLD AUTO: 0.89 K/UL (ref 0–0.85)
MONOCYTES NFR BLD AUTO: 9.6 % (ref 0–13.4)
NEUTROPHILS # BLD AUTO: 5.43 K/UL (ref 1.82–7.42)
NEUTROPHILS NFR BLD: 58.4 % (ref 44–72)
NRBC # BLD AUTO: 0 K/UL
NRBC BLD-RTO: 0 /100 WBC (ref 0–0.2)
PLATELET # BLD AUTO: 331 K/UL (ref 164–446)
PMV BLD AUTO: 10.8 FL (ref 9–12.9)
POTASSIUM SERPL-SCNC: 3.7 MMOL/L (ref 3.6–5.5)
PROT SERPL-MCNC: 6.7 G/DL (ref 6–8.2)
PROTHROMBIN TIME: 15.8 SEC (ref 12–14.6)
RBC # BLD AUTO: 4.36 M/UL (ref 4.7–6.1)
SODIUM SERPL-SCNC: 135 MMOL/L (ref 135–145)
T4 FREE SERPL-MCNC: 1.35 NG/DL (ref 0.93–1.7)
TSH SERPL DL<=0.005 MIU/L-ACNC: 1.79 UIU/ML (ref 0.38–5.33)
WBC # BLD AUTO: 9.3 K/UL (ref 4.8–10.8)

## 2024-02-05 PROCEDURE — 85025 COMPLETE CBC W/AUTO DIFF WBC: CPT

## 2024-02-05 PROCEDURE — 84439 ASSAY OF FREE THYROXINE: CPT

## 2024-02-05 PROCEDURE — 36415 COLL VENOUS BLD VENIPUNCTURE: CPT

## 2024-02-05 PROCEDURE — RXMED WILLOW AMBULATORY MEDICATION CHARGE: Performed by: FAMILY MEDICINE

## 2024-02-05 PROCEDURE — 85610 PROTHROMBIN TIME: CPT

## 2024-02-05 PROCEDURE — 82105 ALPHA-FETOPROTEIN SERUM: CPT

## 2024-02-05 PROCEDURE — 99214 OFFICE O/P EST MOD 30 MIN: CPT | Performed by: STUDENT IN AN ORGANIZED HEALTH CARE EDUCATION/TRAINING PROGRAM

## 2024-02-05 PROCEDURE — 99212 OFFICE O/P EST SF 10 MIN: CPT | Performed by: STUDENT IN AN ORGANIZED HEALTH CARE EDUCATION/TRAINING PROGRAM

## 2024-02-05 PROCEDURE — 84443 ASSAY THYROID STIM HORMONE: CPT

## 2024-02-05 PROCEDURE — 80053 COMPREHEN METABOLIC PANEL: CPT

## 2024-02-05 RX ORDER — METHADONE HYDROCHLORIDE 10 MG/1
10 TABLET ORAL EVERY 8 HOURS
Qty: 21 TABLET | Refills: 0 | Status: SHIPPED | OUTPATIENT
Start: 2024-02-05 | End: 2024-02-19

## 2024-02-05 ASSESSMENT — ENCOUNTER SYMPTOMS
DIARRHEA: 0
BRUISES/BLEEDS EASILY: 0
SHORTNESS OF BREATH: 0
HEARTBURN: 0
CONSTIPATION: 0
INSOMNIA: 0
DIAPHORESIS: 0
FEVER: 0
DIZZINESS: 0
SPUTUM PRODUCTION: 0
BLOOD IN STOOL: 0
WEIGHT LOSS: 0
ORTHOPNEA: 0
ABDOMINAL PAIN: 0
BACK PAIN: 0
WHEEZING: 0
WEAKNESS: 1
NERVOUS/ANXIOUS: 0
SINUS PAIN: 0
SORE THROAT: 0
BLURRED VISION: 0
CHILLS: 0
VOMITING: 0
NAUSEA: 0
PALPITATIONS: 0
TINGLING: 0
COUGH: 0
DOUBLE VISION: 0
MYALGIAS: 1
HEADACHES: 0

## 2024-02-05 ASSESSMENT — FIBROSIS 4 INDEX: FIB4 SCORE: 2.3

## 2024-02-05 NOTE — PROGRESS NOTES
"Pharmacy Chemotherapy Calculation:    Dx: Hepatocellular carcinoma cTxNxM1 stage IV         Protocol: Durvalumab with Tremelimumab   *Dosing Reference*     Tremelimumab-actl 300 mg (weight ? 30 kg) IV over 60 minutes on Day 1 followed by   Durvalumab 1,500 mg (weight ? 30 kg) IV over 60 minutes on Day 1   28-day cycle for 1 cycle   ~Followed by~  Durvalumab 1,500 mg (weight ? 30 kg) IV over 60 minutes on Day 1  28-day cycle until disease progression or unacceptable toxicity     NCCN Guidelines® for Hepatocellular Carcinoma V.1.2023.   Charu et al. NEJM Evid. 2022;1(8):8.a    Allergies:  Pcn [penicillins]       BP 99/56   Pulse (!) 101   Temp 36.3 °C (97.4 °F) (Temporal)   Resp 18   Ht 1.74 m (5' 8.5\")   Wt 66 kg (145 lb 8.1 oz)   SpO2 92%   BMI 21.80 kg/m²  Body surface area is 1.79 meters squared.    Labs 2/5/24:  ANC~ 5430 Plt = 331k   Hgb = 11.8     SCr = 0.78 mg/dL CrCl ~ 81.2 mL/min   AST/ALT/AP = 52/66/169 TBili = 0.3   TSH = 1.790   Free T4 = 1.35        Xgeva q28d, most recent dose given on 2/6/24  Drug Order   (Drug name, dose, route, IV Fluid & volume, frequency, number of doses) Cycle 2  Previous treatment: C1 1/9/24     Medication = Durvalumab  Base Dose = 1500 mg  Fixed dose; no calc required  Final Dose = 1500 mg  Route = IV  Fluid & Volume =  mL  Admin Duration = Over 60 minutes          Fixed dose, OK to treat with final dose     By my signature below, I confirm this process was performed independently with the BSA and all final chemotherapy dosing calculations congruent. I have reviewed the above chemotherapy order and that my calculation of the final dose and BSA (when applicable) corroborate those calculations of the  pharmacist. Discrepancies of 10% or greater in the written dose have been addressed and documented within the [facility] Progress notes.    Mariola Meng, PharmD  "

## 2024-02-05 NOTE — PROGRESS NOTES
"Pharmacy Chemotherapy Calculations    Dx: Hepatocellular carcinoma  Cycle 2  Previous treatment = C1 1/9/24    Protocol: Tremelimumab-actl + Durvalumab  Tremelimumab-actl 300 mg IV over 60 minutes followed by  Durvalumab 1500 mg IV over 60 minutes   28-day cycle for 1 cycle  ~Followed by~  Durvalumab 1500 mg IV over 60 minutes   28-day cycle until DP/UT  NCCN Guidelines for Hepatocellular Carcinoma V.1.2023.  Charu et al. NEJM Evid. 2022;1(8):8.    Allergies:  Pcn [penicillins]       BP 99/56   Pulse (!) 101   Temp 36.3 °C (97.4 °F) (Temporal)   Resp 18   Ht 1.74 m (5' 8.5\")   Wt 66 kg (145 lb 8.1 oz)   SpO2 92%   BMI 21.80 kg/m²  Body surface area is 1.79 meters squared.    Labs 2/5/24:  ANC~ 5430 Plt = 331k   Hgb = 11.8     SCr = 0.78 mg/dL CrCl ~ 81 mL/min   AST/ALT/AP = 52/66/169 TBili = 0.3  TSH = 1.79 Free T4 = 1.35    Also receiving Xgeva every 28 days, last dose 2/6/24    Durvalumab 1500 mg fixed dose   No calculation required, OK to treat with final dose = 1500 mg    Noah Sanabria, PharmD  "

## 2024-02-05 NOTE — PROGRESS NOTES
Follow Up Note:  Hematology/Oncology      Primary Care:  VALORIE Lawrence    Diagnosis: Hepatocellular carcinoma    Chief Complaint: On-treatment visit    Current Treatment: Durvalumab/tremelimumab    Prior Treatment: Palliative CRT    Oncology History of Presenting Illness:  German Pagan is a 68 y.o.  man who presents to the clinic for evaluation for systemic therapy for a new diagnosis of hepatocellular carcinoma. The patient had presented to the hospital with low back pain which had been worsening over several months, and was found on imaging to have lytic lesions throughout his skeleton. He was managed with pain control and discharged for outpatient evaluation for potential multiple myeloma. He was sent to our intake oncology coordinator, who worked him up for multiple myeloma. His bone marrow biopsy only showed 3-5% plasma cells though, without any specific signature on blood work significant for myeloma. A whole body PET scan revealed the lytic lesions and a few hypermetabolic nodes, but no mass in the liver. The patient then underwent a biopsy of one of the bone lesions, and that revealed hepatocellular carcinoma. He then had an AFP drawn which was elevated at 80. He was subsequently referred to me for evaluation. Of note, he has a history of hepatitis C, treated in 2016, in the setting of prior IV drug use (he has not used in many years).      Treatment History:   12/04/23: Palliative XRT 4000 cGy in 5 fractions  01/09/24: C1 durva/treme  02/06/24: C2 durva scheduled    Interval History:  Patient is here for follow up visit. He has some itching for which he was recently started on Atarax, but no rash. He otherwise is doing okay. His pain is being managed effectively by our palliative care team. He otherwise has no new complaints. His wife is with him today.     Allergies as of 02/05/2024 - Reviewed 02/05/2024   Allergen Reaction Noted    Pcn [penicillins]  05/19/2017         Current  Outpatient Medications:     hydrOXYzine HCl (ATARAX) 50 MG Tab, Take 1 Tablet by mouth 3 times a day as needed for Itching or Anxiety., Disp: 90 Tablet, Rfl: 1    cloNIDine (CATAPRES) 0.1 MG Tab, Take 0.1 mg by mouth as needed. PRN FOR SYSTOLIC BP > 160, Disp: , Rfl:     diphenhydrAMINE (BENADRYL ALLERGY) 25 MG capsule, Take 25 mg by mouth as needed. FOR SKIN ITCHING, Disp: , Rfl:     sennosides (SENOKOT) 8.6 MG Tab, Take 8.6 mg by mouth 1 time a day as needed., Disp: , Rfl:     ondansetron (ZOFRAN) 4 MG Tab tablet, Take 1 Tablet by mouth every four hours as needed for Nausea/Vomiting., Disp: 20 Tablet, Rfl: 3    prochlorperazine (COMPAZINE) 10 MG Tab, Take 1 Tablet by mouth every 6 hours as needed for Nausea/Vomiting., Disp: 30 Tablet, Rfl: 3    tamsulosin (FLOMAX) 0.4 MG capsule, Take 0.4 mg by mouth every evening., Disp: , Rfl:     Naloxone (NARCAN) 4 MG/0.1ML Liquid, One spray in one nostril for overdose and call 911., Disp: 1 Each, Rfl: 0    Cholecalciferol (VITAMIN D3) 125 MCG (5000 UT) Cap, Take 5,000 Units by mouth every 48 hours., Disp: , Rfl:     methadone (DOLOPHINE) 10 MG Tab, Take 1 Tablet by mouth every 8 hours for 7 days., Disp: 21 Tablet, Rfl: 0      Review of Systems:  Review of Systems   Constitutional:  Negative for chills, diaphoresis, fever, malaise/fatigue and weight loss.   HENT:  Negative for hearing loss, nosebleeds, sinus pain and sore throat.    Eyes:  Negative for blurred vision and double vision.   Respiratory:  Negative for cough, sputum production, shortness of breath and wheezing.    Cardiovascular:  Negative for chest pain, palpitations, orthopnea and leg swelling.   Gastrointestinal:  Negative for abdominal pain, blood in stool, constipation, diarrhea, heartburn, melena, nausea and vomiting.   Genitourinary:  Negative for dysuria, frequency, hematuria and urgency.   Musculoskeletal:  Positive for joint pain (Down left hip) and myalgias. Negative for back pain.   Skin:  Positive for  "itching. Negative for rash.   Neurological:  Positive for weakness (Ambulating with walker). Negative for dizziness, tingling and headaches.   Endo/Heme/Allergies:  Does not bruise/bleed easily.   Psychiatric/Behavioral:  The patient is not nervous/anxious and does not have insomnia.          Physical Exam:  Vitals:    02/05/24 0944   BP: 102/62   Pulse: 92   Resp: 12   Temp: 36.8 °C (98.2 °F)   TempSrc: Temporal   SpO2: 92%   Weight: 66.2 kg (146 lb)   Height: 1.727 m (5' 8\")       DESC; KARNOFSKY SCALE WITH ECOG EQUIVALENT: 80, Normal activity with effort; some signs or symptoms of disease (ECOG equivalent 1)    DISTRESS LEVEL: no apparent distress    Physical Exam  Vitals and nursing note reviewed.   Constitutional:       General: He is awake. He is not in acute distress.     Appearance: Normal appearance. He is normal weight. He is not ill-appearing, toxic-appearing or diaphoretic.   HENT:      Head: Normocephalic and atraumatic.      Nose: Nose normal. No congestion.      Mouth/Throat:      Pharynx: Oropharynx is clear. No oropharyngeal exudate or posterior oropharyngeal erythema.   Eyes:      General: No scleral icterus.     Extraocular Movements: Extraocular movements intact.      Conjunctiva/sclera: Conjunctivae normal.      Pupils: Pupils are equal, round, and reactive to light.   Cardiovascular:      Rate and Rhythm: Normal rate and regular rhythm.      Pulses: Normal pulses.      Heart sounds: Normal heart sounds. No murmur heard.     No friction rub. No gallop.   Pulmonary:      Effort: Pulmonary effort is normal.      Breath sounds: Normal breath sounds. No decreased air movement. No wheezing, rhonchi or rales.   Abdominal:      General: Bowel sounds are normal. There is no distension.      Tenderness: There is no abdominal tenderness.   Musculoskeletal:         General: No deformity. Normal range of motion.      Cervical back: Normal range of motion and neck supple. No tenderness.      Right lower leg: " No edema.      Left lower leg: No edema.   Lymphadenopathy:      Cervical: No cervical adenopathy.      Upper Body:      Right upper body: No axillary adenopathy.      Left upper body: No axillary adenopathy.      Lower Body: No right inguinal adenopathy. No left inguinal adenopathy.   Skin:     General: Skin is warm and dry.      Coloration: Skin is not jaundiced.      Findings: No erythema or rash.   Neurological:      General: No focal deficit present.      Mental Status: He is alert and oriented to person, place, and time.      Sensory: Sensation is intact.      Motor: Weakness present.      Gait: Gait abnormal (Ambulating with walker).   Psychiatric:         Attention and Perception: Attention normal.         Mood and Affect: Mood normal.         Behavior: Behavior normal. Behavior is cooperative.         Thought Content: Thought content normal.         Judgment: Judgment normal.           Labs:  No visits with results within 7 Day(s) from this visit.   Latest known visit with results is:   Hospital Outpatient Visit on 01/05/2024   Component Date Value Ref Range Status    WBC 01/05/2024 9.0  4.8 - 10.8 K/uL Final    RBC 01/05/2024 5.22  4.70 - 6.10 M/uL Final    Hemoglobin 01/05/2024 14.5  14.0 - 18.0 g/dL Final    Hematocrit 01/05/2024 44.1  42.0 - 52.0 % Final    MCV 01/05/2024 84.5  81.4 - 97.8 fL Final    MCH 01/05/2024 27.8  27.0 - 33.0 pg Final    MCHC 01/05/2024 32.9  32.3 - 36.5 g/dL Final    Please note new reference range effective 05/22/2023.    RDW 01/05/2024 43.8  35.9 - 50.0 fL Final    Platelet Count 01/05/2024 290  164 - 446 K/uL Final    MPV 01/05/2024 10.2  9.0 - 12.9 fL Final    Neutrophils-Polys 01/05/2024 72.60 (H)  44.00 - 72.00 % Final    Lymphocytes 01/05/2024 14.70 (L)  22.00 - 41.00 % Final    Monocytes 01/05/2024 6.10  0.00 - 13.40 % Final    Eosinophils 01/05/2024 5.20  0.00 - 6.90 % Final    Basophils 01/05/2024 0.70  0.00 - 1.80 % Final    Immature Granulocytes 01/05/2024 0.70   0.00 - 0.90 % Final    Nucleated RBC 01/05/2024 0.00  0.00 - 0.20 /100 WBC Final    Please note new reference range effective 05/22/2023.    Neutrophils (Absolute) 01/05/2024 6.53  1.82 - 7.42 K/uL Final    Comment: Includes immature neutrophils, if present.  Please note new reference range effective 05/22/2023.      Lymphs (Absolute) 01/05/2024 1.32  1.00 - 4.80 K/uL Final    Monos (Absolute) 01/05/2024 0.55  0.00 - 0.85 K/uL Final    Eos (Absolute) 01/05/2024 0.47  0.00 - 0.51 K/uL Final    Baso (Absolute) 01/05/2024 0.06  0.00 - 0.12 K/uL Final    Immature Granulocytes (abs) 01/05/2024 0.06  0.00 - 0.11 K/uL Final    NRBC (Absolute) 01/05/2024 0.00  K/uL Final    Sodium 01/05/2024 137  135 - 145 mmol/L Final    Potassium 01/05/2024 4.4  3.6 - 5.5 mmol/L Final    Chloride 01/05/2024 97  96 - 112 mmol/L Final    Co2 01/05/2024 23  20 - 33 mmol/L Final    Anion Gap 01/05/2024 17.0 (H)  7.0 - 16.0 Final    Glucose 01/05/2024 93  65 - 99 mg/dL Final    Bun 01/05/2024 15  8 - 22 mg/dL Final    Creatinine 01/05/2024 1.07  0.50 - 1.40 mg/dL Final    Calcium 01/05/2024 10.2  8.5 - 10.5 mg/dL Final    Correct Calcium 01/05/2024 10.0  8.5 - 10.5 mg/dL Final    AST(SGOT) 01/05/2024 88 (H)  12 - 45 U/L Final    ALT(SGPT) 01/05/2024 85 (H)  2 - 50 U/L Final    Alkaline Phosphatase 01/05/2024 182 (H)  30 - 99 U/L Final    Total Bilirubin 01/05/2024 0.4  0.1 - 1.5 mg/dL Final    Albumin 01/05/2024 4.3  3.2 - 4.9 g/dL Final    Total Protein 01/05/2024 7.8  6.0 - 8.2 g/dL Final    Globulin 01/05/2024 3.5  1.9 - 3.5 g/dL Final    A-G Ratio 01/05/2024 1.2  g/dL Final    TSH 01/05/2024 4.290  0.380 - 5.330 uIU/mL Final    Comment: The 2011 American Thyroid Association (CHELITA) guidelines  recommended that the interpretation of thyroid function in  pregnancy be based on trimester specific reference ranges.    1st Trimester  0.100-2.500 mIU/L  2nd Trimester  0.200-3.000 mIU/L  3rd Trimester  0.300-3.500 mIU/L    These established  reference ranges have not been validated  at [facility].      Free T-4 01/05/2024 1.24  0.93 - 1.70 ng/dL Final    Alpha Fetoprotein 01/05/2024 293 (H)  0 - 9 ng/mL Final    Comment: INTERPRETIVE INFORMATION: Alpha Fetoprotein Tumor Marker  The [employer] [place] Access DxI AFP method is used. Results  obtained with different assay methods or kits cannot be used  interchangeably. AFP is a valuable aid in the management of  nonseminomatous testicular cancer patients when used in  conjunction with information available from the clinical  evaluation and other diagnostic procedures. Increased AFP  concentrations have also been observed in ataxia telangiectasia,  hereditary tyrosinemia, primary hepatocellular carcinoma,  teratocarcinoma, gastrointestinal tract cancers with and without  liver metastases, and in benign hepatic conditions such as acute  viral hepatitis, chronic active hepatitis, and cirrhosis. The  result cannot be interpreted as absolute evidence of the presence  or absence of malignant disease. The result is not interpretable  as a tumor marker in pregnant females.  Access complete set of age- and/or gender-specific reference  intervals for this test in the                            [facility] Test Directory  ([email or web address]).  Performed By: [facility]  [address] [place] [id number]  : Haim Becker MD, PhD  CLIA Number: [id number]      GFR (CKD-EPI) 01/05/2024 74  >60 mL/min/1.73 m 2 Final    Comment: Estimated Glomerular Filtration Rate is calculated using  race neutral CKD-EPI 2021 equation per NKF-ASN recommendations.         Imaging:     All listed images below have been independently reviewed by me. I agree with the findings as summarized below:    No results found.    Pathology:  FINAL DIAGNOSIS:     A. Left iliac bone biopsy:          Metastatic carcinoma with hepatoid differentiation.          See comment.     Comment: Sections show  metastatic carcinoma with striking morphologic   and immunohistochemical features of hepatocellular carcinoma (HCC). The   tumor is positive for , CAM5.2 and heppar1, and is negative for   CK7, CK20, CK5/6, pankeratin, PSA, NKX3.1, CDX2, NapsinA, TTF1, p63,   synaptophysin, chromograninA, SOX10, MelanA, S100, Calretinin, inhibin,   HMB45, and PAX8 (some of these markers were performed on the recent   bone marrow biopsy). P53 stain shows overexpression and may suggest   tp53 mutation. Recent PET CT and CT with contrast show only lytic bony   lesions, bilateral adrenal masses, and possible hilar adenopathy which   is indeterminate for metastatic lymphadenopathy. The liver is   reportedly unremarkable. Based on the current biopsy alone, metastatic   hepatocellular carcinoma (HCC) would be most likely. However, if this   possibility is definitively excluded by the radiologic findings,   metastatic carcinoma with hepatoid differentiation is the appropriate   diagnosis. Carcinoma with hepatoid differentiation may arise from   numerous sites throughout the body, and clinical/radiologic correlation   would be needed to determine the site of origin. There is adequate   tumor for any additional testing required (block A1 or A2).                                           Diagnosis performed by:                                       CHRISTELLE MARTINEZ DO      Assessment & Plan:  1. Hx of squamous cell carcinoma of skin        2. Metastasis to bone (HCC)        3. Hepatocellular carcinoma (HCC)          This is a 68 year old  man with hepatocellular carcinoma, cTxNxM1 stage IV disease. He presents for evaluation for systemic therapy.      Current Diagnosis and Staging: Hepatocellular carcinoma, cTxNxM1 stage IV disease    Update: The patient is doing well with treatment at this time. 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      Treatment Plan: Durvalumab with tremelimumab     Treatment  Citation: HIMALAYA trial, [facility] Evidence 2023     Plan of Care:     Primary Therapy: Durva C2 tomorrow  Supportive Therapy: Denosumab for fracture prevention  Toxicity: Patient is getting antineoplastic therapy and needs monitoring of blood counts, hepatic function, and renal function due to potential for organ dysfunction.   Labs: CBC with diff, CMP, PT, AFP monitoring  Imaging: Repeat CT scans after C3 of therapy  Treatment Planning: Patient has hepatocellular carcinoma by pathology, though no clear primary identified. He will proceed with STRIDE regimen for palliative therapy, and will be referred for palliative XRT as well.   Consultations: Radiation oncology (Dr. Ruvalcaba); Palliative care (Dr. Leblanc)  Code Status: Full  Miscellaneous: NA  Return for Follow Up: 4 weeks    Any questions and concerns raised by the patient were answered to the best of my ability. Thank you for allowing me to participate in the care for this patient. Please feel free to contact me for any questions or concerns.       Total time spent on chart review, clinic encounter, and documentation: 24 minutes.

## 2024-02-06 ENCOUNTER — OUTPATIENT INFUSION SERVICES (OUTPATIENT)
Dept: ONCOLOGY | Facility: MEDICAL CENTER | Age: 69
End: 2024-02-06
Attending: STUDENT IN AN ORGANIZED HEALTH CARE EDUCATION/TRAINING PROGRAM
Payer: COMMERCIAL

## 2024-02-06 VITALS
HEART RATE: 101 BPM | TEMPERATURE: 97.4 F | OXYGEN SATURATION: 92 % | RESPIRATION RATE: 18 BRPM | DIASTOLIC BLOOD PRESSURE: 56 MMHG | BODY MASS INDEX: 21.55 KG/M2 | HEIGHT: 69 IN | SYSTOLIC BLOOD PRESSURE: 99 MMHG | WEIGHT: 145.5 LBS

## 2024-02-06 DIAGNOSIS — C22.0 HEPATOCELLULAR CARCINOMA (HCC): ICD-10-CM

## 2024-02-06 PROCEDURE — 700111 HCHG RX REV CODE 636 W/ 250 OVERRIDE (IP): Mod: JZ,JG | Performed by: STUDENT IN AN ORGANIZED HEALTH CARE EDUCATION/TRAINING PROGRAM

## 2024-02-06 PROCEDURE — 96372 THER/PROPH/DIAG INJ SC/IM: CPT

## 2024-02-06 PROCEDURE — 700105 HCHG RX REV CODE 258: Performed by: NURSE PRACTITIONER

## 2024-02-06 PROCEDURE — 82105 ALPHA-FETOPROTEIN SERUM: CPT

## 2024-02-06 PROCEDURE — 700111 HCHG RX REV CODE 636 W/ 250 OVERRIDE (IP): Mod: JZ,JG | Performed by: NURSE PRACTITIONER

## 2024-02-06 PROCEDURE — 96413 CHEMO IV INFUSION 1 HR: CPT

## 2024-02-06 RX ADMIN — DENOSUMAB 120 MG: 120 INJECTION SUBCUTANEOUS at 10:39

## 2024-02-06 RX ADMIN — SODIUM CHLORIDE 1500 MG: 9 INJECTION, SOLUTION INTRAVENOUS at 09:41

## 2024-02-06 ASSESSMENT — PAIN DESCRIPTION - PAIN TYPE: TYPE: CHRONIC PAIN

## 2024-02-06 ASSESSMENT — FIBROSIS 4 INDEX: FIB4 SCORE: 1.35

## 2024-02-06 NOTE — PROGRESS NOTES
Chemotherapy Verification - PRIMARY RN      Height = 1.74m  Weight = 66kg  BSA = 1.79m2       Medication: durvalumab (Imfinzi)  Dose: 1500mg set dose  Calculated Dose: 1500mg                                  I confirm this process was performed independently with the BSA and all final chemotherapy dosing calculations congruent.  Any discrepancies of 10% or greater have been addressed with the chemotherapy pharmacist. The resolution of the discrepancy has been documented in the EPIC progress notes.

## 2024-02-06 NOTE — PROGRESS NOTES
Chemotherapy Verification - SECONDARY RN       Height = 1.74 m  Weight = 66 kg  BSA = 1.79 m^2       Medication: durvalumab  Dose: 1500 mg (set dose)  Calculated Dose: 1500 mg (set dose)                             (In mg/m2, AUC, mg/kg)     I confirm that this process was performed independently.

## 2024-02-06 NOTE — PROGRESS NOTES
Patient came into INF with wife. Orders and vitals reviewed, assessment done. PIV established with blood return noted and flushed briskly. Labs from 2/5/24 reviewed, patient meets parameters to proceed with treatment today. Cycle 2 of imfinzi treatment given as ordered with no adverse events. PIV flushed and removed with tip intact. Patient reports taking calcium and vitamin D supplement every day. Patient denies any oral surgery in last month or any planned in next month. Injection given in back of right arm subq with no adverse events. Pt left in stable condition with next appointment confirmed.

## 2024-02-07 LAB
AFP-TM SERPL-MCNC: 424 NG/ML (ref 0–9)
AFP-TM SERPL-MCNC: 451 NG/ML (ref 0–9)

## 2024-02-12 ENCOUNTER — HOSPITAL ENCOUNTER (OUTPATIENT)
Dept: HEMATOLOGY ONCOLOGY | Facility: MEDICAL CENTER | Age: 69
End: 2024-02-12
Attending: FAMILY MEDICINE
Payer: COMMERCIAL

## 2024-02-12 ENCOUNTER — PHARMACY VISIT (OUTPATIENT)
Dept: PHARMACY | Facility: MEDICAL CENTER | Age: 69
End: 2024-02-12
Payer: COMMERCIAL

## 2024-02-12 VITALS
DIASTOLIC BLOOD PRESSURE: 62 MMHG | TEMPERATURE: 97.6 F | WEIGHT: 146 LBS | RESPIRATION RATE: 12 BRPM | HEART RATE: 78 BPM | OXYGEN SATURATION: 92 % | SYSTOLIC BLOOD PRESSURE: 120 MMHG | BODY MASS INDEX: 22.13 KG/M2 | HEIGHT: 68 IN

## 2024-02-12 DIAGNOSIS — G89.3 CANCER RELATED PAIN: ICD-10-CM

## 2024-02-12 DIAGNOSIS — C22.0 HEPATOCELLULAR CARCINOMA (HCC): ICD-10-CM

## 2024-02-12 PROCEDURE — 99213 OFFICE O/P EST LOW 20 MIN: CPT | Performed by: FAMILY MEDICINE

## 2024-02-12 PROCEDURE — 99212 OFFICE O/P EST SF 10 MIN: CPT | Performed by: FAMILY MEDICINE

## 2024-02-12 PROCEDURE — RXMED WILLOW AMBULATORY MEDICATION CHARGE: Performed by: FAMILY MEDICINE

## 2024-02-12 RX ORDER — METHADONE HYDROCHLORIDE 10 MG/1
10 TABLET ORAL EVERY 8 HOURS
Qty: 90 TABLET | Refills: 0 | Status: SHIPPED | OUTPATIENT
Start: 2024-02-12 | End: 2024-03-11 | Stop reason: SDUPTHER

## 2024-02-12 ASSESSMENT — FIBROSIS 4 INDEX: FIB4 SCORE: 1.35

## 2024-02-12 NOTE — PROGRESS NOTES
Subjective:     Chief Complaint   Patient presents with    Cancer     Pain Managment     German Pagan is a 68 y.o. male here today for follow-up on symptom management for oncological pain.  Patient reports that his pain is well-controlled his recently increased dose of methadone and his only requiring breakthrough morphine every couple days.  When asked if he thought we needed to make any changes to pain regimen he said no.  He does report continued fatigue which we discussed is likely combination of everything between medications, his treatments, and his cancer itself.  He does still experience some itching and has been using Benadryl.  I did let her know he could try Zyrtec as that may not be as sedating as the Benadryl.  We concluded the visit with a plan to follow-up in 4 weeks.    Problem   Cancer Related Pain        Current medicines (including changes today)  Current Outpatient Medications   Medication Sig Dispense Refill    methadone (DOLOPHINE) 10 MG Tab Take 1 Tablet by mouth every 8 hours for 30 days. 90 Tablet 0    hydrOXYzine HCl (ATARAX) 50 MG Tab Take 1 Tablet by mouth 3 times a day as needed for Itching or Anxiety. 90 Tablet 1    cloNIDine (CATAPRES) 0.1 MG Tab Take 0.1 mg by mouth as needed. PRN FOR SYSTOLIC BP > 160      diphenhydrAMINE (BENADRYL ALLERGY) 25 MG capsule Take 25 mg by mouth as needed. FOR SKIN ITCHING      sennosides (SENOKOT) 8.6 MG Tab Take 8.6 mg by mouth 1 time a day as needed.      ondansetron (ZOFRAN) 4 MG Tab tablet Take 1 Tablet by mouth every four hours as needed for Nausea/Vomiting. 20 Tablet 3    prochlorperazine (COMPAZINE) 10 MG Tab Take 1 Tablet by mouth every 6 hours as needed for Nausea/Vomiting. 30 Tablet 3    tamsulosin (FLOMAX) 0.4 MG capsule Take 0.4 mg by mouth every evening.      Naloxone (NARCAN) 4 MG/0.1ML Liquid One spray in one nostril for overdose and call 911. 1 Each 0    Cholecalciferol (VITAMIN D3) 125 MCG (5000 UT) Cap Take 5,000 Units by mouth  every 48 hours.      methadone (DOLOPHINE) 10 MG Tab Take 1 Tablet by mouth every 8 hours for 7 days. 21 Tablet 0     No current facility-administered medications for this encounter.       Social History     Tobacco Use    Smoking status: Former     Current packs/day: 0.00     Average packs/day: 0.5 packs/day for 36.0 years (18.0 ttl pk-yrs)     Types: Cigarettes     Start date:      Quit date:      Years since quittin.1    Smokeless tobacco: Former     Types: Chew     Quit date: 10/3/2023    Tobacco comments:     Quit smoking in  - just less than 1 ppd. Used chewing tobacco for about 8 years.    Vaping Use    Vaping Use: Never used   Substance Use Topics    Alcohol use: Not Currently     Comment: Quit in     Drug use: Not Currently     Types: Methamphetamines, Marijuana, Inhaled     Comment: Quit in , used methamphetamine     Past Medical History:   Diagnosis Date    Back pain 10/05/2023    groin    Bilateral recurrent inguinal hernia without obstruction or gangrene 10/06/2023    Bowel habit changes 10/05/2023    constipation    Bronchitis     as a teenager    Cataract 10/05/2023    no surgery    Dental disorder     dentures    Drug-induced constipation 10/13/2023    Heart burn     Hepatitis A     treated    Hepatitis C 2016    treated    Hepatocellular carcinoma (HCC)     High cholesterol     Hypertension     Metastasis to bone (HCC)     Nausea and vomiting 10/06/2023    Pain in right leg 09/15/2023    Pneumonia     as a teenager    Postoperative abdominal pain 10/13/2023      Family History   Problem Relation Age of Onset    Cancer Mother         Multiple Myeloma    Cancer Father         Colon and Lung    Cancer Sister         Breast    Diabetes Brother     Cancer Maternal Grandmother         Colon    Colon Cancer Maternal Grandmother     No Known Problems Daughter     No Known Problems Daughter     No Known Problems Son     Cancer Other         Multiple myeloma         Objective:  "    Vitals:    02/12/24 1034   BP: 120/62   Pulse: 78   Resp: 12   Temp: 36.4 °C (97.6 °F)   TempSrc: Temporal   SpO2: 92%   Weight: 66.2 kg (146 lb)   Height: 1.727 m (5' 8\")     Body mass index is 22.2 kg/m².     Review of Systems   Constitutional:  Positive for malaise/fatigue and weight loss. Negative for chills and fever.   Respiratory:  Negative for cough and shortness of breath.    Cardiovascular:  Negative for chest pain and palpitations.   Gastrointestinal:  Negative for constipation, diarrhea, nausea and vomiting.   Musculoskeletal:  Positive for back pain, joint pain and myalgias.     Physical Exam:   Gen: no acute distress.   Skin: Pink, warm, and dry  HEENT: conjunctiva non-injected, sclera non-icteric. EOMs intact.   Nasal mucosa without edema nor erythema.   Pinna normal.    Oral mucous membranes pink and moist with no lesions.  Neck: Supple, trachea midline. No adenopathy or masses in the neck or supraclavicular regions.  Lungs: Effort is normal.   CV: regular rate and rhythm  Abdomen: flat  Ext: no edema, color normal, vascularity normal, temperature normal  Alert and oriented Eye contact is good, speech goal directed, affect calm    Assessment and Plan:   Cancer related pain  Patient with metastatic hepatocellular carcinoma.  Significant disease burden and bony metastases.  Has done well with transition to methadone currently on 10 mg 3 times a day and morphine for breakthrough.  Reports pain is adequately controlled and does not feel he needs further adjustments of his methadone at this period of time.  Will send in 30-day supply of methadone.  PDMP reviewed, no signs of diversion or abuse, risk and benefits of medication discussed, controlled subs agreement on file.    24 minutes in total spent on patient encounter including chart review and consultation with patient's oncological providers.    Followup: No follow-ups on file.    Burak Leblanc M.D.    "

## 2024-02-14 ASSESSMENT — ENCOUNTER SYMPTOMS
FEVER: 0
BACK PAIN: 1
CONSTIPATION: 0
PALPITATIONS: 0
VOMITING: 0
WEIGHT LOSS: 1
DIARRHEA: 0
MYALGIAS: 1
NAUSEA: 0
CHILLS: 0
SHORTNESS OF BREATH: 0
COUGH: 0

## 2024-02-14 NOTE — ASSESSMENT & PLAN NOTE
Patient with metastatic hepatocellular carcinoma.  Significant disease burden and bony metastases.  Has done well with transition to methadone currently on 10 mg 3 times a day and morphine for breakthrough.  Reports pain is adequately controlled and does not feel he needs further adjustments of his methadone at this period of time.  Will send in 30-day supply of methadone.  PDMP reviewed, no signs of diversion or abuse, risk and benefits of medication discussed, controlled subs agreement on file.

## 2024-02-21 DIAGNOSIS — G89.3 CANCER RELATED PAIN: ICD-10-CM

## 2024-02-21 DIAGNOSIS — C22.0 HEPATOCELLULAR CARCINOMA (HCC): ICD-10-CM

## 2024-02-22 ENCOUNTER — PHARMACY VISIT (OUTPATIENT)
Dept: PHARMACY | Facility: MEDICAL CENTER | Age: 69
End: 2024-02-22
Payer: COMMERCIAL

## 2024-02-22 DIAGNOSIS — F41.9 ANXIETY: ICD-10-CM

## 2024-02-22 PROCEDURE — RXMED WILLOW AMBULATORY MEDICATION CHARGE: Performed by: FAMILY MEDICINE

## 2024-02-22 RX ORDER — LORAZEPAM 0.5 MG/1
0.5 TABLET ORAL EVERY 8 HOURS PRN
Qty: 42 TABLET | Refills: 0 | Status: SHIPPED | OUTPATIENT
Start: 2024-02-22 | End: 2024-03-07

## 2024-02-22 RX ORDER — MORPHINE SULFATE 30 MG/1
30 TABLET ORAL EVERY 6 HOURS PRN
Qty: 56 TABLET | Refills: 0 | Status: SHIPPED | OUTPATIENT
Start: 2024-02-22 | End: 2024-03-07

## 2024-02-29 RX ORDER — EPINEPHRINE 1 MG/ML(1)
0.5 AMPUL (ML) INJECTION PRN
Status: CANCELLED | OUTPATIENT
Start: 2024-03-05

## 2024-02-29 RX ORDER — 0.9 % SODIUM CHLORIDE 0.9 %
10 VIAL (ML) INJECTION PRN
Status: CANCELLED | OUTPATIENT
Start: 2024-03-04

## 2024-02-29 RX ORDER — DIPHENHYDRAMINE HYDROCHLORIDE 50 MG/ML
50 INJECTION INTRAMUSCULAR; INTRAVENOUS PRN
Status: CANCELLED | OUTPATIENT
Start: 2024-03-05

## 2024-02-29 RX ORDER — 0.9 % SODIUM CHLORIDE 0.9 %
3 VIAL (ML) INJECTION PRN
Status: CANCELLED | OUTPATIENT
Start: 2024-03-05

## 2024-02-29 RX ORDER — 0.9 % SODIUM CHLORIDE 0.9 %
VIAL (ML) INJECTION PRN
Status: CANCELLED | OUTPATIENT
Start: 2024-03-05

## 2024-02-29 RX ORDER — ONDANSETRON 8 MG/1
8 TABLET, ORALLY DISINTEGRATING ORAL PRN
Status: CANCELLED | OUTPATIENT
Start: 2024-03-05

## 2024-02-29 RX ORDER — 0.9 % SODIUM CHLORIDE 0.9 %
VIAL (ML) INJECTION PRN
Status: CANCELLED | OUTPATIENT
Start: 2024-03-04

## 2024-02-29 RX ORDER — METHYLPREDNISOLONE SODIUM SUCCINATE 125 MG/2ML
125 INJECTION, POWDER, LYOPHILIZED, FOR SOLUTION INTRAMUSCULAR; INTRAVENOUS PRN
Status: CANCELLED | OUTPATIENT
Start: 2024-03-05

## 2024-02-29 RX ORDER — ONDANSETRON 2 MG/ML
4 INJECTION INTRAMUSCULAR; INTRAVENOUS PRN
Status: CANCELLED | OUTPATIENT
Start: 2024-03-05

## 2024-02-29 RX ORDER — 0.9 % SODIUM CHLORIDE 0.9 %
10 VIAL (ML) INJECTION PRN
Status: CANCELLED | OUTPATIENT
Start: 2024-03-05

## 2024-02-29 RX ORDER — 0.9 % SODIUM CHLORIDE 0.9 %
3 VIAL (ML) INJECTION PRN
Status: CANCELLED | OUTPATIENT
Start: 2024-03-04

## 2024-02-29 RX ORDER — PROCHLORPERAZINE MALEATE 10 MG
10 TABLET ORAL EVERY 6 HOURS PRN
Status: CANCELLED | OUTPATIENT
Start: 2024-03-05

## 2024-02-29 RX ORDER — SODIUM CHLORIDE 9 MG/ML
INJECTION, SOLUTION INTRAVENOUS CONTINUOUS
Status: CANCELLED | OUTPATIENT
Start: 2024-03-05

## 2024-03-02 NOTE — PROGRESS NOTES
"Pharmacy Chemotherapy Verification    Dx: Hepatocellular carcinoma  Cycle 3  Previous treatment = C2 2/6/24    Protocol: Tremelimumab-actl + Durvalumab  Tremelimumab-actl 300 mg IV over 60 minutes followed by  Durvalumab 1500 mg IV over 60 minutes  28-day cycle for 1 cycle  ~Followed by~  Durvalumab 1500 mg IV over 60 minutes  28-day cycle until DP/UT  NCCN Guidelines for Hepatocellular Carcinoma V.1.2023.  Charu et al. NEJM Evid. 2022;1(8):8.    Allergies:  Pcn [penicillins]     /63   Pulse 76   Temp 36.4 °C (97.5 °F) (Temporal)   Resp 16   Ht 1.753 m (5' 9\")   Wt 65.4 kg (144 lb 2.9 oz)   SpO2 91%   BMI 21.29 kg/m²  Body surface area is 1.78 meters squared.    Labs 3/4/24  ANC 7410 Hgb 13.3 Plt 309k  SCr 0.75 CrCl 83.6 mL/min   AST/ALT/AP = 85/136/151 Tbili = 0.3  TSH 1.15 Free T4 1.13    Also receiving Xgeva every 28 days, last dose 3/5/24    Durvalumab 1500 mg fixed dose, no calculation required   <10% difference, OK to treat with final dose = 1500 mg IV    Silvana Eldridge, PharmD, BCOP  "

## 2024-03-04 ENCOUNTER — HOSPITAL ENCOUNTER (OUTPATIENT)
Dept: LAB | Facility: MEDICAL CENTER | Age: 69
End: 2024-03-04
Attending: STUDENT IN AN ORGANIZED HEALTH CARE EDUCATION/TRAINING PROGRAM
Payer: MEDICARE

## 2024-03-04 ENCOUNTER — HOSPITAL ENCOUNTER (OUTPATIENT)
Dept: HEMATOLOGY ONCOLOGY | Facility: MEDICAL CENTER | Age: 69
End: 2024-03-04
Attending: STUDENT IN AN ORGANIZED HEALTH CARE EDUCATION/TRAINING PROGRAM
Payer: MEDICARE

## 2024-03-04 VITALS
BODY MASS INDEX: 21.86 KG/M2 | WEIGHT: 144.2 LBS | DIASTOLIC BLOOD PRESSURE: 72 MMHG | OXYGEN SATURATION: 95 % | TEMPERATURE: 97.6 F | HEART RATE: 78 BPM | HEIGHT: 68 IN | RESPIRATION RATE: 16 BRPM | SYSTOLIC BLOOD PRESSURE: 120 MMHG

## 2024-03-04 DIAGNOSIS — C22.0 HEPATOCELLULAR CARCINOMA (HCC): Chronic | ICD-10-CM

## 2024-03-04 DIAGNOSIS — C22.0 HEPATOCELLULAR CARCINOMA (HCC): ICD-10-CM

## 2024-03-04 DIAGNOSIS — Z79.899 HIGH RISK MEDICATION USE: ICD-10-CM

## 2024-03-04 LAB
ALBUMIN SERPL BCP-MCNC: 3.7 G/DL (ref 3.2–4.9)
ALBUMIN/GLOB SERPL: 1 G/DL
ALP SERPL-CCNC: 151 U/L (ref 30–99)
ALT SERPL-CCNC: 136 U/L (ref 2–50)
ANION GAP SERPL CALC-SCNC: 10 MMOL/L (ref 7–16)
AST SERPL-CCNC: 85 U/L (ref 12–45)
BASOPHILS # BLD AUTO: 1.3 % (ref 0–1.8)
BASOPHILS # BLD: 0.12 K/UL (ref 0–0.12)
BILIRUB SERPL-MCNC: 0.3 MG/DL (ref 0.1–1.5)
BUN SERPL-MCNC: 11 MG/DL (ref 8–22)
CALCIUM ALBUM COR SERPL-MCNC: 9 MG/DL (ref 8.5–10.5)
CALCIUM SERPL-MCNC: 8.8 MG/DL (ref 8.5–10.5)
CHLORIDE SERPL-SCNC: 101 MMOL/L (ref 96–112)
CO2 SERPL-SCNC: 22 MMOL/L (ref 20–33)
CREAT SERPL-MCNC: 0.75 MG/DL (ref 0.5–1.4)
EOSINOPHIL # BLD AUTO: 0.27 K/UL (ref 0–0.51)
EOSINOPHIL NFR BLD: 2.8 % (ref 0–6.9)
ERYTHROCYTE [DISTWIDTH] IN BLOOD BY AUTOMATED COUNT: 51.3 FL (ref 35.9–50)
GFR SERPLBLD CREATININE-BSD FMLA CKD-EPI: 96 ML/MIN/1.73 M 2
GLOBULIN SER CALC-MCNC: 3.6 G/DL (ref 1.9–3.5)
GLUCOSE SERPL-MCNC: 100 MG/DL (ref 65–99)
HCT VFR BLD AUTO: 42.6 % (ref 42–52)
HGB BLD-MCNC: 13.3 G/DL (ref 14–18)
IMM GRANULOCYTES # BLD AUTO: 0.08 K/UL (ref 0–0.11)
IMM GRANULOCYTES NFR BLD AUTO: 0.8 % (ref 0–0.9)
INR PPP: 1.11 (ref 0.87–1.13)
LYMPHOCYTES # BLD AUTO: 1.13 K/UL (ref 1–4.8)
LYMPHOCYTES NFR BLD: 11.8 % (ref 22–41)
MCH RBC QN AUTO: 27.1 PG (ref 27–33)
MCHC RBC AUTO-ENTMCNC: 31.2 G/DL (ref 32.3–36.5)
MCV RBC AUTO: 86.8 FL (ref 81.4–97.8)
MONOCYTES # BLD AUTO: 0.59 K/UL (ref 0–0.85)
MONOCYTES NFR BLD AUTO: 6.1 % (ref 0–13.4)
NEUTROPHILS # BLD AUTO: 7.41 K/UL (ref 1.82–7.42)
NEUTROPHILS NFR BLD: 77.2 % (ref 44–72)
NRBC # BLD AUTO: 0 K/UL
NRBC BLD-RTO: 0 /100 WBC (ref 0–0.2)
PLATELET # BLD AUTO: 309 K/UL (ref 164–446)
PMV BLD AUTO: 10.7 FL (ref 9–12.9)
POTASSIUM SERPL-SCNC: 5 MMOL/L (ref 3.6–5.5)
PROT SERPL-MCNC: 7.3 G/DL (ref 6–8.2)
PROTHROMBIN TIME: 14.5 SEC (ref 12–14.6)
RBC # BLD AUTO: 4.91 M/UL (ref 4.7–6.1)
SODIUM SERPL-SCNC: 133 MMOL/L (ref 135–145)
T4 FREE SERPL-MCNC: 1.13 NG/DL (ref 0.93–1.7)
TSH SERPL DL<=0.005 MIU/L-ACNC: 1.15 UIU/ML (ref 0.38–5.33)
WBC # BLD AUTO: 9.6 K/UL (ref 4.8–10.8)

## 2024-03-04 PROCEDURE — 82105 ALPHA-FETOPROTEIN SERUM: CPT

## 2024-03-04 PROCEDURE — 84443 ASSAY THYROID STIM HORMONE: CPT

## 2024-03-04 PROCEDURE — 84439 ASSAY OF FREE THYROXINE: CPT

## 2024-03-04 PROCEDURE — 99214 OFFICE O/P EST MOD 30 MIN: CPT | Performed by: STUDENT IN AN ORGANIZED HEALTH CARE EDUCATION/TRAINING PROGRAM

## 2024-03-04 PROCEDURE — 80053 COMPREHEN METABOLIC PANEL: CPT

## 2024-03-04 PROCEDURE — 85025 COMPLETE CBC W/AUTO DIFF WBC: CPT

## 2024-03-04 PROCEDURE — 85610 PROTHROMBIN TIME: CPT

## 2024-03-04 PROCEDURE — 36415 COLL VENOUS BLD VENIPUNCTURE: CPT

## 2024-03-04 PROCEDURE — 99212 OFFICE O/P EST SF 10 MIN: CPT | Performed by: STUDENT IN AN ORGANIZED HEALTH CARE EDUCATION/TRAINING PROGRAM

## 2024-03-04 ASSESSMENT — ENCOUNTER SYMPTOMS
BLURRED VISION: 0
MYALGIAS: 1
INSOMNIA: 0
CHILLS: 0
DOUBLE VISION: 0
NERVOUS/ANXIOUS: 0
COUGH: 0
SPUTUM PRODUCTION: 0
ABDOMINAL PAIN: 0
SINUS PAIN: 0
VOMITING: 0
PALPITATIONS: 0
CONSTIPATION: 0
WEAKNESS: 1
DIARRHEA: 0
WHEEZING: 0
BLOOD IN STOOL: 0
WEIGHT LOSS: 0
BRUISES/BLEEDS EASILY: 0
BACK PAIN: 0
FEVER: 0
HEADACHES: 0
SORE THROAT: 0
HEARTBURN: 0
NAUSEA: 0
TINGLING: 0
DIZZINESS: 0
ORTHOPNEA: 0
SHORTNESS OF BREATH: 0
DIAPHORESIS: 0

## 2024-03-04 ASSESSMENT — FIBROSIS 4 INDEX: FIB4 SCORE: 1.35

## 2024-03-04 ASSESSMENT — PAIN SCALES - GENERAL: PAINLEVEL: 7=MODERATE-SEVERE PAIN

## 2024-03-04 NOTE — PROGRESS NOTES
"Pharmacy Chemotherapy Calculation:    Dx: Hepatocellular carcinoma cTxNxM1 stage IV         Protocol: Durvalumab with Tremelimumab   *Dosing Reference*     Tremelimumab-actl 300 mg (weight ? 30 kg) IV over 60 minutes on Day 1 followed by   Durvalumab 1,500 mg (weight ? 30 kg) IV over 60 minutes on Day 1   28-day cycle for 1 cycle   ~Followed by~  Durvalumab 1,500 mg (weight ? 30 kg) IV over 60 minutes on Day 1  28-day cycle until disease progression or unacceptable toxicity     NCCN Guidelines® for Hepatocellular Carcinoma V.1.2023.   Charu et al. NEJM Evid. 2022;1(8):8.a    Allergies:  Pcn [penicillins]       /63   Pulse 76   Temp 36.4 °C (97.5 °F) (Temporal)   Resp 16   Ht 1.753 m (5' 9\")   Wt 65.4 kg (144 lb 2.9 oz)   SpO2 91%   BMI 21.29 kg/m²  Body surface area is 1.78 meters squared.    Labs 3/4/24:  ANC~ 7410 Plt = 309k   Hgb = 13.3     SCr = 0.75 mg/dL CrCl ~ 83.5 mL/min   AST/ALT/AP = 85/136/151 TBili = 0.3   TSH = 1.150   Free T4 = 1.11      Xgeva q28d, most recent dose given on 3/5/24  Drug Order   (Drug name, dose, route, IV Fluid & volume, frequency, number of doses) Cycle 3  Previous treatment: C2 2/6/24     Medication = Durvalumab  Base Dose = 1500 mg  Fixed dose; no calc required  Final Dose = 1500 mg  Route = IV  Fluid & Volume =  mL  Admin Duration = Over 60 minutes          Fixed dose, OK to treat with final dose     By my signature below, I confirm this process was performed independently with the BSA and all final chemotherapy dosing calculations congruent. I have reviewed the above chemotherapy order and that my calculation of the final dose and BSA (when applicable) corroborate those calculations of the  pharmacist. Discrepancies of 10% or greater in the written dose have been addressed and documented within the [facility] Progress notes.    Rebeca WalterD  "

## 2024-03-05 ENCOUNTER — OUTPATIENT INFUSION SERVICES (OUTPATIENT)
Dept: ONCOLOGY | Facility: MEDICAL CENTER | Age: 69
End: 2024-03-05
Attending: STUDENT IN AN ORGANIZED HEALTH CARE EDUCATION/TRAINING PROGRAM
Payer: MEDICARE

## 2024-03-05 VITALS
BODY MASS INDEX: 21.36 KG/M2 | WEIGHT: 144.18 LBS | SYSTOLIC BLOOD PRESSURE: 121 MMHG | DIASTOLIC BLOOD PRESSURE: 63 MMHG | HEART RATE: 76 BPM | OXYGEN SATURATION: 91 % | TEMPERATURE: 97.5 F | HEIGHT: 69 IN | RESPIRATION RATE: 16 BRPM

## 2024-03-05 DIAGNOSIS — C22.0 HEPATOCELLULAR CARCINOMA (HCC): ICD-10-CM

## 2024-03-05 PROCEDURE — 96372 THER/PROPH/DIAG INJ SC/IM: CPT

## 2024-03-05 PROCEDURE — 700111 HCHG RX REV CODE 636 W/ 250 OVERRIDE (IP): Mod: JZ,JG | Performed by: STUDENT IN AN ORGANIZED HEALTH CARE EDUCATION/TRAINING PROGRAM

## 2024-03-05 PROCEDURE — 96413 CHEMO IV INFUSION 1 HR: CPT

## 2024-03-05 PROCEDURE — 700105 HCHG RX REV CODE 258: Performed by: STUDENT IN AN ORGANIZED HEALTH CARE EDUCATION/TRAINING PROGRAM

## 2024-03-05 RX ORDER — ONDANSETRON 8 MG/1
8 TABLET, ORALLY DISINTEGRATING ORAL PRN
Status: DISCONTINUED | OUTPATIENT
Start: 2024-03-05 | End: 2024-03-05 | Stop reason: HOSPADM

## 2024-03-05 RX ADMIN — SODIUM CHLORIDE 1500 MG: 9 INJECTION, SOLUTION INTRAVENOUS at 09:53

## 2024-03-05 RX ADMIN — DENOSUMAB 120 MG: 120 INJECTION SUBCUTANEOUS at 09:23

## 2024-03-05 ASSESSMENT — PAIN DESCRIPTION - PAIN TYPE: TYPE: CHRONIC PAIN

## 2024-03-05 ASSESSMENT — FIBROSIS 4 INDEX: FIB4 SCORE: 1.65

## 2024-03-05 NOTE — PROGRESS NOTES
Follow Up Note:  Hematology/Oncology      Primary Care:  VALORIE Lawrence    Diagnosis: Hepatocellular carcinoma    Chief Complaint: On-treatment visit    Current Treatment: Durvalumab/tremelimumab    Prior Treatment: Palliative CRT    Oncology History of Presenting Illness:  German Pagan is a 68 y.o.  man who presents to the clinic for evaluation for systemic therapy for a new diagnosis of hepatocellular carcinoma. The patient had presented to the hospital with low back pain which had been worsening over several months, and was found on imaging to have lytic lesions throughout his skeleton. He was managed with pain control and discharged for outpatient evaluation for potential multiple myeloma. He was sent to our intake oncology coordinator, who worked him up for multiple myeloma. His bone marrow biopsy only showed 3-5% plasma cells though, without any specific signature on blood work significant for myeloma. A whole body PET scan revealed the lytic lesions and a few hypermetabolic nodes, but no mass in the liver. The patient then underwent a biopsy of one of the bone lesions, and that revealed hepatocellular carcinoma. He then had an AFP drawn which was elevated at 80. He was subsequently referred to me for evaluation. Of note, he has a history of hepatitis C, treated in 2016, in the setting of prior IV drug use (he has not used in many years).      Treatment History:   12/04/23: Palliative XRT 4000 cGy in 5 fractions  01/09/24: C1 durva/treme  02/06/24: C2 durva   03/05/24: C3 durva scheduled    Interval History:  Patient is here for follow up visit. He is doing reasonably well with therapy, though he still has pain in his hip. He is tolerating treatment reasonably well.     Allergies as of 03/04/2024 - Reviewed 03/04/2024   Allergen Reaction Noted    Pcn [penicillins]  05/19/2017         Current Outpatient Medications:     morphine (MS IR) 30 MG tablet, Take 1 Tablet by mouth every 6 hours as  needed for Severe Pain for up to 14 days., Disp: 56 Tablet, Rfl: 0    LORazepam (ATIVAN) 0.5 MG Tab, Take 1 Tablet by mouth every 8 hours as needed for Anxiety for up to 14 days., Disp: 42 Tablet, Rfl: 0    methadone (DOLOPHINE) 10 MG Tab, Take 1 Tablet by mouth every 8 hours for 30 days., Disp: 90 Tablet, Rfl: 0    hydrOXYzine HCl (ATARAX) 50 MG Tab, Take 1 Tablet by mouth 3 times a day as needed for Itching or Anxiety., Disp: 90 Tablet, Rfl: 1    cloNIDine (CATAPRES) 0.1 MG Tab, Take 0.1 mg by mouth as needed. PRN FOR SYSTOLIC BP > 160, Disp: , Rfl:     diphenhydrAMINE (BENADRYL ALLERGY) 25 MG capsule, Take 25 mg by mouth as needed. FOR SKIN ITCHING, Disp: , Rfl:     sennosides (SENOKOT) 8.6 MG Tab, Take 8.6 mg by mouth 1 time a day as needed., Disp: , Rfl:     ondansetron (ZOFRAN) 4 MG Tab tablet, Take 1 Tablet by mouth every four hours as needed for Nausea/Vomiting., Disp: 20 Tablet, Rfl: 3    prochlorperazine (COMPAZINE) 10 MG Tab, Take 1 Tablet by mouth every 6 hours as needed for Nausea/Vomiting., Disp: 30 Tablet, Rfl: 3    tamsulosin (FLOMAX) 0.4 MG capsule, Take 0.4 mg by mouth every evening., Disp: , Rfl:     Naloxone (NARCAN) 4 MG/0.1ML Liquid, One spray in one nostril for overdose and call 911., Disp: 1 Each, Rfl: 0    Cholecalciferol (VITAMIN D3) 125 MCG (5000 UT) Cap, Take 5,000 Units by mouth every 48 hours., Disp: , Rfl:       Review of Systems:  Review of Systems   Constitutional:  Negative for chills, diaphoresis, fever, malaise/fatigue and weight loss.   HENT:  Negative for hearing loss, nosebleeds, sinus pain and sore throat.    Eyes:  Negative for blurred vision and double vision.   Respiratory:  Negative for cough, sputum production, shortness of breath and wheezing.    Cardiovascular:  Negative for chest pain, palpitations, orthopnea and leg swelling.   Gastrointestinal:  Negative for abdominal pain, blood in stool, constipation, diarrhea, heartburn, melena, nausea and vomiting.  "  Genitourinary:  Negative for dysuria, frequency, hematuria and urgency.   Musculoskeletal:  Positive for joint pain (Down left hip) and myalgias. Negative for back pain.   Skin:  Positive for itching. Negative for rash.   Neurological:  Positive for weakness (Ambulating with walker). Negative for dizziness, tingling and headaches.   Endo/Heme/Allergies:  Does not bruise/bleed easily.   Psychiatric/Behavioral:  The patient is not nervous/anxious and does not have insomnia.          Physical Exam:  Vitals:    03/04/24 1135   BP: 120/72   Pulse: 78   Resp: 16   Temp: 36.4 °C (97.6 °F)   TempSrc: Temporal   SpO2: 95%   Weight: 65.4 kg (144 lb 3.2 oz)   Height: 1.727 m (5' 7.99\")       DESC; KARNOFSKY SCALE WITH ECOG EQUIVALENT: 80, Normal activity with effort; some signs or symptoms of disease (ECOG equivalent 1)    DISTRESS LEVEL: no apparent distress    Physical Exam  Vitals and nursing note reviewed.   Constitutional:       General: He is awake. He is not in acute distress.     Appearance: Normal appearance. He is normal weight. He is not ill-appearing, toxic-appearing or diaphoretic.   HENT:      Head: Normocephalic and atraumatic.      Nose: Nose normal. No congestion.      Mouth/Throat:      Pharynx: Oropharynx is clear. No oropharyngeal exudate or posterior oropharyngeal erythema.   Eyes:      General: No scleral icterus.     Extraocular Movements: Extraocular movements intact.      Conjunctiva/sclera: Conjunctivae normal.      Pupils: Pupils are equal, round, and reactive to light.   Cardiovascular:      Rate and Rhythm: Normal rate and regular rhythm.      Pulses: Normal pulses.      Heart sounds: Normal heart sounds. No murmur heard.     No friction rub. No gallop.   Pulmonary:      Effort: Pulmonary effort is normal.      Breath sounds: Normal breath sounds. No decreased air movement. No wheezing, rhonchi or rales.   Abdominal:      General: Bowel sounds are normal. There is no distension.      Tenderness: " There is no abdominal tenderness.   Musculoskeletal:         General: No deformity. Normal range of motion.      Cervical back: Normal range of motion and neck supple. No tenderness.      Right lower leg: No edema.      Left lower leg: No edema.   Lymphadenopathy:      Cervical: No cervical adenopathy.      Upper Body:      Right upper body: No axillary adenopathy.      Left upper body: No axillary adenopathy.      Lower Body: No right inguinal adenopathy. No left inguinal adenopathy.   Skin:     General: Skin is warm and dry.      Coloration: Skin is not jaundiced.      Findings: No erythema or rash.   Neurological:      General: No focal deficit present.      Mental Status: He is alert and oriented to person, place, and time.      Sensory: Sensation is intact.      Motor: Weakness present.      Gait: Gait abnormal (Ambulating with walker).   Psychiatric:         Attention and Perception: Attention normal.         Mood and Affect: Mood normal.         Behavior: Behavior normal. Behavior is cooperative.         Thought Content: Thought content normal.         Judgment: Judgment normal.           Labs:  Hospital Outpatient Visit on 03/04/2024   Component Date Value Ref Range Status    WBC 03/04/2024 9.6  4.8 - 10.8 K/uL Final    RBC 03/04/2024 4.91  4.70 - 6.10 M/uL Final    Hemoglobin 03/04/2024 13.3 (L)  14.0 - 18.0 g/dL Final    Hematocrit 03/04/2024 42.6  42.0 - 52.0 % Final    MCV 03/04/2024 86.8  81.4 - 97.8 fL Final    MCH 03/04/2024 27.1  27.0 - 33.0 pg Final    MCHC 03/04/2024 31.2 (L)  32.3 - 36.5 g/dL Final    Please note new reference range effective 05/22/2023.    RDW 03/04/2024 51.3 (H)  35.9 - 50.0 fL Final    Platelet Count 03/04/2024 309  164 - 446 K/uL Final    MPV 03/04/2024 10.7  9.0 - 12.9 fL Final    Neutrophils-Polys 03/04/2024 77.20 (H)  44.00 - 72.00 % Final    Lymphocytes 03/04/2024 11.80 (L)  22.00 - 41.00 % Final    Monocytes 03/04/2024 6.10  0.00 - 13.40 % Final    Eosinophils 03/04/2024  2.80  0.00 - 6.90 % Final    Basophils 03/04/2024 1.30  0.00 - 1.80 % Final    Immature Granulocytes 03/04/2024 0.80  0.00 - 0.90 % Final    Nucleated RBC 03/04/2024 0.00  0.00 - 0.20 /100 WBC Final    Please note new reference range effective 05/22/2023.    Neutrophils (Absolute) 03/04/2024 7.41  1.82 - 7.42 K/uL Final    Comment: Includes immature neutrophils, if present.  Please note new reference range effective 05/22/2023.      Lymphs (Absolute) 03/04/2024 1.13  1.00 - 4.80 K/uL Final    Monos (Absolute) 03/04/2024 0.59  0.00 - 0.85 K/uL Final    Eos (Absolute) 03/04/2024 0.27  0.00 - 0.51 K/uL Final    Baso (Absolute) 03/04/2024 0.12  0.00 - 0.12 K/uL Final    Immature Granulocytes (abs) 03/04/2024 0.08  0.00 - 0.11 K/uL Final    NRBC (Absolute) 03/04/2024 0.00  K/uL Final    PT 03/04/2024 14.5  12.0 - 14.6 sec Final    INR 03/04/2024 1.11  0.87 - 1.13 Final    Comment: INR - Non-therapeutic Reference Range: 0.87-1.13  INR - Therapeutic Reference Range: 2.0-4.0       Imaging:     All listed images below have been independently reviewed by me. I agree with the findings as summarized below:    No results found.    Pathology:  FINAL DIAGNOSIS:     A. Left iliac bone biopsy:          Metastatic carcinoma with hepatoid differentiation.          See comment.     Comment: Sections show metastatic carcinoma with striking morphologic   and immunohistochemical features of hepatocellular carcinoma (HCC). The   tumor is positive for , CAM5.2 and heppar1, and is negative for   CK7, CK20, CK5/6, pankeratin, PSA, NKX3.1, CDX2, NapsinA, TTF1, p63,   synaptophysin, chromograninA, SOX10, MelanA, S100, Calretinin, inhibin,   HMB45, and PAX8 (some of these markers were performed on the recent   bone marrow biopsy). P53 stain shows overexpression and may suggest   tp53 mutation. Recent PET CT and CT with contrast show only lytic bony   lesions, bilateral adrenal masses, and possible hilar adenopathy which   is indeterminate  for metastatic lymphadenopathy. The liver is   reportedly unremarkable. Based on the current biopsy alone, metastatic   hepatocellular carcinoma (HCC) would be most likely. However, if this   possibility is definitively excluded by the radiologic findings,   metastatic carcinoma with hepatoid differentiation is the appropriate   diagnosis. Carcinoma with hepatoid differentiation may arise from   numerous sites throughout the body, and clinical/radiologic correlation   would be needed to determine the site of origin. There is adequate   tumor for any additional testing required (block A1 or A2).                                           Diagnosis performed by:                                       CHRISTELLE MARTINEZ DO      Assessment & Plan:  1. Hepatocellular carcinoma (HCC)  MR-HIP WITH & W/O LEFT    CT-CHEST,ABDOMEN,PELVIS WITH        This is a 68 year old  man with hepatocellular carcinoma, cTxNxM1 stage IV disease. He presents for evaluation for systemic therapy.      Current Diagnosis and Staging: Hepatocellular carcinoma, cTxNxM1 stage IV disease    Update: The patient is doing well with treatment at this time. Continue C3 tomorrow, monitor. Scans to be done prior to next cycle.      Treatment Plan: Durvalumab with tremelimumab     [facility]     Plan of Care:     Primary Therapy: Durva C3 tomorrow  Supportive Therapy: Denosumab for fracture prevention  Toxicity: Patient is getting antineoplastic therapy and needs monitoring of blood counts, hepatic function, and renal function due to potential for organ dysfunction.   Labs: CBC with diff, CMP, PT, AFP monitoring  Imaging: Repeat CT scans after C3 of therapy (ordered)  Treatment Planning: Patient has hepatocellular carcinoma by pathology, though no clear primary identified. He will proceed with STRIDE regimen for palliative therapy, and will be referred for palliative XRT as well.   Consultations: Radiation  oncology (Dr. Ruvalcaba); Palliative care (Dr. Leblanc)  Code Status: Full  Miscellaneous: NA  Return for Follow Up: 4 weeks    Any questions and concerns raised by the patient were answered to the best of my ability. Thank you for allowing me to participate in the care for this patient. Please feel free to contact me for any questions or concerns.       Total time spent on chart review, clinic encounter, and documentation: 26 minutes.

## 2024-03-05 NOTE — PROGRESS NOTES
Chemotherapy Verification - SECONDARY RN       Height = 175.3 cm  Weight = 65.4 kg  BSA = 1.78 m2       Medication: Imfinzi  Dose: 1500 mg set dose  Calculated Dose: 1500 mg                             (In mg/m2, AUC, mg/kg)     I confirm that this process was performed independently.

## 2024-03-05 NOTE — PROGRESS NOTES
Chemotherapy Verification - PRIMARY RN  C3 D1    Height = 175.3 cm  Weight = 65.4 kg  BSA = 1.78 m^2       Medication: durvalumab (IMFINZI)  Dose: 1500 mg set dose  Calculated Dose: 1500 mg set dose                             (In mg/m2, AUC, mg/kg)         I confirm this process was performed independently with the BSA and all final chemotherapy dosing calculations congruent.  Any discrepancies of 10% or greater have been addressed with the chemotherapy pharmacist. The resolution of the discrepancy has been documented in the EPIC progress notes.

## 2024-03-05 NOTE — PROGRESS NOTES
German arrives to hospitals for C3D1 Imfinzi/Xgeva for hepatocellular carcinoma with mets to bone. Patient denies acute health concerns today. Denies s/s active infections, open wounds, and mouth sores. Denies s/s hypocalcemia and denies recent/upcoming invasive dental procedures (he has full upper and lower dentures). Labs drawn on 3/4/24 were reviewed and met parameters to proceed with treatment. 24g PIV placed to LFA, which flushes easily and has brisk blood return. Xgeva administered SQ to back of left arm without adverse s/s. Imfinzi infused, with an inline 0.2 micron filter, over 1 hour without adverse s/s. PIV flushed and removed with tip intact. Patient tolerated all treatments well today. Patient has his next appt. Discharged home to self care in no apparent distress.

## 2024-03-06 LAB — AFP-TM SERPL-MCNC: 831 NG/ML (ref 0–9)

## 2024-03-11 ENCOUNTER — PHARMACY VISIT (OUTPATIENT)
Dept: PHARMACY | Facility: MEDICAL CENTER | Age: 69
End: 2024-03-11
Payer: COMMERCIAL

## 2024-03-11 ENCOUNTER — HOSPITAL ENCOUNTER (OUTPATIENT)
Dept: HEMATOLOGY ONCOLOGY | Facility: MEDICAL CENTER | Age: 69
End: 2024-03-11
Attending: FAMILY MEDICINE
Payer: MEDICARE

## 2024-03-11 VITALS
WEIGHT: 144 LBS | BODY MASS INDEX: 21.33 KG/M2 | RESPIRATION RATE: 12 BRPM | SYSTOLIC BLOOD PRESSURE: 130 MMHG | DIASTOLIC BLOOD PRESSURE: 64 MMHG | TEMPERATURE: 98.6 F | HEIGHT: 69 IN | HEART RATE: 109 BPM | OXYGEN SATURATION: 89 %

## 2024-03-11 DIAGNOSIS — F41.9 ANXIETY: ICD-10-CM

## 2024-03-11 DIAGNOSIS — C22.0 HEPATOCELLULAR CARCINOMA (HCC): ICD-10-CM

## 2024-03-11 DIAGNOSIS — G89.3 CANCER RELATED PAIN: ICD-10-CM

## 2024-03-11 DIAGNOSIS — C79.51 METASTASIS TO BONE (HCC): Chronic | ICD-10-CM

## 2024-03-11 PROCEDURE — 99212 OFFICE O/P EST SF 10 MIN: CPT | Performed by: FAMILY MEDICINE

## 2024-03-11 PROCEDURE — 99214 OFFICE O/P EST MOD 30 MIN: CPT | Performed by: FAMILY MEDICINE

## 2024-03-11 PROCEDURE — RXMED WILLOW AMBULATORY MEDICATION CHARGE: Performed by: FAMILY MEDICINE

## 2024-03-11 RX ORDER — DIAZEPAM 5 MG/1
5 TABLET ORAL
Qty: 1 TABLET | Refills: 0 | Status: SHIPPED | OUTPATIENT
Start: 2024-03-11 | End: 2024-03-15

## 2024-03-11 RX ORDER — LORAZEPAM 0.5 MG/1
0.5 TABLET ORAL EVERY 8 HOURS PRN
Qty: 60 TABLET | Refills: 0 | Status: SHIPPED | OUTPATIENT
Start: 2024-03-11 | End: 2024-04-10

## 2024-03-11 RX ORDER — MORPHINE SULFATE 30 MG/1
30 TABLET ORAL EVERY 6 HOURS PRN
Qty: 30 TABLET | Refills: 0 | Status: SHIPPED | OUTPATIENT
Start: 2024-03-11 | End: 2024-03-26

## 2024-03-11 RX ORDER — METHADONE HYDROCHLORIDE 10 MG/1
10 TABLET ORAL EVERY 8 HOURS
Qty: 90 TABLET | Refills: 0 | Status: SHIPPED | OUTPATIENT
Start: 2024-03-11 | End: 2024-04-10

## 2024-03-11 RX ORDER — METHADONE HYDROCHLORIDE 5 MG/1
2.5 TABLET ORAL
Qty: 30 TABLET | Refills: 0 | Status: SHIPPED | OUTPATIENT
Start: 2024-03-11 | End: 2024-04-10

## 2024-03-11 ASSESSMENT — PAIN SCALES - GENERAL: PAINLEVEL: 6=MODERATE PAIN

## 2024-03-11 ASSESSMENT — FIBROSIS 4 INDEX: FIB4 SCORE: 1.65

## 2024-03-12 ASSESSMENT — ENCOUNTER SYMPTOMS
COUGH: 0
CHILLS: 0
SHORTNESS OF BREATH: 0
MYALGIAS: 1
WEIGHT LOSS: 1
NERVOUS/ANXIOUS: 1
FEVER: 0
CONSTIPATION: 0
VOMITING: 0
NAUSEA: 0
PALPITATIONS: 0
DIARRHEA: 0
BACK PAIN: 1

## 2024-03-12 NOTE — ASSESSMENT & PLAN NOTE
Has recently worsened.  In impacting sleep.  Could be secondary to increase in pain.  Has been using lorazepam 1-2 times per day.  Did discuss with patient nonpharmacological anxiety management options including mindfulness.  Will reevaluate at next appointment.  PDMP reviewed as noted above

## 2024-03-12 NOTE — ASSESSMENT & PLAN NOTE
Patient with known metastatic hepatocellular carcinoma.  Has significant disease burden causing distressing oncological pain.  Has previously been well-controlled on methadone and occasional use morphine for breakthrough.  Over the last couple weeks has an increase in his pain.  Through shared decision making will increase methadone dosing daily by 5 mg to a total of 35 mg daily.  May also continue to use 30 mg of morphine as needed for breakthrough.  PDMP reviewed, no signs of diversion or abuse, risk and benefits of medication discussed, controlled subs agreement on file.  1 month prescription sent to pharmacy.  Will follow-up in 1 month to reevaluate symptoms or request refills.

## 2024-03-12 NOTE — PROGRESS NOTES
Subjective:     Chief Complaint   Patient presents with    Cancer     [facility]/ liver cancer/ symptom mgmt     German Pagan is a 68 y.o. male here today for follow-up on symptom management for oncological pain.  Patient reports that he has recently had a worsening of his cancer related pain.  He also reports an increase in anxiety and his wife reports he is frequently up during the night pacing due to pain and anxiety.  He has also started using his short acting morphine more often.  The pain is not in new location is just exacerbation of his existing pain.  We then discussed several options and concluded we can do a small increase in his methadone to see if that would improve his pain.  We then talked about his anxiety and they do endorse that the use of lorazepam does make him loopy.  We then discussed nonmedication options for anxiety including mindfulness.  We then reviewed a short video on YouTube giving him and his wife an example of a short mindfulness session that could also help with anxiety.  He does not endorse any significant constipation or diarrhea or nausea or vomiting.  He also asked about a one-time dose of Valium for his upcoming MRI.  We concluded the visit with a plan to follow-up in 1 month for reevaluation.    Problem   Anxiety   Cancer Related Pain        Current medicines (including changes today)  Current Outpatient Medications   Medication Sig Dispense Refill    methadone (DOLOPHINE) 10 MG Tab Take 1 Tablet by mouth every 8 hours for 30 days. To be used in conjunction with 5 mg tablets for a total of 35mg daily 90 Tablet 0    methadone (DOLOPHINE) 5 MG Tab Take 0.5 Tablets by mouth 2 times a day for 30 days. To be used in conjunction with 10 mg tablets for a total of 35mg daily 30 Tablet 0    morphine (MS IR) 30 MG tablet Take 1 Tablet by mouth every 6 hours as needed for Severe Pain for up to 15 days. 30 Tablet 0    LORazepam (ATIVAN) 0.5 MG Tab Take 1 Tablet by mouth every 8 hours  as needed for Anxiety for up to 30 days. 60 Tablet 0    diazePAM (VALIUM) 5 MG Tab Take 1 Tablet by mouth one time as needed for Anxiety (to be taken prior to MRI) for up to 1 dose. 1 Tablet 0    hydrOXYzine HCl (ATARAX) 50 MG Tab Take 1 Tablet by mouth 3 times a day as needed for Itching or Anxiety. 90 Tablet 1    cloNIDine (CATAPRES) 0.1 MG Tab Take 0.1 mg by mouth as needed. PRN FOR SYSTOLIC BP > 160      diphenhydrAMINE (BENADRYL ALLERGY) 25 MG capsule Take 25 mg by mouth as needed. FOR SKIN ITCHING      sennosides (SENOKOT) 8.6 MG Tab Take 8.6 mg by mouth 1 time a day as needed.      ondansetron (ZOFRAN) 4 MG Tab tablet Take 1 Tablet by mouth every four hours as needed for Nausea/Vomiting. 20 Tablet 3    prochlorperazine (COMPAZINE) 10 MG Tab Take 1 Tablet by mouth every 6 hours as needed for Nausea/Vomiting. 30 Tablet 3    tamsulosin (FLOMAX) 0.4 MG capsule Take 0.4 mg by mouth every evening.      Naloxone (NARCAN) 4 MG/0.1ML Liquid One spray in one nostril for overdose and call 911. 1 Each 0    Cholecalciferol (VITAMIN D3) 125 MCG (5000 UT) Cap Take 5,000 Units by mouth every 48 hours.       No current facility-administered medications for this encounter.       Social History     Tobacco Use    Smoking status: Former     Current packs/day: 0.00     Average packs/day: 0.5 packs/day for 36.0 years (18.0 ttl pk-yrs)     Types: Cigarettes     Start date:      Quit date: 2006     Years since quittin.2    Smokeless tobacco: Former     Types: Chew     Quit date: 10/3/2023    Tobacco comments:     Quit smoking in 2006 - just less than 1 ppd. Used chewing tobacco for about 8 years.    Vaping Use    Vaping Use: Never used   Substance Use Topics    Alcohol use: Not Currently     Comment: Quit in     Drug use: Not Currently     Types: Methamphetamines, Marijuana, Inhaled     Comment: Quit in , used methamphetamine     Past Medical History:   Diagnosis Date    Back pain 10/05/2023    groin    Bilateral  "recurrent inguinal hernia without obstruction or gangrene 10/06/2023    Bowel habit changes 10/05/2023    constipation    Bronchitis     as a teenager    Cataract 10/05/2023    no surgery    Dental disorder     dentures    Drug-induced constipation 10/13/2023    Heart burn     Hepatitis A     treated    Hepatitis C 2016    treated    Hepatocellular carcinoma (HCC)     High cholesterol     Hypertension     Metastasis to bone (HCC)     Nausea and vomiting 10/06/2023    Pain in right leg 09/15/2023    Pneumonia     as a teenager    Postoperative abdominal pain 10/13/2023      Family History   Problem Relation Age of Onset    Cancer Mother         Multiple Myeloma    Cancer Father         Colon and Lung    Cancer Sister         Breast    Diabetes Brother     Cancer Maternal Grandmother         Colon    Colon Cancer Maternal Grandmother     No Known Problems Daughter     No Known Problems Daughter     No Known Problems Son     Cancer Other         Multiple myeloma         Objective:     Vitals:    03/11/24 1508   BP: 130/64   Pulse: (!) 109   Resp: 12   Temp: 37 °C (98.6 °F)   TempSrc: Temporal   SpO2: 89%   Weight: 65.3 kg (144 lb)   Height: 1.753 m (5' 9\")     Body mass index is 21.27 kg/m².     Review of Systems   Constitutional:  Positive for malaise/fatigue and weight loss. Negative for chills and fever.   Respiratory:  Negative for cough and shortness of breath.    Cardiovascular:  Negative for chest pain and palpitations.   Gastrointestinal:  Negative for constipation, diarrhea, nausea and vomiting.   Musculoskeletal:  Positive for back pain and myalgias.   Psychiatric/Behavioral:  The patient is nervous/anxious.      Physical Exam:   Gen: no acute distress.   Skin: Pink, warm, and dry  HEENT: conjunctiva non-injected, sclera non-icteric. EOMs intact.   Nasal mucosa without edema nor erythema.   Pinna normal.    Oral mucous membranes pink and moist with no lesions.  Neck: Supple, trachea midline. No adenopathy or " masses in the neck or supraclavicular regions.  Lungs: Effort is normal.   CV: regular rate and rhythm  Abdomen: flat  Ext: no edema, color normal, vascularity normal, temperature normal  Alert and oriented Eye contact is good, speech goal directed, affect calm    Assessment and Plan:   Cancer related pain  Patient with known metastatic hepatocellular carcinoma.  Has significant disease burden causing distressing oncological pain.  Has previously been well-controlled on methadone and occasional use morphine for breakthrough.  Over the last couple weeks has an increase in his pain.  Through shared decision making will increase methadone dosing daily by 5 mg to a total of 35 mg daily.  May also continue to use 30 mg of morphine as needed for breakthrough.  PDMP reviewed, no signs of diversion or abuse, risk and benefits of medication discussed, controlled subs agreement on file.  1 month prescription sent to pharmacy.  Will follow-up in 1 month to reevaluate symptoms or request refills.    Anxiety  Has recently worsened.  In impacting sleep.  Could be secondary to increase in pain.  Has been using lorazepam 1-2 times per day.  Did discuss with patient nonpharmacological anxiety management options including mindfulness.  Will reevaluate at next appointment.  PDMP reviewed as noted above    33 minutes in total spent on patient encounter including chart review and consultation with patient's oncological providers.    Followup: Return in about 1 month (around 4/11/2024).    Burak Leblanc M.D.

## 2024-03-27 ENCOUNTER — HOSPITAL ENCOUNTER (OUTPATIENT)
Dept: RADIOLOGY | Facility: MEDICAL CENTER | Age: 69
End: 2024-03-27
Attending: STUDENT IN AN ORGANIZED HEALTH CARE EDUCATION/TRAINING PROGRAM
Payer: MEDICARE

## 2024-03-27 DIAGNOSIS — C22.0 HEPATOCELLULAR CARCINOMA (HCC): Chronic | ICD-10-CM

## 2024-03-27 PROCEDURE — 71260 CT THORAX DX C+: CPT

## 2024-03-27 PROCEDURE — 700117 HCHG RX CONTRAST REV CODE 255: Performed by: STUDENT IN AN ORGANIZED HEALTH CARE EDUCATION/TRAINING PROGRAM

## 2024-03-27 RX ADMIN — IOHEXOL 100 ML: 350 INJECTION, SOLUTION INTRAVENOUS at 13:12

## 2024-03-29 DIAGNOSIS — Z79.899 HIGH RISK MEDICATION USE: ICD-10-CM

## 2024-03-29 RX ORDER — 0.9 % SODIUM CHLORIDE 0.9 %
3 VIAL (ML) INJECTION PRN
Status: CANCELLED | OUTPATIENT
Start: 2024-04-02

## 2024-03-29 RX ORDER — 0.9 % SODIUM CHLORIDE 0.9 %
VIAL (ML) INJECTION PRN
Status: CANCELLED | OUTPATIENT
Start: 2024-04-02

## 2024-03-29 RX ORDER — 0.9 % SODIUM CHLORIDE 0.9 %
VIAL (ML) INJECTION PRN
Status: CANCELLED | OUTPATIENT
Start: 2024-04-01

## 2024-03-29 RX ORDER — METHYLPREDNISOLONE SODIUM SUCCINATE 125 MG/2ML
125 INJECTION, POWDER, LYOPHILIZED, FOR SOLUTION INTRAMUSCULAR; INTRAVENOUS PRN
Status: CANCELLED | OUTPATIENT
Start: 2024-04-02

## 2024-03-29 RX ORDER — DIPHENHYDRAMINE HYDROCHLORIDE 50 MG/ML
50 INJECTION INTRAMUSCULAR; INTRAVENOUS PRN
Status: CANCELLED | OUTPATIENT
Start: 2024-04-02

## 2024-03-29 RX ORDER — 0.9 % SODIUM CHLORIDE 0.9 %
10 VIAL (ML) INJECTION PRN
Status: CANCELLED | OUTPATIENT
Start: 2024-04-02

## 2024-03-29 RX ORDER — EPINEPHRINE 1 MG/ML(1)
0.5 AMPUL (ML) INJECTION PRN
Status: CANCELLED | OUTPATIENT
Start: 2024-04-02

## 2024-03-29 RX ORDER — SODIUM CHLORIDE 9 MG/ML
INJECTION, SOLUTION INTRAVENOUS CONTINUOUS
Status: CANCELLED | OUTPATIENT
Start: 2024-04-02

## 2024-03-29 RX ORDER — ONDANSETRON 2 MG/ML
4 INJECTION INTRAMUSCULAR; INTRAVENOUS PRN
Status: CANCELLED | OUTPATIENT
Start: 2024-04-02

## 2024-03-29 RX ORDER — ONDANSETRON 8 MG/1
8 TABLET, ORALLY DISINTEGRATING ORAL PRN
Status: CANCELLED | OUTPATIENT
Start: 2024-04-02

## 2024-03-29 RX ORDER — 0.9 % SODIUM CHLORIDE 0.9 %
10 VIAL (ML) INJECTION PRN
Status: CANCELLED | OUTPATIENT
Start: 2024-04-01

## 2024-03-29 RX ORDER — 0.9 % SODIUM CHLORIDE 0.9 %
3 VIAL (ML) INJECTION PRN
Status: CANCELLED | OUTPATIENT
Start: 2024-04-01

## 2024-03-29 RX ORDER — PROCHLORPERAZINE MALEATE 10 MG
10 TABLET ORAL EVERY 6 HOURS PRN
Status: CANCELLED | OUTPATIENT
Start: 2024-04-02

## 2024-04-01 ENCOUNTER — PHARMACY VISIT (OUTPATIENT)
Dept: PHARMACY | Facility: MEDICAL CENTER | Age: 69
End: 2024-04-01
Payer: COMMERCIAL

## 2024-04-01 ENCOUNTER — HOSPITAL ENCOUNTER (OUTPATIENT)
Dept: HEMATOLOGY ONCOLOGY | Facility: MEDICAL CENTER | Age: 69
End: 2024-04-01
Attending: STUDENT IN AN ORGANIZED HEALTH CARE EDUCATION/TRAINING PROGRAM
Payer: MEDICARE

## 2024-04-01 ENCOUNTER — HOME CARE VISIT (OUTPATIENT)
Dept: HOSPICE | Facility: HOSPICE | Age: 69
End: 2024-04-01
Payer: MEDICARE

## 2024-04-01 ENCOUNTER — HOSPITAL ENCOUNTER (OUTPATIENT)
Dept: HEMATOLOGY ONCOLOGY | Facility: MEDICAL CENTER | Age: 69
End: 2024-04-01
Attending: FAMILY MEDICINE
Payer: COMMERCIAL

## 2024-04-01 VITALS
HEIGHT: 69 IN | RESPIRATION RATE: 12 BRPM | WEIGHT: 143 LBS | DIASTOLIC BLOOD PRESSURE: 62 MMHG | BODY MASS INDEX: 21.18 KG/M2 | OXYGEN SATURATION: 90 % | TEMPERATURE: 98.9 F | HEART RATE: 96 BPM | SYSTOLIC BLOOD PRESSURE: 118 MMHG

## 2024-04-01 VITALS
BODY MASS INDEX: 21.18 KG/M2 | WEIGHT: 143 LBS | OXYGEN SATURATION: 90 % | TEMPERATURE: 98.9 F | SYSTOLIC BLOOD PRESSURE: 118 MMHG | DIASTOLIC BLOOD PRESSURE: 62 MMHG | HEART RATE: 96 BPM | HEIGHT: 69 IN | RESPIRATION RATE: 12 BRPM

## 2024-04-01 DIAGNOSIS — G89.3 CANCER RELATED PAIN: ICD-10-CM

## 2024-04-01 DIAGNOSIS — C79.51 METASTASIS TO BONE (HCC): Chronic | ICD-10-CM

## 2024-04-01 DIAGNOSIS — C22.0 HEPATOCELLULAR CARCINOMA (HCC): ICD-10-CM

## 2024-04-01 DIAGNOSIS — C22.0 HEPATOCELLULAR CARCINOMA (HCC): Chronic | ICD-10-CM

## 2024-04-01 DIAGNOSIS — F41.9 ANXIETY: ICD-10-CM

## 2024-04-01 PROCEDURE — 99212 OFFICE O/P EST SF 10 MIN: CPT | Performed by: STUDENT IN AN ORGANIZED HEALTH CARE EDUCATION/TRAINING PROGRAM

## 2024-04-01 PROCEDURE — RXMED WILLOW AMBULATORY MEDICATION CHARGE: Performed by: FAMILY MEDICINE

## 2024-04-01 PROCEDURE — 99214 OFFICE O/P EST MOD 30 MIN: CPT | Performed by: FAMILY MEDICINE

## 2024-04-01 PROCEDURE — 99214 OFFICE O/P EST MOD 30 MIN: CPT | Performed by: STUDENT IN AN ORGANIZED HEALTH CARE EDUCATION/TRAINING PROGRAM

## 2024-04-01 RX ORDER — MORPHINE SULFATE 30 MG/1
30 TABLET ORAL EVERY 4 HOURS PRN
Qty: 75 TABLET | Refills: 0 | Status: SHIPPED | OUTPATIENT
Start: 2024-04-08 | End: 2024-05-08

## 2024-04-01 RX ORDER — LORAZEPAM 0.5 MG/1
0.5 TABLET ORAL EVERY 8 HOURS PRN
Qty: 60 TABLET | Refills: 0 | Status: SHIPPED | OUTPATIENT
Start: 2024-04-01 | End: 2024-05-01

## 2024-04-01 RX ORDER — METHADONE HYDROCHLORIDE 10 MG/1
10 TABLET ORAL EVERY 8 HOURS
Qty: 90 TABLET | Refills: 0 | Status: SHIPPED | OUTPATIENT
Start: 2024-04-08 | End: 2024-05-09

## 2024-04-01 RX ORDER — METHADONE HYDROCHLORIDE 5 MG/1
TABLET ORAL
Qty: 14 TABLET | Refills: 0 | Status: SHIPPED | OUTPATIENT
Start: 2024-04-01 | End: 2024-04-08

## 2024-04-01 RX ORDER — METHADONE HYDROCHLORIDE 5 MG/1
5 TABLET ORAL EVERY 8 HOURS
Qty: 90 TABLET | Refills: 0 | Status: SHIPPED | OUTPATIENT
Start: 2024-04-08 | End: 2024-05-10

## 2024-04-01 ASSESSMENT — ENCOUNTER SYMPTOMS
FEVER: 0
SHORTNESS OF BREATH: 0
DIARRHEA: 0
CHILLS: 0
CHILLS: 0
SORE THROAT: 0
COUGH: 0
CONSTIPATION: 0
VOMITING: 0
HEARTBURN: 0
SINUS PAIN: 0
DIAPHORESIS: 0
DOUBLE VISION: 0
FEVER: 0
ORTHOPNEA: 0
NERVOUS/ANXIOUS: 0
BLURRED VISION: 0
NAUSEA: 0
COUGH: 0
SHORTNESS OF BREATH: 0
HEADACHES: 0
BRUISES/BLEEDS EASILY: 0
DIZZINESS: 0
WEIGHT LOSS: 0
PALPITATIONS: 0
PALPITATIONS: 0
WEAKNESS: 1
VOMITING: 0
ABDOMINAL PAIN: 0
SPUTUM PRODUCTION: 0
INSOMNIA: 1
BACK PAIN: 0
TINGLING: 0
WHEEZING: 0
MYALGIAS: 1
DIARRHEA: 0
BLOOD IN STOOL: 0
INSOMNIA: 0
BACK PAIN: 1
WEIGHT LOSS: 1
NAUSEA: 0
CONSTIPATION: 0

## 2024-04-01 ASSESSMENT — FIBROSIS 4 INDEX
FIB4 SCORE: 1.67
FIB4 SCORE: 1.67

## 2024-04-01 ASSESSMENT — PAIN SCALES - GENERAL
PAINLEVEL: 8=MODERATE-SEVERE PAIN
PAINLEVEL: 8=MODERATE-SEVERE PAIN

## 2024-04-01 NOTE — ASSESSMENT & PLAN NOTE
Patient with known metastatic hepatocellular carcinoma.  Does have extensive disease burden.  Currently has most significant pain located in his back.  Has been previously well-controlled on 35 mg of methadone divided in 3 times a day dosing along with morphine 15 mg grams to 30 mg for breakthrough.  Has had progression of disease and subsequently worsening of his symptoms.  Through shared decision making we will titrate his methadone up to 15 mg 3 times a day along with morphine for breakthrough.  Patient is also currently evaluating whether he will enroll in hospice or not and may subsequently have them take over his prescription prescribing.  PDMP reviewed, no signs of diversion or abuse, risk and benefits of medication discussed, controlled subs agreement on file.

## 2024-04-01 NOTE — PROGRESS NOTES
Follow Up Note:  Hematology/Oncology      Primary Care:  VALORIE Lawrence    Diagnosis: Hepatocellular carcinoma    Chief Complaint: On-treatment visit    Current Treatment: Durvalumab/tremelimumab    Prior Treatment: Palliative CRT    Oncology History of Presenting Illness:  German Pagan is a 68 y.o.  man who presents to the clinic for evaluation for systemic therapy for a new diagnosis of hepatocellular carcinoma. The patient had presented to the hospital with low back pain which had been worsening over several months, and was found on imaging to have lytic lesions throughout his skeleton. He was managed with pain control and discharged for outpatient evaluation for potential multiple myeloma. He was sent to our intake oncology coordinator, who worked him up for multiple myeloma. His bone marrow biopsy only showed 3-5% plasma cells though, without any specific signature on blood work significant for myeloma. A whole body PET scan revealed the lytic lesions and a few hypermetabolic nodes, but no mass in the liver. The patient then underwent a biopsy of one of the bone lesions, and that revealed hepatocellular carcinoma. He then had an AFP drawn which was elevated at 80. He was subsequently referred to me for evaluation. Of note, he has a history of hepatitis C, treated in 2016, in the setting of prior IV drug use (he has not used in many years).      Treatment History:   12/04/23: Palliative XRT 4000 cGy in 5 fractions  01/09/24: C1 durva/treme  02/06/24: C2 durva   03/05/24: C3 durva   03/27/24: CT scans show progression of disease    Interval History:  Patient is here for follow up visit. He is having more back pain which is due to disease progression and worsening involvement at T12. He states that for the most part his pain is controlled with the regimen prescribed by Dr. Leblanc, but that pain in his back is more severe. His wife is with him today.     Allergies as of 04/01/2024 - Reviewed  04/01/2024   Allergen Reaction Noted    Pcn [penicillins]  05/19/2017         Current Outpatient Medications:     [START ON 4/8/2024] methadone (DOLOPHINE) 10 MG Tab, Take 1 Tablet by mouth every 8 hours for 30 days. To be used in conjunction with 5 mg tablets for a total of 45mg daily, Disp: 90 Tablet, Rfl: 0    [START ON 4/8/2024] methadone (DOLOPHINE) 5 MG Tab, Take 1 Tablet by mouth every 8 hours for 30 days. To be used in conjunction with 10 mg tablets for a total of 45mg daily, Disp: 90 Tablet, Rfl: 0    [START ON 4/8/2024] morphine (MS IR) 30 MG tablet, Take 1 Tablet by mouth every four hours as needed for Severe Pain for up to 30 days., Disp: 75 Tablet, Rfl: 0    methadone (DOLOPHINE) 5 MG Tab, Take one tablet three times a day to be used in conjunction with existing 5 mg prescription, Disp: 14 Tablet, Rfl: 0    LORazepam (ATIVAN) 0.5 MG Tab, Take 1 Tablet by mouth every 8 hours as needed for Anxiety for up to 30 days., Disp: 60 Tablet, Rfl: 0    hydrOXYzine HCl (ATARAX) 50 MG Tab, Take 1 Tablet by mouth 3 times a day as needed for Itching or Anxiety., Disp: 90 Tablet, Rfl: 1    cloNIDine (CATAPRES) 0.1 MG Tab, Take 0.1 mg by mouth as needed. PRN FOR SYSTOLIC BP > 160, Disp: , Rfl:     diphenhydrAMINE (BENADRYL ALLERGY) 25 MG capsule, Take 25 mg by mouth as needed. FOR SKIN ITCHING, Disp: , Rfl:     sennosides (SENOKOT) 8.6 MG Tab, Take 8.6 mg by mouth 1 time a day as needed., Disp: , Rfl:     ondansetron (ZOFRAN) 4 MG Tab tablet, Take 1 Tablet by mouth every four hours as needed for Nausea/Vomiting., Disp: 20 Tablet, Rfl: 3    prochlorperazine (COMPAZINE) 10 MG Tab, Take 1 Tablet by mouth every 6 hours as needed for Nausea/Vomiting., Disp: 30 Tablet, Rfl: 3    tamsulosin (FLOMAX) 0.4 MG capsule, Take 0.4 mg by mouth every evening., Disp: , Rfl:     Naloxone (NARCAN) 4 MG/0.1ML Liquid, One spray in one nostril for overdose and call 911., Disp: 1 Each, Rfl: 0    Cholecalciferol (VITAMIN D3) 125 MCG (5000 UT)  "Cap, Take 5,000 Units by mouth every 48 hours., Disp: , Rfl:       Review of Systems:  Review of Systems   Constitutional:  Negative for chills, diaphoresis, fever, malaise/fatigue and weight loss.   HENT:  Negative for hearing loss, nosebleeds, sinus pain and sore throat.    Eyes:  Negative for blurred vision and double vision.   Respiratory:  Negative for cough, sputum production, shortness of breath and wheezing.    Cardiovascular:  Negative for chest pain, palpitations, orthopnea and leg swelling.   Gastrointestinal:  Negative for abdominal pain, blood in stool, constipation, diarrhea, heartburn, melena, nausea and vomiting.   Genitourinary:  Negative for dysuria, frequency, hematuria and urgency.   Musculoskeletal:  Positive for joint pain (Down left hip) and myalgias. Negative for back pain.   Skin:  Positive for itching. Negative for rash.   Neurological:  Positive for weakness (Ambulating with walker). Negative for dizziness, tingling and headaches.   Endo/Heme/Allergies:  Does not bruise/bleed easily.   Psychiatric/Behavioral:  The patient is not nervous/anxious and does not have insomnia.          Physical Exam:  Vitals:    04/01/24 1108   BP: 118/62   Pulse: 96   Resp: 12   Temp: 37.2 °C (98.9 °F)   TempSrc: Temporal   SpO2: 90%   Weight: 64.9 kg (143 lb)   Height: 1.753 m (5' 9\")       DESC; KARNOFSKY SCALE WITH ECOG EQUIVALENT: 80, Normal activity with effort; some signs or symptoms of disease (ECOG equivalent 1)    DISTRESS LEVEL: no apparent distress    Physical Exam  Vitals and nursing note reviewed.   Constitutional:       General: He is awake. He is not in acute distress.     Appearance: Normal appearance. He is normal weight. He is not ill-appearing, toxic-appearing or diaphoretic.   HENT:      Head: Normocephalic and atraumatic.      Nose: Nose normal. No congestion.      Mouth/Throat:      Pharynx: Oropharynx is clear. No oropharyngeal exudate or posterior oropharyngeal erythema.   Eyes:      " General: No scleral icterus.     Extraocular Movements: Extraocular movements intact.      Conjunctiva/sclera: Conjunctivae normal.      Pupils: Pupils are equal, round, and reactive to light.   Cardiovascular:      Rate and Rhythm: Normal rate and regular rhythm.      Pulses: Normal pulses.      Heart sounds: Normal heart sounds. No murmur heard.     No friction rub. No gallop.   Pulmonary:      Effort: Pulmonary effort is normal.      Breath sounds: Normal breath sounds. No decreased air movement. No wheezing, rhonchi or rales.   Abdominal:      General: Bowel sounds are normal. There is no distension.      Tenderness: There is no abdominal tenderness.   Musculoskeletal:         General: Tenderness (Mid back) present. No deformity. Normal range of motion.      Cervical back: Normal range of motion and neck supple. No tenderness.      Right lower leg: No edema.      Left lower leg: No edema.   Lymphadenopathy:      Cervical: No cervical adenopathy.      Upper Body:      Right upper body: No axillary adenopathy.      Left upper body: No axillary adenopathy.      Lower Body: No right inguinal adenopathy. No left inguinal adenopathy.   Skin:     General: Skin is warm and dry.      Coloration: Skin is not jaundiced.      Findings: No erythema or rash.   Neurological:      General: No focal deficit present.      Mental Status: He is alert and oriented to person, place, and time.      Sensory: Sensation is intact.      Motor: Weakness present.      Gait: Gait abnormal (Ambulating with walker).   Psychiatric:         Attention and Perception: Attention normal.         Mood and Affect: Mood normal.         Behavior: Behavior normal. Behavior is cooperative.         Thought Content: Thought content normal.         Judgment: Judgment normal.           Labs:  No visits with results within 7 Day(s) from this visit.   Latest known visit with results is:   Hospital Outpatient Visit on 03/04/2024   Component Date Value Ref Range  Status    Free T-4 03/04/2024 1.13  0.93 - 1.70 ng/dL Final    TSH 03/04/2024 1.150  0.380 - 5.330 uIU/mL Final    Comment: The 2011 American Thyroid Association (CHELITA) guidelines  recommended that the interpretation of thyroid function in  pregnancy be based on trimester specific reference ranges.    1st Trimester  0.100-2.500 mIU/L  2nd Trimester  0.200-3.000 mIU/L  3rd Trimester  0.300-3.500 mIU/L    These established reference ranges have not been validated  at [facility].      Sodium 03/04/2024 133 (L)  135 - 145 mmol/L Final    Potassium 03/04/2024 5.0  3.6 - 5.5 mmol/L Final    Chloride 03/04/2024 101  96 - 112 mmol/L Final    Co2 03/04/2024 22  20 - 33 mmol/L Final    Anion Gap 03/04/2024 10.0  7.0 - 16.0 Final    Glucose 03/04/2024 100 (H)  65 - 99 mg/dL Final    Bun 03/04/2024 11  8 - 22 mg/dL Final    Creatinine 03/04/2024 0.75  0.50 - 1.40 mg/dL Final    Calcium 03/04/2024 8.8  8.5 - 10.5 mg/dL Final    Correct Calcium 03/04/2024 9.0  8.5 - 10.5 mg/dL Final    AST(SGOT) 03/04/2024 85 (H)  12 - 45 U/L Final    ALT(SGPT) 03/04/2024 136 (H)  2 - 50 U/L Final    Alkaline Phosphatase 03/04/2024 151 (H)  30 - 99 U/L Final    Total Bilirubin 03/04/2024 0.3  0.1 - 1.5 mg/dL Final    Albumin 03/04/2024 3.7  3.2 - 4.9 g/dL Final    Total Protein 03/04/2024 7.3  6.0 - 8.2 g/dL Final    Globulin 03/04/2024 3.6 (H)  1.9 - 3.5 g/dL Final    A-G Ratio 03/04/2024 1.0  g/dL Final    WBC 03/04/2024 9.6  4.8 - 10.8 K/uL Final    RBC 03/04/2024 4.91  4.70 - 6.10 M/uL Final    Hemoglobin 03/04/2024 13.3 (L)  14.0 - 18.0 g/dL Final    Hematocrit 03/04/2024 42.6  42.0 - 52.0 % Final    MCV 03/04/2024 86.8  81.4 - 97.8 fL Final    MCH 03/04/2024 27.1  27.0 - 33.0 pg Final    MCHC 03/04/2024 31.2 (L)  32.3 - 36.5 g/dL Final    Please note new reference range effective 05/22/2023.    RDW 03/04/2024 51.3 (H)  35.9 - 50.0 fL Final    Platelet Count 03/04/2024 309  164 - 446 K/uL Final    MPV 03/04/2024 10.7  9.0 -  12.9 fL Final    Neutrophils-Polys 03/04/2024 77.20 (H)  44.00 - 72.00 % Final    Lymphocytes 03/04/2024 11.80 (L)  22.00 - 41.00 % Final    Monocytes 03/04/2024 6.10  0.00 - 13.40 % Final    Eosinophils 03/04/2024 2.80  0.00 - 6.90 % Final    Basophils 03/04/2024 1.30  0.00 - 1.80 % Final    Immature Granulocytes 03/04/2024 0.80  0.00 - 0.90 % Final    Nucleated RBC 03/04/2024 0.00  0.00 - 0.20 /100 WBC Final    Please note new reference range effective 05/22/2023.    Neutrophils (Absolute) 03/04/2024 7.41  1.82 - 7.42 K/uL Final    Comment: Includes immature neutrophils, if present.  Please note new reference range effective 05/22/2023.      Lymphs (Absolute) 03/04/2024 1.13  1.00 - 4.80 K/uL Final    Monos (Absolute) 03/04/2024 0.59  0.00 - 0.85 K/uL Final    Eos (Absolute) 03/04/2024 0.27  0.00 - 0.51 K/uL Final    Baso (Absolute) 03/04/2024 0.12  0.00 - 0.12 K/uL Final    Immature Granulocytes (abs) 03/04/2024 0.08  0.00 - 0.11 K/uL Final    NRBC (Absolute) 03/04/2024 0.00  K/uL Final    PT 03/04/2024 14.5  12.0 - 14.6 sec Final    INR 03/04/2024 1.11  0.87 - 1.13 Final    Comment: INR - Non-therapeutic Reference Range: 0.87-1.13  INR - Therapeutic Reference Range: 2.0-4.0      Alpha Fetoprotein 03/04/2024 831 (H)  0 - 9 ng/mL Final    Comment: INTERPRETIVE INFORMATION: Alpha Fetoprotein Tumor Marker  The [employer] Access DxI AFP method is used. Results  obtained with different assay methods or kits cannot be used  interchangeably. AFP is a valuable aid in the management of  nonseminomatous testicular cancer patients when used in  conjunction with information available from the clinical  evaluation and other diagnostic procedures. Increased AFP  concentrations have also been observed in ataxia telangiectasia,  hereditary tyrosinemia, primary hepatocellular carcinoma,  teratocarcinoma, gastrointestinal tract cancers with and without  liver metastases, and in benign hepatic conditions such as acute  viral  hepatitis, chronic active hepatitis, and cirrhosis. The  result cannot be interpreted as absolute evidence of the presence  or absence of malignant disease. The result is not interpretable  as a tumor marker in pregnant females.  Access complete set of age- and/or gender-specific reference  intervals for this test in the                            [facility] Directory  ([email or web address]).  Performed By: [facility]  [address] [place] [address]  : Haim Becker MD, PhD  CLIA Number: [id number]      GFR (CKD-EPI) 03/04/2024 96  >60 mL/min/1.73 m 2 Final    Comment: Estimated Glomerular Filtration Rate is calculated using  race neutral CKD-EPI 2021 equation per NKF-ASN recommendations.         Imaging:     All listed images below have been independently reviewed by me. I agree with the findings as summarized below:    CT c/a/p 03/27/24:    IMPRESSION:     1.  Severe lytic osseous metastatic disease. There has been a marked increase in size and number of osseous metastases since prior study. There is a large lytic lesion at T12 protruding the posterior cortex with at least a moderate central canal   stenosis. Recommend further workup with MRI of cervical, thoracic and lumbar spine to assess for central canal stenoses and specifically to better assess the T12 level.  2.  Interval development of diffuse pulmonary infiltrates.  3.  Large adrenal masses, increased in size.  4.  2 vague subtle hypodense foci in the right liver, minimally increased.     Efforts are underway to contact referring physician with results at time of dictation.    Pathology:  FINAL DIAGNOSIS:     A. Left iliac bone biopsy:          Metastatic carcinoma with hepatoid differentiation.          See comment.     Comment: Sections show metastatic carcinoma with striking morphologic   and immunohistochemical features of hepatocellular carcinoma (HCC). The   tumor is positive for , CAM5.2 and heppar1,  and is negative for   CK7, CK20, CK5/6, pankeratin, PSA, NKX3.1, CDX2, NapsinA, TTF1, p63,   synaptophysin, chromograninA, SOX10, MelanA, S100, Calretinin, inhibin,   HMB45, and PAX8 (some of these markers were performed on the recent   bone marrow biopsy). P53 stain shows overexpression and may suggest   tp53 mutation. Recent PET CT and CT with contrast show only lytic bony   lesions, bilateral adrenal masses, and possible hilar adenopathy which   is indeterminate for metastatic lymphadenopathy. The liver is   reportedly unremarkable. Based on the current biopsy alone, metastatic   hepatocellular carcinoma (HCC) would be most likely. However, if this   possibility is definitively excluded by the radiologic findings,   metastatic carcinoma with hepatoid differentiation is the appropriate   diagnosis. Carcinoma with hepatoid differentiation may arise from   numerous sites throughout the body, and clinical/radiologic correlation   would be needed to determine the site of origin. There is adequate   tumor for any additional testing required (block A1 or A2).                                           Diagnosis performed by:                                       CHRISTELLE MARTINEZ DO      Assessment & Plan:  1. Hepatocellular carcinoma (HCC)          This is a now 69 year old  man with hepatocellular carcinoma, cTxNxM1 stage IV disease. He presents for evaluation for systemic therapy. He has progressed on the STRIDE regimen.      Current Diagnosis and Staging: Hepatocellular carcinoma, cTxNxM1 stage IV disease    Update: The patient has progression of disease. Discussed going on lenvatinib vs hospice care, and the patient will decide with his family. I have also referred him to radiation oncology for palliative XRT to the spine.      Treatment Plan: Consideration for lenvatinib     Treatment Citation: NCCN     Plan of Care:     Primary Therapy: Consideration for lenvatinib  Supportive Therapy: Denosumab for fracture  prevention; palliative XRT for spine  Toxicity: Patient is getting antineoplastic therapy and needs monitoring of blood counts, hepatic function, and renal function due to potential for organ dysfunction.   Labs: CBC with diff, CMP, PT, AFP monitoring  Imaging: TBD (patient may opt for hospice care)  Treatment Planning: Patient has progressive disease. Discuss lenvatinib vs hospice care. The patient will decide on what he wants to do and will let us know accordingly.   Consultations: Radiation oncology (Dr. Ruvalcaba); Palliative care (Dr. Leblanc)  Code Status: Full  Miscellaneous: NA  Return for Follow Up: PRN (patient to decide on treatment course)    Any questions and concerns raised by the patient were answered to the best of my ability. Thank you for allowing me to participate in the care for this patient. Please feel free to contact me for any questions or concerns.       Total time spent on chart review, clinic encounter, and documentation: 28 minutes.

## 2024-04-01 NOTE — PROGRESS NOTES
Subjective:     Chief Complaint   Patient presents with    Cancer     FV     German Pagan is a 69 y.o. male here today for follow-up on symptom management for oncological pain.  Patient is accompanied by his wife.  Patient and his wife report they just finished a visit with his medical oncologist in which they were told he was having progression of his disease on his current treatment and they then discussed options including hospice versus palliative radiation versus second opinion at [facility].  He also reports a worsening of his back pain that has required increased use of breakthrough morphine up to 4 doses per day.  We then discussed increasing his methadone and that his increase in pain is likely correlated to the progression of his disease.  They were open to increasing his methadone in the hope of providing better continuous pain relief.  We then discussed goals of care including how to best proceed in the light of his recent scans.  We did discuss the role of hospice and that there may be of value in establishing a relationship with them so while he is evaluating his other options he could decide and if ultimately he decided hospice he would be in a better position to transition to their care.  We concluded the visit with a plan to place a hospice referral to set up an informational meeting and then follow-up in 4 weeks to reevaluate his symptoms and his next methadone refills.    Problem   Hepatocellular Carcinoma (Hcc)   Cancer Related Pain        Current medicines (including changes today)  Current Outpatient Medications   Medication Sig Dispense Refill    [START ON 4/8/2024] methadone (DOLOPHINE) 10 MG Tab Take 1 Tablet by mouth every 8 hours for 30 days. To be used in conjunction with 5 mg tablets for a total of 45mg daily 90 Tablet 0    [START ON 4/8/2024] methadone (DOLOPHINE) 5 MG Tab Take 1 Tablet by mouth every 8 hours for 30 days. To be used in conjunction with 10 mg tablets for a total of  45mg daily 90 Tablet 0    [START ON 2024] morphine (MS IR) 30 MG tablet Take 1 Tablet by mouth every four hours as needed for Severe Pain for up to 30 days. 75 Tablet 0    methadone (DOLOPHINE) 5 MG Tab Take one tablet three times a day to be used in conjunction with existing 5 mg prescription 14 Tablet 0    LORazepam (ATIVAN) 0.5 MG Tab Take 1 Tablet by mouth every 8 hours as needed for Anxiety for up to 30 days. 60 Tablet 0    hydrOXYzine HCl (ATARAX) 50 MG Tab Take 1 Tablet by mouth 3 times a day as needed for Itching or Anxiety. 90 Tablet 1    cloNIDine (CATAPRES) 0.1 MG Tab Take 0.1 mg by mouth as needed. PRN FOR SYSTOLIC BP > 160      diphenhydrAMINE (BENADRYL ALLERGY) 25 MG capsule Take 25 mg by mouth as needed. FOR SKIN ITCHING      sennosides (SENOKOT) 8.6 MG Tab Take 8.6 mg by mouth 1 time a day as needed.      ondansetron (ZOFRAN) 4 MG Tab tablet Take 1 Tablet by mouth every four hours as needed for Nausea/Vomiting. 20 Tablet 3    prochlorperazine (COMPAZINE) 10 MG Tab Take 1 Tablet by mouth every 6 hours as needed for Nausea/Vomiting. 30 Tablet 3    tamsulosin (FLOMAX) 0.4 MG capsule Take 0.4 mg by mouth every evening.      Naloxone (NARCAN) 4 MG/0.1ML Liquid One spray in one nostril for overdose and call 911. 1 Each 0    Cholecalciferol (VITAMIN D3) 125 MCG (5000 UT) Cap Take 5,000 Units by mouth every 48 hours.       No current facility-administered medications for this encounter.       Social History     Tobacco Use    Smoking status: Former     Current packs/day: 0.00     Average packs/day: 0.5 packs/day for 36.0 years (18.0 ttl pk-yrs)     Types: Cigarettes     Start date:      Quit date:      Years since quittin.2    Smokeless tobacco: Former     Types: Chew     Quit date: 10/3/2023    Tobacco comments:     Quit smoking in 2006 - just less than 1 ppd. Used chewing tobacco for about 8 years.    Vaping Use    Vaping Use: Never used   Substance Use Topics    Alcohol use: Not Currently  "    Comment: Quit in 1994    Drug use: Not Currently     Types: Methamphetamines, Marijuana, Inhaled     Comment: Quit in 1999, used methamphetamine     Past Medical History:   Diagnosis Date    Back pain 10/05/2023    groin    Bilateral recurrent inguinal hernia without obstruction or gangrene 10/06/2023    Bowel habit changes 10/05/2023    constipation    Bronchitis     as a teenager    Cataract 10/05/2023    no surgery    Dental disorder     dentures    Drug-induced constipation 10/13/2023    Heart burn     Hepatitis A     treated    Hepatitis C 2016    treated    Hepatocellular carcinoma (HCC)     High cholesterol     Hypertension     Metastasis to bone (HCC)     Nausea and vomiting 10/06/2023    Pain in right leg 09/15/2023    Pneumonia     as a teenager    Postoperative abdominal pain 10/13/2023      Family History   Problem Relation Age of Onset    Cancer Mother         Multiple Myeloma    Cancer Father         Colon and Lung    Cancer Sister         Breast    Diabetes Brother     Cancer Maternal Grandmother         Colon    Colon Cancer Maternal Grandmother     No Known Problems Daughter     No Known Problems Daughter     No Known Problems Son     Cancer Other         Multiple myeloma         Objective:     Vitals:    04/01/24 1119   BP: 118/62   Pulse: 96   Resp: 12   Temp: 37.2 °C (98.9 °F)   TempSrc: Temporal   SpO2: 90%   Weight: 64.9 kg (143 lb)   Height: 1.753 m (5' 9\")     Body mass index is 21.12 kg/m².     Review of Systems   Constitutional:  Positive for malaise/fatigue and weight loss. Negative for chills and fever.   Respiratory:  Negative for cough and shortness of breath.    Cardiovascular:  Negative for chest pain and palpitations.   Gastrointestinal:  Negative for constipation, diarrhea, nausea and vomiting.   Musculoskeletal:  Positive for back pain.   Psychiatric/Behavioral:  The patient has insomnia.      Physical Exam:   Gen: no acute distress.   Skin: Pink, warm, and dry  HEENT: " conjunctiva non-injected, sclera non-icteric. EOMs intact.   Nasal mucosa without edema nor erythema.   Pinna normal.    Oral mucous membranes pink and moist with no lesions.  Neck: Supple, trachea midline. No adenopathy or masses in the neck or supraclavicular regions.  Lungs: Effort is normal.   CV: regular rate and rhythm  Abdomen: flat  Ext: no edema, color normal, vascularity normal, temperature normal  Alert and oriented Eye contact is good, speech goal directed, affect calm    Assessment and Plan:   Cancer related pain  Patient with known metastatic hepatocellular carcinoma.  Does have extensive disease burden.  Currently has most significant pain located in his back.  Has been previously well-controlled on 35 mg of methadone divided in 3 times a day dosing along with morphine 15 mg grams to 30 mg for breakthrough.  Has had progression of disease and subsequently worsening of his symptoms.  Through shared decision making we will titrate his methadone up to 15 mg 3 times a day along with morphine for breakthrough.  Patient is also currently evaluating whether he will enroll in hospice or not and may subsequently have them take over his prescription prescribing.  PDMP reviewed, no signs of diversion or abuse, risk and benefits of medication discussed, controlled subs agreement on file.    Hepatocellular carcinoma (HCC)  Patient with known metastatic hepatocellular carcinoma.  Has recently had scans that show progression of his disease despite his current immunotherapy.  Through discussion with his medical oncologist is currently evaluating potentially palliative radiation for some of his bony lesions as well as a second opinion at [facility] to evaluate any other possible treatment options did have good goals of care conversation with patient including the hospice philosophy of care and the hospice Medicare benefit.  They currently have good insight into his disease and through shared decision making would like  to set up an informational session with hospice to have that information is a evaluate his other options.  Currently has a hospice qualifying medical diagnosis of metastatic cancer.  Prognosis is likely less than 6 months.  Has a current PPS of approximately 60%.  Will send referral to hospice to set up an informational session to allow him to make the decision to enroll when necessary.    38 minutes in total spent on patient encounter including chart review and consultation with patient's oncological providers.    Followup: Return in about 4 weeks (around 4/29/2024).    Burak Leblanc M.D.

## 2024-04-01 NOTE — ASSESSMENT & PLAN NOTE
Patient with known metastatic hepatocellular carcinoma.  Has recently had scans that show progression of his disease despite his current immunotherapy.  Through discussion with his medical oncologist is currently evaluating potentially palliative radiation for some of his bony lesions as well as a second opinion at [facility] to evaluate any other possible treatment options did have good goals of care conversation with patient including the hospice philosophy of care and the hospice Medicare benefit.  They currently have good insight into his disease and through shared decision making would like to set up an informational session with hospice to have that information is a evaluate his other options.  Currently has a hospice qualifying medical diagnosis of metastatic cancer.  Prognosis is likely less than 6 months.  Has a current PPS of approximately 60%.  Will send referral to hospice to set up an informational session to allow him to make the decision to enroll when necessary.

## 2024-04-02 ENCOUNTER — OUTPATIENT INFUSION SERVICES (OUTPATIENT)
Dept: ONCOLOGY | Facility: MEDICAL CENTER | Age: 69
End: 2024-04-02
Attending: STUDENT IN AN ORGANIZED HEALTH CARE EDUCATION/TRAINING PROGRAM
Payer: MEDICARE

## 2024-04-02 VITALS
HEART RATE: 90 BPM | DIASTOLIC BLOOD PRESSURE: 62 MMHG | BODY MASS INDEX: 21.55 KG/M2 | WEIGHT: 145.5 LBS | SYSTOLIC BLOOD PRESSURE: 112 MMHG | TEMPERATURE: 97.4 F | OXYGEN SATURATION: 98 % | RESPIRATION RATE: 18 BRPM | HEIGHT: 69 IN

## 2024-04-02 DIAGNOSIS — C22.0 HEPATOCELLULAR CARCINOMA (HCC): ICD-10-CM

## 2024-04-02 DIAGNOSIS — C79.51 METASTASIS TO BONE (HCC): ICD-10-CM

## 2024-04-02 LAB
CA-I BLD ISE-SCNC: 1.17 MMOL/L (ref 1.1–1.3)
CREAT BLD-MCNC: 0.9 MG/DL (ref 0.5–1.4)

## 2024-04-02 PROCEDURE — 82330 ASSAY OF CALCIUM: CPT

## 2024-04-02 PROCEDURE — 82565 ASSAY OF CREATININE: CPT

## 2024-04-02 PROCEDURE — 96372 THER/PROPH/DIAG INJ SC/IM: CPT

## 2024-04-02 PROCEDURE — 700111 HCHG RX REV CODE 636 W/ 250 OVERRIDE (IP): Mod: JZ,JG | Performed by: NURSE PRACTITIONER

## 2024-04-02 PROCEDURE — 36415 COLL VENOUS BLD VENIPUNCTURE: CPT

## 2024-04-02 RX ADMIN — DENOSUMAB 120 MG: 120 INJECTION SUBCUTANEOUS at 14:19

## 2024-04-02 ASSESSMENT — FIBROSIS 4 INDEX
FIB4 SCORE: 1.67
FIB4 SCORE: 1.67

## 2024-04-02 ASSESSMENT — PAIN DESCRIPTION - PAIN TYPE: TYPE: CHRONIC PAIN

## 2024-04-02 NOTE — PROGRESS NOTES
Pt arrives to [facility] for [facility].  Pt denies any s/sx of infection or recent/planned dental procedures.  ISTAT calcium and creatinine drawn via 23g butterfly needle to left wrist.  Labs reviewed and ok to give Prolia per pharmacy.  Xgeva given SC to back of RUE.  Gave pt a copy of schedule.  Next appt on 4/30/2024.  Pt dc home to self care.

## 2024-04-03 ENCOUNTER — HOME CARE VISIT (OUTPATIENT)
Dept: HOSPICE | Facility: HOSPICE | Age: 69
End: 2024-04-03
Payer: MEDICARE

## 2024-04-03 ASSESSMENT — ACTIVITIES OF DAILY LIVING (ADL): AMBULATION_REQUIRES_ASSISTANCE: 1

## 2024-04-05 ENCOUNTER — HOSPITAL ENCOUNTER (OUTPATIENT)
Dept: RADIATION ONCOLOGY | Facility: MEDICAL CENTER | Age: 69
End: 2024-04-05
Attending: RADIOLOGY
Payer: MEDICARE

## 2024-04-05 ENCOUNTER — HOSPITAL ENCOUNTER (OUTPATIENT)
Dept: RADIATION ONCOLOGY | Facility: MEDICAL CENTER | Age: 69
End: 2024-04-05

## 2024-04-05 VITALS
SYSTOLIC BLOOD PRESSURE: 126 MMHG | DIASTOLIC BLOOD PRESSURE: 75 MMHG | WEIGHT: 143.3 LBS | OXYGEN SATURATION: 92 % | HEART RATE: 74 BPM | BODY MASS INDEX: 21.22 KG/M2 | TEMPERATURE: 97.5 F

## 2024-04-05 DIAGNOSIS — C22.0 HEPATOCELLULAR CARCINOMA (HCC): Chronic | ICD-10-CM

## 2024-04-05 PROCEDURE — 77334 RADIATION TREATMENT AID(S): CPT | Mod: 26 | Performed by: RADIOLOGY

## 2024-04-05 PROCEDURE — 99214 OFFICE O/P EST MOD 30 MIN: CPT | Mod: 25 | Performed by: RADIOLOGY

## 2024-04-05 PROCEDURE — 77470 SPECIAL RADIATION TREATMENT: CPT | Performed by: RADIOLOGY

## 2024-04-05 PROCEDURE — 77263 THER RADIOLOGY TX PLNG CPLX: CPT | Performed by: RADIOLOGY

## 2024-04-05 PROCEDURE — 77290 THER RAD SIMULAJ FIELD CPLX: CPT | Mod: 26 | Performed by: RADIOLOGY

## 2024-04-05 PROCEDURE — 99212 OFFICE O/P EST SF 10 MIN: CPT | Mod: 25 | Performed by: RADIOLOGY

## 2024-04-05 PROCEDURE — 77470 SPECIAL RADIATION TREATMENT: CPT | Mod: 26 | Performed by: RADIOLOGY

## 2024-04-05 PROCEDURE — 77334 RADIATION TREATMENT AID(S): CPT | Performed by: RADIOLOGY

## 2024-04-05 PROCEDURE — 77290 THER RAD SIMULAJ FIELD CPLX: CPT | Performed by: RADIOLOGY

## 2024-04-05 ASSESSMENT — FIBROSIS 4 INDEX: FIB4 SCORE: 1.67

## 2024-04-05 ASSESSMENT — PAIN SCALES - GENERAL: PAINLEVEL: 5=MODERATE PAIN

## 2024-04-05 NOTE — RADIATION COMPLETION NOTES
Clinical Treatment Planning Note    DATE OF SERVICE: 4/5/2024    DIAGNOSIS:  Hepatocellular carcinoma (HCC)  Staging form: Liver, AJCC 8th Edition  - Clinical: Stage IVB (cTX, cNX, pM1) - Signed by Monroe Linn D.O. on 11/16/2023         IMAGING REVIEWED:  [x] CT     [] MRI     [] PET/CT     [] BONE SCAN     [] MAMMO     [] OTHER      TREATMENT INTENT:   [] CURATIVE     [] MAINTENANCE     [x]  PALLIATIVE      []  SUPPORTIVE     []  PROPHYLACTIC     [] BENIGN     []  CONSOLIDATIVE      [] DEFINITIVE   []  OLOGIMETASTATIC      LINE OF TREATMENT:  [] ADJUVANT   [x] DEFINITIVE   [] NEOADJUVANT   [] RE-TREATMENT      TECHNIQUE PLANNED:  [] IMRT   [] 3D   [x] SBRT   [] SRS/SRT   [] HDR   [] ELECTRON       IMRT JUSTIFICATION:  []   An immediately adjacent area has been previously irradiated and abutting portals must be established with high precision.    []  Dose escalation is planned to deliver radiation doses in excess of those commonly utilized for similar tumors with conventional treatment.    []  The target volume is concave or convex, and the critical normal tissues are within or around that convexity or concavity.    []  The target volume is in close proximity to critical structures that must be protected.    []  The volume of interest must be covered with narrow margins to adequately protect  immediately adjacent structures.      FIELDS & BLOCKING:  [x] COMPLEX BLOCKS     []  = 3 TX AREAS     [x]  ARCS     []  CUSTOM SHEILD        []  SIMPLE BLOCK      CHEMOTHERAPY:  []  CONCURRENT     []  INDUCTION     [] SEQUENTIAL     [x]  <30 DAYS FROM XRT      NOTES:  OAR CONSTRAINTS: (GUIDELINES ONLY NOT ABSOLUTE) QUANTEC OR AS STATED     One fraction Three fractions Five fractions    Serial Issue Max critical volume above threshold Threshold dose    (Gy) Max point dose (Gy)^a  Threshold dose   (Gy) Max point dose (Gy)^a Threshold dose   (Gy) Max point dose (Gy)^a End point (greater than or equal to Grade3)   Optic pathway  <0.2 cc 8 10 15.3 (5.1 Gy/fx) 17.4 (5.8 Gy/fx) 23 (4.6 Gy/fx) 25 (5 Gy/fx) Neuritis    Cochlea      9  17.1 (5.7 Gy/fx)  25 (5 Gy/fx) Hearing loss    Brainstem  (non medulla) <0.5 cc 10 15 18 (6 Gy/fx) 23.1 (7.7 Gy/fx) 23 (4.6 Gy/fx)   31 (6.2 Gy/fx) Cranial neuropathy   Spinal chord   and medulla <0.35 cc      <1.2 cc 10      7 14 18 (6 Gy/fx)    12.3 (4.1 Gy/fx) 21.9 (7.3 Gy/fx) 23 (4.6 Gy/fx)    14.5 (2.9 Gy/fx) 30 (6 Gy/fx) Myelitis   Spinal cord subvolume (5-6mm above and below level treated per Alberto) <10% of  subvolume 10 14 18 (6 Gy/fx) 21.9 (7.3 Gy/fx) 23 (4.6 Gy/fx) 30 (6 Gy/fx) Myelitis   Cauda equina  <5 cc 14 16 21.9 (7.3 Gy/fx) 24 (8 Gy/fx) 30 (6 Gy/fx) 32 (6.4 Gy/fx) Neuritis   Sacral plexus <5 cc 14.4 16 22.5 (7.5 Gy/fx) 24 (8 (Gy/fx) 30 (6 Gy/fx) 32 (6.4 Gy/fx) Neuropathy   Esophagus^b <5 cc 11.9 15.4 17.7 (5.9 Gy/fx) 25.2 (8.4 Gy/fx) 19.5 (3.9 Gy/fx) 35 (7 Gy/fx) Stenosis/fistula   Brachial plexus <3 cc 14 17.5 20.4 (6.8 Gy/fx 24 (8 Gy/fx) 27 (5.4 Gy/fx) 30.5 (6.1 Gy/fx) Neuropathy   Heart/ pericardium <15 cc 16 22 24 (8 Gy/fx) 30 (10 Gy/fx) 32 (6.4 Gy/fx) 38 (7.6 Gy/fx) Pericarditis   Great vessels <10 cc 31 37 39 (13 Gy/fx) 45 (15 Gy/fx) 47 (9.4 Gy/fx) 53 (10.6 Gy/fx) Aneurysm   Trachea and large bronchus^b <4 cc 10.5 20.2 15 (5 Gy/fx) 30 (10 Gy/fx) 16.5 (3.3 Gy/fx) 40 (8 Gy/fx) Stenosis/ fistula   Bronchus- smaller airways <0.5 cc 12.4 13.3 18.9 (6.3 Gy/fx) 23.1 (7.7 Gy/fx) 21 (4.2 Gy/fx) 33 (6.6 Gy/fx) Stenosis with atelectasis   Rib <1 cc      <30 cc 22 30 28.8 (9.6 Gy/fx)    30.0 (10.0 Gy/fx) 36.9 (12.3 Gy/fx) 35 (7 Gy/fx) 43 (8.6 Gy/fx) Pain or fracture   Skin <10 cc 23 26 30 (10 Gy/fx) 33 (11 Gy/fx) 36.5 (7.3 Gy/fx) 39.5 (7.9 Gy/fx) Ulceration   Stomach <10 cc 11.2 12.4 16.5 (5.5 Gy/fx) 22.2 (7.4 Gy/fx) 18 (3.6 Gy/fx) 32 (6.4 Gy/fx) Ulceration/fistula   Duodenum^b <5 cc      <10 cc 11.2      9 12.4 16.5 (5.5 Gy/fx)    11.4 (3.8 Gy/fx) 22.2 (7.4 Gy/fx) 18 (3.6 Gy/fx)    12.5 (2.5 Gy/fx) 32  (6.4 Gy/fx) Ulceration   Jejunum/ Ileum^b  <5 cc 11.9 15.4 17.7 (5.9 Gy/fx) 25.2 (8.4 Gy/fx) 19.5 (3.9 Gy/fx) 35 (7 Gy/fx) Enteritis/ obstruction   Colon^b <20 cc 14.3 18.4 24 (8 Gy/fx) 28.2 (9.4 gy/fx) 25 (5 Gy/fx) 38 (7.6 Gy/fx) Colitis/ fistula   Rectum^b <20 cc 14.3 18.4 24 (8 Gy/fx) 28.2 (9.4 gy/fx) 25 (5 Gy/fx) 38 (7.6 Gy/fx) Proctitis/ fistula   Bladder wall <15 cc 11.4 18.4 16.8 (5.6 Gy/fx) 28.2 (9.4 Gy/fx) 18.3 (3.65 Gy/fx) 38 (7.6 Gy/fx) Cystitis/ fistula   Penile bulb <3 cc 14 34 21.9 (7.3 Gy/fx) 42 (14 Gy/fx) 30 (6 Gy/fx) 50 (10 Gy/fx) Impotence   Femoral heads (right and left) <10 cc 14  21.9 (7.3 Gy/fx)  30 (6 Gy/fx)  Necrosis   Renal hilium/ vascular trunk <2/3 volume 1.6 18.6 (6.2 Gy/fx)   23 (4.6 Gy/fx)  Malignant hypertension         One fraction Three fractions Five fractions    Parallel tissue Max critical volume below threshold Threshold dose (Gy) Max point dose (Gy)^a Threshold dose (Gy) Max point dose (Gy)^a Threshold dose (Gy) Max point dose (Gy)^a End point (greater than or equal to Grade 3)   Lung (right and left) 1500 cc 7 NA- Parallel tissue 11.6 (2.96 Gy/fx) NA- Parallel tissue 12.5 (2.5 Gy/fx) NA- Parallel tissue Basic lung function    Lung (right and left) 1000 cc 7.4 NA- Parallel tissue 12.4 (3.1 Gy/fx) NA- Parallel tissue 13.5 (2.7 Gy/fx) NA- Parallel tissue Pneumonitis   Liver 700 cc 9.1 NA- Parallel tissue 19.2 (4.8 Gy/fx) NA- Parallel tissue 21 (4.2 Gy/fx) NA- Parallel tissue Basic liver function   Renal Cortex (right and left) 200 cc 8.4 NA- Parallel tissue 16 (4 Gy/fx) NA- Parallel tissue 17.5 (3.5 Gy/fx) NA- Parallel tissue Basic renal function   ^a “Point” defined as 0.035 cc or less.  ^b Avoid circumferential irradiation.

## 2024-04-05 NOTE — CT SIMULATION
PATIENT NAME German Pagan   PRIMARY PHYSICIAN Heber, Maurizio [id number]   REFERRING PHYSICIAN Monroe Linn D.O. 1955     Hepatocellular carcinoma (HCC)  Staging form: Liver, AJCC 8th Edition  - Clinical: Stage IVB (cTX, cNX, pM1) - Signed by Monroe Linn D.O. on 11/16/2023         Treatment Planning CT Simulation        Order Questions       Question Answer Comment    Is this for a new course of treatment? Yes     Is this an Addendum? No     Implanted Device/Pacemaker No     Simulation Status Initial     Planned Start Date 4/15/2024     Treatment Site Bone     Laterality Axial     Treatment Technique SBRT     Treatment Pattern/Frequency Daily 5 fx    Concurrent Chemotherapy No     CT Technique 3D     Slice Thickness 1mm     Scan Extent Chest T11-L1     Abdomen      Pelvis     Bowel Preparation No     Treatment Device(s) Body Fix     Patient Attire Gown     Patient Position Supine     Patient Orientation Head First     Arm Position Up     Treatment Machine TB1 - STx     Treatment Image Guidance CBCT     Frequency (CBCT) Daily     Image Guidance Match Bone     Treatment Planning Image Fusion CT/PET     Special Physics Consult Stereotactic      Overlap of old fields     Other Orders Special Tx Procedure      Weekly Physics Check

## 2024-04-05 NOTE — RADIATION COMPLETION NOTES
DATE OF SERVICE: 4/5/2024    DIAGNOSIS:  Hepatocellular carcinoma (HCC)  Staging form: Liver, AJCC 8th Edition  - Clinical: Stage IVB (cTX, cNX, pM1) - Signed by Monroe Linn D.O. on 11/16/2023       DATE OF SERVICE: 4/5/2024    TYPE OF SIMULATION: SBRT spine    GOAL OF TREATMENT:   [] Curative  [x] Palliative  [] Oligometastatic    CONTRAST:    [] IV Contrast*  [] Oral Contrast               POSITION:    [x]  Supine  [] Prone     IMMOBILIZATION DEVICE: [x]  Body-Fix  [] Omniboard  []  Bellyboard    PROCEDURE: Patient placed in body fix immobilization device.  Images viewed and approved.  Images reconstructed as need.  Image data sets transferred to [employer] for planning.    I have personally reviewed the relevant data, performed the target localization, and determined all relevant factors for this patient’s simulation.    *Omnipaque 80 -100cc IVP in conjunction with 500cc NS

## 2024-04-05 NOTE — PROGRESS NOTES
RADIATION ONCOLOGY FOLLOW-UP    Patient name:  German Pagan    Primary Physician:  VALORIE Lawrence MRN: [id number]  CSN: [id number]   Referring physician:  Monroe Linn D.O.  : 1955, 69 y.o.     DATE OF SERVICE: 2024    IDENTIFICATION:   A 69 y.o. male with    Hepatocellular carcinoma (HCC)  Staging form: Liver, AJCC 8th Edition  - Clinical: Stage IVB (cTX, cNX, pM1) - Signed by Monroe Linn D.O. on 2023      RADIATION SUMMARY:  Radiation Oncology          2023   Aria Course Treatment Dates   Course First Treatment Date 2023    Course Last Treatment Date 2023    Aria Treatment Summary   L Hip SBRT  Plan from Course C1_L_Hip_SBRT   Fraction 4 of 5 5 of 5    5 of 5   Elapsed Course Days 7 @ [id number] 10 @ [id number]    10 @ [id number]   Prescribed Fraction Dose 700 cGy 700 cGy    700 cGy   Prescribed Total Dose 3,500 cGy 3,500 cGy    3,500 cGy   GTV  Reference Point from Course C1_L_Hip_SBRT   Elapsed Course Days  @ [id number] 10 @ [id number]    10 @ [id number]   Session Dose 700 cGy 700 cGy    --   Total Dose 2,800 cGy 3,500 cGy    3,500 cGy      Details          More values are hidden. Newest values shown. Go to activity for more data.                HISTORY OF PRESENT ILLNESS:    I had the pleasure of seeing Mr. Pagan today in consultation at the request of Dr. Linn for left ilium bone metastasis causing symptomatic pain.  Patient was originally presented with lower back pain and had imaging which showed lytic lesions throughout the skeleton patient had a PET/CT which showed lytic lesions and a few hypermetabolic nodes but no mass of the liver and underwent biopsy of bone lesion which revealed hepatocellular carcinoma.  AFP drawn was elevated 80.  Currently taking MS Contin 15 mg twice daily and morphine for breakthrough     24  Completed SBRT L Hip 35Gy in 5 fractions completed 23. Starting immunotherapy STRIDE  regimen next week. Pain slightly improved. Dr. Linn following closely if pain persists after 2-3months of IO after reimaging will consider additional radiation to other bone lesions     INTERVAL HISTORY:  Patient developing worsening mid back pain CT scan which shows lytic lesion at T12 with epidural extension causing radicular pain.  He is on methadone for pain is no longer seeking systemic therapy.  Considering hospice vs levatinib.      PROBLEM LIST:  Patient Active Problem List   Diagnosis    History of hepatitis C    ED (erectile dysfunction)    Elevated blood pressure reading    Vitamin D insufficiency    Prediabetes    Elevated LDL cholesterol level    Healthcare maintenance    Arthritis of left hip    Chronic bilateral thoracic back pain    Grief reaction    Tinnitus of both ears    Metastasis to bone (HCC)    Cancer related pain    Weak urine stream    Hx of squamous cell carcinoma of skin    Hepatocellular carcinoma (HCC)    Anxiety    Elevated LFTs    Pruritus secondary to opiate therapy    High risk medication use       CURRENT MEDICATIONS:  Current Outpatient Medications   Medication Sig Dispense Refill    [START ON 4/8/2024] methadone (DOLOPHINE) 10 MG Tab Take 1 Tablet by mouth every 8 hours for 30 days. To be used in conjunction with 5 mg tablets for a total of 45mg daily 90 Tablet 0    [START ON 4/8/2024] methadone (DOLOPHINE) 5 MG Tab Take 1 Tablet by mouth every 8 hours for 30 days. To be used in conjunction with 10 mg tablets for a total of 45mg daily 90 Tablet 0    [START ON 4/8/2024] morphine (MS IR) 30 MG tablet Take 1 Tablet by mouth every four hours as needed for Severe Pain for up to 30 days. 75 Tablet 0    methadone (DOLOPHINE) 5 MG Tab Take one tablet three times a day to be used in conjunction with existing 5 mg prescription 14 Tablet 0    LORazepam (ATIVAN) 0.5 MG Tab Take 1 Tablet by mouth every 8 hours as needed for Anxiety for up to 30 days. 60 Tablet 0    hydrOXYzine HCl (ATARAX)  50 MG Tab Take 1 Tablet by mouth 3 times a day as needed for Itching or Anxiety. 90 Tablet 1    cloNIDine (CATAPRES) 0.1 MG Tab Take 0.1 mg by mouth as needed. PRN FOR SYSTOLIC BP > 160      diphenhydrAMINE (BENADRYL ALLERGY) 25 MG capsule Take 25 mg by mouth as needed. FOR SKIN ITCHING      sennosides (SENOKOT) 8.6 MG Tab Take 8.6 mg by mouth 1 time a day as needed.      ondansetron (ZOFRAN) 4 MG Tab tablet Take 1 Tablet by mouth every four hours as needed for Nausea/Vomiting. 20 Tablet 3    prochlorperazine (COMPAZINE) 10 MG Tab Take 1 Tablet by mouth every 6 hours as needed for Nausea/Vomiting. 30 Tablet 3    tamsulosin (FLOMAX) 0.4 MG capsule Take 0.4 mg by mouth every evening.      Naloxone (NARCAN) 4 MG/0.1ML Liquid One spray in one nostril for overdose and call 911. 1 Each 0    Cholecalciferol (VITAMIN D3) 125 MCG (5000 UT) Cap Take 5,000 Units by mouth every 48 hours.       No current facility-administered medications for this encounter.       ALLERGIES:  Pcn [penicillins]    REVIEW OF SYSTEMS:    A complete review of system taken. Pertinent items in HPI.  All others negative.    PHYSICAL EXAM:  PERFORMANCE STATUS:      11/22/2023     1:34 PM   ECOG Performance Review   ECOG Performance Status Restricted in physically strenuous activity but ambulatory and able to carry out work of a light or sedentary nature, e.g., light house work, office work         11/22/2023     1:34 PM   Karnofsky Score   Karnofsky Score 80     /75 (BP Location: Right arm, Patient Position: Sitting, BP Cuff Size: Adult)   Pulse 74   Temp 36.4 °C (97.5 °F) (Temporal)   Wt 65 kg (143 lb 4.8 oz)   SpO2 92%   BMI 21.22 kg/m²   Physical Exam  Vitals reviewed.   HENT:      Mouth/Throat:      Mouth: Mucous membranes are moist.   Eyes:      Pupils: Pupils are equal, round, and reactive to light.   Cardiovascular:      Rate and Rhythm: Normal rate.   Abdominal:      General: Abdomen is flat.   Musculoskeletal:         General: Normal  range of motion.   Neurological:      Mental Status: He is alert. Mental status is at baseline.   Psychiatric:         Mood and Affect: Mood normal.         LABORATORY DATA:   Lab Results   Component Value Date/Time    WBC 9.6 03/04/2024 12:03 PM    RBC 4.91 03/04/2024 12:03 PM    HEMOGLOBIN 13.3 (L) 03/04/2024 12:03 PM    HEMATOCRIT 42.6 03/04/2024 12:03 PM    MCV 86.8 03/04/2024 12:03 PM    MCH 27.1 03/04/2024 12:03 PM    MCHC 31.2 (L) 03/04/2024 12:03 PM    RDW 51.3 (H) 03/04/2024 12:03 PM    PLATELETCT 309 03/04/2024 12:03 PM    MPV 10.7 03/04/2024 12:03 PM    NEUTSPOLYS 77.20 (H) 03/04/2024 12:03 PM    LYMPHOCYTES 11.80 (L) 03/04/2024 12:03 PM    MONOCYTES 6.10 03/04/2024 12:03 PM    EOSINOPHILS 2.80 03/04/2024 12:03 PM    BASOPHILS 1.30 03/04/2024 12:03 PM      Lab Results   Component Value Date/Time    SODIUM 133 (L) 03/04/2024 12:03 PM    POTASSIUM 5.0 03/04/2024 12:03 PM    CHLORIDE 101 03/04/2024 12:03 PM    CO2 22 03/04/2024 12:03 PM    GLUCOSE 100 (H) 03/04/2024 12:03 PM    BUN 11 03/04/2024 12:03 PM    CREATININE 0.75 03/04/2024 12:03 PM         RADIOLOGY DATA:  CT-CHEST,ABDOMEN,PELVIS WITH    Addendum Date: 3/27/2024    VOALTE message of critical findings sent to Dr. Linn at 3/27/2024 2:23 PM. I also received confirmation of receipt of VOALTE message back from the provider.    Result Date: 3/27/2024  1.  Severe lytic osseous metastatic disease. There has been a marked increase in size and number of osseous metastases since prior study. There is a large lytic lesion at T12 protruding the posterior cortex with at least a moderate central canal stenosis. Recommend further workup with MRI of cervical, thoracic and lumbar spine to assess for central canal stenoses and specifically to better assess the T12 level. 2.  Interval development of diffuse pulmonary infiltrates. 3.  Large adrenal masses, increased in size. 4.  2 vague subtle hypodense foci in the right liver, minimally increased. Efforts are  underway to contact referring physician with results at time of dictation.      IMPRESSION:    A 69 y.o. with   Hepatocellular carcinoma (HCC)  Staging form: Liver, AJCC 8th Edition  - Clinical: Stage IVB (cTX, cNX, pM1) - Signed by Monroe Linn D.O. on 11/16/2023      CANCER STATUS:  Disease Progression Distant    RECOMMENDATIONS:   I discussed with patient the role of palliative stereotactic body radiation therapy for the treatment of T12 lytic bone metastasis versus conventional radiation to a larger field encompassing the whole thoracic lumbar spine.  Patient would like to limit number of treatments as well as treatment related toxicity and I did state that SBRT would be a smaller field and have less abdominal toxicity which he is worried about.  Will plan for radiation mapping today targeting T12 bone metastasis 30 Weathers in 5 fractions to start April 15 through April 19.    Thank you for the opportunity to participate in his care.  If any questions or comments, please do not hesitate in calling.    Orders Placed This Encounter    Rad Onc Treatment Planning CT Simulation

## 2024-04-05 NOTE — RADIATION COMPLETION NOTES
PATIENT NAME German Pagan   PRIMARY PHYSICIAN Buck, Bill [id number]   REFERRING PHYSICIAN No ref. provider found 1955     DATE OF SERVICE: 4/5/2024    DIAGNOSIS:  Hepatocellular carcinoma (HCC)  Staging form: Liver, AJCC 8th Edition  - Clinical: Stage IVB (cTX, cNX, pM1) - Signed by Monroe Linn D.O. on 11/16/2023         SPECIAL TREATMENT PROCEDURE NOTE:  Considerable additional effort required in the management of this case because of administration of Stereotactic Radiotherapy, which may result in increased normal tissue toxicity and require greater effort in contouring and treatment because of greater precision.

## 2024-04-05 NOTE — PROGRESS NOTES
Patient was seen today in clinic with Dr. Ruvalcaba for follow up.  Vitals signs and weight were obtained and pain assessment was completed.  Allergies and medications were reviewed with the patient.      Vitals/Pain:  Vitals:    04/05/24 0834   BP: 126/75   BP Location: Right arm   Patient Position: Sitting   BP Cuff Size: Adult   Pulse: 74   Temp: 36.4 °C (97.5 °F)   TempSrc: Temporal   SpO2: 92%   Weight: 65 kg (143 lb 4.8 oz)   Pain Score: 5=Moderate Pain        Allergies:   Pcn [penicillins]    Current Medications:  Current Outpatient Medications   Medication Sig Dispense Refill    [START ON 4/8/2024] methadone (DOLOPHINE) 10 MG Tab Take 1 Tablet by mouth every 8 hours for 30 days. To be used in conjunction with 5 mg tablets for a total of 45mg daily 90 Tablet 0    [START ON 4/8/2024] methadone (DOLOPHINE) 5 MG Tab Take 1 Tablet by mouth every 8 hours for 30 days. To be used in conjunction with 10 mg tablets for a total of 45mg daily 90 Tablet 0    [START ON 4/8/2024] morphine (MS IR) 30 MG tablet Take 1 Tablet by mouth every four hours as needed for Severe Pain for up to 30 days. 75 Tablet 0    methadone (DOLOPHINE) 5 MG Tab Take one tablet three times a day to be used in conjunction with existing 5 mg prescription 14 Tablet 0    LORazepam (ATIVAN) 0.5 MG Tab Take 1 Tablet by mouth every 8 hours as needed for Anxiety for up to 30 days. 60 Tablet 0    hydrOXYzine HCl (ATARAX) 50 MG Tab Take 1 Tablet by mouth 3 times a day as needed for Itching or Anxiety. 90 Tablet 1    cloNIDine (CATAPRES) 0.1 MG Tab Take 0.1 mg by mouth as needed. PRN FOR SYSTOLIC BP > 160      diphenhydrAMINE (BENADRYL ALLERGY) 25 MG capsule Take 25 mg by mouth as needed. FOR SKIN ITCHING      sennosides (SENOKOT) 8.6 MG Tab Take 8.6 mg by mouth 1 time a day as needed.      ondansetron (ZOFRAN) 4 MG Tab tablet Take 1 Tablet by mouth every four hours as needed for Nausea/Vomiting. 20 Tablet 3    prochlorperazine (COMPAZINE) 10 MG Tab Take 1  Tablet by mouth every 6 hours as needed for Nausea/Vomiting. 30 Tablet 3    tamsulosin (FLOMAX) 0.4 MG capsule Take 0.4 mg by mouth every evening.      Naloxone (NARCAN) 4 MG/0.1ML Liquid One spray in one nostril for overdose and call 911. 1 Each 0    Cholecalciferol (VITAMIN D3) 125 MCG (5000 UT) Cap Take 5,000 Units by mouth every 48 hours.       No current facility-administered medications for this encounter.           Marquita Graves, Med Ass't

## 2024-04-07 ENCOUNTER — HOSPITAL ENCOUNTER (OUTPATIENT)
Dept: RADIOLOGY | Facility: MEDICAL CENTER | Age: 69
End: 2024-04-07
Attending: STUDENT IN AN ORGANIZED HEALTH CARE EDUCATION/TRAINING PROGRAM
Payer: MEDICARE

## 2024-04-07 DIAGNOSIS — C22.0 HEPATOCELLULAR CARCINOMA (HCC): Chronic | ICD-10-CM

## 2024-04-07 PROCEDURE — 700117 HCHG RX CONTRAST REV CODE 255: Performed by: STUDENT IN AN ORGANIZED HEALTH CARE EDUCATION/TRAINING PROGRAM

## 2024-04-07 PROCEDURE — A9579 GAD-BASE MR CONTRAST NOS,1ML: HCPCS | Performed by: STUDENT IN AN ORGANIZED HEALTH CARE EDUCATION/TRAINING PROGRAM

## 2024-04-07 PROCEDURE — 73723 MRI JOINT LWR EXTR W/O&W/DYE: CPT | Mod: LT

## 2024-04-07 RX ADMIN — GADOTERIDOL 10 ML: 279.3 INJECTION, SOLUTION INTRAVENOUS at 14:45

## 2024-04-08 ENCOUNTER — PHARMACY VISIT (OUTPATIENT)
Dept: PHARMACY | Facility: MEDICAL CENTER | Age: 69
End: 2024-04-08
Payer: COMMERCIAL

## 2024-04-08 PROCEDURE — RXMED WILLOW AMBULATORY MEDICATION CHARGE: Performed by: FAMILY MEDICINE

## 2024-04-09 ENCOUNTER — PHARMACY VISIT (OUTPATIENT)
Dept: PHARMACY | Facility: MEDICAL CENTER | Age: 69
End: 2024-04-09

## 2024-04-09 PROCEDURE — 77334 RADIATION TREATMENT AID(S): CPT | Mod: 26 | Performed by: RADIOLOGY

## 2024-04-09 PROCEDURE — RXMED WILLOW AMBULATORY MEDICATION CHARGE: Performed by: FAMILY MEDICINE

## 2024-04-09 PROCEDURE — 77295 3-D RADIOTHERAPY PLAN: CPT | Performed by: RADIOLOGY

## 2024-04-09 PROCEDURE — 77300 RADIATION THERAPY DOSE PLAN: CPT | Mod: 26 | Performed by: RADIOLOGY

## 2024-04-09 PROCEDURE — 77334 RADIATION TREATMENT AID(S): CPT | Performed by: RADIOLOGY

## 2024-04-09 PROCEDURE — 77295 3-D RADIOTHERAPY PLAN: CPT | Mod: 26 | Performed by: RADIOLOGY

## 2024-04-09 PROCEDURE — 77300 RADIATION THERAPY DOSE PLAN: CPT | Performed by: RADIOLOGY

## 2024-04-10 ENCOUNTER — PHARMACY VISIT (OUTPATIENT)
Dept: PHARMACY | Facility: MEDICAL CENTER | Age: 69
End: 2024-04-10
Payer: COMMERCIAL

## 2024-04-10 ENCOUNTER — HOME CARE VISIT (OUTPATIENT)
Dept: HOSPICE | Facility: HOSPICE | Age: 69
End: 2024-04-10
Payer: MEDICARE

## 2024-04-10 PROCEDURE — 77370 RADIATION PHYSICS CONSULT: CPT | Performed by: RADIOLOGY

## 2024-04-11 ENCOUNTER — APPOINTMENT (OUTPATIENT)
Dept: HEMATOLOGY ONCOLOGY | Facility: MEDICAL CENTER | Age: 69
End: 2024-04-11
Payer: COMMERCIAL

## 2024-04-15 ENCOUNTER — HOSPITAL ENCOUNTER (OUTPATIENT)
Dept: RADIATION ONCOLOGY | Facility: MEDICAL CENTER | Age: 69
End: 2024-04-15

## 2024-04-15 ENCOUNTER — HOME CARE VISIT (OUTPATIENT)
Dept: HOSPICE | Facility: HOSPICE | Age: 69
End: 2024-04-15
Payer: MEDICARE

## 2024-04-15 LAB
CHEMOTHERAPY INFUSION START DATE: NORMAL
CHEMOTHERAPY RECORDS: 3000
CHEMOTHERAPY RECORDS: 6
CHEMOTHERAPY RECORDS: NORMAL
CHEMOTHERAPY RX CANCER: NORMAL
DATE 1ST CHEMO CANCER: NORMAL
RAD ONC ARIA COURSE LAST TREATMENT DATE: NORMAL
RAD ONC ARIA COURSE TREATMENT ELAPSED DAYS: NORMAL
RAD ONC ARIA REFERENCE POINT DOSAGE GIVEN TO DATE: 6
RAD ONC ARIA REFERENCE POINT ID: NORMAL
RAD ONC ARIA REFERENCE POINT SESSION DOSAGE GIVEN: 6

## 2024-04-15 PROCEDURE — 77280 THER RAD SIMULAJ FIELD SMPL: CPT | Performed by: RADIOLOGY

## 2024-04-15 PROCEDURE — 77373 STRTCTC BDY RAD THER TX DLVR: CPT | Performed by: RADIOLOGY

## 2024-04-15 PROCEDURE — 77435 SBRT MANAGEMENT: CPT | Performed by: RADIOLOGY

## 2024-04-15 PROCEDURE — 77280 THER RAD SIMULAJ FIELD SMPL: CPT | Mod: 26 | Performed by: RADIOLOGY

## 2024-04-15 NOTE — PROCEDURES
DATE OF SERVICE: 4/15/2024    DIAGNOSIS:  Hepatocellular carcinoma (HCC)  Staging form: Liver, AJCC 8th Edition  - Clinical: Stage IVB (cTX, cNX, pM1) - Signed by Monroe Linn D.O. on 11/16/2023       TREATMENT:  Radiation Therapy Episodes       Active Episodes       Radiation Therapy: SBRT (4/15/2024)    Radiation Therapy: SBRT                   Radiation Treatments         Plan Last Treated On Elapsed Days Fractions Treated Prescribed Fraction Dose (cGy) Prescribed Total Dose (cGy)    SBRT T12 4/15/2024 0 @ [id number] 1 of 5 600 3,000                  Reference Point Last Treated On Elapsed Days Most Recent Session Dose (cGy) Total Dose (cGy)    SBRT T12 4/15/2024 0 @ [id number] 600 600                      Historical Treatments (Plans: 1)      Plan Last Treated On Elapsed Days Fractions Treated Prescribed Fraction Dose (cGy) Prescribed Total Dose (cGy)   L Hip SBRT 12/14/2023 10 @ [id number] 5 of 5 700 3,500                Reference Point Last Treated On Elapsed Days Most Recent Session Dose (cGy) Total Dose (cGy)   GTV 12/14/2023 10 @ [id number] -- 3,500                            STEREOTACTIC PROCEDURE NOTE:    Called by Truebeam machine to verify treatment parameters including:  treatment site, treatment dose, and treatment setup prior to stereotactic treatment..    Patient was placed in the treatment position with use of immobilization device and  laser guidance. CBCT images were acquired for target localization.  Images were reviewed in the axial, coronal, and saggital views and shifts were made as necessary to ensure that patient position matched simulation position.      Treatment delivered per  prescription.  The medical physicist was present throughout the set-up, verification and treatment delivery to oversee the procedure and ensure all parameters agreed with the computerized plan.    I have personally reviewed the relevant data, performed the target localization, and determined all relevant  factors for this patient’s simulation.

## 2024-04-16 ENCOUNTER — HOSPITAL ENCOUNTER (OUTPATIENT)
Dept: RADIATION ONCOLOGY | Facility: MEDICAL CENTER | Age: 69
End: 2024-04-16

## 2024-04-16 LAB
CHEMOTHERAPY INFUSION START DATE: NORMAL
CHEMOTHERAPY RECORDS: 3000
CHEMOTHERAPY RECORDS: 6
CHEMOTHERAPY RECORDS: NORMAL
CHEMOTHERAPY RX CANCER: NORMAL
DATE 1ST CHEMO CANCER: NORMAL
RAD ONC ARIA COURSE LAST TREATMENT DATE: NORMAL
RAD ONC ARIA COURSE TREATMENT ELAPSED DAYS: NORMAL
RAD ONC ARIA REFERENCE POINT DOSAGE GIVEN TO DATE: 12
RAD ONC ARIA REFERENCE POINT ID: NORMAL
RAD ONC ARIA REFERENCE POINT SESSION DOSAGE GIVEN: 6

## 2024-04-16 PROCEDURE — 77373 STRTCTC BDY RAD THER TX DLVR: CPT | Performed by: RADIOLOGY

## 2024-04-16 PROCEDURE — 77280 THER RAD SIMULAJ FIELD SMPL: CPT | Performed by: RADIOLOGY

## 2024-04-16 PROCEDURE — 77280 THER RAD SIMULAJ FIELD SMPL: CPT | Mod: 26 | Performed by: RADIOLOGY

## 2024-04-16 NOTE — PROCEDURES
DATE OF SERVICE: 4/16/2024    DIAGNOSIS:  Hepatocellular carcinoma (HCC)  Staging form: Liver, AJCC 8th Edition  - Clinical: Stage IVB (cTX, cNX, pM1) - Signed by Monroe Linn D.O. on 11/16/2023       TREATMENT:  Radiation Therapy Episodes       Active Episodes       Radiation Therapy: SBRT (4/15/2024)    Radiation Therapy: SBRT                   Radiation Treatments         Plan Last Treated On Elapsed Days Fractions Treated Prescribed Fraction Dose (cGy) Prescribed Total Dose (cGy)    SBRT T12 4/16/2024 1 @ [id number] 2 of 5 600 3,000                  Reference Point Last Treated On Elapsed Days Most Recent Session Dose (cGy) Total Dose (cGy)    SBRT T12 4/16/2024 1 @ [id number] 600 1,200                      Historical Treatments (Plans: 1)      Plan Last Treated On Elapsed Days Fractions Treated Prescribed Fraction Dose (cGy) Prescribed Total Dose (cGy)   L Hip SBRT 12/14/2023 10 @ [id number] 5 of 5 700 3,500                Reference Point Last Treated On Elapsed Days Most Recent Session Dose (cGy) Total Dose (cGy)   GTV 12/14/2023 10 @ [id number] -- 3,500                            STEREOTACTIC PROCEDURE NOTE:    Called by Truebeam machine to verify treatment parameters including:  treatment site, treatment dose, and treatment setup prior to stereotactic treatment..    Patient was placed in the treatment position with use of immobilization device and  laser guidance. CBCT images were acquired for target localization.  Images were reviewed in the axial, coronal, and saggital views and shifts were made as necessary to ensure that patient position matched simulation position.      Treatment delivered per  prescription.  The medical physicist was present throughout the set-up, verification and treatment delivery to oversee the procedure and ensure all parameters agreed with the computerized plan.    I have personally reviewed the relevant data, performed the target localization, and determined all relevant  factors for this patient’s simulation.

## 2024-04-17 ENCOUNTER — HOSPITAL ENCOUNTER (OUTPATIENT)
Dept: RADIATION ONCOLOGY | Facility: MEDICAL CENTER | Age: 69
End: 2024-04-17

## 2024-04-17 LAB
CHEMOTHERAPY INFUSION START DATE: NORMAL
CHEMOTHERAPY RECORDS: 3000
CHEMOTHERAPY RECORDS: 6
CHEMOTHERAPY RECORDS: NORMAL
CHEMOTHERAPY RX CANCER: NORMAL
DATE 1ST CHEMO CANCER: NORMAL
RAD ONC ARIA COURSE LAST TREATMENT DATE: NORMAL
RAD ONC ARIA COURSE TREATMENT ELAPSED DAYS: NORMAL
RAD ONC ARIA REFERENCE POINT DOSAGE GIVEN TO DATE: 18
RAD ONC ARIA REFERENCE POINT ID: NORMAL
RAD ONC ARIA REFERENCE POINT SESSION DOSAGE GIVEN: 6

## 2024-04-17 PROCEDURE — 77373 STRTCTC BDY RAD THER TX DLVR: CPT | Performed by: RADIOLOGY

## 2024-04-17 PROCEDURE — 77280 THER RAD SIMULAJ FIELD SMPL: CPT | Performed by: RADIOLOGY

## 2024-04-17 PROCEDURE — 77280 THER RAD SIMULAJ FIELD SMPL: CPT | Mod: 26 | Performed by: RADIOLOGY

## 2024-04-17 NOTE — PROCEDURES
DATE OF SERVICE: 4/17/2024    DIAGNOSIS:  Hepatocellular carcinoma (HCC)  Staging form: Liver, AJCC 8th Edition  - Clinical: Stage IVB (cTX, cNX, pM1) - Signed by Monroe Linn D.O. on 11/16/2023       TREATMENT:  Radiation Therapy Episodes       Active Episodes       Radiation Therapy: SBRT (4/15/2024)    Radiation Therapy: SBRT                   Radiation Treatments         Plan Last Treated On Elapsed Days Fractions Treated Prescribed Fraction Dose (cGy) Prescribed Total Dose (cGy)    SBRT T12 4/17/2024 2 @ [id number] 3 of 5 600 3,000                  Reference Point Last Treated On Elapsed Days Most Recent Session Dose (cGy) Total Dose (cGy)    SBRT T12 4/17/2024 2 @ [id number] 600 1,800                      Historical Treatments (Plans: 1)      Plan Last Treated On Elapsed Days Fractions Treated Prescribed Fraction Dose (cGy) Prescribed Total Dose (cGy)   L Hip SBRT 12/14/2023 10 @ [id number] 5 of 5 700 3,500                Reference Point Last Treated On Elapsed Days Most Recent Session Dose (cGy) Total Dose (cGy)   GTV 12/14/2023 10 @ [id number] -- 3,500                            STEREOTACTIC PROCEDURE NOTE:    Called by Truebeam machine to verify treatment parameters including:  treatment site, treatment dose, and treatment setup prior to stereotactic treatment..    Patient was placed in the treatment position with use of immobilization device and  laser guidance. CBCT images were acquired for target localization.  Images were reviewed in the axial, coronal, and saggital views and shifts were made as necessary to ensure that patient position matched simulation position.      Treatment delivered per  prescription.  The medical physicist was present throughout the set-up, verification and treatment delivery to oversee the procedure and ensure all parameters agreed with the computerized plan.    I have personally reviewed the relevant data, performed the target localization, and determined all relevant  factors for this patient’s simulation.

## 2024-04-18 ENCOUNTER — HOSPITAL ENCOUNTER (OUTPATIENT)
Dept: RADIATION ONCOLOGY | Facility: MEDICAL CENTER | Age: 69
End: 2024-04-18

## 2024-04-18 LAB
CHEMOTHERAPY INFUSION START DATE: NORMAL
CHEMOTHERAPY RECORDS: 3000
CHEMOTHERAPY RECORDS: 6
CHEMOTHERAPY RECORDS: NORMAL
CHEMOTHERAPY RX CANCER: NORMAL
DATE 1ST CHEMO CANCER: NORMAL
RAD ONC ARIA COURSE LAST TREATMENT DATE: NORMAL
RAD ONC ARIA COURSE TREATMENT ELAPSED DAYS: NORMAL
RAD ONC ARIA REFERENCE POINT DOSAGE GIVEN TO DATE: 24
RAD ONC ARIA REFERENCE POINT ID: NORMAL
RAD ONC ARIA REFERENCE POINT SESSION DOSAGE GIVEN: 6

## 2024-04-18 PROCEDURE — 77280 THER RAD SIMULAJ FIELD SMPL: CPT | Mod: 26 | Performed by: RADIOLOGY

## 2024-04-18 PROCEDURE — 77280 THER RAD SIMULAJ FIELD SMPL: CPT | Performed by: RADIOLOGY

## 2024-04-18 PROCEDURE — 77373 STRTCTC BDY RAD THER TX DLVR: CPT | Performed by: RADIOLOGY

## 2024-04-18 NOTE — PROCEDURES
DATE OF SERVICE: 4/18/2024    DIAGNOSIS:  Hepatocellular carcinoma (HCC)  Staging form: Liver, AJCC 8th Edition  - Clinical: Stage IVB (cTX, cNX, pM1) - Signed by Monroe Linn D.O. on 11/16/2023       TREATMENT:  Radiation Therapy Episodes       Active Episodes       Radiation Therapy: SBRT (4/15/2024)    Radiation Therapy: SBRT                   Radiation Treatments         Plan Last Treated On Elapsed Days Fractions Treated Prescribed Fraction Dose (cGy) Prescribed Total Dose (cGy)    SBRT T12 4/18/2024 3 @ [id number] 4 of 5 600 3,000                  Reference Point Last Treated On Elapsed Days Most Recent Session Dose (cGy) Total Dose (cGy)    SBRT T12 4/18/2024 3 @ [id number] 600 2,400                      Historical Treatments (Plans: 1)      Plan Last Treated On Elapsed Days Fractions Treated Prescribed Fraction Dose (cGy) Prescribed Total Dose (cGy)   L Hip SBRT 12/14/2023 10 @ [id number] 5 of 5 700 3,500                Reference Point Last Treated On Elapsed Days Most Recent Session Dose (cGy) Total Dose (cGy)   GTV 12/14/2023 10 @ [id number] -- 3,500                            STEREOTACTIC PROCEDURE NOTE:    Called by Truebeam machine to verify treatment parameters including:  treatment site, treatment dose, and treatment setup prior to stereotactic treatment..    Patient was placed in the treatment position with use of immobilization device and  laser guidance. CBCT images were acquired for target localization.  Images were reviewed in the axial, coronal, and saggital views and shifts were made as necessary to ensure that patient position matched simulation position.      Treatment delivered per  prescription.  The medical physicist was present throughout the set-up, verification and treatment delivery to oversee the procedure and ensure all parameters agreed with the computerized plan.    I have personally reviewed the relevant data, performed the target localization, and determined all relevant  factors for this patient’s simulation.

## 2024-04-19 ENCOUNTER — HOSPITAL ENCOUNTER (OUTPATIENT)
Dept: RADIATION ONCOLOGY | Facility: MEDICAL CENTER | Age: 69
End: 2024-04-19

## 2024-04-19 LAB
CHEMOTHERAPY INFUSION START DATE: NORMAL
CHEMOTHERAPY INFUSION START DATE: NORMAL
CHEMOTHERAPY INFUSION STOP DATE: NORMAL
CHEMOTHERAPY RECORDS: 3000
CHEMOTHERAPY RECORDS: 3500
CHEMOTHERAPY RECORDS: 6
CHEMOTHERAPY RECORDS: 7
CHEMOTHERAPY RECORDS: NORMAL
CHEMOTHERAPY RX CANCER: NORMAL
CHEMOTHERAPY RX CANCER: NORMAL
DATE 1ST CHEMO CANCER: NORMAL
DATE 1ST CHEMO CANCER: NORMAL
RAD ONC ARIA COURSE LAST TREATMENT DATE: NORMAL
RAD ONC ARIA COURSE LAST TREATMENT DATE: NORMAL
RAD ONC ARIA COURSE TREATMENT ELAPSED DAYS: NORMAL
RAD ONC ARIA COURSE TREATMENT ELAPSED DAYS: NORMAL
RAD ONC ARIA REFERENCE POINT DOSAGE GIVEN TO DATE: 30
RAD ONC ARIA REFERENCE POINT DOSAGE GIVEN TO DATE: 35
RAD ONC ARIA REFERENCE POINT ID: NORMAL
RAD ONC ARIA REFERENCE POINT ID: NORMAL
RAD ONC ARIA REFERENCE POINT SESSION DOSAGE GIVEN: 6

## 2024-04-19 PROCEDURE — 77373 STRTCTC BDY RAD THER TX DLVR: CPT | Performed by: RADIOLOGY

## 2024-04-19 PROCEDURE — 77280 THER RAD SIMULAJ FIELD SMPL: CPT | Mod: 26 | Performed by: RADIOLOGY

## 2024-04-19 PROCEDURE — 77336 RADIATION PHYSICS CONSULT: CPT | Mod: XU | Performed by: RADIOLOGY

## 2024-04-19 PROCEDURE — 77280 THER RAD SIMULAJ FIELD SMPL: CPT | Performed by: RADIOLOGY

## 2024-04-19 NOTE — PROCEDURES
DATE OF SERVICE: 4/19/2024    DIAGNOSIS:  Hepatocellular carcinoma (HCC)  Staging form: Liver, AJCC 8th Edition  - Clinical: Stage IVB (cTX, cNX, pM1) - Signed by Monroe Linn D.O. on 11/16/2023       TREATMENT:  Radiation Therapy Episodes       Active Episodes       Radiation Therapy: SBRT (4/15/2024)    Radiation Therapy: SBRT                   Radiation Treatments         Plan Last Treated On Elapsed Days Fractions Treated Prescribed Fraction Dose (cGy) Prescribed Total Dose (cGy)    SBRT T12 4/19/2024 4 @ [id number] 5 of 5 600 3,000                  Reference Point Last Treated On Elapsed Days Most Recent Session Dose (cGy) Total Dose (cGy)    SBRT T12 4/19/2024 4 @ [id number] 600 3,000                      Historical Treatments (Plans: 1)      Plan Last Treated On Elapsed Days Fractions Treated Prescribed Fraction Dose (cGy) Prescribed Total Dose (cGy)   L Hip SBRT 12/14/2023 10 @ [id number] 5 of 5 700 3,500                Reference Point Last Treated On Elapsed Days Most Recent Session Dose (cGy) Total Dose (cGy)   GTV 12/14/2023 10 @ [id number] -- 3,500                            STEREOTACTIC PROCEDURE NOTE:    Called by Truebeam machine to verify treatment parameters including:  treatment site, treatment dose, and treatment setup prior to stereotactic treatment..    Patient was placed in the treatment position with use of immobilization device and  laser guidance. CBCT images were acquired for target localization.  Images were reviewed in the axial, coronal, and saggital views and shifts were made as necessary to ensure that patient position matched simulation position.      Treatment delivered per  prescription.  The medical physicist was present throughout the set-up, verification and treatment delivery to oversee the procedure and ensure all parameters agreed with the computerized plan.    I have personally reviewed the relevant data, performed the target localization, and determined all relevant  factors for this patient’s simulation.

## 2024-04-22 ENCOUNTER — HOME CARE VISIT (OUTPATIENT)
Dept: HOSPICE | Facility: HOSPICE | Age: 69
End: 2024-04-22
Payer: MEDICARE

## 2024-04-22 NOTE — RADIATION COMPLETION NOTES
"  END OF TREATMENT SUMMARY    Patient name:  German Pagan    Primary Physician:  VALORIE Lawrence MRN: [id number]  CSN: [id number]   Referring physician:  Dr. Linn : 1955, 69 y.o.       TREATMENT SUMMARY:        Course First Treatment Date 04/15/2024    Course Last Treatment Date 2024   Course Elapsed Days 4 @ [id number]   Course Intent Palliative     Radiation Therapy Episodes       Active Episodes       Radiation Therapy: SBRT (4/15/2024)    Radiation Therapy: SBRT                   Radiation Treatments         Plan Last Treated On Elapsed Days Fractions Treated Prescribed Fraction Dose (cGy) Prescribed Total Dose (cGy)    SBRT T12 2024 4 @ [id number] 5 of 5 600 3,000                  Reference Point Last Treated On Elapsed Days Most Recent Session Dose (cGy) Total Dose (cGy)    SBRT T12 2024 4 @ [id number] 600 3,000                      Historical Treatments (Plans: 1)      Plan Last Treated On Elapsed Days Fractions Treated Prescribed Fraction Dose (cGy) Prescribed Total Dose (cGy)   L Hip SBRT 2023 10 @ [id number] 5 of 5 700 3,500                Reference Point Last Treated On Elapsed Days Most Recent Session Dose (cGy) Total Dose (cGy)   GTV 2023 10 @ [id number] -- 3,500                                     STAGE:   Hepatocellular carcinoma (HCC)  Staging form: Liver, AJCC 8th Edition  - Clinical: Stage IVB (cTX, cNX, pM1) - Signed by Monroe Linn D.O. on 2023       TREATMENT INDICATION:   ***     CONCURRENT SYSTEMIC TREATMENT:   ***     RT COURSE DISCONTINUED EARLY:   No     PATIENT EXPERIENCE:        No data to display                 FOLLOW-UP PLAN:   {avpfollowuplist:05762::\"8 Weeks\"}     COMMENT:          ANATOMIC TARGET SUMMARY    ANATOMIC TARGET MODALITY TECHNIQUE   ***   {RAD ONC MODALITY:53826} {RAD ONC Technique:89613::\"IMRT\"}            COMMENT:         DIAGRAMS:      DOSE VOLUME HISTOGRAMS:            "

## 2024-04-22 NOTE — ADDENDUM NOTE
Encounter addended by: Marquita Graves, Med Ass't on: 4/22/2024 12:55 PM   Actions taken: Pend clinical note

## 2024-04-29 ENCOUNTER — TELEPHONE (OUTPATIENT)
Dept: HEMATOLOGY ONCOLOGY | Facility: MEDICAL CENTER | Age: 69
End: 2024-04-29
Payer: COMMERCIAL

## 2024-04-29 NOTE — TELEPHONE ENCOUNTER
Patient partner call to reschedule appointments do to patient not feel good today, Chemo was move to Saturday and patient reschedule with Antonia for Friday.

## 2024-04-30 ENCOUNTER — APPOINTMENT (OUTPATIENT)
Dept: HEMATOLOGY ONCOLOGY | Facility: MEDICAL CENTER | Age: 69
End: 2024-04-30
Payer: MEDICARE

## 2024-05-03 NOTE — PROGRESS NOTES
Subjective     German Pagan is a 69 y.o. male who presents with Hepatic Cancer (Kaveh/Prechemo)            HPI  Mr. Pagan presents for evaluation prior to Xgeva and for pain medication for supportive treatment of stage IVb, cTxNx pM1, hepatocellular carcinoma. He is accompanied by his wife for today's visit.     Patient was in his usual state of health until 10/2/2023 when he presented to ED for worsening back pain, testicular pain worsening over approximately 1 month. Patient noted weight loss and night sweats. CT completed during ED visit showed lytic lesion in right eighth rib, spine, pelvic bones, concerning for lytic metastases versus myeloma. He was referred to [facility] for further evaluation of abnormal SPEP and imaging. Bone marrow biopsy completed 10/20/23 showed some atypical findings but nothing conclusive. Patient was advised to undergo biopsy of lytic lesion for further evaluation. Bone biopsy completed 11/8/23 confirmed metastatic hepatocellular carcinoma. He completed 5 fractions palliative SBRT to left hip, per Dr. Ruvalcaba, from 12/4-12/14/23. He initiated tremelimumab, durvalumab 1/9/24, transitioning to maintenance durvalumab with cycle 2, completing cycle 3 on 3/5/24.CT completed 3/27/24 showed progression of disease.  He completed palliative radiation to T12 from 4/15-4/19/24.  He has been offered lenvatinib versus hospice care and will advise as to his decision, in the interim we will continue with supportive Xgeva and pain medication.     Patient reports fatigue and pain are increased but he is sleeping better.  Fiber Gummies and dura lax help with chronic constipation related to pain medication.     Review of Systems   Constitutional:  Positive for malaise/fatigue. Negative for chills, fever and weight loss (stable).   Respiratory:  Negative for cough, shortness of breath and wheezing.    Cardiovascular:  Negative for chest pain, palpitations and leg swelling.   Gastrointestinal:  Positive for  "constipation (r/t chonic opioids; fiber gummies help, duralax PRN). Negative for blood in stool, diarrhea, melena, nausea and vomiting.   Genitourinary:  Negative for dysuria and hematuria.   Musculoskeletal:  Positive for back pain (low), joint pain (left hip) and myalgias (generalized).   Neurological:  Negative for dizziness, tingling and headaches.   Psychiatric/Behavioral:  The patient does not have insomnia.      Allergies   Allergen Reactions    Pcn [Penicillins]      Pt reports that it was a childhood allergie not sure what happens          No current outpatient medications on file prior to encounter.     No current facility-administered medications on file prior to encounter.              Objective     /60 (BP Location: Right arm, Patient Position: Sitting, BP Cuff Size: Adult)   Pulse 89   Temp 36.7 °C (98 °F) (Temporal)   Resp 14   Ht 1.753 m (5' 9\")   Wt 64.6 kg (142 lb 6.4 oz)   SpO2 92%   BMI 21.03 kg/m²      Physical Exam  Vitals reviewed.   Constitutional:       General: He is not in acute distress.     Appearance: He is well-developed. He is not diaphoretic.   HENT:      Head: Normocephalic and atraumatic.   Eyes:      General: No scleral icterus.        Right eye: No discharge.         Left eye: No discharge.   Cardiovascular:      Rate and Rhythm: Normal rate and regular rhythm.      Heart sounds: Normal heart sounds. No murmur heard.     No friction rub. No gallop.   Pulmonary:      Effort: Pulmonary effort is normal. No respiratory distress.      Breath sounds: Normal breath sounds. No wheezing.   Abdominal:      General: There is no distension.      Palpations: Abdomen is soft.      Tenderness: There is no abdominal tenderness.   Musculoskeletal:         General: Normal range of motion.      Cervical back: Normal range of motion.   Skin:     General: Skin is warm and dry.      Coloration: Skin is not pale.      Findings: No erythema or rash.   Neurological:      Mental Status: He " is alert and oriented to person, place, and time.   Psychiatric:         Behavior: Behavior normal.          Latest Reference Range & Units 03/04/24 12:03   WBC 4.8 - 10.8 K/uL 9.6   RBC 4.70 - 6.10 M/uL 4.91   Hemoglobin 14.0 - 18.0 g/dL 13.3 (L)   Hematocrit 42.0 - 52.0 % 42.6   MCV 81.4 - 97.8 fL 86.8   MCH 27.0 - 33.0 pg 27.1   MCHC 32.3 - 36.5 g/dL 31.2 (L)   RDW 35.9 - 50.0 fL 51.3 (H)   Platelet Count 164 - 446 K/uL 309   MPV 9.0 - 12.9 fL 10.7   Neutrophils-Polys 44.00 - 72.00 % 77.20 (H)   Neutrophils (Absolute) 1.82 - 7.42 K/uL 7.41   Lymphocytes 22.00 - 41.00 % 11.80 (L)   Lymphs (Absolute) 1.00 - 4.80 K/uL 1.13   Monocytes 0.00 - 13.40 % 6.10   Monos (Absolute) 0.00 - 0.85 K/uL 0.59   Eosinophils 0.00 - 6.90 % 2.80   Eos (Absolute) 0.00 - 0.51 K/uL 0.27   Basophils 0.00 - 1.80 % 1.30   Baso (Absolute) 0.00 - 0.12 K/uL 0.12   Immature Granulocytes 0.00 - 0.90 % 0.80   Immature Granulocytes (abs) 0.00 - 0.11 K/uL 0.08   Nucleated RBC 0.00 - 0.20 /100 WBC 0.00   NRBC (Absolute) K/uL 0.00   Sodium 135 - 145 mmol/L 133 (L)   Potassium 3.6 - 5.5 mmol/L 5.0   Chloride 96 - 112 mmol/L 101   Co2 20 - 33 mmol/L 22   Anion Gap 7.0 - 16.0  10.0   Glucose 65 - 99 mg/dL 100 (H)   Bun 8 - 22 mg/dL 11   Creatinine 0.50 - 1.40 mg/dL 0.75   GFR (CKD-EPI) >60 mL/min/1.73 m 2 96   Calcium 8.5 - 10.5 mg/dL 8.8   Correct Calcium 8.5 - 10.5 mg/dL 9.0   AST(SGOT) 12 - 45 U/L 85 (H)   ALT(SGPT) 2 - 50 U/L 136 (H)   Alkaline Phosphatase 30 - 99 U/L 151 (H)   Total Bilirubin 0.1 - 1.5 mg/dL 0.3   Albumin 3.2 - 4.9 g/dL 3.7   Total Protein 6.0 - 8.2 g/dL 7.3   Globulin 1.9 - 3.5 g/dL 3.6 (H)   A-G Ratio g/dL 1.0   INR 0.87 - 1.13  1.11   PT 12.0 - 14.6 sec 14.5   Alpha Fetoprotein 0 - 9 ng/mL 831 (H)   TSH 0.380 - 5.330 uIU/mL 1.150   Free T-4 0.93 - 1.70 ng/dL 1.13   (L): Data is abnormally low  (H): Data is abnormally high      CT CAP  3/27/2024 12:48 PM  HISTORY/REASON FOR EXAM: .  C 22.0     TECHNIQUE/EXAM DESCRIPTION:  CT scan  of the chest, abdomen and pelvis with contrast.     Thin-section helical scanning was obtained with intravenous contrast from the lung apices through the pubic symphysis to include the chest, abdomen and pelvis. 100 mL of Omnipaque 350 nonionic contrast was administered intravenously without complication.     Low dose optimization technique was utilized for this CT exam including automated exposure control and adjustment of the mA and/or kV according to patient size.     COMPARISON: Comparison for chest CT portion of study is PET/CT 10/18/2023. Comparison for abdomen/pelvis portion of the study is CT abdomen and pelvis 10/2/2023.     FINDINGS:  Osseous structures: Widespread lytic osseous metastatic disease. This was also present previously. There has been substantial increase in size of lytic metastases since the previous study. For example there is a metastasis in the right L5 vertebra and   pedicle measuring 4.5 x 2.4 cm and previously 3.2 x 1.7 cm.     There are many new lesions since prior.     Osseous metastatic disease is most notable largest metastasis in the T12 vertebral body which is eroding the posterior cortex of the T12 vertebral body. This is at least moderately narrowing the central canal. Recommend further workup with whole-body   spine MRI.     CT Chest:  Lungs: Right apical nodular density on image 15 measures 14 x 10 mm. This is suspected to represent scarring. It was also seen on the previous PET/CT and was not glucose avid.     Since the previous PET CT there has been interval development of extensive ill-defined centrilobular groundglass opacities suggesting infection.     No definite suspicious focal lung nodule is detected. Scarring in the right lung base.     Pleura: No pleural effusion.     Mediastinum/Cristina: No significant adenopathy.     Cardiac: Heart normal in size without pericardial effusion.     Vascular: Unremarkable.     Soft tissues: Unremarkable.     CT Abdomen and  Pelvis:  Liver: 6 mm hypodensity right liver image 24, previously 4 mm.     5 mm hypodensity right liver image 24, previously 3 mm.     Spleen: Unremarkable.     Pancreas: Unremarkable.     Gallbladder: No calcified stones.     Biliary: Nondilated.     Adrenal glands: Right adrenal mass image 22 measures 5.5 x 4.6 cm previously 3.4 x 3.3 cm. Left adrenal mass image 26 measures 4.5 x 4.1 cm previously 2.1 x 1.8 cm.     Kidneys: Unremarkable without hydronephrosis.     Bowel: No obstruction or acute inflammation.     Lymph nodes: No adenopathy.     Vasculature: Unremarkable.     Ascites: None.     Pelvis: No adenopathy or free fluid.     IMPRESSION:  1.  Severe lytic osseous metastatic disease. There has been a marked increase in size and number of osseous metastases since prior study. There is a large lytic lesion at T12 protruding the posterior cortex with at least a moderate central canal stenosis. Recommend further workup with MRI of cervical, thoracic and lumbar spine to assess for central canal stenoses and specifically to better assess the T12 level.  2.  Interval development of diffuse pulmonary infiltrates.  3.  Large adrenal masses, increased in size.  4.  2 vague subtle hypodense foci in the right liver, minimally increased.         Assessment & Plan   1. Oncology follow-up encounter        2. Hepatocellular carcinoma (HCC)  DISCONTINUED: methadone (DOLOPHINE) 10 MG Tab    DISCONTINUED: methadone (DOLOPHINE) 5 MG Tab    DISCONTINUED: morphine (MS IR) 30 MG tablet      3. Anxiety  DISCONTINUED: LORazepam (ATIVAN) 0.5 MG Tab      4. Cancer related pain  DISCONTINUED: methadone (DOLOPHINE) 10 MG Tab    DISCONTINUED: methadone (DOLOPHINE) 5 MG Tab    DISCONTINUED: morphine (MS IR) 30 MG tablet      5. Metastasis to bone (HCC)  DISCONTINUED: methadone (DOLOPHINE) 5 MG Tab    DISCONTINUED: morphine (MS IR) 30 MG tablet          1.  HCC: Diagnosed 11/8/23; s/p 5 fractions palliative SBRT 12/4-12/14/23; s/p 3  cycles tremelimumab, durvalumab 1/5-3/5/24; s/p palliative XRT 4/15-4/19/24     CT completed 3/27/24 showed progression of disease.  He was provided option of lenvatinib versus hospice.  Patient with increasing fatigue and poor activity tolerance and desires to proceed with hospice, consultation is already scheduled.  Pain medication is filled in the interim.  Patient is discharged to self-care to follow-up as needed, will likely obtain next meds per hospice.      The patient verbalized agreement and understanding of current plan. All questions and concerns were addressed at time of visit.    Please note that this dictation was created using voice recognition software. I have made every reasonable attempt to correct obvious errors, but I expect that there are errors of grammar and possibly content that I did not discover before finalizing the note.

## 2024-05-08 NOTE — PROGRESS NOTES
Patient discussed with Ish Olmedo RN. Patient admitting to hospice with primary diagnosis of hepatocellular carcinoma. Comfort medications ordered at this time.

## 2024-05-13 NOTE — PROGRESS NOTES
Patient discussed with Matilde Kimbrough RN. Patient has ongoing pain not relieved with current dosing.He has been taking methadone 20 mg TID, morphine 30 mg PRN - multiple doses per day. New order to increase methadone 40 mg BID.

## 2024-05-14 NOTE — PROGRESS NOTES
Patient discussed with Landon Kimbrough RN. Patient having increased pain, 10/10. MSIR only lasting 1 hour then pain returns. Recent increase in methadone. New order for dexamethasone 4 mg daily. MSIR dosing increased to 40 mg every 1 hour PRN. Pt is completely out of MSIR, delivery today ASAP

## 2024-05-15 NOTE — PROGRESS NOTES
Patient discussed during IDT with Dr. Gilliland and Landon Kimbrough RN. Patient continues to have severe pain. Will continue titrating methadone up for baseline pain relief. New order for methadone 50 mg BID. Profile only at this time.

## 2024-05-20 NOTE — PROGRESS NOTES
Patient currently having pain crisis despite the increase in Methadone late last week. Will increase Decadron and PRN Methadone dose range and re-evaluate. D/w LANA Navarrete.

## 2024-06-03 NOTE — PROGRESS NOTES
Patient discussed with Landon Kimbrough RN. Patient having increased pain. Continue with titration of methadone to 40 mg every 8 hours. Patient prefers MSIR tablets, ordered MSIR 30 mg PRN dosing. Patient to continue MSIR liquid for when he is unable to swallow.

## 2024-06-10 NOTE — PROGRESS NOTES
Patient discussed with Matilde Kimbrough RN. Patient taking methadone 45 mg every 8 hours, pain is improving. He continues with MSIR. PRN. Change of methadone to liquid as pharmacy does not have methadone tabs in stock.

## 2024-06-15 NOTE — PROGRESS NOTES
Patient discussed with China Colunga RN. Patient having increasing agitation despite routinely dosing with lorazepam 2 mg. Increased dosage. Patient requires refills of meds to be delivered today.

## 2024-08-05 PROCEDURE — 665036 HSPC NOTICE OF ELECTION NOE

## (undated) DEVICE — STERI STRIP COMPOUND BENZOIN - TINCTURE 0.6ML WITH APPLICATOR (40EA/BX)

## (undated) DEVICE — BANDAID SHEER STRIP 3/4 IN (100EA/BX 12BX/CA)

## (undated) DEVICE — SYSTEM DISSECTION BALLOON  KII OVAL WITH 2 EACH 5MM LOW PROFILE TROCARS (3EA/BX)

## (undated) DEVICE — SODIUM CHL IRRIGATION 0.9% 1000ML (12EA/CA)

## (undated) DEVICE — CLOSURE SKIN STRIP 1/2 X 4 IN - (STERI STRIP) (50/BX 4BX/CA)

## (undated) DEVICE — SUTURE GENERAL

## (undated) DEVICE — TROCAR 5X100 NON BLADED Z-TH - READ KII (6/BX)

## (undated) DEVICE — CANNULA W/SEAL 5X100 Z-THRE - ADED KII (12/BX)

## (undated) DEVICE — SLEEVE, VASO, THIGH, MED

## (undated) DEVICE — SET TUBING PNEUMOCLEAR HIGH FLOW SMOKE EVACUATION (10EA/BX)

## (undated) DEVICE — DRAPESURG STERI-DRAPE LONG - (10/BX 4BX/CA)

## (undated) DEVICE — GOWN WARMING STANDARD FLEX - (30/CA)

## (undated) DEVICE — BANDAID X-LARGE 2 X 4 IN LF (50EA/BX)

## (undated) DEVICE — SUTURE 4-0 VICRYL PLUSFS-1 - 27 INCH (36/BX)

## (undated) DEVICE — CANISTER SUCTION 3000ML MECHANICAL FILTER AUTO SHUTOFF MEDI-VAC NONSTERILE LF DISP  (40EA/CA)

## (undated) DEVICE — PACK LAP CHOLE OR - (2EA/CA)

## (undated) DEVICE — TUBING CLEARLINK DUO-VENT - C-FLO (48EA/CA)

## (undated) DEVICE — GLOVE BIOGEL PI INDICATOR SZ 7.5 SURGICAL PF LF -(50/BX 4BX/CA)

## (undated) DEVICE — SUTURE 0 VICRYL PLUS UR-6 - 27 INCH (36/BX)

## (undated) DEVICE — SENSOR OXIMETER ADULT SPO2 RD SET (20EA/BX)

## (undated) DEVICE — LACTATED RINGERS INJ 1000 ML - (14EA/CA 60CA/PF)

## (undated) DEVICE — GLOVE SZ 7 BIOGEL PI MICRO - PF LF (50PR/BX 4BX/CA)

## (undated) DEVICE — TROCAR LAPSCP 100MM 12MM NTHRD - (6/BX)

## (undated) DEVICE — ELECTRODE DUAL RETURN W/ CORD - (50/PK)

## (undated) DEVICE — SET LEADWIRE 5 LEAD BEDSIDE DISPOSABLE ECG (1SET OF 5/EA)

## (undated) DEVICE — SET EXTENSION WITH 2 PORTS (48EA/CA) ***PART #2C8610 IS A SUBSTITUTE*****

## (undated) DEVICE — COVER LIGHT HANDLE ALC PLUS DISP (18EA/BX)

## (undated) DEVICE — SUCTION INSTRUMENT YANKAUER BULBOUS TIP W/O VENT (50EA/CA)

## (undated) DEVICE — CHLORAPREP 26 ML APPLICATOR - ORANGE TINT(25/CA)